# Patient Record
Sex: MALE | Race: WHITE | Employment: OTHER | URBAN - METROPOLITAN AREA
[De-identification: names, ages, dates, MRNs, and addresses within clinical notes are randomized per-mention and may not be internally consistent; named-entity substitution may affect disease eponyms.]

---

## 2017-03-23 ENCOUNTER — HOSPITAL ENCOUNTER (OUTPATIENT)
Dept: GENERAL RADIOLOGY | Age: 67
Discharge: HOME | End: 2017-03-23
Attending: PSYCHIATRY & NEUROLOGY | Admitting: PSYCHIATRY & NEUROLOGY

## 2017-03-23 LAB
ALBUMIN SERPL-MCNC: 4.7 GM/DL (ref 3.4–5)
ALP BLD-CCNC: 97 IU/L (ref 40–128)
ALT SERPL-CCNC: 12 U/L (ref 10–40)
ANION GAP SERPL CALCULATED.3IONS-SCNC: 11 MMOL/L (ref 4–16)
AST SERPL-CCNC: 13 IU/L (ref 15–37)
BASOPHILS ABSOLUTE: 0 K/CU MM
BASOPHILS RELATIVE PERCENT: 0.3 % (ref 0–1)
BILIRUB SERPL-MCNC: 0.7 MG/DL (ref 0–1)
BILIRUB SERPL-MCNC: 0.7 MG/DL (ref 0–1)
BUN BLDV-MCNC: 12 MG/DL (ref 6–23)
CALCIUM SERPL-MCNC: 10.3 MG/DL (ref 8.3–10.6)
CHLORIDE BLD-SCNC: 100 MMOL/L (ref 99–110)
CO2: 28 MMOL/L (ref 21–32)
CREAT SERPL-MCNC: 0.9 MG/DL (ref 0.9–1.3)
DIFFERENTIAL TYPE: ABNORMAL
EOSINOPHILS ABSOLUTE: 0.1 K/CU MM
EOSINOPHILS RELATIVE PERCENT: 0.6 % (ref 0–3)
ERYTHROCYTE SEDIMENTATION RATE: 12 MM/HR (ref 0–20)
GFR AFRICAN AMERICAN: >60 ML/MIN/1.73M2
GFR NON-AFRICAN AMERICAN: >60 ML/MIN/1.73M2
GLUCOSE BLD-MCNC: 88 MG/DL (ref 70–140)
HCT VFR BLD CALC: 43.7 % (ref 42–52)
HEMOGLOBIN: 13.7 GM/DL (ref 13.5–18)
IMMATURE NEUTROPHIL %: 0.4 % (ref 0–0.43)
LYMPHOCYTES ABSOLUTE: 1.6 K/CU MM
LYMPHOCYTES RELATIVE PERCENT: 16.4 % (ref 24–44)
MCH RBC QN AUTO: 28.6 PG (ref 27–31)
MCHC RBC AUTO-ENTMCNC: 31.4 % (ref 32–36)
MCV RBC AUTO: 91.2 FL (ref 78–100)
MONOCYTES ABSOLUTE: 0.6 K/CU MM
MONOCYTES RELATIVE PERCENT: 6.3 % (ref 0–4)
NUCLEATED RBC %: 0 %
PDW BLD-RTO: 14.6 % (ref 11.7–14.9)
PLATELET # BLD: 181 K/CU MM (ref 140–440)
PMV BLD AUTO: 10.5 FL (ref 7.5–11.1)
POTASSIUM SERPL-SCNC: 4.4 MMOL/L (ref 3.5–5.1)
RBC # BLD: 4.79 M/CU MM (ref 4.6–6.2)
SEGMENTED NEUTROPHILS ABSOLUTE COUNT: 7.4 K/CU MM
SEGMENTED NEUTROPHILS RELATIVE PERCENT: 76 % (ref 36–66)
SODIUM BLD-SCNC: 139 MMOL/L (ref 135–145)
TOTAL CK: 49 IU/L (ref 38–174)
TOTAL IMMATURE NEUTOROPHIL: 0.04 K/CU MM
TOTAL NUCLEATED RBC: 0 K/CU MM
TOTAL PROTEIN: 7 GM/DL (ref 6.4–8.2)
TSH HIGH SENSITIVITY: 1.97 UIU/ML (ref 0.27–4.2)
WBC # BLD: 9.8 K/CU MM (ref 4–10.5)

## 2017-03-24 LAB
ALBUMIN ELP: 4 GM/DL (ref 3.2–5.6)
ALPHA-1-GLOBULIN: 0.3 GM/DL (ref 0.1–0.4)
ALPHA-2-GLOBULIN: 0.7 GM/DL (ref 0.4–1.2)
BETA GLOBULIN: 0.9 GM/DL (ref 0.5–1.3)
FOLATE: 3.6 NG/ML (ref 3.1–17.5)
GAMMA GLOBULIN: 1.1 GM/DL (ref 0.5–1.6)
T3 UPTAKE PERCENT: 36
T4 TOTAL: 7.57 UG/DL
TOTAL PROTEIN: 7 GM/DL (ref 6.4–8.2)
VITAMIN B-12: 1868 PG/ML (ref 211–911)

## 2017-03-25 LAB — ANTI-NUCLEAR ANTIBODY (ANA): NORMAL

## 2017-05-30 ENCOUNTER — HOSPITAL ENCOUNTER (OUTPATIENT)
Dept: ULTRASOUND IMAGING | Age: 67
Discharge: HOME | End: 2017-05-30
Attending: FAMILY MEDICINE | Admitting: FAMILY MEDICINE

## 2017-05-30 DIAGNOSIS — R22.42 LOCALIZED SWELLING, MASS AND LUMP, LEFT LOWER LIMB: ICD-10-CM

## 2017-06-05 ENCOUNTER — OFFICE VISIT (OUTPATIENT)
Dept: ORTHOPEDIC SURGERY | Age: 67
End: 2017-06-05

## 2017-06-05 VITALS — RESPIRATION RATE: 16 BRPM | BODY MASS INDEX: 23.98 KG/M2 | HEIGHT: 72 IN | WEIGHT: 177 LBS

## 2017-06-05 DIAGNOSIS — M70.22 OLECRANON BURSITIS OF LEFT ELBOW: Primary | ICD-10-CM

## 2017-06-05 PROCEDURE — 3017F COLORECTAL CA SCREEN DOC REV: CPT | Performed by: PHYSICIAN ASSISTANT

## 2017-06-05 PROCEDURE — 4040F PNEUMOC VAC/ADMIN/RCVD: CPT | Performed by: PHYSICIAN ASSISTANT

## 2017-06-05 PROCEDURE — G8427 DOCREV CUR MEDS BY ELIG CLIN: HCPCS | Performed by: PHYSICIAN ASSISTANT

## 2017-06-05 PROCEDURE — 73080 X-RAY EXAM OF ELBOW: CPT | Performed by: PHYSICIAN ASSISTANT

## 2017-06-05 PROCEDURE — 99204 OFFICE O/P NEW MOD 45 MIN: CPT | Performed by: PHYSICIAN ASSISTANT

## 2017-06-05 PROCEDURE — G8420 CALC BMI NORM PARAMETERS: HCPCS | Performed by: PHYSICIAN ASSISTANT

## 2017-06-05 PROCEDURE — 4004F PT TOBACCO SCREEN RCVD TLK: CPT | Performed by: PHYSICIAN ASSISTANT

## 2017-06-05 ASSESSMENT — ENCOUNTER SYMPTOMS
CONSTIPATION: 1
EYES NEGATIVE: 1
RESPIRATORY NEGATIVE: 1

## 2017-11-30 ENCOUNTER — HOSPITAL ENCOUNTER (OUTPATIENT)
Dept: ULTRASOUND IMAGING | Age: 67
Discharge: HOME | End: 2017-11-30
Attending: UROLOGY | Admitting: UROLOGY

## 2017-11-30 DIAGNOSIS — R39.12 POOR URINARY STREAM: ICD-10-CM

## 2018-10-04 ENCOUNTER — APPOINTMENT (OUTPATIENT)
Dept: GENERAL RADIOLOGY | Age: 68
DRG: 682 | End: 2018-10-04
Payer: MEDICARE

## 2018-10-04 ENCOUNTER — APPOINTMENT (OUTPATIENT)
Dept: CT IMAGING | Age: 68
DRG: 682 | End: 2018-10-04
Payer: MEDICARE

## 2018-10-04 ENCOUNTER — HOSPITAL ENCOUNTER (INPATIENT)
Age: 68
LOS: 1 days | Discharge: ANOTHER HOSPITAL | DRG: 682 | End: 2018-10-05
Attending: EMERGENCY MEDICINE | Admitting: FAMILY MEDICINE
Payer: MEDICARE

## 2018-10-04 DIAGNOSIS — R53.83 FATIGUE, UNSPECIFIED TYPE: ICD-10-CM

## 2018-10-04 DIAGNOSIS — N17.9 AKI (ACUTE KIDNEY INJURY) (HCC): Primary | ICD-10-CM

## 2018-10-04 PROBLEM — G20.A1 PARKINSON DISEASE (HCC): Status: ACTIVE | Noted: 2018-10-04

## 2018-10-04 PROBLEM — G20 PARKINSON DISEASE (HCC): Status: ACTIVE | Noted: 2018-10-04

## 2018-10-04 PROBLEM — I10 HYPERTENSION: Status: ACTIVE | Noted: 2018-10-04

## 2018-10-04 PROBLEM — E78.5 HYPERLIPIDEMIA: Status: ACTIVE | Noted: 2018-10-04

## 2018-10-04 LAB
ALBUMIN SERPL-MCNC: 4.2 GM/DL (ref 3.4–5)
ALP BLD-CCNC: 93 IU/L (ref 40–129)
ALT SERPL-CCNC: 9 U/L (ref 10–40)
AMMONIA: 18 UMOL/L (ref 16–60)
ANION GAP SERPL CALCULATED.3IONS-SCNC: 10 MMOL/L (ref 4–16)
APTT: 27.7 SECONDS (ref 24–40)
AST SERPL-CCNC: 19 IU/L (ref 15–37)
BASOPHILS ABSOLUTE: 0 K/CU MM
BASOPHILS RELATIVE PERCENT: 0.3 % (ref 0–1)
BILIRUB SERPL-MCNC: 0.7 MG/DL (ref 0–1)
BUN BLDV-MCNC: 40 MG/DL (ref 6–23)
CALCIUM SERPL-MCNC: 10.4 MG/DL (ref 8.3–10.6)
CHLORIDE BLD-SCNC: 102 MMOL/L (ref 99–110)
CHP ED QC CHECK: YES
CO2: 35 MMOL/L (ref 21–32)
CREAT SERPL-MCNC: 1.6 MG/DL (ref 0.9–1.3)
D DIMER: 231 NG/ML(DDU)
DIFFERENTIAL TYPE: ABNORMAL
EOSINOPHILS ABSOLUTE: 0 K/CU MM
EOSINOPHILS RELATIVE PERCENT: 0.1 % (ref 0–3)
GFR AFRICAN AMERICAN: 52 ML/MIN/1.73M2
GFR NON-AFRICAN AMERICAN: 43 ML/MIN/1.73M2
GLUCOSE BLD-MCNC: 129 MG/DL
GLUCOSE BLD-MCNC: 129 MG/DL (ref 70–99)
GLUCOSE BLD-MCNC: 142 MG/DL (ref 70–99)
HCT VFR BLD CALC: 39 % (ref 42–52)
HEMOGLOBIN: 12.2 GM/DL (ref 13.5–18)
HIGH SENSITIVE C-REACTIVE PROTEIN: 13.2 MG/L
IMMATURE NEUTROPHIL %: 0.4 % (ref 0–0.43)
INR BLD: 1.07 INDEX
LYMPHOCYTES ABSOLUTE: 1.5 K/CU MM
LYMPHOCYTES RELATIVE PERCENT: 15.6 % (ref 24–44)
MAGNESIUM: 2.3 MG/DL (ref 1.8–2.4)
MCH RBC QN AUTO: 28.6 PG (ref 27–31)
MCHC RBC AUTO-ENTMCNC: 31.3 % (ref 32–36)
MCV RBC AUTO: 91.5 FL (ref 78–100)
MONOCYTES ABSOLUTE: 0.6 K/CU MM
MONOCYTES RELATIVE PERCENT: 5.8 % (ref 0–4)
PDW BLD-RTO: 13.2 % (ref 11.7–14.9)
PLATELET # BLD: 158 K/CU MM (ref 140–440)
PMV BLD AUTO: 10.3 FL (ref 7.5–11.1)
POTASSIUM SERPL-SCNC: 3.5 MMOL/L (ref 3.5–5.1)
PRO-BNP: 2038 PG/ML
PROTHROMBIN TIME: 12.2 SECONDS (ref 9.12–12.5)
RBC # BLD: 4.26 M/CU MM (ref 4.6–6.2)
SEGMENTED NEUTROPHILS ABSOLUTE COUNT: 7.4 K/CU MM
SEGMENTED NEUTROPHILS RELATIVE PERCENT: 77.8 % (ref 36–66)
SODIUM BLD-SCNC: 147 MMOL/L (ref 135–145)
TOTAL IMMATURE NEUTOROPHIL: 0.04 K/CU MM
TOTAL PROTEIN: 7.1 GM/DL (ref 6.4–8.2)
TROPONIN T: 0.02 NG/ML
TROPONIN T: 0.02 NG/ML
WBC # BLD: 9.5 K/CU MM (ref 4–10.5)

## 2018-10-04 PROCEDURE — 2140000000 HC CCU INTERMEDIATE R&B

## 2018-10-04 PROCEDURE — 6370000000 HC RX 637 (ALT 250 FOR IP): Performed by: NURSE PRACTITIONER

## 2018-10-04 PROCEDURE — 36415 COLL VENOUS BLD VENIPUNCTURE: CPT

## 2018-10-04 PROCEDURE — 85025 COMPLETE CBC W/AUTO DIFF WBC: CPT

## 2018-10-04 PROCEDURE — 2580000003 HC RX 258: Performed by: EMERGENCY MEDICINE

## 2018-10-04 PROCEDURE — 82140 ASSAY OF AMMONIA: CPT

## 2018-10-04 PROCEDURE — 82962 GLUCOSE BLOOD TEST: CPT

## 2018-10-04 PROCEDURE — 70450 CT HEAD/BRAIN W/O DYE: CPT

## 2018-10-04 PROCEDURE — 83735 ASSAY OF MAGNESIUM: CPT

## 2018-10-04 PROCEDURE — 85610 PROTHROMBIN TIME: CPT

## 2018-10-04 PROCEDURE — 80053 COMPREHEN METABOLIC PANEL: CPT

## 2018-10-04 PROCEDURE — 1200000000 HC SEMI PRIVATE

## 2018-10-04 PROCEDURE — 85730 THROMBOPLASTIN TIME PARTIAL: CPT

## 2018-10-04 PROCEDURE — 2580000003 HC RX 258: Performed by: FAMILY MEDICINE

## 2018-10-04 PROCEDURE — 86141 C-REACTIVE PROTEIN HS: CPT

## 2018-10-04 PROCEDURE — 84484 ASSAY OF TROPONIN QUANT: CPT

## 2018-10-04 PROCEDURE — 85379 FIBRIN DEGRADATION QUANT: CPT

## 2018-10-04 PROCEDURE — 71045 X-RAY EXAM CHEST 1 VIEW: CPT

## 2018-10-04 PROCEDURE — 99285 EMERGENCY DEPT VISIT HI MDM: CPT

## 2018-10-04 PROCEDURE — 6360000002 HC RX W HCPCS: Performed by: NURSE PRACTITIONER

## 2018-10-04 PROCEDURE — 83880 ASSAY OF NATRIURETIC PEPTIDE: CPT

## 2018-10-04 RX ORDER — DOCUSATE SODIUM 100 MG/1
100 CAPSULE, LIQUID FILLED ORAL 2 TIMES DAILY
Status: DISCONTINUED | OUTPATIENT
Start: 2018-10-04 | End: 2018-10-05

## 2018-10-04 RX ORDER — SODIUM CHLORIDE 0.9 % (FLUSH) 0.9 %
10 SYRINGE (ML) INJECTION EVERY 12 HOURS SCHEDULED
Status: DISCONTINUED | OUTPATIENT
Start: 2018-10-04 | End: 2018-10-05 | Stop reason: HOSPADM

## 2018-10-04 RX ORDER — ATORVASTATIN CALCIUM 40 MG/1
40 TABLET, FILM COATED ORAL NIGHTLY
Status: DISCONTINUED | OUTPATIENT
Start: 2018-10-04 | End: 2018-10-05

## 2018-10-04 RX ORDER — SODIUM CHLORIDE 9 MG/ML
INJECTION, SOLUTION INTRAVENOUS CONTINUOUS
Status: DISCONTINUED | OUTPATIENT
Start: 2018-10-04 | End: 2018-10-04 | Stop reason: SDUPTHER

## 2018-10-04 RX ORDER — ASPIRIN 81 MG/1
81 TABLET ORAL DAILY
Status: DISCONTINUED | OUTPATIENT
Start: 2018-10-05 | End: 2018-10-05 | Stop reason: HOSPADM

## 2018-10-04 RX ORDER — SODIUM CHLORIDE 0.9 % (FLUSH) 0.9 %
10 SYRINGE (ML) INJECTION PRN
Status: DISCONTINUED | OUTPATIENT
Start: 2018-10-04 | End: 2018-10-05 | Stop reason: HOSPADM

## 2018-10-04 RX ORDER — CLONAZEPAM 1 MG/1
2 TABLET ORAL 2 TIMES DAILY
Status: DISCONTINUED | OUTPATIENT
Start: 2018-10-04 | End: 2018-10-05

## 2018-10-04 RX ORDER — TRIHEXYPHENIDYL HYDROCHLORIDE 2 MG/1
2 TABLET ORAL 2 TIMES DAILY
Status: DISCONTINUED | OUTPATIENT
Start: 2018-10-05 | End: 2018-10-05 | Stop reason: HOSPADM

## 2018-10-04 RX ORDER — LISINOPRIL 10 MG/1
10 TABLET ORAL DAILY
Status: DISCONTINUED | OUTPATIENT
Start: 2018-10-05 | End: 2018-10-05

## 2018-10-04 RX ORDER — TIZANIDINE 4 MG/1
2 TABLET ORAL 3 TIMES DAILY
Status: DISCONTINUED | OUTPATIENT
Start: 2018-10-04 | End: 2018-10-05

## 2018-10-04 RX ORDER — ONDANSETRON 2 MG/ML
4 INJECTION INTRAMUSCULAR; INTRAVENOUS EVERY 6 HOURS PRN
Status: DISCONTINUED | OUTPATIENT
Start: 2018-10-04 | End: 2018-10-05 | Stop reason: HOSPADM

## 2018-10-04 RX ORDER — BACLOFEN 10 MG/1
10 TABLET ORAL 2 TIMES DAILY
Status: DISCONTINUED | OUTPATIENT
Start: 2018-10-04 | End: 2018-10-05

## 2018-10-04 RX ORDER — HYDRALAZINE HYDROCHLORIDE 20 MG/ML
5 INJECTION INTRAMUSCULAR; INTRAVENOUS ONCE
Status: COMPLETED | OUTPATIENT
Start: 2018-10-04 | End: 2018-10-04

## 2018-10-04 RX ORDER — SODIUM CHLORIDE 9 MG/ML
INJECTION, SOLUTION INTRAVENOUS CONTINUOUS
Status: DISCONTINUED | OUTPATIENT
Start: 2018-10-04 | End: 2018-10-05 | Stop reason: HOSPADM

## 2018-10-04 RX ORDER — CLOPIDOGREL BISULFATE 75 MG/1
75 TABLET ORAL DAILY
Status: DISCONTINUED | OUTPATIENT
Start: 2018-10-05 | End: 2018-10-05 | Stop reason: HOSPADM

## 2018-10-04 RX ORDER — SENNA AND DOCUSATE SODIUM 50; 8.6 MG/1; MG/1
1 TABLET, FILM COATED ORAL DAILY
Status: DISCONTINUED | OUTPATIENT
Start: 2018-10-04 | End: 2018-10-05

## 2018-10-04 RX ADMIN — CLONAZEPAM 2 MG: 1 TABLET ORAL at 21:01

## 2018-10-04 RX ADMIN — HYDRALAZINE HYDROCHLORIDE 5 MG: 20 INJECTION INTRAMUSCULAR; INTRAVENOUS at 23:25

## 2018-10-04 RX ADMIN — SODIUM CHLORIDE: 9 INJECTION, SOLUTION INTRAVENOUS at 18:08

## 2018-10-04 RX ADMIN — ATORVASTATIN CALCIUM 40 MG: 40 TABLET, FILM COATED ORAL at 21:01

## 2018-10-04 RX ADMIN — TIZANIDINE 2 MG: 4 TABLET ORAL at 21:01

## 2018-10-04 RX ADMIN — SODIUM CHLORIDE: 9 INJECTION, SOLUTION INTRAVENOUS at 20:45

## 2018-10-04 RX ADMIN — DOCUSATE SODIUM 100 MG: 100 CAPSULE, LIQUID FILLED ORAL at 21:01

## 2018-10-04 RX ADMIN — BACLOFEN 10 MG: 10 TABLET ORAL at 21:01

## 2018-10-04 ASSESSMENT — PAIN SCALES - GENERAL
PAINLEVEL_OUTOF10: 0

## 2018-10-04 NOTE — ED PROVIDER NOTES
infusion   Intravenous Continuous Rodriguez Odonnell MD         Current Outpatient Prescriptions   Medication Sig Dispense Refill    sennosides-docusate sodium (SENOKOT-S) 8.6-50 MG tablet Take 1 tablet by mouth daily 30 tablet 0    baclofen (LIORESAL) 10 MG tablet Take 10 mg by mouth 2 times daily      atorvastatin (LIPITOR) 80 MG tablet Take 0.5 tablets by mouth nightly 30 tablet 3    docusate sodium (COLACE) 100 MG capsule Take 1 capsule by mouth 2 times daily. 60 capsule 0    clopidogrel (PLAVIX) 75 MG tablet Take 75 mg by mouth daily.  lisinopril (PRINIVIL;ZESTRIL) 10 MG tablet Take 10 mg by mouth daily.  carisoprodol (SOMA) 350 MG tablet Take 350 mg by mouth 4 times daily as needed for Muscle spasms.  aspirin 81 MG EC tablet Take 81 mg by mouth daily.  clopidogrel (PLAVIX) 75 MG tablet Take 1 tablet by mouth daily. 30 tablet 11    clonazepam (KLONOPIN) 0.5 MG tablet Take 2 mg by mouth 2 times daily. 1.5 mg twice daily      trihexyphenidyl (ARTANE) 2 MG tablet Take 2 mg by mouth 2 times daily. Allergies   Allergen Reactions    Daypro [Oxaprozin]        Nursing Notes Reviewed    Physical Exam:  ED Triage Vitals [10/04/18 1640]   Enc Vitals Group      /80      Pulse 60      Resp 12      Temp 98.1 °F (36.7 °C)      Temp Source Oral      SpO2 99 %      Weight 170 lb (77.1 kg)      Height 6' (1.829 m)      Head Circumference       Peak Flow       Pain Score       Pain Loc       Pain Edu? Excl. in [address]? My pulse ox interpretation is  normal    General appearance:  No acute distress. Skin:  Warm. Dry. Sacral decubitus ulcer to muscle tissue, skin discoloration around it, no crepitus, nontender, no drainage. Eye:  Extraocular movements intact. Ears, nose, mouth and throat:  Oral mucosa moist   Neck:  Trachea midline. Extremity:  No swelling. Normal ROM     Heart:  Regular rate and rhythm, normal S1 & S2, no extra heart sounds.     Perfusion:

## 2018-10-05 ENCOUNTER — APPOINTMENT (OUTPATIENT)
Dept: NUCLEAR MEDICINE | Age: 68
DRG: 094 | End: 2018-10-05
Attending: HOSPITALIST
Payer: MEDICARE

## 2018-10-05 ENCOUNTER — HOSPITAL ENCOUNTER (INPATIENT)
Age: 68
LOS: 4 days | Discharge: NURSING, REHAB OR LONG-TERM CARE | DRG: 094 | End: 2018-10-09
Attending: HOSPITALIST | Admitting: HOSPITALIST
Payer: MEDICARE

## 2018-10-05 VITALS
BODY MASS INDEX: 21.25 KG/M2 | HEIGHT: 72 IN | WEIGHT: 156.9 LBS | SYSTOLIC BLOOD PRESSURE: 144 MMHG | RESPIRATION RATE: 12 BRPM | OXYGEN SATURATION: 99 % | TEMPERATURE: 96.8 F | HEART RATE: 60 BPM | DIASTOLIC BLOOD PRESSURE: 73 MMHG

## 2018-10-05 DIAGNOSIS — G20 PARKINSON DISEASE (HCC): Primary | ICD-10-CM

## 2018-10-05 PROBLEM — G93.40 ACUTE ENCEPHALOPATHY: Status: ACTIVE | Noted: 2018-10-05

## 2018-10-05 LAB
ANION GAP SERPL CALCULATED.3IONS-SCNC: 16 MMOL/L (ref 4–16)
BASOPHILS ABSOLUTE: 0 K/CU MM
BASOPHILS RELATIVE PERCENT: 0.4 % (ref 0–1)
BUN BLDV-MCNC: 36 MG/DL (ref 6–23)
CALCIUM SERPL-MCNC: 9.4 MG/DL (ref 8.3–10.6)
CHLORIDE BLD-SCNC: 104 MMOL/L (ref 99–110)
CO2: 26 MMOL/L (ref 21–32)
CREAT SERPL-MCNC: 1.4 MG/DL (ref 0.9–1.3)
DIFFERENTIAL TYPE: ABNORMAL
EOSINOPHILS ABSOLUTE: 0.1 K/CU MM
EOSINOPHILS RELATIVE PERCENT: 1 % (ref 0–3)
GFR AFRICAN AMERICAN: >60 ML/MIN/1.73M2
GFR NON-AFRICAN AMERICAN: 50 ML/MIN/1.73M2
GLUCOSE BLD-MCNC: 100 MG/DL (ref 70–99)
HCT VFR BLD CALC: 35.5 % (ref 42–52)
HEMOGLOBIN: 11 GM/DL (ref 13.5–18)
IMMATURE NEUTROPHIL %: 0.3 % (ref 0–0.43)
LYMPHOCYTES ABSOLUTE: 2 K/CU MM
LYMPHOCYTES RELATIVE PERCENT: 27.5 % (ref 24–44)
MAGNESIUM: 2.2 MG/DL (ref 1.8–2.4)
MCH RBC QN AUTO: 28.6 PG (ref 27–31)
MCHC RBC AUTO-ENTMCNC: 31 % (ref 32–36)
MCV RBC AUTO: 92.2 FL (ref 78–100)
MONOCYTES ABSOLUTE: 0.6 K/CU MM
MONOCYTES RELATIVE PERCENT: 8.7 % (ref 0–4)
PDW BLD-RTO: 13.1 % (ref 11.7–14.9)
PLATELET # BLD: 129 K/CU MM (ref 140–440)
PMV BLD AUTO: 9.8 FL (ref 7.5–11.1)
POTASSIUM SERPL-SCNC: 3.1 MMOL/L (ref 3.5–5.1)
RBC # BLD: 3.85 M/CU MM (ref 4.6–6.2)
SEGMENTED NEUTROPHILS ABSOLUTE COUNT: 4.4 K/CU MM
SEGMENTED NEUTROPHILS RELATIVE PERCENT: 62.1 % (ref 36–66)
SODIUM BLD-SCNC: 146 MMOL/L (ref 135–145)
TOTAL IMMATURE NEUTOROPHIL: 0.02 K/CU MM
TROPONIN T: 0.01 NG/ML
TROPONIN T: 0.02 NG/ML
WBC # BLD: 7.1 K/CU MM (ref 4–10.5)

## 2018-10-05 PROCEDURE — 1200000000 HC SEMI PRIVATE

## 2018-10-05 PROCEDURE — G8988 SELF CARE GOAL STATUS: HCPCS

## 2018-10-05 PROCEDURE — 80048 BASIC METABOLIC PNL TOTAL CA: CPT

## 2018-10-05 PROCEDURE — 6360000002 HC RX W HCPCS: Performed by: NURSE PRACTITIONER

## 2018-10-05 PROCEDURE — 83735 ASSAY OF MAGNESIUM: CPT

## 2018-10-05 PROCEDURE — 97167 OT EVAL HIGH COMPLEX 60 MIN: CPT

## 2018-10-05 PROCEDURE — 6370000000 HC RX 637 (ALT 250 FOR IP): Performed by: FAMILY MEDICINE

## 2018-10-05 PROCEDURE — G8979 MOBILITY GOAL STATUS: HCPCS

## 2018-10-05 PROCEDURE — A9540 TC99M MAA: HCPCS | Performed by: NURSE PRACTITIONER

## 2018-10-05 PROCEDURE — 3430000000 HC RX DIAGNOSTIC RADIOPHARMACEUTICAL: Performed by: NURSE PRACTITIONER

## 2018-10-05 PROCEDURE — 2580000003 HC RX 258: Performed by: NURSE PRACTITIONER

## 2018-10-05 PROCEDURE — 99211 OFF/OP EST MAY X REQ PHY/QHP: CPT

## 2018-10-05 PROCEDURE — 97530 THERAPEUTIC ACTIVITIES: CPT

## 2018-10-05 PROCEDURE — 97163 PT EVAL HIGH COMPLEX 45 MIN: CPT

## 2018-10-05 PROCEDURE — G8978 MOBILITY CURRENT STATUS: HCPCS

## 2018-10-05 PROCEDURE — 6370000000 HC RX 637 (ALT 250 FOR IP): Performed by: NURSE PRACTITIONER

## 2018-10-05 PROCEDURE — 94761 N-INVAS EAR/PLS OXIMETRY MLT: CPT

## 2018-10-05 PROCEDURE — 84484 ASSAY OF TROPONIN QUANT: CPT

## 2018-10-05 PROCEDURE — 6360000002 HC RX W HCPCS: Performed by: PSYCHIATRY & NEUROLOGY

## 2018-10-05 PROCEDURE — G8987 SELF CARE CURRENT STATUS: HCPCS

## 2018-10-05 PROCEDURE — 78582 LUNG VENTILAT&PERFUS IMAGING: CPT

## 2018-10-05 PROCEDURE — A9539 TC99M PENTETATE: HCPCS | Performed by: NURSE PRACTITIONER

## 2018-10-05 PROCEDURE — 36415 COLL VENOUS BLD VENIPUNCTURE: CPT

## 2018-10-05 PROCEDURE — 85025 COMPLETE CBC W/AUTO DIFF WBC: CPT

## 2018-10-05 PROCEDURE — 6370000000 HC RX 637 (ALT 250 FOR IP): Performed by: PSYCHIATRY & NEUROLOGY

## 2018-10-05 PROCEDURE — 2580000003 HC RX 258: Performed by: FAMILY MEDICINE

## 2018-10-05 RX ORDER — TRIHEXYPHENIDYL HYDROCHLORIDE 2 MG/1
2 TABLET ORAL 2 TIMES DAILY
Status: DISCONTINUED | OUTPATIENT
Start: 2018-10-05 | End: 2018-10-09 | Stop reason: HOSPADM

## 2018-10-05 RX ORDER — LANOLIN ALCOHOL/MO/W.PET/CERES
1000 CREAM (GRAM) TOPICAL DAILY
Status: DISCONTINUED | OUTPATIENT
Start: 2018-10-05 | End: 2018-10-05 | Stop reason: SDUPTHER

## 2018-10-05 RX ORDER — ASPIRIN 81 MG/1
81 TABLET ORAL DAILY
Status: DISCONTINUED | OUTPATIENT
Start: 2018-10-06 | End: 2018-10-05 | Stop reason: SDUPTHER

## 2018-10-05 RX ORDER — ACETAMINOPHEN 80 MG
TABLET,CHEWABLE ORAL
Status: DISCONTINUED
Start: 2018-10-05 | End: 2018-10-05 | Stop reason: HOSPADM

## 2018-10-05 RX ORDER — BACLOFEN 20 MG/1
20 TABLET ORAL 2 TIMES DAILY
COMMUNITY
End: 2018-10-11 | Stop reason: ALTCHOICE

## 2018-10-05 RX ORDER — DOXAZOSIN MESYLATE 4 MG/1
4 TABLET ORAL DAILY
Status: CANCELLED | OUTPATIENT
Start: 2018-10-06

## 2018-10-05 RX ORDER — TRIHEXYPHENIDYL HYDROCHLORIDE 2 MG/1
2 TABLET ORAL 2 TIMES DAILY
Status: CANCELLED | OUTPATIENT
Start: 2018-10-05

## 2018-10-05 RX ORDER — ONDANSETRON 2 MG/ML
4 INJECTION INTRAMUSCULAR; INTRAVENOUS EVERY 6 HOURS PRN
Status: DISCONTINUED | OUTPATIENT
Start: 2018-10-05 | End: 2018-10-05 | Stop reason: SDUPTHER

## 2018-10-05 RX ORDER — LANOLIN ALCOHOL/MO/W.PET/CERES
1000 CREAM (GRAM) TOPICAL DAILY
Status: DISCONTINUED | OUTPATIENT
Start: 2018-10-05 | End: 2018-10-05 | Stop reason: HOSPADM

## 2018-10-05 RX ORDER — CLONAZEPAM 0.5 MG/1
0.25 TABLET ORAL EVERY MORNING
Status: ON HOLD | COMMUNITY
End: 2018-10-14 | Stop reason: HOSPADM

## 2018-10-05 RX ORDER — FUROSEMIDE 20 MG/1
20 TABLET ORAL 2 TIMES DAILY
COMMUNITY
End: 2018-10-11 | Stop reason: ALTCHOICE

## 2018-10-05 RX ORDER — FUROSEMIDE 20 MG/1
20 TABLET ORAL 2 TIMES DAILY
Status: DISCONTINUED | OUTPATIENT
Start: 2018-10-05 | End: 2018-10-09 | Stop reason: HOSPADM

## 2018-10-05 RX ORDER — SODIUM CHLORIDE 9 MG/ML
INJECTION, SOLUTION INTRAVENOUS ONCE
Status: COMPLETED | OUTPATIENT
Start: 2018-10-05 | End: 2018-10-05

## 2018-10-05 RX ORDER — ATORVASTATIN CALCIUM 20 MG/1
20 TABLET, FILM COATED ORAL NIGHTLY
Status: DISCONTINUED | OUTPATIENT
Start: 2018-10-05 | End: 2018-10-05 | Stop reason: SDUPTHER

## 2018-10-05 RX ORDER — CLONAZEPAM 0.5 MG/1
0.25 TABLET ORAL DAILY
Status: DISCONTINUED | OUTPATIENT
Start: 2018-10-06 | End: 2018-10-05

## 2018-10-05 RX ORDER — DOXAZOSIN MESYLATE 4 MG/1
4 TABLET ORAL DAILY
Status: DISCONTINUED | OUTPATIENT
Start: 2018-10-05 | End: 2018-10-05 | Stop reason: HOSPADM

## 2018-10-05 RX ORDER — HEPARIN SODIUM 5000 [USP'U]/ML
5000 INJECTION, SOLUTION INTRAVENOUS; SUBCUTANEOUS EVERY 8 HOURS SCHEDULED
Status: DISCONTINUED | OUTPATIENT
Start: 2018-10-05 | End: 2018-10-09 | Stop reason: HOSPADM

## 2018-10-05 RX ORDER — METOLAZONE 5 MG/1
2.5 TABLET ORAL
Status: DISCONTINUED | OUTPATIENT
Start: 2018-10-05 | End: 2018-10-09 | Stop reason: HOSPADM

## 2018-10-05 RX ORDER — KIT FOR THE PREPARATION OF TECHNETIUM TC 99M PENTETATE 20 MG/1
3 INJECTION, POWDER, LYOPHILIZED, FOR SOLUTION INTRAVENOUS; RESPIRATORY (INHALATION)
Status: COMPLETED | OUTPATIENT
Start: 2018-10-05 | End: 2018-10-05

## 2018-10-05 RX ORDER — MEMANTINE HYDROCHLORIDE 10 MG/1
10 TABLET ORAL 2 TIMES DAILY
Status: DISCONTINUED | OUTPATIENT
Start: 2018-10-05 | End: 2018-10-05 | Stop reason: SDUPTHER

## 2018-10-05 RX ORDER — SODIUM CHLORIDE 0.9 % (FLUSH) 0.9 %
10 SYRINGE (ML) INJECTION EVERY 12 HOURS SCHEDULED
Status: DISCONTINUED | OUTPATIENT
Start: 2018-10-05 | End: 2018-10-05 | Stop reason: SDUPTHER

## 2018-10-05 RX ORDER — ATORVASTATIN CALCIUM 20 MG/1
20 TABLET, FILM COATED ORAL NIGHTLY
Status: DISCONTINUED | OUTPATIENT
Start: 2018-10-05 | End: 2018-10-05 | Stop reason: HOSPADM

## 2018-10-05 RX ORDER — INDOMETHACIN 50 MG/1
50 CAPSULE ORAL 2 TIMES DAILY PRN
COMMUNITY
End: 2019-02-06

## 2018-10-05 RX ORDER — ASPIRIN 81 MG/1
81 TABLET ORAL DAILY
Status: DISCONTINUED | OUTPATIENT
Start: 2018-10-05 | End: 2018-10-09 | Stop reason: HOSPADM

## 2018-10-05 RX ORDER — SODIUM CHLORIDE 0.9 % (FLUSH) 0.9 %
10 SYRINGE (ML) INJECTION PRN
Status: DISCONTINUED | OUTPATIENT
Start: 2018-10-05 | End: 2018-10-09 | Stop reason: HOSPADM

## 2018-10-05 RX ORDER — CLONAZEPAM 0.5 MG/1
0.25 TABLET ORAL DAILY
Status: CANCELLED | OUTPATIENT
Start: 2018-10-06

## 2018-10-05 RX ORDER — ONDANSETRON 2 MG/ML
4 INJECTION INTRAMUSCULAR; INTRAVENOUS EVERY 6 HOURS PRN
Status: CANCELLED | OUTPATIENT
Start: 2018-10-05

## 2018-10-05 RX ORDER — CLONAZEPAM 1 MG/1
1 TABLET ORAL NIGHTLY
Status: DISCONTINUED | OUTPATIENT
Start: 2018-10-05 | End: 2018-10-09

## 2018-10-05 RX ORDER — CLONAZEPAM 1 MG/1
2 TABLET ORAL NIGHTLY
Status: CANCELLED | OUTPATIENT
Start: 2018-10-05

## 2018-10-05 RX ORDER — MEMANTINE HYDROCHLORIDE 10 MG/1
10 TABLET ORAL 2 TIMES DAILY
COMMUNITY

## 2018-10-05 RX ORDER — CLONAZEPAM 2 MG/1
2 TABLET ORAL NIGHTLY
Status: ON HOLD | COMMUNITY
End: 2018-10-14 | Stop reason: HOSPADM

## 2018-10-05 RX ORDER — CLOPIDOGREL BISULFATE 75 MG/1
75 TABLET ORAL DAILY
Status: DISCONTINUED | OUTPATIENT
Start: 2018-10-06 | End: 2018-10-09 | Stop reason: HOSPADM

## 2018-10-05 RX ORDER — LANOLIN ALCOHOL/MO/W.PET/CERES
1000 CREAM (GRAM) TOPICAL DAILY
Status: DISCONTINUED | OUTPATIENT
Start: 2018-10-06 | End: 2018-10-06

## 2018-10-05 RX ORDER — CLONAZEPAM 1 MG/1
2 TABLET ORAL NIGHTLY
Status: DISCONTINUED | OUTPATIENT
Start: 2018-10-05 | End: 2018-10-05 | Stop reason: HOSPADM

## 2018-10-05 RX ORDER — BACLOFEN 10 MG/1
20 TABLET ORAL 2 TIMES DAILY
Status: CANCELLED | OUTPATIENT
Start: 2018-10-05

## 2018-10-05 RX ORDER — CLONAZEPAM 1 MG/1
2 TABLET ORAL NIGHTLY
Status: DISCONTINUED | OUTPATIENT
Start: 2018-10-05 | End: 2018-10-05

## 2018-10-05 RX ORDER — CLOPIDOGREL BISULFATE 75 MG/1
75 TABLET ORAL DAILY
Status: CANCELLED | OUTPATIENT
Start: 2018-10-06

## 2018-10-05 RX ORDER — LANOLIN ALCOHOL/MO/W.PET/CERES
1000 CREAM (GRAM) TOPICAL DAILY
Status: CANCELLED | OUTPATIENT
Start: 2018-10-06

## 2018-10-05 RX ORDER — SODIUM CHLORIDE 9 MG/ML
INJECTION, SOLUTION INTRAVENOUS CONTINUOUS
Status: CANCELLED | OUTPATIENT
Start: 2018-10-05

## 2018-10-05 RX ORDER — DOXAZOSIN MESYLATE 4 MG/1
4 TABLET ORAL DAILY
Status: DISCONTINUED | OUTPATIENT
Start: 2018-10-06 | End: 2018-10-09 | Stop reason: HOSPADM

## 2018-10-05 RX ORDER — CLONAZEPAM 0.5 MG/1
0.25 TABLET ORAL EVERY MORNING
Status: DISCONTINUED | OUTPATIENT
Start: 2018-10-06 | End: 2018-10-05 | Stop reason: SDUPTHER

## 2018-10-05 RX ORDER — CLONAZEPAM 0.5 MG/1
0.25 TABLET ORAL DAILY
Status: DISCONTINUED | OUTPATIENT
Start: 2018-10-05 | End: 2018-10-05 | Stop reason: HOSPADM

## 2018-10-05 RX ORDER — ACETAMINOPHEN 80 MG
TABLET,CHEWABLE ORAL
Status: COMPLETED
Start: 2018-10-05 | End: 2018-10-05

## 2018-10-05 RX ORDER — ATORVASTATIN CALCIUM 20 MG/1
20 TABLET, FILM COATED ORAL NIGHTLY
Status: CANCELLED | OUTPATIENT
Start: 2018-10-05

## 2018-10-05 RX ORDER — SODIUM CHLORIDE 9 MG/ML
INJECTION, SOLUTION INTRAVENOUS CONTINUOUS
Status: DISCONTINUED | OUTPATIENT
Start: 2018-10-05 | End: 2018-10-09 | Stop reason: HOSPADM

## 2018-10-05 RX ORDER — INDOMETHACIN 25 MG/1
50 CAPSULE ORAL 2 TIMES DAILY PRN
Status: DISCONTINUED | OUTPATIENT
Start: 2018-10-05 | End: 2018-10-05 | Stop reason: SDUPTHER

## 2018-10-05 RX ORDER — DONEPEZIL HYDROCHLORIDE 5 MG/1
5 TABLET, FILM COATED ORAL NIGHTLY
Status: ON HOLD | COMMUNITY
End: 2018-10-09 | Stop reason: HOSPADM

## 2018-10-05 RX ORDER — BACLOFEN 10 MG/1
20 TABLET ORAL 2 TIMES DAILY
Status: DISCONTINUED | OUTPATIENT
Start: 2018-10-05 | End: 2018-10-05 | Stop reason: SDUPTHER

## 2018-10-05 RX ORDER — ONDANSETRON 2 MG/ML
4 INJECTION INTRAMUSCULAR; INTRAVENOUS EVERY 6 HOURS PRN
Status: DISCONTINUED | OUTPATIENT
Start: 2018-10-05 | End: 2018-10-09 | Stop reason: HOSPADM

## 2018-10-05 RX ORDER — SODIUM CHLORIDE 0.9 % (FLUSH) 0.9 %
10 SYRINGE (ML) INJECTION PRN
Status: DISCONTINUED | OUTPATIENT
Start: 2018-10-05 | End: 2018-10-05 | Stop reason: SDUPTHER

## 2018-10-05 RX ORDER — SODIUM CHLORIDE 0.9 % (FLUSH) 0.9 %
10 SYRINGE (ML) INJECTION PRN
Status: CANCELLED | OUTPATIENT
Start: 2018-10-05

## 2018-10-05 RX ORDER — MEMANTINE HYDROCHLORIDE 10 MG/1
10 TABLET ORAL 2 TIMES DAILY
Status: DISCONTINUED | OUTPATIENT
Start: 2018-10-05 | End: 2018-10-05 | Stop reason: HOSPADM

## 2018-10-05 RX ORDER — ATORVASTATIN CALCIUM 20 MG/1
20 TABLET, FILM COATED ORAL NIGHTLY
Status: DISCONTINUED | OUTPATIENT
Start: 2018-10-05 | End: 2018-10-09 | Stop reason: HOSPADM

## 2018-10-05 RX ORDER — BACLOFEN 10 MG/1
20 TABLET ORAL 2 TIMES DAILY
Status: DISCONTINUED | OUTPATIENT
Start: 2018-10-05 | End: 2018-10-05 | Stop reason: HOSPADM

## 2018-10-05 RX ORDER — MEMANTINE HYDROCHLORIDE 10 MG/1
10 TABLET ORAL 2 TIMES DAILY
Status: DISCONTINUED | OUTPATIENT
Start: 2018-10-05 | End: 2018-10-09 | Stop reason: HOSPADM

## 2018-10-05 RX ORDER — CLOPIDOGREL BISULFATE 75 MG/1
75 TABLET ORAL DAILY
Status: DISCONTINUED | OUTPATIENT
Start: 2018-10-05 | End: 2018-10-05 | Stop reason: SDUPTHER

## 2018-10-05 RX ORDER — CLONAZEPAM 1 MG/1
2 TABLET ORAL NIGHTLY
Status: DISCONTINUED | OUTPATIENT
Start: 2018-10-05 | End: 2018-10-05 | Stop reason: SDUPTHER

## 2018-10-05 RX ORDER — POTASSIUM CHLORIDE 7.45 MG/ML
10 INJECTION INTRAVENOUS PRN
Status: DISCONTINUED | OUTPATIENT
Start: 2018-10-05 | End: 2018-10-09 | Stop reason: HOSPADM

## 2018-10-05 RX ORDER — METOLAZONE 2.5 MG/1
2.5 TABLET ORAL
Status: ON HOLD | COMMUNITY
End: 2018-10-06 | Stop reason: ALTCHOICE

## 2018-10-05 RX ORDER — POTASSIUM CHLORIDE 20MEQ/15ML
40 LIQUID (ML) ORAL PRN
Status: DISCONTINUED | OUTPATIENT
Start: 2018-10-05 | End: 2018-10-09 | Stop reason: HOSPADM

## 2018-10-05 RX ORDER — SODIUM CHLORIDE 0.9 % (FLUSH) 0.9 %
10 SYRINGE (ML) INJECTION EVERY 12 HOURS SCHEDULED
Status: DISCONTINUED | OUTPATIENT
Start: 2018-10-05 | End: 2018-10-09 | Stop reason: HOSPADM

## 2018-10-05 RX ORDER — TRIHEXYPHENIDYL HYDROCHLORIDE 2 MG/1
2 TABLET ORAL 2 TIMES DAILY
Status: DISCONTINUED | OUTPATIENT
Start: 2018-10-05 | End: 2018-10-05 | Stop reason: SDUPTHER

## 2018-10-05 RX ORDER — SODIUM CHLORIDE 9 MG/ML
INJECTION, SOLUTION INTRAVENOUS CONTINUOUS
Status: DISCONTINUED | OUTPATIENT
Start: 2018-10-05 | End: 2018-10-05 | Stop reason: SDUPTHER

## 2018-10-05 RX ORDER — BACLOFEN 10 MG/1
20 TABLET ORAL 2 TIMES DAILY
Status: DISCONTINUED | OUTPATIENT
Start: 2018-10-05 | End: 2018-10-05

## 2018-10-05 RX ORDER — DONEPEZIL HYDROCHLORIDE 5 MG/1
15 TABLET, FILM COATED ORAL NIGHTLY
Status: ON HOLD | COMMUNITY
End: 2018-10-06 | Stop reason: SDUPTHER

## 2018-10-05 RX ORDER — ASPIRIN 81 MG/1
81 TABLET ORAL DAILY
Status: CANCELLED | OUTPATIENT
Start: 2018-10-06

## 2018-10-05 RX ORDER — BACLOFEN 10 MG/1
10 TABLET ORAL 2 TIMES DAILY
Status: DISCONTINUED | OUTPATIENT
Start: 2018-10-05 | End: 2018-10-09 | Stop reason: HOSPADM

## 2018-10-05 RX ORDER — POTASSIUM CHLORIDE 20 MEQ/1
40 TABLET, EXTENDED RELEASE ORAL PRN
Status: DISCONTINUED | OUTPATIENT
Start: 2018-10-05 | End: 2018-10-09 | Stop reason: HOSPADM

## 2018-10-05 RX ORDER — SODIUM CHLORIDE 0.9 % (FLUSH) 0.9 %
10 SYRINGE (ML) INJECTION EVERY 12 HOURS SCHEDULED
Status: CANCELLED | OUTPATIENT
Start: 2018-10-05

## 2018-10-05 RX ORDER — MEMANTINE HYDROCHLORIDE 10 MG/1
10 TABLET ORAL 2 TIMES DAILY
Status: CANCELLED | OUTPATIENT
Start: 2018-10-05

## 2018-10-05 RX ORDER — ATORVASTATIN CALCIUM 20 MG/1
20 TABLET, FILM COATED ORAL NIGHTLY
COMMUNITY
End: 2019-02-05 | Stop reason: SDUPTHER

## 2018-10-05 RX ADMIN — FUROSEMIDE 20 MG: 20 TABLET ORAL at 22:28

## 2018-10-05 RX ADMIN — HEPARIN SODIUM 5000 UNITS: 5000 INJECTION INTRAVENOUS; SUBCUTANEOUS at 22:27

## 2018-10-05 RX ADMIN — SODIUM CHLORIDE: 9 INJECTION, SOLUTION INTRAVENOUS at 01:56

## 2018-10-05 RX ADMIN — SODIUM CHLORIDE: 9 INJECTION, SOLUTION INTRAVENOUS at 04:41

## 2018-10-05 RX ADMIN — DONEPEZIL HYDROCHLORIDE 15 MG: 10 TABLET, FILM COATED ORAL at 22:28

## 2018-10-05 RX ADMIN — CYANOCOBALAMIN TAB 1000 MCG 1000 MCG: 1000 TAB at 10:29

## 2018-10-05 RX ADMIN — CLOPIDOGREL BISULFATE 75 MG: 75 TABLET ORAL at 10:28

## 2018-10-05 RX ADMIN — KIT FOR THE PREPARATION OF TECHNETIUM TC 99M PENTETATE 3 MILLICURIE: 20 INJECTION, POWDER, LYOPHILIZED, FOR SOLUTION INTRAVENOUS; RESPIRATORY (INHALATION) at 15:35

## 2018-10-05 RX ADMIN — SODIUM CHLORIDE: 9 INJECTION, SOLUTION INTRAVENOUS at 18:40

## 2018-10-05 RX ADMIN — DOXAZOSIN 4 MG: 4 TABLET ORAL at 10:28

## 2018-10-05 RX ADMIN — CLONAZEPAM 0.25 MG: 0.5 TABLET ORAL at 09:56

## 2018-10-05 RX ADMIN — ATORVASTATIN CALCIUM 20 MG: 20 TABLET, FILM COATED ORAL at 22:28

## 2018-10-05 RX ADMIN — BACLOFEN 20 MG: 10 TABLET ORAL at 10:29

## 2018-10-05 RX ADMIN — TRIHEXYPHENIDYL HYDROCHLORIDE 2 MG: 2 TABLET ORAL at 10:29

## 2018-10-05 RX ADMIN — ASPIRIN 81 MG: 81 TABLET, COATED ORAL at 10:28

## 2018-10-05 RX ADMIN — BACLOFEN 10 MG: 10 TABLET ORAL at 22:28

## 2018-10-05 RX ADMIN — CLONAZEPAM 1 MG: 1 TABLET ORAL at 22:27

## 2018-10-05 RX ADMIN — MEMANTINE 10 MG: 10 TABLET ORAL at 10:28

## 2018-10-05 RX ADMIN — Medication: at 09:58

## 2018-10-05 RX ADMIN — METOLAZONE 2.5 MG: 5 TABLET ORAL at 19:42

## 2018-10-05 RX ADMIN — POTASSIUM CHLORIDE 40 MEQ: 40 SOLUTION ORAL at 19:42

## 2018-10-05 RX ADMIN — MEMANTINE 10 MG: 10 TABLET ORAL at 22:28

## 2018-10-05 RX ADMIN — METOPROLOL TARTRATE 12.5 MG: 25 TABLET ORAL at 22:29

## 2018-10-05 RX ADMIN — ASPIRIN 81 MG: 81 TABLET, COATED ORAL at 19:42

## 2018-10-05 RX ADMIN — Medication 3.6 MILLICURIE: at 15:35

## 2018-10-05 RX ADMIN — IMMUNE GLOBULIN INTRAVENOUS (HUMAN) 30 G: 5 INJECTION, SOLUTION INTRAVENOUS at 22:36

## 2018-10-05 ASSESSMENT — PAIN SCALES - GENERAL
PAINLEVEL_OUTOF10: 0

## 2018-10-05 NOTE — PROGRESS NOTES
(Simultaneous filing. User may not have seen previous data.)  Home Access: Stairs to enter with rails (Simultaneous filing. User may not have seen previous data.)  Entrance Stairs - Number of Steps: pt reports he has 4 steps, but they are working on putting on a handicapped deck. (Simultaneous filing. User may not have seen previous data.)  Bathroom Shower/Tub: Tub/Shower unit (pt reports he took a shower until he couldnt move his L leg. Simultaneous filing. User may not have seen previous data.)  Bathroom Toilet: Standard (Simultaneous filing. User may not have seen previous data.)  Bathroom Equipment: Shower chair, Grab bars in shower, Grab bars around toilet (pt reports his shower chair doesnt fit in the tub. Simultaneous filing. User may not have seen previous data.)  Home Equipment: Cane, Rolling walker, Wheelchair-manual (pt reports he was using a cane and holding the wall, but got to where he was unable to ambulate. Pt reports he has been sleeping in the chair lately. Simultaneous filing. User may not have seen previous data.)  Receives Help From: Family  ADL Assistance: Needs assistance (Simultaneous filing. User may not have seen previous data.)  Bath: Maximum assistance  Dressing: Maximum assistance  Grooming: Maximum assistance  Feeding: Maximum assistance  Toileting: Needs assistance (Pt reports he has been urinating in his depends)  Homemaking Assistance: Needs assistance  Homemaking Responsibilities: No (Simultaneous filing. User may not have seen previous data.)  Ambulation Assistance: Needs assistance (pt reports he needs A to ambulate, but has not been able to get up lately. Simultaneous filing. User may not have seen previous data.)  Transfer Assistance: Needs assistance (Simultaneous filing. User may not have seen previous data.)  Active : No  Leisure & Hobbies: Pt reports he used to bowl a lot. He reports he watches the history channel a lot.    IADL Comments: Pt reports his wife A with
something. Pt reports he has been urinating and having a BM in his depends as he has needed help to do everything recently . Pt reports his hasn't been able to grasp and cup or untensils as his hands have been shaking so bad and his wife has had to help feed him and he reports the shaking has happend for about the past month. Pt reports he has had home physical therapy and a nurse coming about 1x a week       Objective   Vision: Impaired  Vision Exceptions: Wears glasses at all times  Hearing: Within functional limits    Orientation  Overall Orientation Status: Within Functional Limits  Observation/Palpation  Observation: Pt presented in bed with HOB elevated. Balance  Sitting Balance:  (Mod-Max A)  Standing Balance: Unable to assess(comment)  ADL  Feeding: Dependent/Total (Pt is unable to demonstrate bringing hand to mouth at this time)  Grooming: Dependent/Total  UE Bathing: Dependent/Total  LE Bathing: Dependent/Total  UE Dressing: Dependent/Total  LE Dressing: Dependent/Total  Toileting: Dependent/Total  Additional Comments: Pt required mod-max A to sit up at EOB and was unable to bring hands toward head and had difficulty reaching out to grasp bed rail  Coordination  Movements Are Fluid And Coordinated: No  Coordination and Movement description: Tremors; Intention tremors;Right UE;Left UE     Bed mobility  Rolling to Left: Dependent/Total  Rolling to Right: Dependent/Total  Supine to Sit: Dependent/Total  Sit to Supine: Dependent/Total  Scooting: Dependent/Total  Comment: Pt required Ax2 for bed mobility to sit up at EOB  Transfers  Transfer Comments: Pt unable to sit up at EOB indep and will require a lift for transfers at this time  Vision - Basic Assessment  Prior Vision: Wears glasses all the time           Sensation  Overall Sensation Status: Impaired  Additional Comments: Pt reports paresthesias in BLE with L>R        LUE AROM (degrees)  LUE AROM : Exceptions  LUE General AROM: Pt had difficulty to

## 2018-10-05 NOTE — H&P
History and Physical      Name:  Diamond Vyas /Age/Sex: 1950  (70 y.o. male)   MRN & CSN:  [id number] & [id number] Admission Date/Time: 10/4/2018  4:36 PM   Location:   PCP: Reny Ortiz DO       Diamond Poster is a 70 y.o.  male  who presents with Fatigue (pt arrives per ems from home with wife who told ems pt had been sleeping alot and altered mental status increasing,  Pt has open wounds to buttocks with redness)      Day 1. Assessment and Plan:     1. Acute Kidney Injury         BUN 40 (H) MG/DL CREATININE 1.6 (H) MG/DL       Elevated Troponin Troponin T 0.017 (H) NG/ML       Mild Hydration in the light of bilateral lower limb edema. Hold nephrotoxic medications. 2 Parkinson's Disease    Slow response to questions, forgetfulness    Reduced ambulation. Continue home Artane      3. Hyperlipidemia      Lipitor    4.  Elevated  pro BNP      Pro-BNP 2,038 (H) PG/ML      Trop trending up, 0.017, 0.023       Dependent edema, shortness of breath with exertion      Last echo 2016 EF > 55%      Echo, cardiology consult              Medications:   Medications:    aspirin  81 mg Oral Daily    atorvastatin  40 mg Oral Nightly    baclofen  10 mg Oral BID    tiZANidine  2 mg Oral TID    clonazePAM  2 mg Oral BID    clopidogrel  75 mg Oral Daily    docusate sodium  100 mg Oral BID    lisinopril  10 mg Oral Daily    sennosides-docusate sodium  1 tablet Oral Daily    [START ON 10/5/2018] trihexyphenidyl  2 mg Oral BID    sodium chloride flush  10 mL Intravenous 2 times per day      Infusions:    sodium chloride       PRN Meds:   sodium chloride flush 10 mL PRN   magnesium hydroxide 30 mL Daily PRN   ondansetron 4 mg Q6H PRN       Current Facility-Administered Medications:     0.9 % sodium chloride infusion, , Intravenous, Continuous, Douglas Espinosa MD    aspirin EC tablet 81 mg, 81 mg, Oral, Daily, JARVIS Zamora - CNP    atorvastatin (LIPITOR) tablet

## 2018-10-05 NOTE — DISCHARGE SUMMARY
other adventious breath sounds  GI: soft & non tender, with +ve bowel sounds, no guarding ,rigidity or rebound tenderness  : no inguinal lymphadenopathy, no CVA tenderness  CNS: no focal weakness or sensory deficits peripherally, DTR's equal bilaterally, CN2-12 normal, obvious tremor noted in bilateral upper extremities  Skin: Could not get to see the wound   MS: normal muscle strength, normal ROM in all major joints      Procedures:  None significant    Significant Diagnostic Studies at discharge:   As above    Patient Instructions:   [facility] Medication Instructions ILB:[id number]    Printed on:10/05/18 5061   Medication Information                      aspirin 81 MG EC tablet  Take 81 mg by mouth daily. atorvastatin (LIPITOR) 20 MG tablet  Take 20 mg by mouth nightly              baclofen (LIORESAL) 20 MG tablet  Take 20 mg by mouth 2 times daily             clonazePAM (KLONOPIN) 0.5 MG tablet  Take 0.25 mg by mouth every morning. .             clonazePAM (KLONOPIN) 2 MG tablet  Take 2 mg by mouth nightly. .             clopidogrel (PLAVIX) 75 MG tablet  Take 75 mg by mouth daily.              cyanocobalamin 1000 MCG tablet  Take 1,000 mcg by mouth daily             donepezil (ARICEPT) 10 MG tablet  Take 15 mg by mouth nightly             donepezil (ARICEPT) 5 MG tablet  Take 15 mg by mouth nightly              furosemide (LASIX) 20 MG tablet  Take 20 mg by mouth 2 times daily             indomethacin (INDOCIN) 50 MG capsule  Take 50 mg by mouth 2 times daily as needed for Pain              linaclotide (LINZESS) 145 MCG capsule  Take 145 mcg by mouth every morning (before breakfast)             memantine (NAMENDA) 10 MG tablet  Take 10 mg by mouth 2 times daily             metolazone (ZAROXOLYN) 2.5 MG tablet  Take 2.5 mg by mouth on Mondays, Wednesdays, and Fridays             metoprolol tartrate (LOPRESSOR) 25 MG tablet  Take 12.5 mg by mouth nightly              trihexyphenidyl

## 2018-10-06 LAB
ANION GAP SERPL CALCULATED.3IONS-SCNC: 8 MMOL/L (ref 4–16)
BASOPHILS ABSOLUTE: 0 K/CU MM
BASOPHILS RELATIVE PERCENT: 0.5 % (ref 0–1)
BUN BLDV-MCNC: 22 MG/DL (ref 6–23)
CALCIUM SERPL-MCNC: 8.9 MG/DL (ref 8.3–10.6)
CHLORIDE BLD-SCNC: 102 MMOL/L (ref 99–110)
CO2: 30 MMOL/L (ref 21–32)
CREAT SERPL-MCNC: 1.3 MG/DL (ref 0.9–1.3)
DIFFERENTIAL TYPE: ABNORMAL
EOSINOPHILS ABSOLUTE: 0.1 K/CU MM
EOSINOPHILS RELATIVE PERCENT: 0.9 % (ref 0–3)
ERYTHROCYTE SEDIMENTATION RATE: 22 MM/HR (ref 0–20)
FOLATE: 3.8 NG/ML (ref 3.1–17.5)
GFR AFRICAN AMERICAN: >60 ML/MIN/1.73M2
GFR NON-AFRICAN AMERICAN: 55 ML/MIN/1.73M2
GLUCOSE BLD-MCNC: 102 MG/DL (ref 70–99)
HCT VFR BLD CALC: 32 % (ref 42–52)
HEMOGLOBIN: 10.2 GM/DL (ref 13.5–18)
IMMATURE NEUTROPHIL %: 0.6 % (ref 0–0.43)
LYMPHOCYTES ABSOLUTE: 1.6 K/CU MM
LYMPHOCYTES RELATIVE PERCENT: 23.6 % (ref 24–44)
MAGNESIUM: 2 MG/DL (ref 1.8–2.4)
MCH RBC QN AUTO: 29.6 PG (ref 27–31)
MCHC RBC AUTO-ENTMCNC: 31.9 % (ref 32–36)
MCV RBC AUTO: 92.8 FL (ref 78–100)
MONOCYTES ABSOLUTE: 0.5 K/CU MM
MONOCYTES RELATIVE PERCENT: 8 % (ref 0–4)
NUCLEATED RBC %: 0 %
PDW BLD-RTO: 13.2 % (ref 11.7–14.9)
PLATELET # BLD: 114 K/CU MM (ref 140–440)
PMV BLD AUTO: 10.2 FL (ref 7.5–11.1)
POTASSIUM SERPL-SCNC: 3.4 MMOL/L (ref 3.5–5.1)
RBC # BLD: 3.45 M/CU MM (ref 4.6–6.2)
SEGMENTED NEUTROPHILS ABSOLUTE COUNT: 4.4 K/CU MM
SEGMENTED NEUTROPHILS RELATIVE PERCENT: 66.4 % (ref 36–66)
SODIUM BLD-SCNC: 140 MMOL/L (ref 135–145)
TOTAL CK: 133 IU/L (ref 38–174)
TOTAL IMMATURE NEUTOROPHIL: 0.04 K/CU MM
TOTAL NUCLEATED RBC: 0 K/CU MM
TSH HIGH SENSITIVITY: 2.91 UIU/ML (ref 0.27–4.2)
VITAMIN B-12: >2000 PG/ML (ref 211–911)
WBC # BLD: 6.6 K/CU MM (ref 4–10.5)

## 2018-10-06 PROCEDURE — 97530 THERAPEUTIC ACTIVITIES: CPT

## 2018-10-06 PROCEDURE — 6370000000 HC RX 637 (ALT 250 FOR IP): Performed by: NURSE PRACTITIONER

## 2018-10-06 PROCEDURE — 6370000000 HC RX 637 (ALT 250 FOR IP): Performed by: PSYCHIATRY & NEUROLOGY

## 2018-10-06 PROCEDURE — 97112 NEUROMUSCULAR REEDUCATION: CPT

## 2018-10-06 PROCEDURE — 85025 COMPLETE CBC W/AUTO DIFF WBC: CPT

## 2018-10-06 PROCEDURE — 84443 ASSAY THYROID STIM HORMONE: CPT

## 2018-10-06 PROCEDURE — 97167 OT EVAL HIGH COMPLEX 60 MIN: CPT

## 2018-10-06 PROCEDURE — G8988 SELF CARE GOAL STATUS: HCPCS

## 2018-10-06 PROCEDURE — 6370000000 HC RX 637 (ALT 250 FOR IP): Performed by: FAMILY MEDICINE

## 2018-10-06 PROCEDURE — 97110 THERAPEUTIC EXERCISES: CPT

## 2018-10-06 PROCEDURE — 6360000002 HC RX W HCPCS: Performed by: NURSE PRACTITIONER

## 2018-10-06 PROCEDURE — 97163 PT EVAL HIGH COMPLEX 45 MIN: CPT

## 2018-10-06 PROCEDURE — 1200000000 HC SEMI PRIVATE

## 2018-10-06 PROCEDURE — 36415 COLL VENOUS BLD VENIPUNCTURE: CPT

## 2018-10-06 PROCEDURE — 2580000003 HC RX 258: Performed by: FAMILY MEDICINE

## 2018-10-06 PROCEDURE — G8979 MOBILITY GOAL STATUS: HCPCS

## 2018-10-06 PROCEDURE — 94761 N-INVAS EAR/PLS OXIMETRY MLT: CPT

## 2018-10-06 PROCEDURE — 82607 VITAMIN B-12: CPT

## 2018-10-06 PROCEDURE — 80048 BASIC METABOLIC PNL TOTAL CA: CPT

## 2018-10-06 PROCEDURE — 82550 ASSAY OF CK (CPK): CPT

## 2018-10-06 PROCEDURE — G8987 SELF CARE CURRENT STATUS: HCPCS

## 2018-10-06 PROCEDURE — G8978 MOBILITY CURRENT STATUS: HCPCS

## 2018-10-06 PROCEDURE — 82746 ASSAY OF FOLIC ACID SERUM: CPT

## 2018-10-06 PROCEDURE — 83735 ASSAY OF MAGNESIUM: CPT

## 2018-10-06 PROCEDURE — 6360000002 HC RX W HCPCS: Performed by: PSYCHIATRY & NEUROLOGY

## 2018-10-06 PROCEDURE — 85652 RBC SED RATE AUTOMATED: CPT

## 2018-10-06 RX ORDER — DONEPEZIL HYDROCHLORIDE 10 MG/1
10 TABLET, FILM COATED ORAL NIGHTLY
Status: ON HOLD | COMMUNITY
End: 2018-10-09 | Stop reason: HOSPADM

## 2018-10-06 RX ADMIN — FUROSEMIDE 20 MG: 20 TABLET ORAL at 09:20

## 2018-10-06 RX ADMIN — BACLOFEN 10 MG: 10 TABLET ORAL at 09:20

## 2018-10-06 RX ADMIN — DONEPEZIL HYDROCHLORIDE 15 MG: 10 TABLET, FILM COATED ORAL at 23:25

## 2018-10-06 RX ADMIN — IMMUNE GLOBULIN INTRAVENOUS (HUMAN) 30 G: 5 INJECTION, SOLUTION INTRAVENOUS at 11:22

## 2018-10-06 RX ADMIN — SODIUM CHLORIDE: 9 INJECTION, SOLUTION INTRAVENOUS at 23:26

## 2018-10-06 RX ADMIN — HEPARIN SODIUM 5000 UNITS: 5000 INJECTION INTRAVENOUS; SUBCUTANEOUS at 14:39

## 2018-10-06 RX ADMIN — FUROSEMIDE 20 MG: 20 TABLET ORAL at 23:25

## 2018-10-06 RX ADMIN — CLOPIDOGREL BISULFATE 75 MG: 75 TABLET ORAL at 09:19

## 2018-10-06 RX ADMIN — IMMUNE GLOBULIN INTRAVENOUS (HUMAN) 30 G: 5 INJECTION, SOLUTION INTRAVENOUS at 13:21

## 2018-10-06 RX ADMIN — MEMANTINE 10 MG: 10 TABLET ORAL at 23:25

## 2018-10-06 RX ADMIN — TRIHEXYPHENIDYL HYDROCHLORIDE 2 MG: 2 TABLET ORAL at 14:32

## 2018-10-06 RX ADMIN — ATORVASTATIN CALCIUM 20 MG: 20 TABLET, FILM COATED ORAL at 23:25

## 2018-10-06 RX ADMIN — ASPIRIN 81 MG: 81 TABLET, COATED ORAL at 09:20

## 2018-10-06 RX ADMIN — HEPARIN SODIUM 5000 UNITS: 5000 INJECTION INTRAVENOUS; SUBCUTANEOUS at 05:55

## 2018-10-06 RX ADMIN — DOXAZOSIN 4 MG: 4 TABLET ORAL at 09:19

## 2018-10-06 RX ADMIN — CLONAZEPAM 1 MG: 1 TABLET ORAL at 23:22

## 2018-10-06 RX ADMIN — TRIHEXYPHENIDYL HYDROCHLORIDE 2 MG: 2 TABLET ORAL at 09:22

## 2018-10-06 RX ADMIN — HEPARIN SODIUM 5000 UNITS: 5000 INJECTION INTRAVENOUS; SUBCUTANEOUS at 23:26

## 2018-10-06 RX ADMIN — CYANOCOBALAMIN TAB 1000 MCG 1000 MCG: 1000 TAB at 09:19

## 2018-10-06 RX ADMIN — BACLOFEN 10 MG: 10 TABLET ORAL at 23:25

## 2018-10-06 RX ADMIN — MEMANTINE 10 MG: 10 TABLET ORAL at 09:20

## 2018-10-06 RX ADMIN — SODIUM CHLORIDE: 9 INJECTION, SOLUTION INTRAVENOUS at 09:40

## 2018-10-06 ASSESSMENT — PAIN SCALES - GENERAL
PAINLEVEL_OUTOF10: 0
PAINLEVEL_OUTOF10: 0

## 2018-10-06 NOTE — CARE COORDINATION
Spoke with patient , his sister Gi Reyes , niece Anjel Garcia and son Fifi Geiger regarding discharge planning . PT/OT evals just completed and CM was informed by therapy that they are recommending SNF. Per Praneeth patient is from home with his wife and patient has been very sedentary and mobility has been difficult especially related to Parkinson's diagnosis. CM let them know that therapy is recommending SNF and they are agreeable. CM provided SNF list and they stated that they will discuss with other family members and choose a facility. Patient 's PCP is Dr Ania Baires and patient has traditional Medicare . Case Management to follow for SNF choice.

## 2018-10-06 NOTE — PROGRESS NOTES
My name is Nirmala Stanley and I am an RN student from [employer] who was assigned to this patient today from 7am to 7pm.  I worked under the supervision of RN Melody Rossi and clinical instructor Elisabet Gee.

## 2018-10-06 NOTE — PROGRESS NOTES
Decreased functional mobility ; Decreased safe awareness;Decreased balance;Decreased coordination;Decreased ADL status; Decreased ROM; Decreased endurance;Decreased high-level IADLs;Decreased strength;Decreased fine motor control  Assessment: pt is 71 yo male who presents to [facility] with decreased functional mobility. PMHx of PD which has progressed significantly. Bed bound for past 6 months and has initiated home care within past 2 weeks. There is concern for GBS and he is undergoing IVIG treatment trial. The pt has the above impairments in addition to rigidity and postural control deficits. able to engate in max A SPT to [facility]. would recommend co-tx moving forward. pt will benefit from skilled PT with dc to SNF in order to achieve optimal functional mobility prior to return home. The medical dx and tx effectiveness with certainly dictate the functional prognosis, but pt demos significant room for improvement regardless of this.    Prognosis: Good  Decision Making: High Complexity  Patient Education: bed mob, offloading, transfers, POC, functional prognosis  Activity Tolerance  Activity Tolerance: Patient Tolerated treatment well  Activity Tolerance: progression of PD and potential GBS limit intensity and duration of activity that can be performed         Plan   Plan  Times per week: 2+  Times per day: Daily  Current Treatment Recommendations: Strengthening, Transfer Training, Endurance Training, Neuromuscular Re-education, Patient/Caregiver Education & Training, ROM, ADL/Self-care Training, Equipment Evaluation, Education, & procurement, Balance Training, IADL Training, Home Exercise Program, Functional Mobility Training, Safety Education & Training, Positioning  Safety Devices  Type of devices: Call light within reach, Left in bed, Bed alarm in place, Nurse notified    G-Code  PT G-Codes  Functional Assessment Tool Used: [facility] mobility   Functional Limitation: Mobility: Walking and moving around  Mobility: Walking and

## 2018-10-06 NOTE — PROGRESS NOTES
PRN       Data/Labs:     Recent Labs      10/04/18   1700  10/05/18   0430  10/06/18   0449   WBC  9.5  7.1  6.6   HGB  12.2*  11.0*  10.2*   HCT  39.0*  35.5*  32.0*   PLT  158  129*  114*      Recent Labs      10/04/18   1700  10/05/18   0430  10/06/18   0449   NA  147*  146*  140   K  3.5  3.1*  3.4*   CL  102  104  102   CO2  35*  26  30   BUN  40*  36*  22   CREATININE  1.6*  1.4*  1.3     Recent Labs      10/04/18   1700   AST  19   ALT  9*   BILITOT  0.7   ALKPHOS  93     Recent Labs      10/04/18   1700   INR  1.07     Recent Labs      10/04/18   2200  10/05/18   0430  10/05/18   1000  10/06/18   0449   CKTOTAL   --    --    --   133   TROPONINT  0.023*  0.019*  0.013*   --        I/O last 3 completed shifts: In: 240 [P.O.:240]  Out: -     Intake/Output Summary (Last 24 hours) at 10/06/18 1251  Last data filed at 10/06/18 1215   Gross per 24 hour   Intake              480 ml   Output                0 ml   Net              480 ml        Assessment/Plan:   Severe Parkinson symptoms concerning for [facility]  MRI spine pending  -IVIG x 5 days  -Continue home Artane  -PT/OT  -Consult neuro  -Consult Oncology r/o paraneoplastic myopathy neuropathy  -Consult Case management for SNF placement     2. Acute metabolic encephalopathy due to chronic dementia  VQ scan with low probability for PE. CXR clarence cute. CT head with no intracranial abnormality  - Continue Namenda and Aricept  -Hydration     3. YEYO 2/2 prerenal   # Elevated troponin due to #2, trending down  # Hypokalemia due to diuretic  EKG with nonspecific ST and T elevation likely 2/2 hypokalemia   -Improving with hydration  -Potassium replacement  -Avoid nephrotoxins  -Monitor BMP     4. HypertensionCardura, metoprolol, monitor blood pressure      DVT Prophylaxis: scd  Diet: DIET CARDIAC;   Dietary Nutrition Supplements: Low Calorie High Protein Supplement  Code Status: Full Code    Dispo:when stable    Electronically signed by Sarah Garcia MD on

## 2018-10-06 NOTE — PROGRESS NOTES
Occupational Therapy  [facility] OCCUPATIONAL THERAPY EVALUATION    History  [place]:  There were no encounter diagnoses. Patient  has a past medical history of Fracture; Hypertension; and Parkinson disease ([address] Patient  has a past surgical history that includes back surgery; Vasectomy; and Cardiac surgery. Restrictions:  Restrictions/Precautions  Restrictions/Precautions: Fall Risk, General Precautions    Position Activity Restriction  Other position/activity restrictions: IV, telemetry                   Communication with other providers: Weston c RN. Roberta ramos PT. Subjective:  Patient states:  \"I want to be able to walk\"  Pain:  No c/o during session. Patient goal:  Be able to stand/walk. Regain strength. Occupational profile (relevant social history and personal factors):    Social/Functional History  Lives With: Spouse  Type of Home: Trailer  Home Layout: One level  Home Access: Stairs to enter with rails (looking to put in ramp soon)  Entrance Stairs - Number of Steps: pt reports he has 4 steps, but they are working on putting on a handicapped deck. Bathroom Shower/Tub: Tub/Shower unit  Bathroom Toilet: Standard  Bathroom Equipment: Shower chair, Grab bars in shower, Grab bars around toilet  Home Equipment: Cane, Rolling walker, Tykli Help From: Family  ADL Assistance: Needs assistance  Bath: Maximum assistance  Dressing: Maximum assistance  Grooming: Maximum assistance  Feeding: Maximum assistance  Toileting: Needs assistance  Homemaking Assistance: Needs assistance  Ambulation Assistance: Needs assistance  Transfer Assistance: Needs assistance  Active : No  IADL Comments: Pt reports his wife A with ADLs including dressing and bathing when he can hold himself up. He reports he frequently cannot get up. Pt reports he urinates and has bowel movments in his depends.    Additional Comments: via eval from [place]--Pt reports the past few weeks having difficulty getting leg over tub to shower and has been sleeping in the chair. Pt reports his wife helps with his ADLs when she is able to. Reports he hasn't been able to hold himself up for ADLs/transfers. Pt reports his L leg gives out when he tries to stand up straight and he needs to hold onto something. Pt reports he has been urinating and having a BM in his depends as he has needed help to do everything recently . Pt reports his hasn't been able to grasp and cup or untensils as his hands have been shaking so bad and his wife has had to help feed him and he reports the shaking has happend for about the past month. Pt reports he has had home physical therapy and a nurse coming about 1x a week    Examination of body systems (includes body structures/functions, activity/participation limitations):  · Orientation: WFL   · Cognition:  WFL   · Observation:  Received pt in bed. Alert and cooperative. Gown and chux saturated c urine. Extremities positive for rigidity and bradykinesia. No tremoring today but pt reports he frequently does have intension tremors which impede function. · Vision:  Good  · Hearing:  good  · ROM:  Impaired LUE>RUE. RUE: grossly 90* shldr flexion, WFL to end ranges elbow flexion, forearm sup/pronation, wrist flex/ext and digit flex/ext. LUE: Less than 90* shldr flexion, WFL to end ranges elbow flexion, forearm sup/pronation, wrist flex/ext and digit flex/ext. · Strength: grossly 2+/5 shldrs and 3+/5 elbows and distally  · Sensation: needs further assessment; appears intact to BUEs    AM-PAC 6 click short form for inpatient daily activity:   How much help from another person does the patient currently need. .. Unable  Dep A Lot  Max A A Lot   Mod A A Little  Min A A Little   CGA  Sup None   Mod I  Indep   1. Putting on and taking off regular lower body clothing? [x] 1    [] 2   [] 2   [] 3   [] 3   [] 4      2. Bathing (including washing, rinsing, drying)? [x] 1   [] 2   [] 2 [] 3 [] 3 [] 4   3.  Toileting,

## 2018-10-06 NOTE — PLAN OF CARE
Problem: Nutrition  Goal: Optimal nutrition therapy  Outcome: Ongoing  Nutrition Problem: Predicted suboptimal energy intake  Intervention: Food and/or Nutrient Delivery: Continue current diet, Start ONS  Nutritional Goals: Patient will tolerate diet to consume at least 70% of meals during stay

## 2018-10-06 NOTE — CONSULTS
Globulin 10% IV INFUSION  0.4 g/kg (Ideal) Intravenous Daily    pneumococcal 13-valent conjugate  0.5 mL Intramuscular Once    influenza virus vaccine  0.5 mL Intramuscular Once     PRN Medications: sodium chloride flush, magnesium hydroxide, ondansetron, potassium chloride **OR** potassium chloride **OR** potassium chloride, potassium chloride      Allergies:  Daypro [oxaprozin]    Social History:   TOBACCO:   reports that he has been smoking Cigarettes. He has a 15.00 pack-year smoking history. He has never used smokeless tobacco.  ETOH:   reports that he drinks about 0.6 oz of alcohol per week .   Quit 4 days ago prior to which he smoked 1 ppd for 35 years  Family History:   Family History   Problem Relation Age of Onset    High Blood Pressure Mother     High Blood Pressure Father     Heart Disease Father          REVIEW OF SYSTEMS:    Please refer to HPI    PHYSICAL EXAM:      Vitals:    /63   Pulse 54   Temp 98.4 °F (36.9 °C) (Oral)   Resp 11   Ht 6' (1.829 m)   Wt 172 lb 3.2 oz (78.1 kg)   SpO2 98%   BMI 23.35 kg/m²     General Appearance:    Alert, cooperative, no distress, tired appearing, masked facies   HEENT:   MM dry       Lungs:     Clear to auscultation bilaterally, respirations unlabored        Heart:    Regular rate and rhythm, S1 and S2 normal, no murmur, rub   or gallop   Abdomen:     Soft, non-tender, bowel sounds active all four quadrants,     no masses, no organomegaly, no ascites        Extremities:   Extremities normal, atraumatic, no cyanosis or edema       Skin:   Diffuse large ecchymosis on the UE/LE   Lymph nodes:   No abnormality               DATA:    ABGs: Lab Results   Component Value Date    PO2ART 106 10/16/2014    QYU0LCR 37.0 10/16/2014     CBC:   Recent Labs      10/04/18   1700  10/05/18   0430  10/06/18   0449   WBC  9.5  7.1  6.6   HGB  12.2*  11.0*  10.2*   PLT  158  129*  114*     BMP:  Recent Labs      10/04/18   1700  10/04/18   1743  10/05/18   0430 10/06/18   0449   NA  147*   --   146*  140   K  3.5   --   3.1*  3.4*   CL  102   --   104  102   CO2  35*   --   26  30   BUN  40*   --   36*  22   CREATININE  1.6*   --   1.4*  1.3   GLUCOSE  142*  129  100*  102*     Magnesium:   Lab Results   Component Value Date    MG 2.0 10/06/2018     Hepatic: Recent Labs      10/04/18   1700   AST  19   ALT  9*   BILITOT  0.7   ALKPHOS  93     No results for input(s): LIPASE, AMYLASE in the last 72 hours. Recent Labs      10/04/18   1700   PROTIME  12.2   INR  1.07     No results for input(s): PTT in the last 72 hours. Lipids: No results for input(s): CHOL, HDL in the last 72 hours. Invalid input(s): LDLCALCU  INR:   Recent Labs      10/04/18   1700   INR  1.07     TSH: No results found for: TSH    Intake/Output Summary (Last 24 hours) at 10/06/18 1251  Last data filed at 10/06/18 1215   Gross per 24 hour   Intake              480 ml   Output                0 ml   Net              480 ml      sodium chloride 75 mL/hr at 10/06/18 0940   LE weakness: Is being followed by neurology. No s/sx of underlying malignancy.  Will await neurology evaluation    Anemia and thrombocytopenia: R/O nutritional defe, hemolysis, MG. W/U in progress    ARF: ?Sec to volume depletion    Continue other medical care    Thank you for letting us be part of the care and will follow along    Jon Shah MD  10/6/2018  12:51 PM

## 2018-10-07 ENCOUNTER — APPOINTMENT (OUTPATIENT)
Dept: CT IMAGING | Age: 68
DRG: 094 | End: 2018-10-07
Attending: HOSPITALIST
Payer: MEDICARE

## 2018-10-07 LAB
ANION GAP SERPL CALCULATED.3IONS-SCNC: 9 MMOL/L (ref 4–16)
BACTERIA: NEGATIVE /HPF
BASOPHILS ABSOLUTE: 0 K/CU MM
BASOPHILS RELATIVE PERCENT: 0.7 % (ref 0–1)
BILIRUBIN URINE: NEGATIVE MG/DL
BLOOD, URINE: NEGATIVE
BUN BLDV-MCNC: 18 MG/DL (ref 6–23)
CALCIUM SERPL-MCNC: 8.9 MG/DL (ref 8.3–10.6)
CEA: 5.1 NG/ML
CHLORIDE BLD-SCNC: 99 MMOL/L (ref 99–110)
CLARITY: CLEAR
CO2: 30 MMOL/L (ref 21–32)
COLOR: YELLOW
CREAT SERPL-MCNC: 1.3 MG/DL (ref 0.9–1.3)
DIFFERENTIAL TYPE: ABNORMAL
EOSINOPHILS ABSOLUTE: 0.1 K/CU MM
EOSINOPHILS RELATIVE PERCENT: 1.8 % (ref 0–3)
FERRITIN: 619 NG/ML (ref 30–400)
FOLATE: 3.1 NG/ML (ref 3.1–17.5)
GFR AFRICAN AMERICAN: >60 ML/MIN/1.73M2
GFR NON-AFRICAN AMERICAN: 55 ML/MIN/1.73M2
GLUCOSE BLD-MCNC: 94 MG/DL (ref 70–99)
GLUCOSE, URINE: NEGATIVE MG/DL
HCT VFR BLD CALC: 30 % (ref 42–52)
HEMOGLOBIN: 9.8 GM/DL (ref 13.5–18)
IMMATURE NEUTROPHIL %: 0.4 % (ref 0–0.43)
IRON: 72 UG/DL (ref 59–158)
KETONES, URINE: NEGATIVE MG/DL
LACTATE DEHYDROGENASE: 154 IU/L (ref 120–246)
LEUKOCYTE ESTERASE, URINE: NEGATIVE
LYMPHOCYTES ABSOLUTE: 1.7 K/CU MM
LYMPHOCYTES RELATIVE PERCENT: 29.2 % (ref 24–44)
MAGNESIUM: 1.8 MG/DL (ref 1.8–2.4)
MCH RBC QN AUTO: 30.2 PG (ref 27–31)
MCHC RBC AUTO-ENTMCNC: 32.7 % (ref 32–36)
MCV RBC AUTO: 92.3 FL (ref 78–100)
MONOCYTES ABSOLUTE: 0.5 K/CU MM
MONOCYTES RELATIVE PERCENT: 8 % (ref 0–4)
MUCUS: ABNORMAL HPF
NITRITE URINE, QUANTITATIVE: NEGATIVE
NUCLEATED RBC %: 0 %
PCT TRANSFERRIN: 40 % (ref 10–44)
PDW BLD-RTO: 13.1 % (ref 11.7–14.9)
PH, URINE: 7 (ref 5–8)
PLATELET # BLD: 119 K/CU MM (ref 140–440)
PMV BLD AUTO: 10.2 FL (ref 7.5–11.1)
POTASSIUM SERPL-SCNC: 3.3 MMOL/L (ref 3.5–5.1)
PROTEIN UA: NEGATIVE MG/DL
RBC # BLD: 3.25 M/CU MM (ref 4.6–6.2)
RBC URINE: 1 /HPF (ref 0–3)
RETICULOCYTE COUNT PCT: 1.2 % (ref 0.2–2.2)
SEGMENTED NEUTROPHILS ABSOLUTE COUNT: 3.4 K/CU MM
SEGMENTED NEUTROPHILS RELATIVE PERCENT: 59.9 % (ref 36–66)
SODIUM BLD-SCNC: 138 MMOL/L (ref 135–145)
SPECIFIC GRAVITY UA: 1.01 (ref 1–1.03)
TOTAL IMMATURE NEUTOROPHIL: 0.02 K/CU MM
TOTAL IRON BINDING CAPACITY: 178 UG/DL (ref 250–450)
TOTAL NUCLEATED RBC: 0 K/CU MM
TRICHOMONAS: ABNORMAL /HPF
UNSATURATED IRON BINDING CAPACITY: 106 UG/DL (ref 110–370)
UROBILINOGEN, URINE: NORMAL MG/DL (ref 0.2–1)
VITAMIN B-12: >2000 PG/ML (ref 211–911)
WBC # BLD: 5.7 K/CU MM (ref 4–10.5)
WBC UA: <1 /HPF (ref 0–2)

## 2018-10-07 PROCEDURE — 6370000000 HC RX 637 (ALT 250 FOR IP): Performed by: PSYCHIATRY & NEUROLOGY

## 2018-10-07 PROCEDURE — 6360000002 HC RX W HCPCS: Performed by: PSYCHIATRY & NEUROLOGY

## 2018-10-07 PROCEDURE — 84154 ASSAY OF PSA FREE: CPT

## 2018-10-07 PROCEDURE — 82105 ALPHA-FETOPROTEIN SERUM: CPT

## 2018-10-07 PROCEDURE — 81001 URINALYSIS AUTO W/SCOPE: CPT

## 2018-10-07 PROCEDURE — 85025 COMPLETE CBC W/AUTO DIFF WBC: CPT

## 2018-10-07 PROCEDURE — 1200000000 HC SEMI PRIVATE

## 2018-10-07 PROCEDURE — 71250 CT THORAX DX C-: CPT

## 2018-10-07 PROCEDURE — 85045 AUTOMATED RETICULOCYTE COUNT: CPT

## 2018-10-07 PROCEDURE — 83550 IRON BINDING TEST: CPT

## 2018-10-07 PROCEDURE — 6370000000 HC RX 637 (ALT 250 FOR IP): Performed by: NURSE PRACTITIONER

## 2018-10-07 PROCEDURE — 86301 IMMUNOASSAY TUMOR CA 19-9: CPT

## 2018-10-07 PROCEDURE — 83010 ASSAY OF HAPTOGLOBIN QUANT: CPT

## 2018-10-07 PROCEDURE — 2580000003 HC RX 258: Performed by: FAMILY MEDICINE

## 2018-10-07 PROCEDURE — 82728 ASSAY OF FERRITIN: CPT

## 2018-10-07 PROCEDURE — 36415 COLL VENOUS BLD VENIPUNCTURE: CPT

## 2018-10-07 PROCEDURE — 94761 N-INVAS EAR/PLS OXIMETRY MLT: CPT

## 2018-10-07 PROCEDURE — 82607 VITAMIN B-12: CPT

## 2018-10-07 PROCEDURE — 74176 CT ABD & PELVIS W/O CONTRAST: CPT

## 2018-10-07 PROCEDURE — 83735 ASSAY OF MAGNESIUM: CPT

## 2018-10-07 PROCEDURE — 80048 BASIC METABOLIC PNL TOTAL CA: CPT

## 2018-10-07 PROCEDURE — 6370000000 HC RX 637 (ALT 250 FOR IP): Performed by: FAMILY MEDICINE

## 2018-10-07 PROCEDURE — 82746 ASSAY OF FOLIC ACID SERUM: CPT

## 2018-10-07 PROCEDURE — 83540 ASSAY OF IRON: CPT

## 2018-10-07 PROCEDURE — 84153 ASSAY OF PSA TOTAL: CPT

## 2018-10-07 PROCEDURE — 82378 CARCINOEMBRYONIC ANTIGEN: CPT

## 2018-10-07 PROCEDURE — 84165 PROTEIN E-PHORESIS SERUM: CPT

## 2018-10-07 PROCEDURE — 86320 SERUM IMMUNOELECTROPHORESIS: CPT

## 2018-10-07 PROCEDURE — 6360000002 HC RX W HCPCS: Performed by: NURSE PRACTITIONER

## 2018-10-07 PROCEDURE — 83615 LACTATE (LD) (LDH) ENZYME: CPT

## 2018-10-07 RX ADMIN — CLOPIDOGREL BISULFATE 75 MG: 75 TABLET ORAL at 09:05

## 2018-10-07 RX ADMIN — IMMUNE GLOBULIN INTRAVENOUS (HUMAN) 30 G: 5 INJECTION, SOLUTION INTRAVENOUS at 13:33

## 2018-10-07 RX ADMIN — METOPROLOL TARTRATE 12.5 MG: 25 TABLET ORAL at 21:49

## 2018-10-07 RX ADMIN — TRIHEXYPHENIDYL HYDROCHLORIDE 2 MG: 2 TABLET ORAL at 09:08

## 2018-10-07 RX ADMIN — SODIUM CHLORIDE: 9 INJECTION, SOLUTION INTRAVENOUS at 13:32

## 2018-10-07 RX ADMIN — DONEPEZIL HYDROCHLORIDE 15 MG: 10 TABLET, FILM COATED ORAL at 21:49

## 2018-10-07 RX ADMIN — ATORVASTATIN CALCIUM 20 MG: 20 TABLET, FILM COATED ORAL at 21:49

## 2018-10-07 RX ADMIN — DOXAZOSIN 4 MG: 4 TABLET ORAL at 09:05

## 2018-10-07 RX ADMIN — CLONAZEPAM 1 MG: 1 TABLET ORAL at 21:49

## 2018-10-07 RX ADMIN — BACLOFEN 10 MG: 10 TABLET ORAL at 21:49

## 2018-10-07 RX ADMIN — HEPARIN SODIUM 5000 UNITS: 5000 INJECTION INTRAVENOUS; SUBCUTANEOUS at 15:34

## 2018-10-07 RX ADMIN — SODIUM CHLORIDE: 9 INJECTION, SOLUTION INTRAVENOUS at 21:50

## 2018-10-07 RX ADMIN — TRIHEXYPHENIDYL HYDROCHLORIDE 2 MG: 2 TABLET ORAL at 15:36

## 2018-10-07 RX ADMIN — BACLOFEN 10 MG: 10 TABLET ORAL at 09:08

## 2018-10-07 RX ADMIN — FUROSEMIDE 20 MG: 20 TABLET ORAL at 09:05

## 2018-10-07 RX ADMIN — ASPIRIN 81 MG: 81 TABLET, COATED ORAL at 09:05

## 2018-10-07 RX ADMIN — FUROSEMIDE 20 MG: 20 TABLET ORAL at 21:49

## 2018-10-07 RX ADMIN — MEMANTINE 10 MG: 10 TABLET ORAL at 09:05

## 2018-10-07 RX ADMIN — MEMANTINE 10 MG: 10 TABLET ORAL at 21:51

## 2018-10-07 NOTE — PROGRESS NOTES
NEUROLOGY NOTE  DR. Tess Hopkins MD.  -------------------------------------------------  Subjective:    pts legs are doing much better today with pt able to lioft his legs off the bed. Denies any new symptoms    Continues to have numbness feet legs    Objective:    BP (!) 104/56   Pulse 67   Temp 98.5 °F (36.9 °C) (Oral)   Resp 12   Ht 6' (1.829 m)   Wt 167 lb 3.2 oz (75.8 kg)   SpO2 95%   BMI 22.68 kg/m²   HEENT nl      Neuro exam    Alert Oriented  X 3  Follow simple commands  EOMI Pupils 3 mm misael  5-/5 misael UE--4-/5 misael LE      RADIOLOGY  -----------------    Ct Head Wo Contrast    Result Date: 10/4/2018  EXAMINATION: CT OF THE HEAD WITHOUT CONTRAST  10/4/2018 5:42 pm TECHNIQUE: CT of the head was performed without the administration of intravenous contrast. Dose modulation, iterative reconstruction, and/or weight based adjustment of the mA/kV was utilized to reduce the radiation dose to as low as reasonably achievable. COMPARISON: 06/12/2015 HISTORY: ORDERING SYSTEM PROVIDED HISTORY: weakness, left side weakness, not getting out of bed TECHNOLOGIST PROVIDED HISTORY: CVA Has a \"code stroke\" or \"stroke alert\" been called? ->No Acuity: Acute Type of Exam: Initial Additional signs and symptoms: weakness, left side weakness, not getting out of bed FINDINGS: BRAIN/VENTRICLES: The ventricles and sulci are diffusely enlarged. Low attenuation is seen in the periventricular and subcortical white matter. No acute intracranial hemorrhage or acute infarct is identified. ORBITS: The visualized portion of the orbits demonstrate no acute abnormality. SINUSES: The visualized paranasal sinuses and mastoid air cells demonstrate no acute abnormality. SOFT TISSUES/SKULL:  No acute abnormality of the visualized skull or soft tissues. No acute intracranial abnormality.  Diffuse atrophic changes with findings suggesting chronic microvascular ischemia     Nm Lung Vent/perfusion (vq)    Result Date:

## 2018-10-07 NOTE — PROGRESS NOTES
HOSPITALIST PROGRESS NOTE  Date: 10/7/2018   Name: Serafin Villarreal   MRN: [id number]   YOB: 1950      Subjective/Interval Hx:   Patient states that he is not having pain at this time appears to be lethargic but alert family at the bedside, denies any pain at this time.     Objective:   Physical Exam:   /73   Pulse 60   Temp 98.2 °F (36.8 °C) (Oral)   Resp 13   Ht 6' (1.829 m)   Wt 167 lb 3.2 oz (75.8 kg)   SpO2 97%   BMI 22.68 kg/m²   General: no acute distress, well nourished and well hydrated  HEENT: NCAT  Heart: S1S2 RRR  Lungs: Clear to ascultation bilaterally,Diminished bibasilar, respiratory effort normal  Abdomen: soft, NT/ND, positive bowel sounds  Extremities: no pitting edema, nontender   Neuro: patient is awake, alert and orientated times 3, no gross deficits  Skin: no rashes or ecchymosis; pale facially        Meds:   Meds:    aspirin  81 mg Oral Daily    metoprolol tartrate  12.5 mg Oral Nightly    metolazone  2.5 mg Oral Q MWF    furosemide  20 mg Oral BID    heparin (porcine)  5,000 Units Subcutaneous 3 times per day    clopidogrel  75 mg Oral Daily    trihexyphenidyl  2 mg Oral BID    sodium chloride flush  10 mL Intravenous 2 times per day    doxazosin  4 mg Oral Daily    atorvastatin  20 mg Oral Nightly    donepezil  15 mg Oral Nightly    linaclotide  145 mcg Oral QAM AC    memantine  10 mg Oral BID    clonazePAM  1 mg Oral Nightly    baclofen  10 mg Oral BID    Immune Globulin 10% IV INFUSION  0.4 g/kg (Ideal) Intravenous Daily    pneumococcal 13-valent conjugate  0.5 mL Intramuscular Once    influenza virus vaccine  0.5 mL Intramuscular Once      Infusions:    sodium chloride 75 mL/hr at 10/06/18 2326     PRN Meds:     sodium chloride flush 10 mL PRN   magnesium hydroxide 30 mL Daily PRN   ondansetron 4 mg Q6H PRN   potassium chloride 40 mEq PRN   Or     potassium chloride 40 mEq PRN   Or     potassium chloride 10 mEq PRN   potassium chloride 10

## 2018-10-08 LAB
ALBUMIN ELP: 2.8 GM/DL (ref 3.2–5.6)
ALPHA-1-GLOBULIN: 0.3 GM/DL (ref 0.1–0.4)
ALPHA-2-GLOBULIN: 0.5 GM/DL (ref 0.4–1.2)
ANION GAP SERPL CALCULATED.3IONS-SCNC: 10 MMOL/L (ref 4–16)
APTT: 26.4 SECONDS (ref 21.2–33)
BASOPHILS ABSOLUTE: 0 K/CU MM
BASOPHILS RELATIVE PERCENT: 0.5 % (ref 0–1)
BETA GLOBULIN: 0.6 GM/DL (ref 0.5–1.3)
BUN BLDV-MCNC: 19 MG/DL (ref 6–23)
CALCIUM SERPL-MCNC: 9.1 MG/DL (ref 8.3–10.6)
CHLORIDE BLD-SCNC: 96 MMOL/L (ref 99–110)
CO2: 30 MMOL/L (ref 21–32)
CREAT SERPL-MCNC: 1.1 MG/DL (ref 0.9–1.3)
DIFFERENTIAL TYPE: ABNORMAL
EKG ATRIAL RATE: 64 BPM
EKG DIAGNOSIS: NORMAL
EKG P AXIS: 88 DEGREES
EKG P-R INTERVAL: 128 MS
EKG Q-T INTERVAL: 454 MS
EKG QRS DURATION: 66 MS
EKG QTC CALCULATION (BAZETT): 468 MS
EKG R AXIS: 23 DEGREES
EKG T AXIS: 56 DEGREES
EKG VENTRICULAR RATE: 64 BPM
EOSINOPHILS ABSOLUTE: 0.1 K/CU MM
EOSINOPHILS RELATIVE PERCENT: 1 % (ref 0–3)
GAMMA GLOBULIN: 1.7 GM/DL (ref 0.5–1.6)
GFR AFRICAN AMERICAN: >60 ML/MIN/1.73M2
GFR NON-AFRICAN AMERICAN: >60 ML/MIN/1.73M2
GLUCOSE BLD-MCNC: 99 MG/DL (ref 70–99)
HCT VFR BLD CALC: 34.9 % (ref 42–52)
HEMOGLOBIN: 11.1 GM/DL (ref 13.5–18)
IMMATURE NEUTROPHIL %: 0.3 % (ref 0–0.43)
INR BLD: 1.05 INDEX
LV EF: 53 %
LVEF MODALITY: NORMAL
LYMPHOCYTES ABSOLUTE: 1.3 K/CU MM
LYMPHOCYTES RELATIVE PERCENT: 20.8 % (ref 24–44)
MAGNESIUM: 1.9 MG/DL (ref 1.8–2.4)
MCH RBC QN AUTO: 29.1 PG (ref 27–31)
MCHC RBC AUTO-ENTMCNC: 31.8 % (ref 32–36)
MCV RBC AUTO: 91.6 FL (ref 78–100)
MONOCYTES ABSOLUTE: 0.5 K/CU MM
MONOCYTES RELATIVE PERCENT: 7.7 % (ref 0–4)
NUCLEATED RBC %: 0 %
PDW BLD-RTO: 13.1 % (ref 11.7–14.9)
PLATELET # BLD: 122 K/CU MM (ref 140–440)
PMV BLD AUTO: 10.1 FL (ref 7.5–11.1)
POTASSIUM SERPL-SCNC: 3.3 MMOL/L (ref 3.5–5.1)
PROTHROMBIN TIME: 12 SECONDS (ref 9.12–12.5)
RBC # BLD: 3.81 M/CU MM (ref 4.6–6.2)
SEGMENTED NEUTROPHILS ABSOLUTE COUNT: 4.4 K/CU MM
SEGMENTED NEUTROPHILS RELATIVE PERCENT: 69.7 % (ref 36–66)
SODIUM BLD-SCNC: 136 MMOL/L (ref 135–145)
TOTAL IMMATURE NEUTOROPHIL: 0.02 K/CU MM
TOTAL NUCLEATED RBC: 0 K/CU MM
TOTAL PROTEIN: 6 GM/DL (ref 6.4–8.2)
WBC # BLD: 6.2 K/CU MM (ref 4–10.5)

## 2018-10-08 PROCEDURE — 6370000000 HC RX 637 (ALT 250 FOR IP): Performed by: FAMILY MEDICINE

## 2018-10-08 PROCEDURE — 6370000000 HC RX 637 (ALT 250 FOR IP): Performed by: PSYCHIATRY & NEUROLOGY

## 2018-10-08 PROCEDURE — 6370000000 HC RX 637 (ALT 250 FOR IP): Performed by: NURSE PRACTITIONER

## 2018-10-08 PROCEDURE — 94761 N-INVAS EAR/PLS OXIMETRY MLT: CPT

## 2018-10-08 PROCEDURE — 2700000000 HC OXYGEN THERAPY PER DAY

## 2018-10-08 PROCEDURE — 92610 EVALUATE SWALLOWING FUNCTION: CPT | Performed by: SPEECH-LANGUAGE PATHOLOGIST

## 2018-10-08 PROCEDURE — G8997 SWALLOW GOAL STATUS: HCPCS | Performed by: SPEECH-LANGUAGE PATHOLOGIST

## 2018-10-08 PROCEDURE — 83735 ASSAY OF MAGNESIUM: CPT

## 2018-10-08 PROCEDURE — 92526 ORAL FUNCTION THERAPY: CPT | Performed by: SPEECH-LANGUAGE PATHOLOGIST

## 2018-10-08 PROCEDURE — 93306 TTE W/DOPPLER COMPLETE: CPT

## 2018-10-08 PROCEDURE — 1200000000 HC SEMI PRIVATE

## 2018-10-08 PROCEDURE — G8996 SWALLOW CURRENT STATUS: HCPCS | Performed by: SPEECH-LANGUAGE PATHOLOGIST

## 2018-10-08 PROCEDURE — 6370000000 HC RX 637 (ALT 250 FOR IP): Performed by: INTERNAL MEDICINE

## 2018-10-08 PROCEDURE — 36415 COLL VENOUS BLD VENIPUNCTURE: CPT

## 2018-10-08 PROCEDURE — 85025 COMPLETE CBC W/AUTO DIFF WBC: CPT

## 2018-10-08 PROCEDURE — 6360000002 HC RX W HCPCS: Performed by: PSYCHIATRY & NEUROLOGY

## 2018-10-08 PROCEDURE — 80048 BASIC METABOLIC PNL TOTAL CA: CPT

## 2018-10-08 PROCEDURE — 85730 THROMBOPLASTIN TIME PARTIAL: CPT

## 2018-10-08 PROCEDURE — 2580000003 HC RX 258: Performed by: FAMILY MEDICINE

## 2018-10-08 PROCEDURE — 6360000002 HC RX W HCPCS: Performed by: NURSE PRACTITIONER

## 2018-10-08 PROCEDURE — 85610 PROTHROMBIN TIME: CPT

## 2018-10-08 RX ORDER — FOLIC ACID 1 MG/1
1 TABLET ORAL DAILY
Status: DISCONTINUED | OUTPATIENT
Start: 2018-10-08 | End: 2018-10-09 | Stop reason: HOSPADM

## 2018-10-08 RX ORDER — ACETAMINOPHEN 80 MG
TABLET,CHEWABLE ORAL
Status: COMPLETED
Start: 2018-10-08 | End: 2018-10-08

## 2018-10-08 RX ADMIN — BACLOFEN 10 MG: 10 TABLET ORAL at 22:28

## 2018-10-08 RX ADMIN — ATORVASTATIN CALCIUM 20 MG: 20 TABLET, FILM COATED ORAL at 22:28

## 2018-10-08 RX ADMIN — ASPIRIN 81 MG: 81 TABLET, COATED ORAL at 08:14

## 2018-10-08 RX ADMIN — FUROSEMIDE 20 MG: 20 TABLET ORAL at 22:29

## 2018-10-08 RX ADMIN — BACLOFEN 10 MG: 10 TABLET ORAL at 08:15

## 2018-10-08 RX ADMIN — DOXAZOSIN 4 MG: 4 TABLET ORAL at 08:14

## 2018-10-08 RX ADMIN — METOPROLOL TARTRATE 12.5 MG: 25 TABLET ORAL at 22:28

## 2018-10-08 RX ADMIN — FUROSEMIDE 20 MG: 20 TABLET ORAL at 08:15

## 2018-10-08 RX ADMIN — IMMUNE GLOBULIN INTRAVENOUS (HUMAN) 30 G: 5 INJECTION, SOLUTION INTRAVENOUS at 09:38

## 2018-10-08 RX ADMIN — MEMANTINE 10 MG: 10 TABLET ORAL at 22:28

## 2018-10-08 RX ADMIN — HEPARIN SODIUM 5000 UNITS: 5000 INJECTION INTRAVENOUS; SUBCUTANEOUS at 14:40

## 2018-10-08 RX ADMIN — CLONAZEPAM 1 MG: 1 TABLET ORAL at 22:28

## 2018-10-08 RX ADMIN — DONEPEZIL HYDROCHLORIDE 15 MG: 10 TABLET, FILM COATED ORAL at 22:28

## 2018-10-08 RX ADMIN — POTASSIUM CHLORIDE 40 MEQ: 20 TABLET, EXTENDED RELEASE ORAL at 06:34

## 2018-10-08 RX ADMIN — TRIHEXYPHENIDYL HYDROCHLORIDE 2 MG: 2 TABLET ORAL at 16:30

## 2018-10-08 RX ADMIN — CLOPIDOGREL BISULFATE 75 MG: 75 TABLET ORAL at 08:13

## 2018-10-08 RX ADMIN — Medication: at 15:31

## 2018-10-08 RX ADMIN — HEPARIN SODIUM 5000 UNITS: 5000 INJECTION INTRAVENOUS; SUBCUTANEOUS at 06:34

## 2018-10-08 RX ADMIN — MEMANTINE 10 MG: 10 TABLET ORAL at 08:14

## 2018-10-08 RX ADMIN — TRIHEXYPHENIDYL HYDROCHLORIDE 2 MG: 2 TABLET ORAL at 08:13

## 2018-10-08 RX ADMIN — FOLIC ACID 1 MG: 1 TABLET ORAL at 14:39

## 2018-10-08 RX ADMIN — METOLAZONE 2.5 MG: 5 TABLET ORAL at 14:39

## 2018-10-08 RX ADMIN — SODIUM CHLORIDE: 9 INJECTION, SOLUTION INTRAVENOUS at 22:27

## 2018-10-08 RX ADMIN — HEPARIN SODIUM 5000 UNITS: 5000 INJECTION INTRAVENOUS; SUBCUTANEOUS at 22:28

## 2018-10-08 ASSESSMENT — PAIN SCALES - GENERAL
PAINLEVEL_OUTOF10: 0

## 2018-10-08 NOTE — PROGRESS NOTES
pm       TECHNIQUE:   CT of the abdomen and pelvis was performed without the administration of   intravenous contrast. Multiplanar reformatted images are provided for review. Dose modulation, iterative reconstruction, and/or weight based adjustment of   the mA/kV was utilized to reduce the radiation dose to as low as reasonably   achievable. CT of the chest was performed without the administration of   intravenous contrast. Multiplanar reformatted images are provided for review.    Dose modulation, iterative reconstruction, and/or weight based adjustment of   the mA/kV was utilized to reduce the radiation dose to as low as reasonably   achievable.       COMPARISON:   June 26, 2016, May 3, 2014 comparison for the abdomen and pelvis.  No   comparison for the chest.       HISTORY:   ORDERING SYSTEM PROVIDED HISTORY: r/o malignancy   TECHNOLOGIST PROVIDED HISTORY:   Additional Contrast?->None   Ordering Physician Provided Reason for Exam: r/o malignancy   Acuity: Acute   Type of Exam: Initial   Additional signs and symptoms: none   Relevant Medical/Surgical History: hx acute kidney injury, cardiac surg, back   surg; ORDERING SYSTEM PROVIDED HISTORY: r/o malignancy/mass/lad   TECHNOLOGIST PROVIDED HISTORY:   Ordering Physician Provided Reason for Exam: r/o malignancy/mass/lad   Acuity: Acute   Type of Exam: Initial   Additional signs and symptoms: none   Relevant Medical/Surgical History: hx acute kidney injury, cardiac surg, back   surg       FINDINGS:       Chest:       Mediastinum: Heart size is normal.  There is no pericardial effusion.  There   are coronary artery calcifications.  Within the limitations of a noncontrast   exam, there is no evidence of hilar or mediastinal adenopathy.  There are   coarsely calcified right hilar and subcarinal nodes.  Visible portion of the   thyroid is grossly unremarkable.       Lungs/pleura: Central tracheobronchial airways are unremarkable.  There is no   focal consolidation, Laryngeal Elevation: All    Prognosis  Prognosis  Prognosis for safe diet advancement: fair  Barriers to reach goals: cognitive deficits  Barriers/Prognosis Comment: questionable family comprehension of recommendations  Individuals consulted  Consulted and agree with results and recommendations: Patient; Family member  Family member consulted: various family members in room - siblings, children, wife not present    Education  Patient Education: results and recommendations  Patient Education Response: No evidence of learning;Needs reinforcement      G-Code  SLP G-Codes  Functional Assessment Tool Used: NOMS  Score: 4  Functional Limitations: Swallowing  Swallow Current Status (): At least 40 percent but less than 60 percent impaired, limited or restricted  Swallow Goal Status ():  At least 20 percent but less than 40 percent impaired, limited or restricted         Therapy Time  SLP Individual Minutes  Time In: 1400  Time Out: [facility]  Minutes: [address]ian [address], [place]  10/8/2018 2:34 PM

## 2018-10-08 NOTE — PROGRESS NOTES
0325  10/08/18   0402   WBC  6.6  5.7  6.2   HGB  10.2*  9.8*  11.1*   HCT  32.0*  30.0*  34.9*   PLT  114*  119*  122*      Recent Labs      10/06/18   0449  10/07/18   0325  10/08/18   0402   NA  140  138  136   K  3.4*  3.3*  3.3*   CL  102  99  96*   CO2  30  30  30   BUN  22  18  19   CREATININE  1.3  1.3  1.1     No results for input(s): AST, ALT, ALB, BILIDIR, BILITOT, ALKPHOS in the last 72 hours. Recent Labs      10/08/18   0402   INR  1.05     Recent Labs      10/05/18   1000  10/06/18   0449   CKTOTAL   --   133   TROPONINT  0.013*   --        I/O last 3 completed shifts: In: 440 [P.O.:440]  Out: 825 [Urine:825]    Intake/Output Summary (Last 24 hours) at 10/08/18 0955  Last data filed at 10/07/18 1205   Gross per 24 hour   Intake                0 ml   Output              825 ml   Net             -825 ml        Assessment/Plan:   Severe Parkinson symptoms concerning for Guillame Parker  MRI spine pending  -IVIG x 5 days  -Continue home Artane  -PT/OT  -Consult neuro  -Consult Oncology r/o paraneoplastic myopathy neuropathy  -Consult Case management for SNF placement  10/08/18-patient has been receiving IVIG, will continue to receive infusion starting 10/05/18 and in the 10/10/18 at 1500 he states some improvement in his extremities but still has paresthesia on bilateral bottom of feet, neurology and hematology or following     2. Acute metabolic encephalopathy due to chronic dementia  VQ scan with low probability for PE. CXR clarence cute. CT head with no intracranial abnormality  - Continue Namenda and Aricept  -Hydration     3. YEYO 2/2 prerenal   # Elevated troponin due to #2, trending down  # Hypokalemia due to diuretic  EKG with nonspecific ST and T elevation likely 2/2 hypokalemia   -Improving with hydration  -Potassium replacement  -Avoid nephrotoxins  -Monitor BMP     4. Hypertension-Cardura, metoprolol, monitor blood pressure      DVT Prophylaxis: scd  Diet: DIET CARDIAC;   Dietary Nutrition

## 2018-10-09 VITALS
HEIGHT: 72 IN | RESPIRATION RATE: 18 BRPM | WEIGHT: 165.1 LBS | BODY MASS INDEX: 22.36 KG/M2 | DIASTOLIC BLOOD PRESSURE: 73 MMHG | TEMPERATURE: 98.3 F | HEART RATE: 66 BPM | SYSTOLIC BLOOD PRESSURE: 127 MMHG | OXYGEN SATURATION: 99 %

## 2018-10-09 LAB
ANION GAP SERPL CALCULATED.3IONS-SCNC: 10 MMOL/L (ref 4–16)
BASOPHILS ABSOLUTE: 0 K/CU MM
BASOPHILS RELATIVE PERCENT: 0.5 % (ref 0–1)
BUN BLDV-MCNC: 17 MG/DL (ref 6–23)
CA 19-9: 10
CALCIUM SERPL-MCNC: 9.7 MG/DL (ref 8.3–10.6)
CHLORIDE BLD-SCNC: 96 MMOL/L (ref 99–110)
CO2: 30 MMOL/L (ref 21–32)
CREAT SERPL-MCNC: 1.1 MG/DL (ref 0.9–1.3)
DIFFERENTIAL TYPE: ABNORMAL
EOSINOPHILS ABSOLUTE: 0.1 K/CU MM
EOSINOPHILS RELATIVE PERCENT: 0.8 % (ref 0–3)
GFR AFRICAN AMERICAN: >60 ML/MIN/1.73M2
GFR NON-AFRICAN AMERICAN: >60 ML/MIN/1.73M2
GLUCOSE BLD-MCNC: 96 MG/DL (ref 70–99)
HAPTOGLOBIN: 87
HCT VFR BLD CALC: 34.4 % (ref 42–52)
HEMOGLOBIN: 10.9 GM/DL (ref 13.5–18)
IMMATURE NEUTROPHIL %: 0.2 % (ref 0–0.43)
LYMPHOCYTES ABSOLUTE: 1.3 K/CU MM
LYMPHOCYTES RELATIVE PERCENT: 21.2 % (ref 24–44)
MCH RBC QN AUTO: 29.3 PG (ref 27–31)
MCHC RBC AUTO-ENTMCNC: 31.7 % (ref 32–36)
MCV RBC AUTO: 92.5 FL (ref 78–100)
MONOCYTES ABSOLUTE: 0.5 K/CU MM
MONOCYTES RELATIVE PERCENT: 7.5 % (ref 0–4)
MS ALPHA-FETOPROTEIN: 1
NUCLEATED RBC %: 0 %
PDW BLD-RTO: 13.2 % (ref 11.7–14.9)
PLATELET # BLD: 117 K/CU MM (ref 140–440)
PMV BLD AUTO: 10.4 FL (ref 7.5–11.1)
POTASSIUM SERPL-SCNC: 3.7 MMOL/L (ref 3.5–5.1)
PROSTATE SPECIFIC ANTIGEN FREE: 0.2
PROSTATE SPECIFIC ANTIGEN PERCENT FREE: 29
PROSTATE SPECIFIC ANTIGEN: 0.7
RBC # BLD: 3.72 M/CU MM (ref 4.6–6.2)
SEGMENTED NEUTROPHILS ABSOLUTE COUNT: 4.4 K/CU MM
SEGMENTED NEUTROPHILS RELATIVE PERCENT: 69.8 % (ref 36–66)
SODIUM BLD-SCNC: 136 MMOL/L (ref 135–145)
TOTAL IMMATURE NEUTOROPHIL: 0.01 K/CU MM
TOTAL NUCLEATED RBC: 0 K/CU MM
WBC # BLD: 6.2 K/CU MM (ref 4–10.5)

## 2018-10-09 PROCEDURE — 6370000000 HC RX 637 (ALT 250 FOR IP): Performed by: FAMILY MEDICINE

## 2018-10-09 PROCEDURE — 6370000000 HC RX 637 (ALT 250 FOR IP): Performed by: PSYCHIATRY & NEUROLOGY

## 2018-10-09 PROCEDURE — 6370000000 HC RX 637 (ALT 250 FOR IP): Performed by: HOSPITALIST

## 2018-10-09 PROCEDURE — 6370000000 HC RX 637 (ALT 250 FOR IP): Performed by: NURSE PRACTITIONER

## 2018-10-09 PROCEDURE — 80048 BASIC METABOLIC PNL TOTAL CA: CPT

## 2018-10-09 PROCEDURE — 6360000002 HC RX W HCPCS: Performed by: PSYCHIATRY & NEUROLOGY

## 2018-10-09 PROCEDURE — 2580000003 HC RX 258: Performed by: FAMILY MEDICINE

## 2018-10-09 PROCEDURE — 36415 COLL VENOUS BLD VENIPUNCTURE: CPT

## 2018-10-09 PROCEDURE — 6370000000 HC RX 637 (ALT 250 FOR IP): Performed by: INTERNAL MEDICINE

## 2018-10-09 PROCEDURE — 85025 COMPLETE CBC W/AUTO DIFF WBC: CPT

## 2018-10-09 PROCEDURE — 97535 SELF CARE MNGMENT TRAINING: CPT

## 2018-10-09 PROCEDURE — 92526 ORAL FUNCTION THERAPY: CPT | Performed by: SPEECH-LANGUAGE PATHOLOGIST

## 2018-10-09 PROCEDURE — 97530 THERAPEUTIC ACTIVITIES: CPT

## 2018-10-09 PROCEDURE — 6360000002 HC RX W HCPCS: Performed by: NURSE PRACTITIONER

## 2018-10-09 PROCEDURE — 97110 THERAPEUTIC EXERCISES: CPT

## 2018-10-09 RX ORDER — DONEPEZIL HYDROCHLORIDE 5 MG/1
15 TABLET, FILM COATED ORAL NIGHTLY
Qty: 30 TABLET | Refills: 3 | Status: SHIPPED | OUTPATIENT
Start: 2018-10-09 | End: 2019-10-09

## 2018-10-09 RX ORDER — NICOTINE 21 MG/24HR
1 PATCH, TRANSDERMAL 24 HOURS TRANSDERMAL DAILY
Status: DISCONTINUED | OUTPATIENT
Start: 2018-10-09 | End: 2018-10-09 | Stop reason: HOSPADM

## 2018-10-09 RX ORDER — ACETAMINOPHEN 160 MG/5ML
650 SOLUTION ORAL EVERY 6 HOURS PRN
Status: DISCONTINUED | OUTPATIENT
Start: 2018-10-09 | End: 2018-10-09

## 2018-10-09 RX ORDER — BACLOFEN 10 MG/1
10 TABLET ORAL 2 TIMES DAILY
Qty: 30 TABLET | Refills: 0 | Status: ON HOLD | OUTPATIENT
Start: 2018-10-09 | End: 2018-10-14 | Stop reason: HOSPADM

## 2018-10-09 RX ORDER — METOLAZONE 2.5 MG/1
2.5 TABLET ORAL EVERY OTHER DAY
Qty: 12 TABLET | Refills: 0 | Status: SHIPPED | OUTPATIENT
Start: 2018-10-09 | End: 2018-10-11 | Stop reason: ALTCHOICE

## 2018-10-09 RX ORDER — ACETAMINOPHEN 325 MG/1
650 TABLET ORAL EVERY 6 HOURS PRN
Status: DISCONTINUED | OUTPATIENT
Start: 2018-10-09 | End: 2018-10-09 | Stop reason: HOSPADM

## 2018-10-09 RX ORDER — CLONAZEPAM 0.5 MG/1
0.5 TABLET ORAL NIGHTLY
Status: DISCONTINUED | OUTPATIENT
Start: 2018-10-09 | End: 2018-10-09 | Stop reason: HOSPADM

## 2018-10-09 RX ORDER — CLONAZEPAM 0.5 MG/1
0.5 TABLET ORAL NIGHTLY
Qty: 6 TABLET | Refills: 0 | Status: SHIPPED | OUTPATIENT
Start: 2018-10-09 | End: 2018-10-11 | Stop reason: DRUGHIGH

## 2018-10-09 RX ADMIN — HUMAN IMMUNOGLOBULIN G 30 G: 10 LIQUID INTRAVENOUS at 15:09

## 2018-10-09 RX ADMIN — FUROSEMIDE 20 MG: 20 TABLET ORAL at 11:17

## 2018-10-09 RX ADMIN — ACETAMINOPHEN 650 MG: 325 TABLET ORAL at 13:27

## 2018-10-09 RX ADMIN — ASPIRIN 81 MG: 81 TABLET, COATED ORAL at 11:18

## 2018-10-09 RX ADMIN — SODIUM CHLORIDE: 9 INJECTION, SOLUTION INTRAVENOUS at 11:30

## 2018-10-09 RX ADMIN — BACLOFEN 10 MG: 10 TABLET ORAL at 11:18

## 2018-10-09 RX ADMIN — FOLIC ACID 1 MG: 1 TABLET ORAL at 11:17

## 2018-10-09 RX ADMIN — MEMANTINE 10 MG: 10 TABLET ORAL at 11:18

## 2018-10-09 RX ADMIN — TRIHEXYPHENIDYL HYDROCHLORIDE 2 MG: 2 TABLET ORAL at 11:18

## 2018-10-09 RX ADMIN — HEPARIN SODIUM 5000 UNITS: 5000 INJECTION INTRAVENOUS; SUBCUTANEOUS at 13:28

## 2018-10-09 RX ADMIN — CLOPIDOGREL BISULFATE 75 MG: 75 TABLET ORAL at 11:18

## 2018-10-09 RX ADMIN — DOXAZOSIN 4 MG: 4 TABLET ORAL at 11:18

## 2018-10-09 ASSESSMENT — PAIN SCALES - GENERAL
PAINLEVEL_OUTOF10: 2
PAINLEVEL_OUTOF10: 8
PAINLEVEL_OUTOF10: 0

## 2018-10-09 ASSESSMENT — PAIN DESCRIPTION - LOCATION: LOCATION: NECK

## 2018-10-09 ASSESSMENT — PAIN DESCRIPTION - PAIN TYPE: TYPE: ACUTE PAIN

## 2018-10-09 NOTE — DISCHARGE SUMMARY
Diamond Poster 1950 [id number]  PCP:  Reny Ortiz DO    Admit date: 10/5/2018  Admitting Physician: Andreas Singh MD    Discharge date: 10/9/2018 Discharge Physician: Sandra Ventura MD         Discharge Diagnoses Include:  [place] syndrome r/o Thoraco Lumbar cord compression cauda aquina lesion   metabolic encephalopathy  Severe parkinsons disease  Moderate dementia    HPI by admitting physician    Diamond Vyas is a 70 y.o.  male  who presents with AMS. Patient's wife reports she is unable to take care of him. She reports increased weakness and fatigue. She states he has been progressively getting more and more confused. He does have a wound on coccyx due to pressure ulcer. Patient was transferred here from [facility] for worsening Parkinson's disease as his neurologist is here. Hx of HTN, Depression, CAD, and Tobacco abuse. Hospital Course best explained per problem list is as follows  Severe Parkinson symptoms concerning for Guillame Butler  MRI spine pending  -IVIG x 5 days  -Continue home Artane  -PT/OT  -Consult neuro  -Consult Oncology r/o paraneoplastic myopathy neuropathy  -Consult Case management for SNF placement  10/08/18-patient has been receiving IVIG, will continue to receive infusion starting 10/05/18 and in the 10/10/18 at 1500 he states some improvement in his extremities but still has paresthesia on bilateral bottom of feet, neurology and hematology or following  10/9/18 pt with some improvement on IVIG,. He got his 5 doses here with some improvement in movement. Ok to d/c per neurology. To snf.      2. Acute metabolic encephalopathy due to chronic dementia  VQ scan with low probability for PE. CXR clarence cute. CT head with no intracranial abnormality  - Continue Namenda and Aricept  -Hydration     3.  YEYO 2/2 prerenal   # Elevated troponin due to #2, trending down  # Hypokalemia due to diuretic  EKG with nonspecific ST and T elevation likely 2/2 hypokalemia   -Improving with without the administration of intravenous contrast. Multiplanar reformatted images are provided for review. Dose modulation, iterative reconstruction, and/or weight based adjustment of the mA/kV was utilized to reduce the radiation dose to as low as reasonably achievable.; CT of the chest was performed without the administration of intravenous contrast. Multiplanar reformatted images are provided for review. Dose modulation, iterative reconstruction, and/or weight based adjustment of the mA/kV was utilized to reduce the radiation dose to as low as reasonably achievable. COMPARISON: June 26, 2016, May 3, 2014 comparison for the abdomen and pelvis. No comparison for the chest. HISTORY: ORDERING SYSTEM PROVIDED HISTORY: r/o malignancy TECHNOLOGIST PROVIDED HISTORY: Additional Contrast?->None Ordering Physician Provided Reason for Exam: r/o malignancy Acuity: Acute Type of Exam: Initial Additional signs and symptoms: none Relevant Medical/Surgical History: hx acute kidney injury, cardiac surg, back surg; ORDERING SYSTEM PROVIDED HISTORY: r/o malignancy/mass/lad TECHNOLOGIST PROVIDED HISTORY: Ordering Physician Provided Reason for Exam: r/o malignancy/mass/lad Acuity: Acute Type of Exam: Initial Additional signs and symptoms: none Relevant Medical/Surgical History: hx acute kidney injury, cardiac surg, back surg FINDINGS: Chest: Mediastinum: Heart size is normal.  There is no pericardial effusion. There are coronary artery calcifications. Within the limitations of a noncontrast exam, there is no evidence of hilar or mediastinal adenopathy. There are coarsely calcified right hilar and subcarinal nodes. Visible portion of the thyroid is grossly unremarkable. Lungs/pleura: Central tracheobronchial airways are unremarkable. There is no focal consolidation, pleural effusion, or pneumothorax. No evidence of pulmonary edema. Calcified granulomas noted in the right lower lobe.  Soft Tissues/Bones: No aggressive lytic or consolidation or significant effusions on recent chest radiograph. Low probability for pulmonary embolus. Xr Chest Portable    Result Date: 10/4/2018  EXAMINATION: SINGLE XRAY VIEW OF THE CHEST 10/4/2018 5:49 pm COMPARISON: 07/20/2016 HISTORY: ORDERING SYSTEM PROVIDED HISTORY: CVA TECHNOLOGIST PROVIDED HISTORY: Reason for exam:->CVA Acuity: Acute Type of Exam: Initial Additional signs and symptoms: sob, weakness, hx HTN FINDINGS: The lungs are without acute focal process. There is no effusion or pneumothorax. The cardiomediastinal silhouette is stable. The osseous structures are stable. No acute process. Significant Diagnostic Studies at discharge:   CBC:   Lab Results   Component Value Date    WBC 6.2 10/09/2018    RBC 3.72 10/09/2018    HGB 10.9 10/09/2018    HCT 34.4 10/09/2018    MCV 92.5 10/09/2018    MCH 29.3 10/09/2018    MCHC 31.7 10/09/2018    RDW 13.2 10/09/2018     10/09/2018    MPV 10.4 10/09/2018     BMP:    Lab Results   Component Value Date     10/09/2018    K 3.7 10/09/2018    CL 96 10/09/2018    CO2 30 10/09/2018    BUN 17 10/09/2018    LABALBU 4.2 10/04/2018    CREATININE 1.1 10/09/2018    CALCIUM 9.7 10/09/2018    GFRAA >60 10/09/2018    LABGLOM >60 10/09/2018    GLUCOSE 96 10/09/2018       Patient Instructions:     Medication List      CHANGE how you take these medications    * baclofen 10 MG tablet  Commonly known as:  LIORESAL  Take 1 tablet by mouth 2 times daily  What changed: You were already taking a medication with the same name, and this prescription was added. Make sure you understand how and when to take each. * baclofen 20 MG tablet  Commonly known as:  LIORESAL  What changed:  Another medication with the same name was added. Make sure you understand how and when to take each.      donepezil 5 MG tablet  Commonly known as:  ARICEPT  Take 3 tablets by mouth nightly  What changed:  · how much to take  · Another medication with the same name was stable    Prognosis: Poor - Guarded    Disposition: SNF      Follow-up with   Physician at SNF   Follow up labs: none        Discharge Physician Signed: Carlton Brown M.D. The patient was seen and examined on day of discharge and this discharge summary is in conjunction with any daily progress note from day of discharge.   Time spent on discharge in the examination, evaluation, counseling and review of medications and discharge plan: 40 minutes

## 2018-10-09 NOTE — PROGRESS NOTES
Plavix     PT-OT     OK to DC pt to [facility] Tuesday or Wednessday.       Electronically signed by Ana Maria Rowe MD on 10/8/2018 at 10:46 PM

## 2018-10-09 NOTE — PROGRESS NOTES
Occupational Therapy Treatment Note  Name: Monet Peraza MRN: [id number] :   1950   Date:  10/9/2018   Admission Date: 10/5/2018 [facility]  [id number]   Restrictions/Precautions:  Restrictions/Precautions  Restrictions/Precautions: Fall Risk, General Precautions   Communication with other providers:  Cleared for OT tx by Giuseppe Wayne  Subjective:  Patient states: \"I get mad sometimes\"  Pain:   Location, Type, Intensity (0/10 to 10/10): Questioned pt about pain, pt answer non sensical  Objective:    Observation:  Pt sitting up in bed with breakfast tray struggling to self-feed  Objective Measures:  Oriented to person only; thought he was in assisted living; knew it was October but not day or   Treatment, including education:  Self Care Training:   Cues were given for safety, sequence, UE/LE placement, visual cues, and balance. Activities performed today included dressing, toileting, hand hygiene, and grooming. Pt required S/U assist for self feeding to open containers, position items on tray and access utensils  Pt washed face/hands after S/U    Therapeutic Activity Training:   Therapeutic activity training was instructed today. Cues were given for safety, sequence, UE/LE placement, awareness, and balance. Activities performed today included bed mobility training, sup-sit, sit-stand, SPT. Pt Min A to reposition in bed for a more upright posture for self-feeding  Mod A to roll onto L side; Max A to roll onto right  Max A to scoot to [facility] in supine  Mod A to scoot hips over in bed via bridging    Therapeutic Exercise:  Cues were given for technique, safety, recruitment, and rationale. Cues were verbal and/or tactile. Pt completed B UE/LE exercise supine in bed  Shoulder flexion  Overhead press  Bicep curl  Wrist flexion/extension  Ankle pumps  Hip abd  Punches  Kicks    Safety  Patient left safely in the bed, with call light/phone in reach with alarm applied.  Gait belt was used for transfers and

## 2018-10-11 ENCOUNTER — HOSPITAL ENCOUNTER (INPATIENT)
Age: 68
LOS: 3 days | Discharge: NURSING, REHAB OR LONG-TERM CARE | DRG: 056 | End: 2018-10-14
Attending: EMERGENCY MEDICINE | Admitting: INTERNAL MEDICINE
Payer: MEDICARE

## 2018-10-11 ENCOUNTER — APPOINTMENT (OUTPATIENT)
Dept: GENERAL RADIOLOGY | Age: 68
DRG: 056 | End: 2018-10-11
Payer: MEDICARE

## 2018-10-11 ENCOUNTER — APPOINTMENT (OUTPATIENT)
Dept: CT IMAGING | Age: 68
DRG: 056 | End: 2018-10-11
Payer: MEDICARE

## 2018-10-11 DIAGNOSIS — S01.01XA LACERATION OF SCALP, INITIAL ENCOUNTER: ICD-10-CM

## 2018-10-11 DIAGNOSIS — E87.6 HYPOKALEMIA: ICD-10-CM

## 2018-10-11 DIAGNOSIS — E87.1 HYPONATREMIA: ICD-10-CM

## 2018-10-11 DIAGNOSIS — S00.83XA CONTUSION OF FACE, INITIAL ENCOUNTER: ICD-10-CM
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
## 2018-10-11 DIAGNOSIS — S01.81XA FACIAL LACERATION, INITIAL ENCOUNTER: ICD-10-CM
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
## 2018-10-11 LAB
ALBUMIN SERPL-MCNC: 3.8 GM/DL (ref 3.4–5)
ALP BLD-CCNC: 78 IU/L (ref 40–129)
ALT SERPL-CCNC: 60 U/L (ref 10–40)
ANION GAP SERPL CALCULATED.3IONS-SCNC: 10 MMOL/L (ref 4–16)
AST SERPL-CCNC: 65 IU/L (ref 15–37)
BASOPHILS ABSOLUTE: 0 K/CU MM
BASOPHILS RELATIVE PERCENT: 0.3 % (ref 0–1)
BILIRUB SERPL-MCNC: 0.6 MG/DL (ref 0–1)
BUN BLDV-MCNC: 23 MG/DL (ref 6–23)
CALCIUM SERPL-MCNC: 9.8 MG/DL (ref 8.3–10.6)
CHLORIDE BLD-SCNC: 87 MMOL/L (ref 99–110)
CO2: 31 MMOL/L (ref 21–32)
CREAT SERPL-MCNC: 1.1 MG/DL (ref 0.9–1.3)
DIFFERENTIAL TYPE: ABNORMAL
EOSINOPHILS ABSOLUTE: 0 K/CU MM
EOSINOPHILS RELATIVE PERCENT: 0.3 % (ref 0–3)
GFR AFRICAN AMERICAN: >60 ML/MIN/1.73M2
GFR NON-AFRICAN AMERICAN: >60 ML/MIN/1.73M2
GLUCOSE BLD-MCNC: 100 MG/DL (ref 70–99)
HAV IGM SER IA-ACNC: NON REACTIVE
HCT VFR BLD CALC: 35.5 % (ref 42–52)
HEMOGLOBIN: 11.6 GM/DL (ref 13.5–18)
HEPATITIS B CORE IGM ANTIBODY: NON REACTIVE
HEPATITIS B SURFACE ANTIGEN: NON REACTIVE
HEPATITIS C ANTIBODY: NON REACTIVE
IMMATURE NEUTROPHIL %: 0.3 % (ref 0–0.43)
INR BLD: 1.06 INDEX
LYMPHOCYTES ABSOLUTE: 1 K/CU MM
LYMPHOCYTES RELATIVE PERCENT: 13.5 % (ref 24–44)
MAGNESIUM: 2.2 MG/DL (ref 1.8–2.4)
MCH RBC QN AUTO: 29.3 PG (ref 27–31)
MCHC RBC AUTO-ENTMCNC: 32.7 % (ref 32–36)
MCV RBC AUTO: 89.6 FL (ref 78–100)
MONOCYTES ABSOLUTE: 0.6 K/CU MM
MONOCYTES RELATIVE PERCENT: 8.2 % (ref 0–4)
NUCLEATED RBC %: 0 %
PDW BLD-RTO: 12.8 % (ref 11.7–14.9)
PLATELET # BLD: 126 K/CU MM (ref 140–440)
PMV BLD AUTO: 10.4 FL (ref 7.5–11.1)
POTASSIUM SERPL-SCNC: 2.9 MMOL/L (ref 3.5–5.1)
POTASSIUM SERPL-SCNC: 3.2 MMOL/L (ref 3.5–5.1)
PROTHROMBIN TIME: 12.3 SECONDS (ref 9.12–12.5)
RBC # BLD: 3.96 M/CU MM (ref 4.6–6.2)
SEGMENTED NEUTROPHILS ABSOLUTE COUNT: 5.5 K/CU MM
SEGMENTED NEUTROPHILS RELATIVE PERCENT: 77.4 % (ref 36–66)
SODIUM BLD-SCNC: 128 MMOL/L (ref 135–145)
SODIUM BLD-SCNC: 129 MMOL/L (ref 135–145)
TOTAL CK: 307 IU/L (ref 38–174)
TOTAL IMMATURE NEUTOROPHIL: 0.02 K/CU MM
TOTAL NUCLEATED RBC: 0 K/CU MM
TOTAL PROTEIN: 8.6 GM/DL (ref 6.4–8.2)
TROPONIN T: <0.01 NG/ML
WBC # BLD: 7 K/CU MM (ref 4–10.5)

## 2018-10-11 PROCEDURE — 85610 PROTHROMBIN TIME: CPT

## 2018-10-11 PROCEDURE — 72170 X-RAY EXAM OF PELVIS: CPT

## 2018-10-11 PROCEDURE — 82550 ASSAY OF CK (CPK): CPT

## 2018-10-11 PROCEDURE — 2580000003 HC RX 258: Performed by: PHYSICIAN ASSISTANT

## 2018-10-11 PROCEDURE — 93010 ELECTROCARDIOGRAM REPORT: CPT | Performed by: INTERNAL MEDICINE

## 2018-10-11 PROCEDURE — 70486 CT MAXILLOFACIAL W/O DYE: CPT

## 2018-10-11 PROCEDURE — 85025 COMPLETE CBC W/AUTO DIFF WBC: CPT

## 2018-10-11 PROCEDURE — 1200000000 HC SEMI PRIVATE

## 2018-10-11 PROCEDURE — 84484 ASSAY OF TROPONIN QUANT: CPT

## 2018-10-11 PROCEDURE — 71045 X-RAY EXAM CHEST 1 VIEW: CPT

## 2018-10-11 PROCEDURE — 73060 X-RAY EXAM OF HUMERUS: CPT

## 2018-10-11 PROCEDURE — 84295 ASSAY OF SERUM SODIUM: CPT

## 2018-10-11 PROCEDURE — 6360000002 HC RX W HCPCS: Performed by: EMERGENCY MEDICINE

## 2018-10-11 PROCEDURE — 6370000000 HC RX 637 (ALT 250 FOR IP): Performed by: PHYSICIAN ASSISTANT

## 2018-10-11 PROCEDURE — 99285 EMERGENCY DEPT VISIT HI MDM: CPT

## 2018-10-11 PROCEDURE — 84132 ASSAY OF SERUM POTASSIUM: CPT

## 2018-10-11 PROCEDURE — 80074 ACUTE HEPATITIS PANEL: CPT

## 2018-10-11 PROCEDURE — 93005 ELECTROCARDIOGRAM TRACING: CPT | Performed by: EMERGENCY MEDICINE

## 2018-10-11 PROCEDURE — 83735 ASSAY OF MAGNESIUM: CPT

## 2018-10-11 PROCEDURE — 73080 X-RAY EXAM OF ELBOW: CPT

## 2018-10-11 PROCEDURE — 70450 CT HEAD/BRAIN W/O DYE: CPT

## 2018-10-11 PROCEDURE — 36415 COLL VENOUS BLD VENIPUNCTURE: CPT

## 2018-10-11 PROCEDURE — 72125 CT NECK SPINE W/O DYE: CPT

## 2018-10-11 PROCEDURE — 80053 COMPREHEN METABOLIC PANEL: CPT

## 2018-10-11 RX ORDER — INDOMETHACIN 25 MG/1
50 CAPSULE ORAL 2 TIMES DAILY PRN
Status: DISCONTINUED | OUTPATIENT
Start: 2018-10-11 | End: 2018-10-15 | Stop reason: HOSPADM

## 2018-10-11 RX ORDER — ONDANSETRON 2 MG/ML
4 INJECTION INTRAMUSCULAR; INTRAVENOUS EVERY 6 HOURS PRN
Status: DISCONTINUED | OUTPATIENT
Start: 2018-10-11 | End: 2018-10-15 | Stop reason: HOSPADM

## 2018-10-11 RX ORDER — TRIHEXYPHENIDYL HYDROCHLORIDE 2 MG/1
2 TABLET ORAL 2 TIMES DAILY
Status: DISCONTINUED | OUTPATIENT
Start: 2018-10-11 | End: 2018-10-15 | Stop reason: HOSPADM

## 2018-10-11 RX ORDER — DIAPER,BRIEF,INFANT-TODD,DISP
EACH MISCELLANEOUS ONCE
Status: COMPLETED | OUTPATIENT
Start: 2018-10-11 | End: 2018-10-11

## 2018-10-11 RX ORDER — CLONAZEPAM 0.5 MG/1
0.25 TABLET ORAL EVERY MORNING
Status: DISCONTINUED | OUTPATIENT
Start: 2018-10-12 | End: 2018-10-13

## 2018-10-11 RX ORDER — CLONAZEPAM 2 MG/1
2 TABLET ORAL NIGHTLY
COMMUNITY
End: 2018-10-11 | Stop reason: SDUPTHER

## 2018-10-11 RX ORDER — POTASSIUM CHLORIDE 7.45 MG/ML
10 INJECTION INTRAVENOUS ONCE
Status: COMPLETED | OUTPATIENT
Start: 2018-10-11 | End: 2018-10-11

## 2018-10-11 RX ORDER — CLONAZEPAM 1 MG/1
2 TABLET ORAL NIGHTLY
Status: DISCONTINUED | OUTPATIENT
Start: 2018-10-11 | End: 2018-10-13

## 2018-10-11 RX ORDER — MEMANTINE HYDROCHLORIDE 10 MG/1
10 TABLET ORAL 2 TIMES DAILY
Status: DISCONTINUED | OUTPATIENT
Start: 2018-10-11 | End: 2018-10-15 | Stop reason: HOSPADM

## 2018-10-11 RX ORDER — POTASSIUM CHLORIDE 7.45 MG/ML
10 INJECTION INTRAVENOUS PRN
Status: DISCONTINUED | OUTPATIENT
Start: 2018-10-11 | End: 2018-10-15 | Stop reason: HOSPADM

## 2018-10-11 RX ORDER — METOPROLOL SUCCINATE 25 MG/1
25 TABLET, EXTENDED RELEASE ORAL DAILY
COMMUNITY
End: 2019-02-05 | Stop reason: SDUPTHER

## 2018-10-11 RX ORDER — SODIUM CHLORIDE 0.9 % (FLUSH) 0.9 %
10 SYRINGE (ML) INJECTION PRN
Status: DISCONTINUED | OUTPATIENT
Start: 2018-10-11 | End: 2018-10-15 | Stop reason: HOSPADM

## 2018-10-11 RX ORDER — NICOTINE 21 MG/24HR
1 PATCH, TRANSDERMAL 24 HOURS TRANSDERMAL DAILY
Status: DISCONTINUED | OUTPATIENT
Start: 2018-10-11 | End: 2018-10-15 | Stop reason: HOSPADM

## 2018-10-11 RX ORDER — SODIUM CHLORIDE 0.9 % (FLUSH) 0.9 %
10 SYRINGE (ML) INJECTION EVERY 12 HOURS SCHEDULED
Status: DISCONTINUED | OUTPATIENT
Start: 2018-10-11 | End: 2018-10-15 | Stop reason: HOSPADM

## 2018-10-11 RX ORDER — 0.9 % SODIUM CHLORIDE 0.9 %
1000 INTRAVENOUS SOLUTION INTRAVENOUS ONCE
Status: COMPLETED | OUTPATIENT
Start: 2018-10-11 | End: 2018-10-11

## 2018-10-11 RX ORDER — 0.9 % SODIUM CHLORIDE 0.9 %
250 INTRAVENOUS SOLUTION INTRAVENOUS PRN
Status: DISCONTINUED | OUTPATIENT
Start: 2018-10-11 | End: 2018-10-15 | Stop reason: HOSPADM

## 2018-10-11 RX ORDER — POTASSIUM CHLORIDE 20 MEQ/1
40 TABLET, EXTENDED RELEASE ORAL PRN
Status: DISCONTINUED | OUTPATIENT
Start: 2018-10-11 | End: 2018-10-15 | Stop reason: HOSPADM

## 2018-10-11 RX ORDER — POTASSIUM CHLORIDE 20MEQ/15ML
40 LIQUID (ML) ORAL PRN
Status: DISCONTINUED | OUTPATIENT
Start: 2018-10-11 | End: 2018-10-15 | Stop reason: HOSPADM

## 2018-10-11 RX ORDER — LANOLIN ALCOHOL/MO/W.PET/CERES
1000 CREAM (GRAM) TOPICAL DAILY
Status: DISCONTINUED | OUTPATIENT
Start: 2018-10-11 | End: 2018-10-15 | Stop reason: HOSPADM

## 2018-10-11 RX ORDER — POTASSIUM CHLORIDE 20 MEQ/1
20 TABLET, EXTENDED RELEASE ORAL 2 TIMES DAILY
COMMUNITY
End: 2019-02-05 | Stop reason: SDUPTHER

## 2018-10-11 RX ORDER — ATORVASTATIN CALCIUM 20 MG/1
20 TABLET, FILM COATED ORAL NIGHTLY
Status: DISCONTINUED | OUTPATIENT
Start: 2018-10-11 | End: 2018-10-15 | Stop reason: HOSPADM

## 2018-10-11 RX ORDER — BACLOFEN 10 MG/1
10 TABLET ORAL 3 TIMES DAILY
Status: DISCONTINUED | OUTPATIENT
Start: 2018-10-11 | End: 2018-10-13

## 2018-10-11 RX ORDER — SODIUM CHLORIDE 9 MG/ML
INJECTION, SOLUTION INTRAVENOUS CONTINUOUS
Status: DISCONTINUED | OUTPATIENT
Start: 2018-10-11 | End: 2018-10-12

## 2018-10-11 RX ADMIN — POTASSIUM CHLORIDE 10 MEQ: 7.46 INJECTION, SOLUTION INTRAVENOUS at 12:58

## 2018-10-11 RX ADMIN — SODIUM CHLORIDE: 9 INJECTION, SOLUTION INTRAVENOUS at 18:21

## 2018-10-11 RX ADMIN — BACITRACIN ZINC 1 G: 500 OINTMENT TOPICAL at 12:59

## 2018-10-11 RX ADMIN — CYANOCOBALAMIN TAB 1000 MCG 1000 MCG: 1000 TAB at 18:24

## 2018-10-11 RX ADMIN — SODIUM CHLORIDE 1000 ML: 9 INJECTION, SOLUTION INTRAVENOUS at 12:58

## 2018-10-11 RX ADMIN — DONEPEZIL HYDROCHLORIDE 15 MG: 10 TABLET, FILM COATED ORAL at 19:57

## 2018-10-11 RX ADMIN — BACLOFEN 10 MG: 10 TABLET ORAL at 19:58

## 2018-10-11 RX ADMIN — CLONAZEPAM 2 MG: 1 TABLET ORAL at 19:57

## 2018-10-11 RX ADMIN — MEMANTINE 10 MG: 10 TABLET ORAL at 19:57

## 2018-10-11 RX ADMIN — TRIHEXYPHENIDYL HYDROCHLORIDE 2 MG: 2 TABLET ORAL at 18:34

## 2018-10-11 RX ADMIN — ATORVASTATIN CALCIUM 20 MG: 20 TABLET, FILM COATED ORAL at 19:57

## 2018-10-11 ASSESSMENT — PAIN SCALES - GENERAL: PAINLEVEL_OUTOF10: 0

## 2018-10-11 ASSESSMENT — PAIN SCALES - WONG BAKER: WONGBAKER_NUMERICALRESPONSE: 8

## 2018-10-11 ASSESSMENT — PAIN DESCRIPTION - LOCATION: LOCATION: ANKLE;KNEE

## 2018-10-11 ASSESSMENT — PAIN DESCRIPTION - PAIN TYPE: TYPE: ACUTE PAIN

## 2018-10-11 ASSESSMENT — PAIN DESCRIPTION - ORIENTATION: ORIENTATION: RIGHT;LEFT

## 2018-10-11 NOTE — ED NOTES
REPORT CALLED TO [facility] 3E.  PT READY FOR TRANSPORT TO [facility] 98.     Karen Ingram RN  10/11/18 1719

## 2018-10-11 NOTE — ED PROVIDER NOTES
Ref Range    Sodium 128 (L) 135 - 145 MMOL/L    Potassium 2.9 (LL) 3.5 - 5.1 MMOL/L    Chloride 87 (L) 99 - 110 mMol/L    CO2 31 21 - 32 MMOL/L    BUN 23 6 - 23 MG/DL    CREATININE 1.1 0.9 - 1.3 MG/DL    Glucose 100 (H) 70 - 99 MG/DL    Calcium 9.8 8.3 - 10.6 MG/DL    Alb 3.8 3.4 - 5.0 GM/DL    Total Protein 8.6 (H) 6.4 - 8.2 GM/DL    Total Bilirubin 0.6 0.0 - 1.0 MG/DL    ALT 60 (H) 10 - 40 U/L    AST 65 (H) 15 - 37 IU/L    Alkaline Phosphatase 78 40 - 129 IU/L    GFR Non-African American >60 >60 mL/min/1.73m2    GFR African American >60 >60 mL/min/1.73m2    Anion Gap 10 4 - 16   PT - INR   Result Value Ref Range    Protime 12.3 9.12 - 12.5 SECONDS    INR 1.06 INDEX   CK   Result Value Ref Range    Total  (H) 38 - 174 IU/L   CBC Auto Differential   Result Value Ref Range    WBC 7.0 4.0 - 10.5 K/CU MM    RBC 3.96 (L) 4.6 - 6.2 M/CU MM    Hemoglobin 11.6 (L) 13.5 - 18.0 GM/DL    Hematocrit 35.5 (L) 42 - 52 %    MCV 89.6 78 - 100 FL    MCH 29.3 27 - 31 PG    MCHC 32.7 32.0 - 36.0 %    RDW 12.8 11.7 - 14.9 %    Platelets 963 (L) 081 - 440 K/CU MM    MPV 10.4 7.5 - 11.1 FL    Differential Type AUTOMATED DIFFERENTIAL     Segs Relative 77.4 (H) 36 - 66 %    Lymphocytes % 13.5 (L) 24 - 44 %    Monocytes % 8.2 (H) 0 - 4 %    Eosinophils % 0.3 0 - 3 %    Basophils % 0.3 0 - 1 %    Segs Absolute 5.5 K/CU MM    Lymphocytes # 1.0 K/CU MM    Monocytes # 0.6 K/CU MM    Eosinophils # 0.0 K/CU MM    Basophils # 0.0 K/CU MM    Nucleated RBC % 0.0 %    Total Nucleated RBC 0.0 K/CU MM    Total Immature Neutrophil 0.02 K/CU MM    Immature Neutrophil % 0.3 0 - 0.43 %   EKG 12 Lead   Result Value Ref Range    Ventricular Rate 63 BPM    Atrial Rate 63 BPM    P-R Interval 146 ms    QRS Duration 98 ms    Q-T Interval 428 ms    QTc Calculation (Bazett) 437 ms    P Axis -20 degrees    R Axis 29 degrees    T Axis 11 degrees    Diagnosis       Normal sinus rhythm  Normal ECG  When compared with ECG of 04-OCT-2018 17:28,  Non-specific elbow.                    XR CHEST PORTABLE (Preliminary result)   Result time 10/11/18 12:36:52   Preliminary result by Priscilla Nieto MD (10/11/18 12:36:52)                Impression:    1. No acute cardiopulmonary disease. 2. No acute fracture of the left humerus and elbow.                    CT CERVICAL SPINE WO CONTRAST (Preliminary result)   Result time 10/11/18 12:39:03   Preliminary result by Burt Kendrick MD (10/11/18 12:39:03)                Impression:    1. No acute fracture or subluxation of the cervical spine. 2. Multilevel cervical degenerative disc disease is as fully detailed above. There is no acute disc herniation or canal stenosis. Narrative:    EXAMINATION:  CT OF THE CERVICAL SPINE WITHOUT CONTRAST 10/11/2018 12:07 pm    TECHNIQUE:  CT of the cervical spine was performed without the administration of  intravenous contrast. Multiplanar reformatted images are provided for review. Dose modulation, iterative reconstruction, and/or weight based adjustment of  the mA/kV was utilized to reduce the radiation dose to as low as reasonably  achievable. COMPARISON:  06/12/2015    HISTORY:  ORDERING SYSTEM PROVIDED HISTORY: RECENT TRAUMA, SPINE  TECHNOLOGIST PROVIDED HISTORY:  Ordering Physician Provided Reason for Exam: FELL OUT OF BED AT NURSING HOME  Acuity: Acute  Type of Exam: Initial    FINDINGS:  BONES/ALIGNMENT: There is no acute fracture or subluxation.  The vertebral  bodies and posterior elements are intact and aligned.  No destructive osseous  lesion is seen.     DEGENERATIVE CHANGES: There is mild multilevel anterior endplate spondylosis  at the C3 through C7 levels.  The disc spaces are maintained.  There are  mild-to-moderate chronic multilevel central disc osteophyte protrusions  throughout the cervical spine, most notable at the C3-C4 level leading to  mild-to-moderate chronic central canal stenoses.  Additionally, there is  moderate severe bilateral uncovertebral  Minimal mucosal thickening the bilateral maxillary  sinuses.  Trace fluid in the inferior left mastoid air cells likely related  to effusion. SOFT TISSUES:  Partially included laceration and subcutaneous contusion in  the right paramedian forehead.  Limited imaging of the intracranial contents  without obvious acute abnormality.                    CT HEAD WO CONTRAST (Final result)   Result time 10/11/18 12:08:13   Final result by Nehemiah Gale MD (10/11/18 12:08:13)                Impression:    1. No acute intracranial abnormality nor acute calvarial fracture. 2. Focal subcutaneous contusion and possible laceration in the right  paramedian forehead. 3. Minimal diffuse atrophy with minimal chronic small vessel ischemic changes. Narrative:    EXAMINATION:  CT OF THE HEAD WITHOUT CONTRAST    10/11/2018 11:49 am    TECHNIQUE:  CT of the head was performed without the administration of intravenous  contrast. Dose modulation, iterative reconstruction, and/or weight based  adjustment of the mA/kV was utilized to reduce the radiation dose to as low  as reasonably achievable. COMPARISON:  Head CT 10/04/2018    HISTORY:  ORDERING SYSTEM PROVIDED HISTORY: HEADACHE, POST TRAUMA  TECHNOLOGIST PROVIDED HISTORY:  Has a \"code stroke\" or \"stroke alert\" been called? ->No  Ordering Physician Provided Reason for Exam: FELL OUT OF BED AT NURSING HOME  Acuity: Acute  Type of Exam: Initial    FINDINGS:  BRAIN/VENTRICLES:  No masses nor acute intracranial hemorrhage.  Intact  gray/white matter differentiation without findings of acute ischemia.  No  mass effect nor midline shift.  Patent basilar cisterns and foramen magnum. No hydrocephalus.  Minimal diffuse atrophy.  Minimal deep periventricular  white matter hypodensities bilaterally. ORBITS:  Normal without acute abnormality. SINUSES:  Minimal mucosal thickening in the bilateral maxillary sinuses.   Trace fluid the inferior left mastoid air cells likely treatment. Given a bolus of fluids and potassium here in ED. Will be admitted for management of electronic abnormality. Patient and family that I comfortable with this workup and plan. Clinical  IMPRESSION    1. Fall, initial encounter    2. Laceration of scalp, initial encounter    3. Contusion of face, initial encounter    4. Facial laceration, initial encounter    5. Hypokalemia    6. Hyponatremia          Admission      Comment: Please note this report has been produced using speech recognition software and may contain errors related to that system including errors in grammar, punctuation, and spelling, as well as words and phrases that may be inappropriate. If there are any questions or concerns please feel free to contact the dictating provider for clarification.         Regulo Lambert PA-C  10/11/18 1667

## 2018-10-11 NOTE — H&P
bed this morning. He states he was trying to get out of bed and landed on his left side on the floor, hitting his head and resulting in small abrasions to the forehead and left arm. He endorses associated pain to bilateral arms, wrists, hands and shoulders as well as forehead over the contusion and abrasion to his nose. He has baseline dementia however is alert and oriented for his normal to person, place, year, president and situation knowing he fell today. He is unsure what time or who found him this morning on the floor. Hospitalist team was consulted for admission despite imaging unremarkable for acute fractures, his labs revealed hypokalemia and hyponatremia. After reviewing nursing home papers he is still taking metolazone which may be causing the HypoK. He has been receiving NS three times daily as well. Family and patient denies any new medication changes. Details per patient history and current CC include: ED/EMS summary, triage data and review of patient medical records and previous admission/ED visits. 10-14 point ROS reviewed negative, unless as noted above. Objective:   No intake or output data in the 24 hours ending 10/11/18 1353     Vitals:   Vitals:    10/11/18 1112 10/11/18 1237   BP: 111/68 110/70   Pulse: 63 67   Resp: 15 12   Temp: 98.6 °F (37 °C)    TempSrc: Oral    SpO2: 98% 100%   Weight: 158 lb (71.7 kg)    Height: 6' 2\" (1.88 m)        Physical Exam:  10/11/18     GEN: Awake elderly and thin male, who appears chronically ill , cooperative, and is sitting upright in bed in no apparent distress. Appears given age. EYES: PERRLA. EOMI. Sclera and conjunctiva are anicteric with no discharge, erythema or conjunctivitis. HENT: Normocephalic, Atraumatic. Mucous membranes are moist, symmetrical rise of palate. No oropharyngeal exudate or erythema. NECK: No apparent thyromegaly or masses. Tracheal midline. RESP: CTA bilaterally, no wheezes, rales or rhonchi. Symmetric chest movement.

## 2018-10-12 PROBLEM — E43 SEVERE MALNUTRITION (HCC): Chronic | Status: ACTIVE | Noted: 2018-10-12

## 2018-10-12 LAB
ALBUMIN SERPL-MCNC: 3.8 GM/DL (ref 3.4–5)
ALP BLD-CCNC: 81 IU/L (ref 40–129)
ALT SERPL-CCNC: 71 U/L (ref 10–40)
ANION GAP SERPL CALCULATED.3IONS-SCNC: 11 MMOL/L (ref 4–16)
AST SERPL-CCNC: 70 IU/L (ref 15–37)
BILIRUB SERPL-MCNC: 0.6 MG/DL (ref 0–1)
BILIRUBIN DIRECT: 0.2 MG/DL (ref 0–0.3)
BILIRUBIN, INDIRECT: 0.4 MG/DL (ref 0–0.7)
BUN BLDV-MCNC: 18 MG/DL (ref 6–23)
CALCIUM SERPL-MCNC: 9.8 MG/DL (ref 8.3–10.6)
CHLORIDE BLD-SCNC: 92 MMOL/L (ref 99–110)
CO2: 31 MMOL/L (ref 21–32)
CREAT SERPL-MCNC: 0.8 MG/DL (ref 0.9–1.3)
GFR AFRICAN AMERICAN: >60 ML/MIN/1.73M2
GFR NON-AFRICAN AMERICAN: >60 ML/MIN/1.73M2
GLUCOSE BLD-MCNC: 86 MG/DL (ref 70–99)
HCT VFR BLD CALC: 37.3 % (ref 42–52)
HEMOGLOBIN: 11.6 GM/DL (ref 13.5–18)
MCH RBC QN AUTO: 28.9 PG (ref 27–31)
MCHC RBC AUTO-ENTMCNC: 31.1 % (ref 32–36)
MCV RBC AUTO: 92.8 FL (ref 78–100)
PDW BLD-RTO: 13.1 % (ref 11.7–14.9)
PLATELET # BLD: 131 K/CU MM (ref 140–440)
PMV BLD AUTO: 10.4 FL (ref 7.5–11.1)
POTASSIUM SERPL-SCNC: 3.6 MMOL/L (ref 3.5–5.1)
POTASSIUM SERPL-SCNC: 3.6 MMOL/L (ref 3.5–5.1)
RBC # BLD: 4.02 M/CU MM (ref 4.6–6.2)
SODIUM BLD-SCNC: 133 MMOL/L (ref 135–145)
SODIUM BLD-SCNC: 134 MMOL/L (ref 135–145)
TOTAL PROTEIN: 7.9 GM/DL (ref 6.4–8.2)
TROPONIN T: <0.01 NG/ML
TROPONIN T: <0.01 NG/ML
WBC # BLD: 7.2 K/CU MM (ref 4–10.5)

## 2018-10-12 PROCEDURE — 6370000000 HC RX 637 (ALT 250 FOR IP): Performed by: PHYSICIAN ASSISTANT

## 2018-10-12 PROCEDURE — 85027 COMPLETE CBC AUTOMATED: CPT

## 2018-10-12 PROCEDURE — 97530 THERAPEUTIC ACTIVITIES: CPT

## 2018-10-12 PROCEDURE — 82248 BILIRUBIN DIRECT: CPT

## 2018-10-12 PROCEDURE — 99223 1ST HOSP IP/OBS HIGH 75: CPT | Performed by: PSYCHIATRY & NEUROLOGY

## 2018-10-12 PROCEDURE — 84295 ASSAY OF SERUM SODIUM: CPT

## 2018-10-12 PROCEDURE — 84484 ASSAY OF TROPONIN QUANT: CPT

## 2018-10-12 PROCEDURE — 97116 GAIT TRAINING THERAPY: CPT

## 2018-10-12 PROCEDURE — G8988 SELF CARE GOAL STATUS: HCPCS

## 2018-10-12 PROCEDURE — 2580000003 HC RX 258: Performed by: PHYSICIAN ASSISTANT

## 2018-10-12 PROCEDURE — G8978 MOBILITY CURRENT STATUS: HCPCS

## 2018-10-12 PROCEDURE — 84132 ASSAY OF SERUM POTASSIUM: CPT

## 2018-10-12 PROCEDURE — 97163 PT EVAL HIGH COMPLEX 45 MIN: CPT

## 2018-10-12 PROCEDURE — 80053 COMPREHEN METABOLIC PANEL: CPT

## 2018-10-12 PROCEDURE — G8996 SWALLOW CURRENT STATUS: HCPCS

## 2018-10-12 PROCEDURE — G8997 SWALLOW GOAL STATUS: HCPCS

## 2018-10-12 PROCEDURE — 36415 COLL VENOUS BLD VENIPUNCTURE: CPT

## 2018-10-12 PROCEDURE — 1200000000 HC SEMI PRIVATE

## 2018-10-12 PROCEDURE — 92610 EVALUATE SWALLOWING FUNCTION: CPT

## 2018-10-12 PROCEDURE — G8979 MOBILITY GOAL STATUS: HCPCS

## 2018-10-12 PROCEDURE — 97167 OT EVAL HIGH COMPLEX 60 MIN: CPT

## 2018-10-12 PROCEDURE — G8987 SELF CARE CURRENT STATUS: HCPCS

## 2018-10-12 RX ORDER — ACETAMINOPHEN 80 MG
TABLET,CHEWABLE ORAL
Status: COMPLETED
Start: 2018-10-12 | End: 2018-10-12

## 2018-10-12 RX ADMIN — BACLOFEN 10 MG: 10 TABLET ORAL at 09:27

## 2018-10-12 RX ADMIN — BACLOFEN 10 MG: 10 TABLET ORAL at 13:04

## 2018-10-12 RX ADMIN — SODIUM CHLORIDE: 9 INJECTION, SOLUTION INTRAVENOUS at 07:45

## 2018-10-12 RX ADMIN — SODIUM CHLORIDE, PRESERVATIVE FREE 10 ML: 5 INJECTION INTRAVENOUS at 21:36

## 2018-10-12 RX ADMIN — DONEPEZIL HYDROCHLORIDE 15 MG: 10 TABLET, FILM COATED ORAL at 21:36

## 2018-10-12 RX ADMIN — CYANOCOBALAMIN TAB 1000 MCG 1000 MCG: 1000 TAB at 09:27

## 2018-10-12 RX ADMIN — BACLOFEN 10 MG: 10 TABLET ORAL at 21:36

## 2018-10-12 RX ADMIN — CLONAZEPAM 2 MG: 1 TABLET ORAL at 21:35

## 2018-10-12 RX ADMIN — CLONAZEPAM 0.25 MG: 0.5 TABLET ORAL at 10:09

## 2018-10-12 RX ADMIN — MEMANTINE 10 MG: 10 TABLET ORAL at 09:27

## 2018-10-12 RX ADMIN — Medication: at 10:15

## 2018-10-12 RX ADMIN — TRIHEXYPHENIDYL HYDROCHLORIDE 2 MG: 2 TABLET ORAL at 09:27

## 2018-10-12 RX ADMIN — ATORVASTATIN CALCIUM 20 MG: 20 TABLET, FILM COATED ORAL at 21:36

## 2018-10-12 RX ADMIN — MEMANTINE 10 MG: 10 TABLET ORAL at 21:36

## 2018-10-12 RX ADMIN — SODIUM CHLORIDE, PRESERVATIVE FREE 10 ML: 5 INJECTION INTRAVENOUS at 10:10

## 2018-10-12 RX ADMIN — TRIHEXYPHENIDYL HYDROCHLORIDE 2 MG: 2 TABLET ORAL at 16:01

## 2018-10-12 ASSESSMENT — PAIN SCALES - GENERAL: PAINLEVEL_OUTOF10: 0

## 2018-10-12 NOTE — CARE COORDINATION
Met with doctor who thinks patient may need hospice. He is going to call the wife and son and talk with them. CM is following    Hospice referral noted and [facility] was called referral. Cm following.

## 2018-10-12 NOTE — CONSULTS
language fluent, repetition and naming intact, follows commands appropriately, patient has a clear short term memory loss, long term intact. Cranial Nerve Exam:   CN II-XII: , PERRL, VFF, no nystagmus, no gaze paresis, sensation V1-V3 intact b/l, muscles of facial expression symmetric; hearing intact to conversational tone, palate elevates symmetrically, shoulder elevation symmetric and tongue protrudes midline with movement side to side. Motor Exam:       Antigravity x4, LLE is weaker than right, this is chronic     Deep Tendon Reflexes: 2/4 biceps, triceps, brachioradialis, 2+/4 patellar, and achilles b/l; flexor plantar responses b/l    Sensation: Intact light touch/pinprick/vibration/pinprick E's/LE's b/l-->BLE stocking distribution     Coordination/Cerebellum:       Tremors--bilateral resting tremor       Rapidly alternating movements: no dysdiadochokinesia b/l                  Gait and stance:      Gait: deferred for safety       LABS:     Results for Delfin Armstrong (MRN [id number]) as of 10/12/2018 15:46   Ref. Range 10/7/2018 03:25   Vitamin B-12 Latest Ref Range: 211 - 911 pg/ml >2000 (H)       Recent Labs      10/11/18   1120  10/11/18   1824  10/12/18   0031  10/12/18   0758   WBC  7.0   --    --   7.2   NA  128*  129*  133*  134*   K  2.9*  3.2*  3.6  3.6   CL  87*   --    --   92*   CO2  31   --    --   31   BUN  23   --    --   18   CREATININE  1.1   --    --   0.8*   GLUCOSE  100*   --    --   86   INR  1.06   --    --    --          IMAGING:    CT Head:  1. No acute intracranial abnormality nor acute calvarial fracture. 2. Focal subcutaneous contusion and possible laceration in the right   paramedian forehead. 3. Minimal diffuse atrophy with minimal chronic small vessel ischemic changes. ASSESSMENT/PLAN:     3 79-year-old male with acute mechanical fall secondary to bilateral lower extremity peripheral neuropathy superimposed on a Parkinson's disease.   Patient previously

## 2018-10-12 NOTE — PLAN OF CARE
Problem: Falls - Risk of:  Goal: Will remain free from falls  Will remain free from falls   Outcome: Ongoing    Goal: Absence of physical injury  Absence of physical injury   Outcome: Ongoing      Problem: Nutrition  Goal: Optimal nutrition therapy  Outcome: Ongoing      Problem: Skin Integrity - Risk of, Impaired  Goal: Be free of skin breakdown  Outcome: Ongoing

## 2018-10-13 PROCEDURE — 6370000000 HC RX 637 (ALT 250 FOR IP): Performed by: PHYSICIAN ASSISTANT

## 2018-10-13 PROCEDURE — 2580000003 HC RX 258: Performed by: PHYSICIAN ASSISTANT

## 2018-10-13 PROCEDURE — 97530 THERAPEUTIC ACTIVITIES: CPT

## 2018-10-13 PROCEDURE — 2580000003 HC RX 258: Performed by: INTERNAL MEDICINE

## 2018-10-13 PROCEDURE — 99233 SBSQ HOSP IP/OBS HIGH 50: CPT | Performed by: NURSE PRACTITIONER

## 2018-10-13 PROCEDURE — 1200000000 HC SEMI PRIVATE

## 2018-10-13 PROCEDURE — 97116 GAIT TRAINING THERAPY: CPT

## 2018-10-13 RX ORDER — SODIUM CHLORIDE 9 MG/ML
INJECTION, SOLUTION INTRAVENOUS CONTINUOUS
Status: DISCONTINUED | OUTPATIENT
Start: 2018-10-13 | End: 2018-10-15 | Stop reason: HOSPADM

## 2018-10-13 RX ADMIN — MEMANTINE 10 MG: 10 TABLET ORAL at 09:54

## 2018-10-13 RX ADMIN — DONEPEZIL HYDROCHLORIDE 15 MG: 10 TABLET, FILM COATED ORAL at 21:37

## 2018-10-13 RX ADMIN — SODIUM CHLORIDE, PRESERVATIVE FREE 10 ML: 5 INJECTION INTRAVENOUS at 09:54

## 2018-10-13 RX ADMIN — SODIUM CHLORIDE: 9 INJECTION, SOLUTION INTRAVENOUS at 09:55

## 2018-10-13 RX ADMIN — CYANOCOBALAMIN TAB 1000 MCG 1000 MCG: 1000 TAB at 09:54

## 2018-10-13 RX ADMIN — ATORVASTATIN CALCIUM 20 MG: 20 TABLET, FILM COATED ORAL at 21:37

## 2018-10-13 RX ADMIN — SODIUM CHLORIDE, PRESERVATIVE FREE 10 ML: 5 INJECTION INTRAVENOUS at 21:37

## 2018-10-13 RX ADMIN — SODIUM CHLORIDE: 9 INJECTION, SOLUTION INTRAVENOUS at 19:25

## 2018-10-13 RX ADMIN — TRIHEXYPHENIDYL HYDROCHLORIDE 2 MG: 2 TABLET ORAL at 16:45

## 2018-10-13 RX ADMIN — MEMANTINE 10 MG: 10 TABLET ORAL at 21:37

## 2018-10-13 RX ADMIN — TRIHEXYPHENIDYL HYDROCHLORIDE 2 MG: 2 TABLET ORAL at 09:54

## 2018-10-13 ASSESSMENT — PAIN DESCRIPTION - LOCATION: LOCATION: BACK;HIP

## 2018-10-13 ASSESSMENT — PAIN DESCRIPTION - ORIENTATION: ORIENTATION: RIGHT

## 2018-10-13 ASSESSMENT — PAIN SCALES - GENERAL
PAINLEVEL_OUTOF10: 0
PAINLEVEL_OUTOF10: 0
PAINLEVEL_OUTOF10: 5

## 2018-10-13 NOTE — PROGRESS NOTES
I am assigned to care for this patient today, 10/13/2018. I am under the direction of my instructor Boogie Witt.
(walked w/ aide before admittance to [facility] only ~10 feet to/from bathroom at a time)  Transfer Assistance: Needs assistance (w/ nursing aide help stand pivot)  Type of occupation: [employer]  Additional Comments: Pt's sister provided social functional history. Pt had one fall leading to current hospitalization. Long term goals  Long term goal 1: In one week, pt will complete all bed mobility with CGAx1  Long term goal 2: In one week, pt will complete sit <> stand transfers with CGAx1  Long term goal 3: In one week, pt will ambulate 100 feet with CGAx1 with LRAD  Long term goal 4:  In one week, pt will independently complete 3 sets of 10 reps of BLE AROM exercises in available and allowed ROM    Electronically signed by:    Bert King, PTA [id number]  10/13/2018, 1:55 PM
minutes  Total Treatment Time: 44 minutes    Tory ADAMS/BRAYDON [id number]  1:22 PM,10/12/2018

## 2018-10-14 VITALS
OXYGEN SATURATION: 99 % | WEIGHT: 150.2 LBS | DIASTOLIC BLOOD PRESSURE: 55 MMHG | SYSTOLIC BLOOD PRESSURE: 132 MMHG | HEART RATE: 65 BPM | BODY MASS INDEX: 19.28 KG/M2 | TEMPERATURE: 98.1 F | RESPIRATION RATE: 16 BRPM | HEIGHT: 74 IN

## 2018-10-14 PROCEDURE — 6370000000 HC RX 637 (ALT 250 FOR IP): Performed by: PHYSICIAN ASSISTANT

## 2018-10-14 PROCEDURE — 2580000003 HC RX 258: Performed by: INTERNAL MEDICINE

## 2018-10-14 RX ADMIN — TRIHEXYPHENIDYL HYDROCHLORIDE 2 MG: 2 TABLET ORAL at 09:07

## 2018-10-14 RX ADMIN — TRIHEXYPHENIDYL HYDROCHLORIDE 2 MG: 2 TABLET ORAL at 17:09

## 2018-10-14 RX ADMIN — MEMANTINE 10 MG: 10 TABLET ORAL at 09:07

## 2018-10-14 RX ADMIN — SODIUM CHLORIDE: 9 INJECTION, SOLUTION INTRAVENOUS at 09:07

## 2018-10-14 RX ADMIN — INDOMETHACIN 50 MG: 25 CAPSULE ORAL at 09:07

## 2018-10-14 RX ADMIN — CYANOCOBALAMIN TAB 1000 MCG 1000 MCG: 1000 TAB at 09:07

## 2018-10-14 ASSESSMENT — PAIN SCALES - GENERAL: PAINLEVEL_OUTOF10: 0

## 2018-10-14 NOTE — PLAN OF CARE
Problem: Falls - Risk of:  Goal: Will remain free from falls  Will remain free from falls   Outcome: Ongoing    Goal: Absence of physical injury  Absence of physical injury   Outcome: Ongoing      Problem: Nutrition  Goal: Optimal nutrition therapy  Outcome: Ongoing      Problem: Skin Integrity - Risk of, Impaired  Goal: Be free of skin breakdown  Outcome: Ongoing      Problem: Pain:  Goal: Pain level will decrease  Pain level will decrease   Outcome: Ongoing    Goal: Control of acute pain  Control of acute pain   Outcome: Ongoing    Goal: Control of chronic pain  Control of chronic pain   Outcome: Ongoing

## 2018-10-14 NOTE — DISCHARGE SUMMARY
Discharge Summary    Name:  Thora Burkitt /Age/Sex: 1950  (70 y.o. male)   MRN & CSN:  [id number] & [id number] Admission Date/Time: 10/11/2018 11:06 AM   Attending:  Jacquelyn Mckeon MD Discharging Physician: Jacquelyn Mckeon MD     Hospital Course:   Thora Burkitt is a 70 y.o.  male  who presents with Fall at nursing home     He is admitted from NH due to fall. Imaging did nor show any fracture. He was hypokalemic on admission. He was lethargic hence some of his medications were discontinued. He was evaluated by PT/OT who recommended PT at [facility]. He was discharged stable. 1. Falls: Likely due to severe Parkinson's disease: PTOT. Continue medications. 2. Acute Encephalopathy: more awake. could be medication induced. Hold Baclofen and Lorazepam for now  3. Dementia, chronic: Continue medications. Nephrology on consult. 4. Hypokalemia: Potassium 2.9. Replace accordingly. 5. Hyponatremia: Sodium 128. Resolving. 6. Chronic decubitus ulcer: Daily wound care. 7. Coronary artery disease: Status post L HC. Hold antiplatelets because of fall risk. 8. Depression    The patient expressed appropriate understanding of and agreement with the discharge recommendations, medications, and plan. Consults this admission:  IP CONSULT TO HOSPITALIST  IP CONSULT TO NEUROLOGY  IP CONSULT TO CASE MANAGEMENT  IP CONSULT TO HOSPICE  IP CONSULT TO CASE MANAGEMENT    Discharge Instruction:   Follow up appointments: Neurology  Primary care physician:  within 1 week    Diet:  regular diet   Activity: activity as tolerated  Disposition: Discharged to:   []Home, [][facility], [x]SNF, []Acute Rehab, []Hospice     Condition on discharge: Stable    Discharge Medications:      Diana Banana   Home Medication Instructions [facility]:[id number]    Printed on:10/14/18 3357   Medication Information                      aspirin 81 MG EC tablet  Take 81 mg by mouth daily.              atorvastatin (LIPITOR) 20 MG tablet  Take 20 mg by mouth nightly              clopidogrel (PLAVIX) 75 MG tablet  Take 75 mg by mouth daily. cyanocobalamin 1000 MCG tablet  Take 1,000 mcg by mouth daily             donepezil (ARICEPT) 5 MG tablet  Take 3 tablets by mouth nightly             indomethacin (INDOCIN) 50 MG capsule  Take 50 mg by mouth 2 times daily as needed for Pain              linaclotide (LINZESS) 145 MCG capsule  Take 145 mcg by mouth every morning (before breakfast)             memantine (NAMENDA) 10 MG tablet  Take 10 mg by mouth 2 times daily             metoprolol succinate (TOPROL XL) 25 MG extended release tablet  Take 25 mg by mouth daily             potassium chloride (KLOR-CON M) 20 MEQ extended release tablet  Take 20 mEq by mouth 2 times daily             trihexyphenidyl (ARTANE) 2 MG tablet  Take 2 mg by mouth 2 times daily. Objective Findings at Discharge:   BP (!) 132/55   Pulse 65   Temp 98.1 °F (36.7 °C) (Oral)   Resp 16   Ht 6' 2\" (1.88 m)   Wt 150 lb 3.2 oz (68.1 kg)   SpO2 99%   BMI 19.28 kg/m²            PHYSICAL EXAM   GEN Awake male, sitting upright in bed in no apparent distress. Appears given age. EYES Pupils are equally round. No scleral erythema, discharge, or conjunctivitis. HENT Mucous membranes are moist. Oral pharynx without exudates, no evidence of thrush. NECK Supple, no apparent thyromegaly or masses. RESP Clear to auscultation, no wheezes, rales or rhonchi. Symmetric chest movement while on room air. CARDIO/VASC S1/S2 auscultated. Regular rate without appreciable murmurs, rubs, or gallops. No JVD or carotid bruits. Peripheral pulses equal bilaterally and palpable. No peripheral edema. GI Abdomen is soft without significant tenderness, masses, or guarding. Bowel sounds are normoactive. Rectal exam deferred. HEME/LYMPH No palpable cervical lymphadenopathy and no hepatosplenomegaly. No petechiae or ecchymoses. MSK No gross joint deformities.   SKIN Normal coloration, the abdomen and pelvis. No comparison for the chest. HISTORY: ORDERING SYSTEM PROVIDED HISTORY: r/o malignancy TECHNOLOGIST PROVIDED HISTORY: Additional Contrast?->None Ordering Physician Provided Reason for Exam: r/o malignancy Acuity: Acute Type of Exam: Initial Additional signs and symptoms: none Relevant Medical/Surgical History: hx acute kidney injury, cardiac surg, back surg; ORDERING SYSTEM PROVIDED HISTORY: r/o malignancy/mass/lad TECHNOLOGIST PROVIDED HISTORY: Ordering Physician Provided Reason for Exam: r/o malignancy/mass/lad Acuity: Acute Type of Exam: Initial Additional signs and symptoms: none Relevant Medical/Surgical History: hx acute kidney injury, cardiac surg, back surg FINDINGS: Chest: Mediastinum: Heart size is normal.  There is no pericardial effusion. There are coronary artery calcifications. Within the limitations of a noncontrast exam, there is no evidence of hilar or mediastinal adenopathy. There are coarsely calcified right hilar and subcarinal nodes. Visible portion of the thyroid is grossly unremarkable. Lungs/pleura: Central tracheobronchial airways are unremarkable. There is no focal consolidation, pleural effusion, or pneumothorax. No evidence of pulmonary edema. Calcified granulomas noted in the right lower lobe. Soft Tissues/Bones: No aggressive lytic or blastic bony lesion. Bones are osteopenic. Abdomen/Pelvis: Organs: Gallbladder is contracted. There are calcified granulomas in the liver and spleen. No hydronephrosis. Multiple hypodensities in the left renal cortex, measuring up to 2.4 cm are not fully assessed on this noncontrast exam but are statistically favored to be cysts. Given stability since 2016, this is unlikely to be of significance. Adrenal glands are morphologically normal. GI/Bowel: Moderate amount stool in the rectum. No evidence of bowel obstruction or free intraperitoneal air. Pelvis: Bladder is unremarkable. No free fluid in the pelvis.   There are

## 2018-10-15 LAB
EKG ATRIAL RATE: 63 BPM
EKG DIAGNOSIS: NORMAL
EKG P AXIS: -20 DEGREES
EKG P-R INTERVAL: 146 MS
EKG Q-T INTERVAL: 428 MS
EKG QRS DURATION: 98 MS
EKG QTC CALCULATION (BAZETT): 437 MS
EKG R AXIS: 29 DEGREES
EKG T AXIS: 11 DEGREES
EKG VENTRICULAR RATE: 63 BPM

## 2018-10-16 ENCOUNTER — HOSPITAL ENCOUNTER (OUTPATIENT)
Age: 68
Setting detail: SPECIMEN
Discharge: HOME | End: 2018-10-16
Payer: MEDICARE

## 2018-10-16 LAB
ALBUMIN SERPL-MCNC: 3.6 GM/DL (ref 3.4–5)
ALP BLD-CCNC: 74 IU/L (ref 40–128)
ALT SERPL-CCNC: 32 U/L (ref 10–40)
ANION GAP SERPL CALCULATED.3IONS-SCNC: 9 MMOL/L (ref 4–16)
AST SERPL-CCNC: 29 IU/L (ref 15–37)
BILIRUB SERPL-MCNC: 0.5 MG/DL (ref 0–1)
BUN BLDV-MCNC: 13 MG/DL (ref 6–23)
CALCIUM SERPL-MCNC: 9.6 MG/DL (ref 8.3–10.6)
CHLORIDE BLD-SCNC: 99 MMOL/L (ref 99–110)
CO2: 27 MMOL/L (ref 21–32)
CREAT SERPL-MCNC: 0.7 MG/DL (ref 0.9–1.3)
GFR AFRICAN AMERICAN: >60 ML/MIN/1.73M2
GFR NON-AFRICAN AMERICAN: >60 ML/MIN/1.73M2
GLUCOSE BLD-MCNC: 84 MG/DL (ref 70–99)
HCT VFR BLD CALC: 33.7 % (ref 42–52)
HEMOGLOBIN: 10.3 GM/DL (ref 13.5–18)
MCH RBC QN AUTO: 28.7 PG (ref 27–31)
MCHC RBC AUTO-ENTMCNC: 30.6 % (ref 32–36)
MCV RBC AUTO: 93.9 FL (ref 78–100)
PDW BLD-RTO: 13.3 % (ref 11.7–14.9)
PLATELET # BLD: 199 K/CU MM (ref 140–440)
PMV BLD AUTO: 10.2 FL (ref 7.5–11.1)
POTASSIUM SERPL-SCNC: 4.3 MMOL/L (ref 3.5–5.1)
RBC # BLD: 3.59 M/CU MM (ref 4.6–6.2)
SODIUM BLD-SCNC: 135 MMOL/L (ref 135–145)
TOTAL PROTEIN: 6.9 GM/DL (ref 6.4–8.2)
WBC # BLD: 9.2 K/CU MM (ref 4–10.5)

## 2018-10-16 PROCEDURE — 36415 COLL VENOUS BLD VENIPUNCTURE: CPT

## 2018-10-16 PROCEDURE — 85027 COMPLETE CBC AUTOMATED: CPT

## 2018-10-16 PROCEDURE — 80053 COMPREHEN METABOLIC PANEL: CPT

## 2018-10-22 ENCOUNTER — HOSPITAL ENCOUNTER (OUTPATIENT)
Age: 68
Setting detail: SPECIMEN
Discharge: HOME | End: 2018-10-22
Payer: MEDICARE

## 2018-10-22 LAB
CHOLESTEROL: 96 MG/DL
HCT VFR BLD CALC: 31.2 % (ref 42–52)
HDLC SERPL-MCNC: 44 MG/DL
HEMOGLOBIN: 9.2 GM/DL (ref 13.5–18)
LDL CHOLESTEROL DIRECT: 42 MG/DL
MCH RBC QN AUTO: 28.8 PG (ref 27–31)
MCHC RBC AUTO-ENTMCNC: 29.5 % (ref 32–36)
MCV RBC AUTO: 97.5 FL (ref 78–100)
PDW BLD-RTO: 13.9 % (ref 11.7–14.9)
PLATELET # BLD: 196 K/CU MM (ref 140–440)
PMV BLD AUTO: 10.1 FL (ref 7.5–11.1)
RBC # BLD: 3.2 M/CU MM (ref 4.6–6.2)
TRIGL SERPL-MCNC: 46 MG/DL
URIC ACID: 3.8 MG/DL (ref 3.5–7.2)
VITAMIN B-12: 1179 PG/ML (ref 211–911)
WBC # BLD: 7.4 K/CU MM (ref 4–10.5)

## 2018-10-22 PROCEDURE — 36415 COLL VENOUS BLD VENIPUNCTURE: CPT

## 2018-10-22 PROCEDURE — 82607 VITAMIN B-12: CPT

## 2018-10-22 PROCEDURE — 80061 LIPID PANEL: CPT

## 2018-10-22 PROCEDURE — 85027 COMPLETE CBC AUTOMATED: CPT

## 2018-10-22 PROCEDURE — 83721 ASSAY OF BLOOD LIPOPROTEIN: CPT

## 2018-10-22 PROCEDURE — 84550 ASSAY OF BLOOD/URIC ACID: CPT

## 2018-10-30 ENCOUNTER — HOSPITAL ENCOUNTER (OUTPATIENT)
Age: 68
Setting detail: SPECIMEN
Discharge: HOME | End: 2018-10-30
Payer: MEDICARE

## 2018-10-30 LAB
HCT VFR BLD CALC: 35.9 % (ref 42–52)
HEMOGLOBIN: 11.3 GM/DL (ref 13.5–18)
MCH RBC QN AUTO: 30 PG (ref 27–31)
MCHC RBC AUTO-ENTMCNC: 31.5 % (ref 32–36)
MCV RBC AUTO: 95.2 FL (ref 78–100)
PDW BLD-RTO: 14.1 % (ref 11.7–14.9)
PLATELET # BLD: 192 K/CU MM (ref 140–440)
PMV BLD AUTO: 9.9 FL (ref 7.5–11.1)
RBC # BLD: 3.77 M/CU MM (ref 4.6–6.2)
WBC # BLD: 7.6 K/CU MM (ref 4–10.5)

## 2018-10-30 PROCEDURE — 36415 COLL VENOUS BLD VENIPUNCTURE: CPT

## 2018-10-30 PROCEDURE — 85027 COMPLETE CBC AUTOMATED: CPT

## 2018-11-12 ENCOUNTER — HOSPITAL ENCOUNTER (EMERGENCY)
Age: 68
Discharge: HOME | End: 2018-11-12
Payer: MEDICARE

## 2018-11-12 ENCOUNTER — APPOINTMENT (OUTPATIENT)
Dept: GENERAL RADIOLOGY | Age: 68
End: 2018-11-12
Payer: MEDICARE

## 2018-11-12 ENCOUNTER — APPOINTMENT (OUTPATIENT)
Dept: CT IMAGING | Age: 68
End: 2018-11-12
Payer: MEDICARE

## 2018-11-12 VITALS
SYSTOLIC BLOOD PRESSURE: 160 MMHG | RESPIRATION RATE: 17 BRPM | DIASTOLIC BLOOD PRESSURE: 77 MMHG | HEART RATE: 62 BPM | TEMPERATURE: 98.7 F | OXYGEN SATURATION: 100 %

## 2018-11-12 DIAGNOSIS — S09.90XA INJURY OF HEAD, INITIAL ENCOUNTER: ICD-10-CM
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
## 2018-11-12 DIAGNOSIS — S01.81XA FACIAL LACERATION, INITIAL ENCOUNTER: ICD-10-CM

## 2018-11-12 LAB
ALBUMIN SERPL-MCNC: 3.9 GM/DL (ref 3.4–5)
ALP BLD-CCNC: 65 IU/L (ref 40–129)
ALT SERPL-CCNC: 10 U/L (ref 10–40)
ANION GAP SERPL CALCULATED.3IONS-SCNC: 9 MMOL/L (ref 4–16)
AST SERPL-CCNC: 12 IU/L (ref 15–37)
BASOPHILS ABSOLUTE: 0 K/CU MM
BASOPHILS RELATIVE PERCENT: 0.3 % (ref 0–1)
BILIRUB SERPL-MCNC: 0.4 MG/DL (ref 0–1)
BUN BLDV-MCNC: 18 MG/DL (ref 6–23)
CALCIUM SERPL-MCNC: 9.3 MG/DL (ref 8.3–10.6)
CHLORIDE BLD-SCNC: 101 MMOL/L (ref 99–110)
CO2: 25 MMOL/L (ref 21–32)
CREAT SERPL-MCNC: 1 MG/DL (ref 0.9–1.3)
DIFFERENTIAL TYPE: ABNORMAL
EKG ATRIAL RATE: 63 BPM
EKG DIAGNOSIS: NORMAL
EKG P AXIS: 71 DEGREES
EKG P-R INTERVAL: 136 MS
EKG Q-T INTERVAL: 416 MS
EKG QRS DURATION: 84 MS
EKG QTC CALCULATION (BAZETT): 425 MS
EKG R AXIS: 16 DEGREES
EKG T AXIS: 49 DEGREES
EKG VENTRICULAR RATE: 63 BPM
EOSINOPHILS ABSOLUTE: 0 K/CU MM
EOSINOPHILS RELATIVE PERCENT: 0.5 % (ref 0–3)
GFR AFRICAN AMERICAN: >60 ML/MIN/1.73M2
GFR NON-AFRICAN AMERICAN: >60 ML/MIN/1.73M2
GLUCOSE BLD-MCNC: 102 MG/DL (ref 70–99)
HCT VFR BLD CALC: 34.7 % (ref 42–52)
HEMOGLOBIN: 10.8 GM/DL (ref 13.5–18)
IMMATURE NEUTROPHIL %: 0.1 % (ref 0–0.43)
LYMPHOCYTES ABSOLUTE: 1.3 K/CU MM
LYMPHOCYTES RELATIVE PERCENT: 17.7 % (ref 24–44)
MCH RBC QN AUTO: 29.6 PG (ref 27–31)
MCHC RBC AUTO-ENTMCNC: 31.1 % (ref 32–36)
MCV RBC AUTO: 95.1 FL (ref 78–100)
MONOCYTES ABSOLUTE: 0.5 K/CU MM
MONOCYTES RELATIVE PERCENT: 6.7 % (ref 0–4)
NUCLEATED RBC %: 0 %
PDW BLD-RTO: 14.2 % (ref 11.7–14.9)
PLATELET # BLD: 162 K/CU MM (ref 140–440)
PMV BLD AUTO: 9.4 FL (ref 7.5–11.1)
POTASSIUM SERPL-SCNC: 4.3 MMOL/L (ref 3.5–5.1)
RBC # BLD: 3.65 M/CU MM (ref 4.6–6.2)
SEGMENTED NEUTROPHILS ABSOLUTE COUNT: 5.6 K/CU MM
SEGMENTED NEUTROPHILS RELATIVE PERCENT: 74.7 % (ref 36–66)
SODIUM BLD-SCNC: 135 MMOL/L (ref 135–145)
TOTAL IMMATURE NEUTOROPHIL: 0.01 K/CU MM
TOTAL NUCLEATED RBC: 0 K/CU MM
TOTAL PROTEIN: 6.7 GM/DL (ref 6.4–8.2)
TOTAL RETICULOCYTE COUNT: 0.06 K/CU MM
TROPONIN T: <0.01 NG/ML
WBC # BLD: 7.6 K/CU MM (ref 4–10.5)

## 2018-11-12 PROCEDURE — 93010 ELECTROCARDIOGRAM REPORT: CPT | Performed by: INTERNAL MEDICINE

## 2018-11-12 PROCEDURE — 99284 EMERGENCY DEPT VISIT MOD MDM: CPT

## 2018-11-12 PROCEDURE — 71045 X-RAY EXAM CHEST 1 VIEW: CPT

## 2018-11-12 PROCEDURE — 4500000027

## 2018-11-12 PROCEDURE — 93005 ELECTROCARDIOGRAM TRACING: CPT | Performed by: PHYSICIAN ASSISTANT

## 2018-11-12 PROCEDURE — 80053 COMPREHEN METABOLIC PANEL: CPT

## 2018-11-12 PROCEDURE — 84484 ASSAY OF TROPONIN QUANT: CPT

## 2018-11-12 PROCEDURE — 36415 COLL VENOUS BLD VENIPUNCTURE: CPT

## 2018-11-12 PROCEDURE — 85025 COMPLETE CBC W/AUTO DIFF WBC: CPT

## 2018-11-12 PROCEDURE — 94761 N-INVAS EAR/PLS OXIMETRY MLT: CPT

## 2018-11-12 PROCEDURE — 70450 CT HEAD/BRAIN W/O DYE: CPT

## 2018-11-12 PROCEDURE — 72125 CT NECK SPINE W/O DYE: CPT

## 2018-11-12 RX ORDER — DONEPEZIL HYDROCHLORIDE 10 MG/1
10 TABLET, FILM COATED ORAL NIGHTLY
COMMUNITY

## 2018-11-12 RX ORDER — ACETAMINOPHEN 325 MG/1
650 TABLET ORAL EVERY 4 HOURS PRN
COMMUNITY

## 2018-11-12 ASSESSMENT — PAIN SCALES - GENERAL: PAINLEVEL_OUTOF10: 4

## 2018-11-12 ASSESSMENT — PAIN DESCRIPTION - LOCATION: LOCATION: HEAD

## 2018-11-12 ASSESSMENT — PAIN DESCRIPTION - ONSET: ONSET: SUDDEN

## 2018-11-12 ASSESSMENT — PAIN DESCRIPTION - DESCRIPTORS: DESCRIPTORS: ACHING;HEADACHE

## 2018-11-12 ASSESSMENT — PAIN DESCRIPTION - FREQUENCY: FREQUENCY: CONTINUOUS

## 2018-11-12 ASSESSMENT — PAIN DESCRIPTION - PROGRESSION: CLINICAL_PROGRESSION: NOT CHANGED

## 2018-11-12 NOTE — ED PROVIDER NOTES
radiographic finding to account for patient's chest pain. 2. Stable mild enlargement of the cardiac silhouette. CT Cervical Spine WO Contrast   Final Result   No acute abnormality of the cervical spine. Multilevel degenerative changes with facet arthropathy and disc disease. CT Head WO Contrast   Preliminary Result   No acute intracranial abnormality.            Results for orders placed or performed during the hospital encounter of 11/12/18   CBC auto diff   Result Value Ref Range    WBC 7.6 4.0 - 10.5 K/CU MM    RBC 3.65 (L) 4.6 - 6.2 M/CU MM    Hemoglobin 10.8 (L) 13.5 - 18.0 GM/DL    Hematocrit 34.7 (L) 42 - 52 %    MCV 95.1 78 - 100 FL    MCH 29.6 27 - 31 PG    MCHC 31.1 (L) 32.0 - 36.0 %    RDW 14.2 11.7 - 14.9 %    Platelets 209 862 - 134 K/CU MM    MPV 9.4 7.5 - 11.1 FL    Differential Type AUTOMATED DIFFERENTIAL     Segs Relative 74.7 (H) 36 - 66 %    Lymphocytes % 17.7 (L) 24 - 44 %    Monocytes % 6.7 (H) 0 - 4 %    Eosinophils % 0.5 0 - 3 %    Basophils % 0.3 0 - 1 %    Segs Absolute 5.6 K/CU MM    Lymphocytes # 1.3 K/CU MM    Monocytes # 0.5 K/CU MM    Eosinophils # 0.0 K/CU MM    Basophils # 0.0 K/CU MM    Nucleated RBC % 0.0 %    Total Nucleated RBC 0.0 K/CU MM    TRC 0.0558 K/CU MM    Total Immature Neutrophil 0.01 K/CU MM    Immature Neutrophil % 0.1 0 - 0.43 %   CMP   Result Value Ref Range    Sodium 135 135 - 145 MMOL/L    Potassium 4.3 3.5 - 5.1 MMOL/L    Chloride 101 99 - 110 mMol/L    CO2 25 21 - 32 MMOL/L    BUN 18 6 - 23 MG/DL    CREATININE 1.0 0.9 - 1.3 MG/DL    Glucose 102 (H) 70 - 99 MG/DL    Calcium 9.3 8.3 - 10.6 MG/DL    Alb 3.9 3.4 - 5.0 GM/DL    Total Protein 6.7 6.4 - 8.2 GM/DL    Total Bilirubin 0.4 0.0 - 1.0 MG/DL    ALT 10 10 - 40 U/L    AST 12 (L) 15 - 37 IU/L    Alkaline Phosphatase 65 40 - 129 IU/L    GFR Non-African American >60 >60 mL/min/1.73m2    GFR African American >60 >60 mL/min/1.73m2    Anion Gap 9 4 - 16   Troponin   Result Value Ref Range in nature or severity of symptoms, development of numbness/tingling, or weakness, or any new symptoms, were discussed in detail with patient who understands and agrees. Comment: Please note this report has been produced using speech recognition software and may contain errors related to that system including errors in grammar, punctuation, and spelling, as well as words and phrases that may be inappropriate. If there are any questions or concerns please feel free to contact the dictating provider for clarification.             LOLY Page  11/12/18 9556

## 2018-11-13 LAB
EKG ATRIAL RATE: 64 BPM
EKG DIAGNOSIS: NORMAL
EKG P AXIS: 63 DEGREES
EKG P-R INTERVAL: 142 MS
EKG Q-T INTERVAL: 418 MS
EKG QRS DURATION: 80 MS
EKG QTC CALCULATION (BAZETT): 431 MS
EKG R AXIS: 13 DEGREES
EKG T AXIS: 42 DEGREES
EKG VENTRICULAR RATE: 64 BPM

## 2018-11-13 PROCEDURE — 93010 ELECTROCARDIOGRAM REPORT: CPT | Performed by: INTERNAL MEDICINE

## 2018-11-16 ENCOUNTER — HOSPITAL ENCOUNTER (OUTPATIENT)
Age: 68
Setting detail: SPECIMEN
Discharge: HOME | End: 2018-11-16
Payer: MEDICARE

## 2018-11-16 LAB
ANION GAP SERPL CALCULATED.3IONS-SCNC: 12 MMOL/L (ref 4–16)
BASOPHILS ABSOLUTE: 0 K/CU MM
BASOPHILS RELATIVE PERCENT: 0.5 % (ref 0–1)
BUN BLDV-MCNC: 16 MG/DL (ref 6–23)
CALCIUM SERPL-MCNC: 9.6 MG/DL (ref 8.3–10.6)
CHLORIDE BLD-SCNC: 104 MMOL/L (ref 99–110)
CO2: 24 MMOL/L (ref 21–32)
CREAT SERPL-MCNC: 0.8 MG/DL (ref 0.9–1.3)
DIFFERENTIAL TYPE: ABNORMAL
EOSINOPHILS ABSOLUTE: 0.1 K/CU MM
EOSINOPHILS RELATIVE PERCENT: 1.2 % (ref 0–3)
GFR AFRICAN AMERICAN: >60 ML/MIN/1.73M2
GFR NON-AFRICAN AMERICAN: >60 ML/MIN/1.73M2
GLUCOSE BLD-MCNC: 89 MG/DL (ref 70–99)
HCT VFR BLD CALC: 36.3 % (ref 42–52)
HEMOGLOBIN: 10.5 GM/DL (ref 13.5–18)
IMMATURE NEUTROPHIL %: 0.3 % (ref 0–0.43)
LYMPHOCYTES ABSOLUTE: 1.7 K/CU MM
LYMPHOCYTES RELATIVE PERCENT: 22.2 % (ref 24–44)
MCH RBC QN AUTO: 30.2 PG (ref 27–31)
MCHC RBC AUTO-ENTMCNC: 28.9 % (ref 32–36)
MCV RBC AUTO: 104.3 FL (ref 78–100)
MONOCYTES ABSOLUTE: 0.6 K/CU MM
MONOCYTES RELATIVE PERCENT: 7.3 % (ref 0–4)
NUCLEATED RBC %: 0.4 %
PDW BLD-RTO: 14.5 % (ref 11.7–14.9)
PLATELET # BLD: 174 K/CU MM (ref 140–440)
PMV BLD AUTO: 10.1 FL (ref 7.5–11.1)
POTASSIUM SERPL-SCNC: 4.6 MMOL/L (ref 3.5–5.1)
RBC # BLD: 3.48 M/CU MM (ref 4.6–6.2)
SEGMENTED NEUTROPHILS ABSOLUTE COUNT: 5.3 K/CU MM
SEGMENTED NEUTROPHILS RELATIVE PERCENT: 68.5 % (ref 36–66)
SODIUM BLD-SCNC: 140 MMOL/L (ref 135–145)
TOTAL IMMATURE NEUTOROPHIL: 0.02 K/CU MM
TOTAL NUCLEATED RBC: 0 K/CU MM
WBC # BLD: 7.7 K/CU MM (ref 4–10.5)

## 2018-11-16 PROCEDURE — 85025 COMPLETE CBC W/AUTO DIFF WBC: CPT

## 2018-11-16 PROCEDURE — 80048 BASIC METABOLIC PNL TOTAL CA: CPT

## 2018-11-16 PROCEDURE — 36415 COLL VENOUS BLD VENIPUNCTURE: CPT

## 2019-01-29 ENCOUNTER — TELEPHONE (OUTPATIENT)
Dept: INTERNAL MEDICINE CLINIC | Age: 69
End: 2019-01-29

## 2019-02-05 ENCOUNTER — OFFICE VISIT (OUTPATIENT)
Dept: INTERNAL MEDICINE CLINIC | Age: 69
End: 2019-02-05
Payer: MEDICARE

## 2019-02-05 VITALS
HEART RATE: 62 BPM | BODY MASS INDEX: 25.15 KG/M2 | HEIGHT: 74 IN | SYSTOLIC BLOOD PRESSURE: 132 MMHG | OXYGEN SATURATION: 98 % | RESPIRATION RATE: 12 BRPM | DIASTOLIC BLOOD PRESSURE: 84 MMHG | WEIGHT: 196 LBS

## 2019-02-05 DIAGNOSIS — E78.5 HYPERLIPIDEMIA, UNSPECIFIED HYPERLIPIDEMIA TYPE: ICD-10-CM

## 2019-02-05 DIAGNOSIS — Z91.81 AT HIGH RISK FOR FALLS: ICD-10-CM

## 2019-02-05 DIAGNOSIS — G20 PARKINSON'S DISEASE (HCC): ICD-10-CM

## 2019-02-05 DIAGNOSIS — E87.6 HYPOKALEMIA: ICD-10-CM

## 2019-02-05 DIAGNOSIS — I10 ESSENTIAL HYPERTENSION: Primary | ICD-10-CM

## 2019-02-05 DIAGNOSIS — E43 SEVERE MALNUTRITION (HCC): ICD-10-CM

## 2019-02-05 PROCEDURE — 99214 OFFICE O/P EST MOD 30 MIN: CPT | Performed by: FAMILY MEDICINE

## 2019-02-05 RX ORDER — ATORVASTATIN CALCIUM 20 MG/1
20 TABLET, FILM COATED ORAL NIGHTLY
Qty: 30 TABLET | Refills: 2 | Status: SHIPPED | OUTPATIENT
Start: 2019-02-05 | End: 2019-05-08 | Stop reason: SDUPTHER

## 2019-02-05 RX ORDER — METOPROLOL SUCCINATE 25 MG/1
25 TABLET, EXTENDED RELEASE ORAL DAILY
Qty: 30 TABLET | Refills: 2 | Status: SHIPPED | OUTPATIENT
Start: 2019-02-05 | End: 2019-05-03 | Stop reason: SDUPTHER

## 2019-02-05 RX ORDER — POTASSIUM CHLORIDE 20 MEQ/1
20 TABLET, EXTENDED RELEASE ORAL DAILY
Qty: 30 TABLET | Refills: 2 | Status: SHIPPED | OUTPATIENT
Start: 2019-02-05 | End: 2019-05-08

## 2019-02-05 ASSESSMENT — PATIENT HEALTH QUESTIONNAIRE - PHQ9
SUM OF ALL RESPONSES TO PHQ9 QUESTIONS 1 & 2: 0
SUM OF ALL RESPONSES TO PHQ QUESTIONS 1-9: 0
2. FEELING DOWN, DEPRESSED OR HOPELESS: 0
SUM OF ALL RESPONSES TO PHQ QUESTIONS 1-9: 0
1. LITTLE INTEREST OR PLEASURE IN DOING THINGS: 0

## 2019-02-06 ASSESSMENT — ENCOUNTER SYMPTOMS
ABDOMINAL PAIN: 0
CONSTIPATION: 1
EYES NEGATIVE: 1
NAUSEA: 0
BLOOD IN STOOL: 0
BACK PAIN: 0
COUGH: 0
SHORTNESS OF BREATH: 0

## 2019-02-11 ENCOUNTER — TELEPHONE (OUTPATIENT)
Dept: INTERNAL MEDICINE CLINIC | Age: 69
End: 2019-02-11

## 2019-02-20 ENCOUNTER — TELEPHONE (OUTPATIENT)
Dept: INTERNAL MEDICINE CLINIC | Age: 69
End: 2019-02-20

## 2019-02-28 ENCOUNTER — HOSPITAL ENCOUNTER (OUTPATIENT)
Age: 69
Discharge: HOME | End: 2019-02-28
Payer: MEDICARE

## 2019-02-28 LAB
ANION GAP SERPL CALCULATED.3IONS-SCNC: 14 MMOL/L (ref 4–16)
BUN BLDV-MCNC: 16 MG/DL (ref 6–23)
CALCIUM SERPL-MCNC: 9.7 MG/DL (ref 8.3–10.6)
CHLORIDE BLD-SCNC: 104 MMOL/L (ref 99–110)
CO2: 23 MMOL/L (ref 21–32)
CREAT SERPL-MCNC: 1.2 MG/DL (ref 0.9–1.3)
GFR AFRICAN AMERICAN: >60 ML/MIN/1.73M2
GFR NON-AFRICAN AMERICAN: >60 ML/MIN/1.73M2
GLUCOSE BLD-MCNC: 80 MG/DL (ref 70–99)
POTASSIUM SERPL-SCNC: 4.2 MMOL/L (ref 3.5–5.1)
SODIUM BLD-SCNC: 141 MMOL/L (ref 135–145)

## 2019-02-28 PROCEDURE — 80048 BASIC METABOLIC PNL TOTAL CA: CPT

## 2019-02-28 PROCEDURE — 36415 COLL VENOUS BLD VENIPUNCTURE: CPT

## 2019-03-01 ENCOUNTER — TELEPHONE (OUTPATIENT)
Dept: INTERNAL MEDICINE CLINIC | Age: 69
End: 2019-03-01

## 2019-03-01 DIAGNOSIS — E87.6 HYPOKALEMIA: Primary | ICD-10-CM

## 2019-05-03 DIAGNOSIS — I10 ESSENTIAL HYPERTENSION: ICD-10-CM

## 2019-05-03 RX ORDER — METOPROLOL SUCCINATE 25 MG/1
TABLET, EXTENDED RELEASE ORAL
Qty: 30 TABLET | Refills: 0 | Status: SHIPPED | OUTPATIENT
Start: 2019-05-03 | End: 2019-05-08 | Stop reason: SDUPTHER

## 2019-05-07 ENCOUNTER — HOSPITAL ENCOUNTER (OUTPATIENT)
Age: 69
Discharge: HOME | End: 2019-05-07
Payer: MEDICARE

## 2019-05-07 LAB
ANION GAP SERPL CALCULATED.3IONS-SCNC: 11 MMOL/L (ref 4–16)
BUN BLDV-MCNC: 19 MG/DL (ref 6–23)
CALCIUM SERPL-MCNC: 9.6 MG/DL (ref 8.3–10.6)
CHLORIDE BLD-SCNC: 103 MMOL/L (ref 99–110)
CO2: 26 MMOL/L (ref 21–32)
CREAT SERPL-MCNC: 1 MG/DL (ref 0.9–1.3)
GFR AFRICAN AMERICAN: >60 ML/MIN/1.73M2
GFR NON-AFRICAN AMERICAN: >60 ML/MIN/1.73M2
GLUCOSE BLD-MCNC: 101 MG/DL (ref 70–99)
POTASSIUM SERPL-SCNC: 4.7 MMOL/L (ref 3.5–5.1)
SODIUM BLD-SCNC: 140 MMOL/L (ref 135–145)

## 2019-05-07 PROCEDURE — 36415 COLL VENOUS BLD VENIPUNCTURE: CPT

## 2019-05-07 PROCEDURE — 80048 BASIC METABOLIC PNL TOTAL CA: CPT

## 2019-05-08 ENCOUNTER — OFFICE VISIT (OUTPATIENT)
Dept: INTERNAL MEDICINE CLINIC | Age: 69
End: 2019-05-08
Payer: MEDICARE

## 2019-05-08 VITALS
WEIGHT: 189.8 LBS | HEART RATE: 54 BPM | RESPIRATION RATE: 14 BRPM | HEIGHT: 74 IN | SYSTOLIC BLOOD PRESSURE: 132 MMHG | DIASTOLIC BLOOD PRESSURE: 86 MMHG | BODY MASS INDEX: 24.36 KG/M2 | OXYGEN SATURATION: 98 %

## 2019-05-08 DIAGNOSIS — Z23 NEED FOR VACCINATION AGAINST STREPTOCOCCUS PNEUMONIAE USING PNEUMOCOCCAL CONJUGATE VACCINE 13: ICD-10-CM

## 2019-05-08 DIAGNOSIS — I10 ESSENTIAL HYPERTENSION: Primary | ICD-10-CM

## 2019-05-08 DIAGNOSIS — I25.10 CORONARY ARTERY DISEASE INVOLVING NATIVE CORONARY ARTERY OF NATIVE HEART WITHOUT ANGINA PECTORIS: ICD-10-CM

## 2019-05-08 DIAGNOSIS — Z79.899 LONG TERM USE OF DRUG: ICD-10-CM

## 2019-05-08 DIAGNOSIS — E78.5 HYPERLIPIDEMIA, UNSPECIFIED HYPERLIPIDEMIA TYPE: ICD-10-CM

## 2019-05-08 PROCEDURE — 99214 OFFICE O/P EST MOD 30 MIN: CPT | Performed by: FAMILY MEDICINE

## 2019-05-08 RX ORDER — ATORVASTATIN CALCIUM 20 MG/1
20 TABLET, FILM COATED ORAL NIGHTLY
Qty: 90 TABLET | Refills: 1 | Status: SHIPPED | OUTPATIENT
Start: 2019-05-08 | End: 2019-10-09 | Stop reason: SDUPTHER

## 2019-05-08 RX ORDER — METOPROLOL SUCCINATE 25 MG/1
25 TABLET, EXTENDED RELEASE ORAL DAILY
Qty: 90 TABLET | Refills: 1 | Status: SHIPPED | OUTPATIENT
Start: 2019-05-08 | End: 2019-10-09 | Stop reason: SDUPTHER

## 2019-05-12 ASSESSMENT — ENCOUNTER SYMPTOMS
EYES NEGATIVE: 1
ABDOMINAL PAIN: 0
NAUSEA: 0
SHORTNESS OF BREATH: 0
DIARRHEA: 0
WHEEZING: 0
CONSTIPATION: 0
BLOOD IN STOOL: 0
BACK PAIN: 0
CHEST TIGHTNESS: 0

## 2019-05-12 NOTE — PROGRESS NOTES
Lamont Toscano  1950  78 y.o.  male    SUBJECTIVE:    History of Present Illness  Lamont Toscano is a 78 y.o. male who presents today for HTN, HL, and CAD. He has parkinson's and had seen neurology. Last labs reviewed. Potassium stable. -HTN-Stable on medication  -HL-Stable on Lipitor 20 mg  -CAD with stent- Last cardiologist was Dr. Aimee Singh. He wants to go to [employer]. No Cp or Sob  -Parkinson's disease and dementia- He is going to [facility]    Review of Systems   Constitutional: Negative for chills, diaphoresis and fever. HENT: Negative. Eyes: Negative. Respiratory: Negative for chest tightness, shortness of breath and wheezing. Cardiovascular: Positive for leg swelling (trace). Negative for chest pain and palpitations. Gastrointestinal: Negative for abdominal pain, blood in stool, constipation, diarrhea and nausea. Endocrine: Negative for polyphagia and polyuria. Genitourinary: Negative for difficulty urinating and dysuria. Musculoskeletal: Negative for back pain and neck pain. Skin: Negative. Neurological: Negative for dizziness, light-headedness and headaches. Psychiatric/Behavioral: Negative for sleep disturbance.         No changes in mood       Allergies   Allergen Reactions    Chantix [Varenicline]     Daypro [Oxaprozin]        Past Medical History:   Diagnosis Date    CAD (coronary artery disease)     with stent    Chronic low back pain     HX of laminectomy    Coronary arteriosclerosis     stent    Dementia     Fracture     R ankle    Hypertension     Hypertensive disorder     Osteoarthritis     Parkinson disease ([address]     Small vessel disease, cerebrovascular     Tobacco abuse        Past Surgical History:   Procedure Laterality Date    ANKLE SURGERY      BACK SURGERY      CARDIAC SURGERY      heart cath    CORONARY ANGIOPLASTY WITH STENT PLACEMENT      HERNIA REPAIR      LUMBAR LAMINECTOMY      TONSILLECTOMY      VASECTOMY         Social History Tobacco Use    Smoking status: Current Every Day Smoker     Packs/day: 0.50     Years: 15.00     Pack years: 7.50     Types: Cigarettes     Start date: 2/5/1964    Smokeless tobacco: Never Used   Substance Use Topics    Alcohol use: Yes     Alcohol/week: 0.6 oz     Types: 1 Cans of beer per week     Comment: seldom    Drug use: No       Current Outpatient Medications   Medication Sig Dispense Refill    cyanocobalamin 1000 MCG tablet Take 1 tablet by mouth daily 90 tablet 2    metoprolol succinate (TOPROL XL) 25 MG extended release tablet Take 1 tablet by mouth daily 90 tablet 1    atorvastatin (LIPITOR) 20 MG tablet Take 1 tablet by mouth nightly 90 tablet 1    acetaminophen (TYLENOL) 325 MG tablet Take 650 mg by mouth every 4 hours as needed for Pain      donepezil (ARICEPT) 10 MG tablet Take 10 mg by mouth nightly      donepezil (ARICEPT) 5 MG tablet Take 3 tablets by mouth nightly (Patient taking differently: Take 10 mg by mouth nightly ) 30 tablet 3    linaclotide (LINZESS) 145 MCG capsule Take 145 mcg by mouth every morning (before breakfast)      memantine (NAMENDA) 10 MG tablet Take 10 mg by mouth 2 times daily      aspirin 81 MG EC tablet Take 81 mg by mouth daily.  trihexyphenidyl (ARTANE) 2 MG tablet Take 2 mg by mouth 2 times daily. No current facility-administered medications for this visit. OBJECTIVE:    /86 (Site: Right Upper Arm, Position: Sitting, Cuff Size: Medium Adult)   Pulse 54   Resp 14   Ht 6' 2\" (1.88 m)   Wt 189 lb 12.8 oz (86.1 kg)   SpO2 98%   BMI 24.37 kg/m²     Physical Exam   Constitutional: He is oriented to person, place, and time. He appears well-developed. No distress. HENT:   Right Ear: External ear normal.   Left Ear: External ear normal.   Nose: Nose normal.   Mouth/Throat: Oropharynx is clear and moist.   Eyes: Pupils are equal, round, and reactive to light. EOM are normal.   Neck: Neck supple.    Cardiovascular: Normal rate,

## 2019-10-09 ENCOUNTER — OFFICE VISIT (OUTPATIENT)
Dept: INTERNAL MEDICINE CLINIC | Age: 69
End: 2019-10-09
Payer: MEDICARE

## 2019-10-09 VITALS
WEIGHT: 186.2 LBS | HEIGHT: 74 IN | OXYGEN SATURATION: 94 % | DIASTOLIC BLOOD PRESSURE: 82 MMHG | BODY MASS INDEX: 23.9 KG/M2 | SYSTOLIC BLOOD PRESSURE: 140 MMHG | HEART RATE: 55 BPM

## 2019-10-09 DIAGNOSIS — E78.5 HYPERLIPIDEMIA, UNSPECIFIED HYPERLIPIDEMIA TYPE: ICD-10-CM

## 2019-10-09 DIAGNOSIS — G20 PARKINSON'S DISEASE (HCC): ICD-10-CM

## 2019-10-09 DIAGNOSIS — I10 ESSENTIAL HYPERTENSION: Primary | ICD-10-CM

## 2019-10-09 DIAGNOSIS — Z23 NEEDS FLU SHOT: ICD-10-CM

## 2019-10-09 PROCEDURE — G8420 CALC BMI NORM PARAMETERS: HCPCS | Performed by: FAMILY MEDICINE

## 2019-10-09 PROCEDURE — G8598 ASA/ANTIPLAT THER USED: HCPCS | Performed by: FAMILY MEDICINE

## 2019-10-09 PROCEDURE — G8427 DOCREV CUR MEDS BY ELIG CLIN: HCPCS | Performed by: FAMILY MEDICINE

## 2019-10-09 PROCEDURE — 4004F PT TOBACCO SCREEN RCVD TLK: CPT | Performed by: FAMILY MEDICINE

## 2019-10-09 PROCEDURE — 90653 IIV ADJUVANT VACCINE IM: CPT | Performed by: FAMILY MEDICINE

## 2019-10-09 PROCEDURE — 99213 OFFICE O/P EST LOW 20 MIN: CPT | Performed by: FAMILY MEDICINE

## 2019-10-09 PROCEDURE — G0008 ADMIN INFLUENZA VIRUS VAC: HCPCS | Performed by: FAMILY MEDICINE

## 2019-10-09 PROCEDURE — 4040F PNEUMOC VAC/ADMIN/RCVD: CPT | Performed by: FAMILY MEDICINE

## 2019-10-09 PROCEDURE — G8482 FLU IMMUNIZE ORDER/ADMIN: HCPCS | Performed by: FAMILY MEDICINE

## 2019-10-09 PROCEDURE — 1123F ACP DISCUSS/DSCN MKR DOCD: CPT | Performed by: FAMILY MEDICINE

## 2019-10-09 PROCEDURE — 3017F COLORECTAL CA SCREEN DOC REV: CPT | Performed by: FAMILY MEDICINE

## 2019-10-09 RX ORDER — ATORVASTATIN CALCIUM 20 MG/1
20 TABLET, FILM COATED ORAL NIGHTLY
Qty: 90 TABLET | Refills: 1 | Status: SHIPPED | OUTPATIENT
Start: 2019-10-09 | End: 2020-05-18 | Stop reason: SDUPTHER

## 2019-10-09 RX ORDER — FUROSEMIDE 20 MG/1
20 TABLET ORAL 2 TIMES DAILY
COMMUNITY

## 2019-10-09 RX ORDER — METOPROLOL SUCCINATE 25 MG/1
25 TABLET, EXTENDED RELEASE ORAL DAILY
Qty: 90 TABLET | Refills: 1 | Status: SHIPPED | OUTPATIENT
Start: 2019-10-09 | End: 2020-05-18 | Stop reason: SDUPTHER

## 2019-10-10 ASSESSMENT — ENCOUNTER SYMPTOMS
SHORTNESS OF BREATH: 0
CHEST TIGHTNESS: 0
BACK PAIN: 0
DIARRHEA: 0
COUGH: 0
BLOOD IN STOOL: 0
EYES NEGATIVE: 1
ABDOMINAL PAIN: 0
CONSTIPATION: 1
WHEEZING: 0
NAUSEA: 0

## 2020-03-25 PROBLEM — N17.9 AKI (ACUTE KIDNEY INJURY) (HCC): Status: RESOLVED | Noted: 2018-10-04 | Resolved: 2020-03-24

## 2020-05-18 ENCOUNTER — VIRTUAL VISIT (OUTPATIENT)
Dept: INTERNAL MEDICINE CLINIC | Age: 70
End: 2020-05-18
Payer: MEDICARE

## 2020-05-18 PROCEDURE — 99442 PR PHYS/QHP TELEPHONE EVALUATION 11-20 MIN: CPT | Performed by: FAMILY MEDICINE

## 2020-05-18 RX ORDER — ATORVASTATIN CALCIUM 20 MG/1
20 TABLET, FILM COATED ORAL NIGHTLY
Qty: 90 TABLET | Refills: 1 | Status: SHIPPED | OUTPATIENT
Start: 2020-05-18 | End: 2020-06-29

## 2020-05-18 RX ORDER — METOPROLOL SUCCINATE 25 MG/1
25 TABLET, EXTENDED RELEASE ORAL DAILY
Qty: 90 TABLET | Refills: 1 | Status: SHIPPED | OUTPATIENT
Start: 2020-05-18 | End: 2020-11-11

## 2020-05-25 ASSESSMENT — ENCOUNTER SYMPTOMS
NAUSEA: 0
CONSTIPATION: 0
COUGH: 0
DIARRHEA: 0
ABDOMINAL PAIN: 0
SHORTNESS OF BREATH: 0

## 2020-06-29 RX ORDER — ATORVASTATIN CALCIUM 20 MG/1
TABLET, FILM COATED ORAL
Qty: 90 TABLET | Refills: 0 | Status: SHIPPED | OUTPATIENT
Start: 2020-06-29 | End: 2021-03-30

## 2020-11-11 RX ORDER — METOPROLOL SUCCINATE 25 MG/1
TABLET, EXTENDED RELEASE ORAL
Qty: 90 TABLET | Refills: 0 | Status: SHIPPED | OUTPATIENT
Start: 2020-11-11 | End: 2021-02-08

## 2020-12-15 ENCOUNTER — OFFICE VISIT (OUTPATIENT)
Dept: INTERNAL MEDICINE CLINIC | Age: 70
End: 2020-12-15
Payer: MEDICARE

## 2020-12-15 VITALS
BODY MASS INDEX: 24.38 KG/M2 | OXYGEN SATURATION: 98 % | HEIGHT: 74 IN | SYSTOLIC BLOOD PRESSURE: 126 MMHG | HEART RATE: 54 BPM | WEIGHT: 190 LBS | DIASTOLIC BLOOD PRESSURE: 84 MMHG | TEMPERATURE: 96.7 F

## 2020-12-15 PROCEDURE — G8484 FLU IMMUNIZE NO ADMIN: HCPCS | Performed by: FAMILY MEDICINE

## 2020-12-15 PROCEDURE — G8420 CALC BMI NORM PARAMETERS: HCPCS | Performed by: FAMILY MEDICINE

## 2020-12-15 PROCEDURE — G8427 DOCREV CUR MEDS BY ELIG CLIN: HCPCS | Performed by: FAMILY MEDICINE

## 2020-12-15 PROCEDURE — 1123F ACP DISCUSS/DSCN MKR DOCD: CPT | Performed by: FAMILY MEDICINE

## 2020-12-15 PROCEDURE — 4004F PT TOBACCO SCREEN RCVD TLK: CPT | Performed by: FAMILY MEDICINE

## 2020-12-15 PROCEDURE — 3017F COLORECTAL CA SCREEN DOC REV: CPT | Performed by: FAMILY MEDICINE

## 2020-12-15 PROCEDURE — 4040F PNEUMOC VAC/ADMIN/RCVD: CPT | Performed by: FAMILY MEDICINE

## 2020-12-15 PROCEDURE — 99214 OFFICE O/P EST MOD 30 MIN: CPT | Performed by: FAMILY MEDICINE

## 2020-12-15 RX ORDER — DOXYCYCLINE HYCLATE 100 MG
100 TABLET ORAL 2 TIMES DAILY
Qty: 20 TABLET | Refills: 0 | Status: SHIPPED | OUTPATIENT
Start: 2020-12-15 | End: 2020-12-25

## 2020-12-15 ASSESSMENT — ENCOUNTER SYMPTOMS
BACK PAIN: 0
ABDOMINAL PAIN: 0
NAUSEA: 0
CHEST TIGHTNESS: 0
SHORTNESS OF BREATH: 0
COUGH: 0

## 2020-12-15 NOTE — PROGRESS NOTES
Lamont Lentz  1950  69 y.o.  male    Chief Complaint   Patient presents with    Follow-up         History of Present Illness  Lamont Lentz is a 69 y.o. male who presents today for a check up with his wife. Patient Active Problem List    Diagnosis Date Noted    Dementia [facility])     Osteoarthritis     Small vessel disease, cerebrovascular     CAD (coronary artery disease)     Tobacco abuse     Hyperlipidemia 10/04/2018    Hypertension 10/04/2018    Parkinson's disease ([address] 10/04/2018    Right inguinal hernia 10/05/2015   -HTN- Stable on medications  -HLD- Stable on Lipitor  -Constipation- Stable on Linzess  -Parkinson's/Dementia- Follows with Dr. Renny Eisenmenger. Stable  -CAD. Hx of stents- Follows with [facility] cardiology  - He c/o of recurrent cyst/abscess to R axilla that has started to drain. Slightly tender. Symptoms for a few days      Review of Systems   Constitutional: Negative for diaphoresis and fever. HENT: Negative. Respiratory: Negative for cough, chest tightness and shortness of breath. Cardiovascular: Negative for chest pain and palpitations. Gastrointestinal: Positive for constipation (stable). Negative for abdominal pain, diarrhea and nausea. Genitourinary: Negative for difficulty urinating. Musculoskeletal: Negative for back pain. Neurological: Negative for dizziness and headaches. Psychiatric/Behavioral: Negative for dysphoric mood.        Allergies   Allergen Reactions    Chantix [Varenicline]     Daypro [Oxaprozin]        Past Medical History:   Diagnosis Date    CAD (coronary artery disease)     with stent    Chronic low back pain     HX of laminectomy    Coronary arteriosclerosis     stent    D[address]     Fracture     R ankle    Hypertension     Hypertensive disorder     Osteoarthritis     Parkinson disease ([address]     Small vessel disease, cerebrovascular     Tobacco abuse        Past Surgical History:   Procedure Laterality Date    ANKLE SURGERY  BACK SURGERY      CARDIAC SURGERY      heart cath    CORONARY ANGIOPLASTY WITH STENT PLACEMENT      HERNIA REPAIR      LUMBAR LAMINECTOMY      TONSILLECTOMY      VASECTOMY         Family History   Problem Relation Age of Onset    High Blood Pressure Mother     High Blood Pressure Father     Heart Disease Father        Social History     Tobacco Use    Smoking status: Current Every Day Smoker     Packs/day: 0.50     Years: 15.00     Pack years: 7.50     Types: Cigarettes     Start date: 2/5/1964    Smokeless tobacco: Never Used   Substance Use Topics    Alcohol use: Yes     Alcohol/week: 1.0 standard drinks     Types: 1 Cans of beer per week     Comment: seldom    Drug use: No       Current Outpatient Medications   Medication Sig Dispense Refill    doxycycline hyclate (VIBRA-TABS) 100 MG tablet Take 1 tablet by mouth 2 times daily for 10 days 20 tablet 0    linaclotide (LINZESS) 145 MCG capsule Take 1 capsule by mouth every morning (before breakfast) 30 capsule 2    metoprolol succinate (TOPROL XL) 25 MG extended release tablet TAKE ONE TABLET BY MOUTH DAILY 90 tablet 0    atorvastatin (LIPITOR) 20 MG tablet TAKE ONE TABLET BY MOUTH ONCE NIGHTLY 90 tablet 0    cyanocobalamin 1000 MCG tablet Take 1 tablet by mouth daily 90 tablet 3    furosemide (LASIX) 20 MG tablet Take 20 mg by mouth 2 times daily      acetaminophen (TYLENOL) 325 MG tablet Take 650 mg by mouth every 4 hours as needed for Pain      donepezil (ARICEPT) 10 MG tablet Take 10 mg by mouth nightly      memantine (NAMENDA) 10 MG tablet Take 10 mg by mouth 2 times daily      aspirin 81 MG EC tablet Take 81 mg by mouth daily.  trihexyphenidyl (ARTANE) 2 MG tablet Take 2 mg by mouth 2 times daily. No current facility-administered medications for this visit.         OBJECTIVE:    /84 (Site: Left Upper Arm, Position: Sitting, Cuff Size: Large Adult)   Pulse 54   Temp 96.7 °F (35.9 °C) (Temporal)   Ht 6' 2\" (1.88 m) Wt 190 lb (86.2 kg)   SpO2 98%   BMI 24.39 kg/m²     Physical Exam  Vitals signs reviewed. Constitutional:       General: He is not in acute distress. Appearance: He is well-developed. Eyes:      Conjunctiva/sclera: Conjunctivae normal.   Neck:      Musculoskeletal: Neck supple. Cardiovascular:      Rate and Rhythm: Normal rate and regular rhythm. Pulmonary:      Effort: Pulmonary effort is normal. No respiratory distress. Breath sounds: Normal breath sounds. Abdominal:      General: Bowel sounds are normal.      Palpations: Abdomen is soft. Tenderness: There is no abdominal tenderness. Musculoskeletal:      Right lower leg: No edema. Left lower leg: No edema. Skin:     Comments: Abscess under R axilla- sl drainage   Neurological:      Mental Status: He is alert and oriented to person, place, and time. Cranial Nerves: No cranial nerve deficit. Gait: Gait abnormal (ambulates with walker). Psychiatric:         Mood and Affect: Mood normal.         ASSESSMENT:  1. Essential hypertension    2. Abscess of axilla    3. Hyperlipidemia, unspecified hyperlipidemia type    4. Chronic constipation    5. Parkinson's disease ([facility]    6. Long term use of drug        PLAN:    Orders Placed This Encounter   Procedures    CBC Auto Differential    Comprehensive Metabolic Panel       Orders Placed This Encounter   Medications    doxycycline hyclate (VIBRA-TABS) 100 MG tablet     Sig: Take 1 tablet by mouth 2 times daily for 10 days     Dispense:  20 tablet     Refill:  0    linaclotide (LINZESS) 145 MCG capsule     Sig: Take 1 capsule by mouth every morning (before breakfast)     Dispense:  30 capsule     Refill:  2   Obtain lab   Continue medications. Start Doxycycline  Warm compress to area  ADR's explained  Keep f/u neurology and other specialists  Fall precautions  Persist RTO or call         Return in about 3 months (around 3/15/2021) for Check up.     Electronically Signed by Shasha Gupta, DO

## 2020-12-20 PROBLEM — N17.9 ACUTE KIDNEY INJURY (HCC): Status: RESOLVED | Noted: 2018-10-04 | Resolved: 2020-12-20

## 2020-12-20 PROBLEM — E43 SEVERE MALNUTRITION (HCC): Chronic | Status: RESOLVED | Noted: 2018-10-12 | Resolved: 2020-12-20
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
## 2020-12-20 PROBLEM — G93.40 ACUTE ENCEPHALOPATHY: Status: RESOLVED | Noted: 2018-10-05 | Resolved: 2020-12-20
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
## 2020-12-20 PROBLEM — E87.6 HYPOKALEMIA: Status: RESOLVED | Noted: 2018-10-11 | Resolved: 2020-12-20

## 2020-12-20 ASSESSMENT — ENCOUNTER SYMPTOMS
CONSTIPATION: 1
DIARRHEA: 0

## 2021-02-08 DIAGNOSIS — I10 ESSENTIAL HYPERTENSION: ICD-10-CM

## 2021-02-08 RX ORDER — METOPROLOL SUCCINATE 25 MG/1
TABLET, EXTENDED RELEASE ORAL
Qty: 90 TABLET | Refills: 1 | Status: SHIPPED | OUTPATIENT
Start: 2021-02-08 | End: 2021-08-16

## 2021-03-16 ENCOUNTER — TELEPHONE (OUTPATIENT)
Dept: INTERNAL MEDICINE CLINIC | Age: 71
End: 2021-03-16

## 2021-03-16 NOTE — TELEPHONE ENCOUNTER
Insurance will not cover Linzess. I have removed it from the list but he needs this changed so he can begin medication. Please review.

## 2021-03-16 NOTE — TELEPHONE ENCOUNTER
Patient wife on hippa called inquiring up date on linzess. She is aware prior authorization was denied. Physician to review and will call back.     Per wife patient has tried senokot previously

## 2021-03-17 NOTE — TELEPHONE ENCOUNTER
Spoke to pt's wife. She will try generic Colace OTC. He received a card from Dr. Yohana OSBORN to repeat C-scope.  She will call to set up also

## 2021-03-30 DIAGNOSIS — E78.5 HYPERLIPIDEMIA, UNSPECIFIED HYPERLIPIDEMIA TYPE: ICD-10-CM

## 2021-03-30 RX ORDER — ATORVASTATIN CALCIUM 20 MG/1
TABLET, FILM COATED ORAL
Qty: 90 TABLET | Refills: 0 | Status: SHIPPED | OUTPATIENT
Start: 2021-03-30 | End: 2021-06-28

## 2021-04-06 LAB
ALBUMIN SERPL-MCNC: 4.3 G/DL
ALP BLD-CCNC: 123 U/L
ALT SERPL-CCNC: 18 U/L
ANION GAP SERPL CALCULATED.3IONS-SCNC: 1.8 MMOL/L
AST SERPL-CCNC: 19 U/L
BASOPHILS ABSOLUTE: 0.1 /ΜL
BASOPHILS RELATIVE PERCENT: 1 %
BILIRUB SERPL-MCNC: 0.4 MG/DL (ref 0.1–1.4)
BUN BLDV-MCNC: 16 MG/DL
CALCIUM SERPL-MCNC: 10.1 MG/DL
CHLORIDE BLD-SCNC: 106 MMOL/L
CO2: 31 MMOL/L
CREAT SERPL-MCNC: 0.9 MG/DL
EOSINOPHILS ABSOLUTE: 0.1 /ΜL
EOSINOPHILS RELATIVE PERCENT: 1 %
GFR CALCULATED: 86
GLUCOSE BLD-MCNC: 99 MG/DL
HCT VFR BLD CALC: 38.9 % (ref 41–53)
HEMOGLOBIN: 12.8 G/DL (ref 13.5–17.5)
LYMPHOCYTES ABSOLUTE: 2.2 /ΜL
LYMPHOCYTES RELATIVE PERCENT: 22.5 %
MCH RBC QN AUTO: 28.8 PG
MCHC RBC AUTO-ENTMCNC: 32.9 G/DL
MCV RBC AUTO: 87.6 FL
MONOCYTES ABSOLUTE: 0.6 /ΜL
MONOCYTES RELATIVE PERCENT: 6.6 %
NEUTROPHILS ABSOLUTE: 6.6 /ΜL
NEUTROPHILS RELATIVE PERCENT: 68.9 %
PDW BLD-RTO: 14.6 %
PLATELET # BLD: 165 K/ΜL
PMV BLD AUTO: ABNORMAL FL
POTASSIUM SERPL-SCNC: 4.3 MMOL/L
RBC # BLD: 4.44 10^6/ΜL
SODIUM BLD-SCNC: 146 MMOL/L
TOTAL PROTEIN: 6.7
WBC # BLD: 9.6 10^3/ML

## 2021-04-12 DIAGNOSIS — Z79.899 LONG TERM USE OF DRUG: ICD-10-CM

## 2021-04-12 DIAGNOSIS — I10 ESSENTIAL HYPERTENSION: ICD-10-CM

## 2021-04-21 ENCOUNTER — OFFICE VISIT (OUTPATIENT)
Dept: INTERNAL MEDICINE CLINIC | Age: 71
End: 2021-04-21
Payer: MEDICARE

## 2021-04-21 VITALS
TEMPERATURE: 96.7 F | DIASTOLIC BLOOD PRESSURE: 81 MMHG | WEIGHT: 207 LBS | BODY MASS INDEX: 26.58 KG/M2 | SYSTOLIC BLOOD PRESSURE: 132 MMHG | OXYGEN SATURATION: 92 % | HEART RATE: 52 BPM

## 2021-04-21 DIAGNOSIS — E78.5 HYPERLIPIDEMIA, UNSPECIFIED HYPERLIPIDEMIA TYPE: ICD-10-CM

## 2021-04-21 DIAGNOSIS — F03.90 DEMENTIA WITHOUT BEHAVIORAL DISTURBANCE, UNSPECIFIED DEMENTIA TYPE: ICD-10-CM

## 2021-04-21 DIAGNOSIS — G20 PARKINSON'S DISEASE (HCC): ICD-10-CM

## 2021-04-21 DIAGNOSIS — K59.09 CHRONIC CONSTIPATION: ICD-10-CM

## 2021-04-21 DIAGNOSIS — I10 ESSENTIAL HYPERTENSION: Primary | ICD-10-CM

## 2021-04-21 DIAGNOSIS — D64.9 ANEMIA, UNSPECIFIED TYPE: ICD-10-CM

## 2021-04-21 PROCEDURE — 3017F COLORECTAL CA SCREEN DOC REV: CPT | Performed by: FAMILY MEDICINE

## 2021-04-21 PROCEDURE — 1123F ACP DISCUSS/DSCN MKR DOCD: CPT | Performed by: FAMILY MEDICINE

## 2021-04-21 PROCEDURE — G8427 DOCREV CUR MEDS BY ELIG CLIN: HCPCS | Performed by: FAMILY MEDICINE

## 2021-04-21 PROCEDURE — 99214 OFFICE O/P EST MOD 30 MIN: CPT | Performed by: FAMILY MEDICINE

## 2021-04-21 PROCEDURE — G8417 CALC BMI ABV UP PARAM F/U: HCPCS | Performed by: FAMILY MEDICINE

## 2021-04-21 PROCEDURE — 4004F PT TOBACCO SCREEN RCVD TLK: CPT | Performed by: FAMILY MEDICINE

## 2021-04-21 PROCEDURE — 4040F PNEUMOC VAC/ADMIN/RCVD: CPT | Performed by: FAMILY MEDICINE

## 2021-04-21 RX ORDER — DOCUSATE SODIUM 100 MG/1
100 CAPSULE, LIQUID FILLED ORAL 2 TIMES DAILY
Status: ON HOLD | COMMUNITY
End: 2022-08-02 | Stop reason: HOSPADM

## 2021-04-21 SDOH — ECONOMIC STABILITY: FOOD INSECURITY: WITHIN THE PAST 12 MONTHS, YOU WORRIED THAT YOUR FOOD WOULD RUN OUT BEFORE YOU GOT MONEY TO BUY MORE.: NEVER TRUE

## 2021-04-21 SDOH — ECONOMIC STABILITY: INCOME INSECURITY: HOW HARD IS IT FOR YOU TO PAY FOR THE VERY BASICS LIKE FOOD, HOUSING, MEDICAL CARE, AND HEATING?: NOT HARD AT ALL

## 2021-04-21 ASSESSMENT — ENCOUNTER SYMPTOMS
COUGH: 0
CONSTIPATION: 0
SHORTNESS OF BREATH: 0
ABDOMINAL PAIN: 0
DIARRHEA: 0
NAUSEA: 0
BACK PAIN: 0

## 2021-04-21 ASSESSMENT — PATIENT HEALTH QUESTIONNAIRE - PHQ9
SUM OF ALL RESPONSES TO PHQ QUESTIONS 1-9: 0
SUM OF ALL RESPONSES TO PHQ QUESTIONS 1-9: 0
2. FEELING DOWN, DEPRESSED OR HOPELESS: 0
SUM OF ALL RESPONSES TO PHQ QUESTIONS 1-9: 0
1. LITTLE INTEREST OR PLEASURE IN DOING THINGS: 0

## 2021-04-21 NOTE — PROGRESS NOTES
[facility]  1950  69 y.o.  male    Chief Complaint   Patient presents with    Hypertension         History of Present Illness  Lamont Terrazas is a 69 y.o. male who presents today for a check up. Patient Active Problem List    Diagnosis Date Noted    Dementia [facility])     Osteoarthritis     Small vessel disease, cerebrovascular     CAD (coronary artery disease)     Tobacco abuse     Hyperlipidemia 10/04/2018    Hypertension 10/04/2018    Parkinson's disease ([facility] 10/04/2018    Right inguinal hernia 10/05/2015     -HTN and HLD -Stable  -Mild NC anemia  -Constipation- Stable on Colace. Insurance would not cover the Llinzess  -Parkinson's disease and dementia- stable. Follows with neurology    Review of Systems   Constitutional: Negative for diaphoresis and fever. Respiratory: Negative for cough and shortness of breath. Cardiovascular: Negative for chest pain and palpitations. Gastrointestinal: Negative for abdominal pain, constipation, diarrhea and nausea. Genitourinary: Negative for difficulty urinating. Musculoskeletal: Negative for back pain. Neurological: Negative for dizziness and headaches. Hematological: Does not bruise/bleed easily. Psychiatric/Behavioral: Negative for dysphoric mood.        Allergies   Allergen Reactions    Chantix [Varenicline]     Daypro [Oxaprozin]        Past Medical History:   Diagnosis Date    CAD (coronary artery disease)     with stent    Chronic low back pain     HX of laminectomy    Coronary arteriosclerosis     stent    Dementia ([address]     Fracture     R ankle    Hypertension     Hypertensive disorder     Osteoarthritis     Parkinson disease ([address]     Small vessel disease, cerebrovascular     Tobacco abuse        Past Surgical History:   Procedure Laterality Date    ANKLE SURGERY      BACK SURGERY      CARDIAC SURGERY      heart cath    CORONARY ANGIOPLASTY WITH STENT PLACEMENT      HERNIA REPAIR      LUMBAR LAMINECTOMY      TONSILLECTOMY      VASECTOMY         Family History   Problem Relation Age of Onset    High Blood Pressure Mother     High Blood Pressure Father     Heart Disease Father        Social History     Tobacco Use    Smoking status: Current Every Day Smoker     Packs/day: 0.50     Years: 15.00     Pack years: 7.50     Types: Cigarettes     Start date: 2/5/1964    Smokeless tobacco: Never Used   Substance Use Topics    Alcohol use: Yes     Alcohol/week: 1.0 standard drinks     Types: 1 Cans of beer per week     Comment: seldom    Drug use: No       Current Outpatient Medications   Medication Sig Dispense Refill    docusate sodium (COLACE) 100 MG capsule Take 100 mg by mouth 2 times daily      atorvastatin (LIPITOR) 20 MG tablet TAKE ONE TABLET BY MOUTH ONCE NIGHTLY 90 tablet 0    metoprolol succinate (TOPROL XL) 25 MG extended release tablet TAKE ONE TABLET BY MOUTH DAILY 90 tablet 1    cyanocobalamin 1000 MCG tablet Take 1 tablet by mouth daily 90 tablet 3    furosemide (LASIX) 20 MG tablet Take 20 mg by mouth 2 times daily      acetaminophen (TYLENOL) 325 MG tablet Take 650 mg by mouth every 4 hours as needed for Pain      donepezil (ARICEPT) 10 MG tablet Take 10 mg by mouth nightly      memantine (NAMENDA) 10 MG tablet Take 10 mg by mouth 2 times daily      aspirin 81 MG EC tablet Take 81 mg by mouth daily.  trihexyphenidyl (ARTANE) 2 MG tablet Take by mouth Take 1 1/2 tablet bid       No current facility-administered medications for this visit. OBJECTIVE:    /81   Pulse 52   Temp 96.7 °F (35.9 °C)   Wt 207 lb (93.9 kg)   SpO2 92%   BMI 26.58 kg/m²     Physical Exam  Vitals signs reviewed. Constitutional:       General: He is not in acute distress. Eyes:      Extraocular Movements: Extraocular movements intact. Conjunctiva/sclera: Conjunctivae normal.      Pupils: Pupils are equal, round, and reactive to light. Neck:      Musculoskeletal: Neck supple.    Cardiovascular: Rate and Rhythm: Normal rate and regular rhythm. Pulmonary:      Effort: Pulmonary effort is normal. No respiratory distress. Breath sounds: Normal breath sounds. Abdominal:      General: Bowel sounds are normal. There is no distension. Palpations: Abdomen is soft. Tenderness: There is no abdominal tenderness. Musculoskeletal:      Right lower leg: No edema. Left lower leg: No edema. Skin:     Comments: R axilla with recurrent cyst   Neurological:      Mental Status: He is alert and oriented to person, place, and time. Cranial Nerves: No cranial nerve deficit. Psychiatric:         Mood and Affect: Mood normal.         ASSESSMENT:  1. Essential hypertension    2. Hyperlipidemia, unspecified hyperlipidemia type    3. Chronic constipation    4. Anemia, unspecified type    5. Parkinson's disease ([facility]    6. Dementia without behavioral disturbance, unspecified dementia type ([facility]        PLAN:  Last labs reviewed  Continue medications  Increase fiber fluids  Pt does not want cyst removed at this time  Pt to follow up with Dr. Terri Bronson for repeat C-scope       Return in about 5 months (around 9/21/2021).     Electronically Signed by Harmony Gonzalez DO

## 2021-06-27 DIAGNOSIS — E78.5 HYPERLIPIDEMIA, UNSPECIFIED HYPERLIPIDEMIA TYPE: ICD-10-CM

## 2021-06-28 RX ORDER — ATORVASTATIN CALCIUM 20 MG/1
TABLET, FILM COATED ORAL
Qty: 90 TABLET | Refills: 1 | Status: SHIPPED | OUTPATIENT
Start: 2021-06-28 | End: 2021-12-27

## 2021-09-20 ENCOUNTER — OFFICE VISIT (OUTPATIENT)
Dept: INTERNAL MEDICINE CLINIC | Age: 71
End: 2021-09-20
Payer: MEDICARE

## 2021-09-20 VITALS
HEIGHT: 74 IN | DIASTOLIC BLOOD PRESSURE: 76 MMHG | WEIGHT: 194 LBS | BODY MASS INDEX: 24.9 KG/M2 | HEART RATE: 62 BPM | SYSTOLIC BLOOD PRESSURE: 120 MMHG | OXYGEN SATURATION: 98 % | TEMPERATURE: 98.1 F

## 2021-09-20 VITALS
BODY MASS INDEX: 24.95 KG/M2 | HEART RATE: 62 BPM | OXYGEN SATURATION: 98 % | TEMPERATURE: 98.1 F | SYSTOLIC BLOOD PRESSURE: 120 MMHG | HEIGHT: 74 IN | WEIGHT: 194.4 LBS | DIASTOLIC BLOOD PRESSURE: 76 MMHG

## 2021-09-20 DIAGNOSIS — G20 PARKINSON'S DISEASE (HCC): ICD-10-CM

## 2021-09-20 DIAGNOSIS — E78.5 HYPERLIPIDEMIA, UNSPECIFIED HYPERLIPIDEMIA TYPE: ICD-10-CM

## 2021-09-20 DIAGNOSIS — D64.9 ANEMIA, UNSPECIFIED TYPE: ICD-10-CM

## 2021-09-20 DIAGNOSIS — Z00.00 ROUTINE GENERAL MEDICAL EXAMINATION AT A HEALTH CARE FACILITY: Primary | ICD-10-CM

## 2021-09-20 DIAGNOSIS — I10 ESSENTIAL HYPERTENSION: Primary | ICD-10-CM

## 2021-09-20 PROCEDURE — G8420 CALC BMI NORM PARAMETERS: HCPCS | Performed by: FAMILY MEDICINE

## 2021-09-20 PROCEDURE — 4004F PT TOBACCO SCREEN RCVD TLK: CPT | Performed by: FAMILY MEDICINE

## 2021-09-20 PROCEDURE — 3017F COLORECTAL CA SCREEN DOC REV: CPT | Performed by: FAMILY MEDICINE

## 2021-09-20 PROCEDURE — 1123F ACP DISCUSS/DSCN MKR DOCD: CPT | Performed by: FAMILY MEDICINE

## 2021-09-20 PROCEDURE — 4040F PNEUMOC VAC/ADMIN/RCVD: CPT | Performed by: FAMILY MEDICINE

## 2021-09-20 PROCEDURE — G0438 PPPS, INITIAL VISIT: HCPCS | Performed by: FAMILY MEDICINE

## 2021-09-20 PROCEDURE — 99214 OFFICE O/P EST MOD 30 MIN: CPT | Performed by: FAMILY MEDICINE

## 2021-09-20 PROCEDURE — G8427 DOCREV CUR MEDS BY ELIG CLIN: HCPCS | Performed by: FAMILY MEDICINE

## 2021-09-20 ASSESSMENT — PATIENT HEALTH QUESTIONNAIRE - PHQ9
2. FEELING DOWN, DEPRESSED OR HOPELESS: 0
SUM OF ALL RESPONSES TO PHQ QUESTIONS 1-9: 0
1. LITTLE INTEREST OR PLEASURE IN DOING THINGS: 0
SUM OF ALL RESPONSES TO PHQ9 QUESTIONS 1 & 2: 0
SUM OF ALL RESPONSES TO PHQ QUESTIONS 1-9: 0
SUM OF ALL RESPONSES TO PHQ QUESTIONS 1-9: 0

## 2021-09-20 ASSESSMENT — ENCOUNTER SYMPTOMS
ABDOMINAL PAIN: 0
NAUSEA: 0
COUGH: 0
BACK PAIN: 0
SHORTNESS OF BREATH: 0

## 2021-09-20 ASSESSMENT — LIFESTYLE VARIABLES: HOW OFTEN DO YOU HAVE A DRINK CONTAINING ALCOHOL: 0

## 2021-09-20 NOTE — PROGRESS NOTES
Medicare Annual Wellness Visit  Name: Efrain Granados Date: 2021   MRN: [id number] Sex: Male   Age: 66 y.o. Ethnicity: Non- / Non    : 1950 Race: White (non-)      Leslie Baker is here for Medicare AWV    Screenings for behavioral, psychosocial and functional/safety risks, and cognitive dysfunction are all negative except as indicated below. These results, as well as other patient data from the [address] form, are documented in Flowsheets linked to this Encounter. Allergies   Allergen Reactions    Chantix [Varenicline]     Daypro [Oxaprozin]          Prior to Visit Medications    Medication Sig Taking? Authorizing Provider   metoprolol succinate (TOPROL XL) 25 MG extended release tablet TAKE ONE TABLET BY MOUTH DAILY Yes Khadar Aguila DO   atorvastatin (LIPITOR) 20 MG tablet TAKE ONE TABLET BY MOUTH ONCE NIGHTLY Yes Khadar Aguila DO   docusate sodium (COLACE) 100 MG capsule Take 100 mg by mouth 2 times daily Yes Historical Provider, MD   cyanocobalamin 1000 MCG tablet Take 1 tablet by mouth daily Yes Khadar Aguila DO   furosemide (LASIX) 20 MG tablet Take 20 mg by mouth 2 times daily Yes Tanvir Darden MD   acetaminophen (TYLENOL) 325 MG tablet Take 650 mg by mouth every 4 hours as needed for Pain Yes Historical Provider, MD   donepezil (ARICEPT) 10 MG tablet Take 10 mg by mouth nightly Yes Historical Provider, MD   memantine (NAMENDA) 10 MG tablet Take 10 mg by mouth 2 times daily Yes Historical Provider, MD   aspirin 81 MG EC tablet Take 81 mg by mouth daily.  Yes Historical Provider, MD   trihexyphenidyl (ARTANE) 2 MG tablet Take by mouth Take 1 1/2 tablet bid Yes Historical Provider, MD         Past Medical History:   Diagnosis Date    CAD (coronary artery disease)     with stent    Chronic low back pain     HX of laminectomy    Coronary arteriosclerosis     stent    Dementia ([facility]     Fracture     R ankle    Hypertension     Hypertensive disorder     Osteoarthritis     Parkinson disease ([facility]     Small vessel disease, cerebrovascular     Tobacco abuse        Past Surgical History:   Procedure Laterality Date    ANKLE SURGERY      BACK SURGERY      CARDIAC SURGERY      heart cath    CORONARY ANGIOPLASTY WITH STENT PLACEMENT      HERNIA REPAIR      LUMBAR LAMINECTOMY      TONSILLECTOMY      VASECTOMY           Family History   Problem Relation Age of Onset    High Blood Pressure Mother     High Blood Pressure Father     Heart Disease Father     Osteoporosis Sister          from allergy from an injection    No Known Problems Sister     No Known Problems Sister     No Known Problems Sister     No Known Problems Sister        CareTeam (Including outside providers/suppliers regularly involved in providing care):   Patient Care Team:  Dylan Gallardo DO as PCP - General  Dylan Gallardo DO as PCP - [facility]  Gela Butcher MD as Consulting Physician (Gastroenterology)  Jeannie Sue DO as Consulting Physician (Neurology)  Dwight Maza MD as Consulting Physician (Cardiology)    Wt Readings from Last 3 Encounters:   21 194 lb (88 kg)   21 194 lb 6.4 oz (88.2 kg)   21 207 lb (93.9 kg)     Vitals:    21 1547   BP: 120/76   Pulse: 62   Temp: 98.1 °F (36.7 °C)   SpO2: 98%   Weight: 194 lb (88 kg)   Height: 6' 2\" (1.88 m)     Body mass index is 24.91 kg/m². Based upon direct observation of the patient, evaluation of cognition reveals remote memory intact, recent memory impaired. Patient's complete Health Risk Assessment and screening values have been reviewed and are found in Flowsheets. The following problems were reviewed today and where indicated follow up appointments were made and/or referrals ordered. Positive Risk Factor Screenings with Interventions:     Fall Risk:  Timed Up and Go Test > 12 seconds?  (Complete if either Fall Risk answers are Yes): no  2 or more falls in past year?: (!) yes  Fall with injury in past year?: no  Fall Risk Interventions:    · Home safety tips provided    Cognitive: Words recalled: 2 Words Recalled  Clock Drawing Test (CDT) Score:  (pt unable to draw the clock due to hand tremor)  Total Score Interpretation: Positive Mini-Cog  Did the patient refuse to take the cognition test?: No  Cognitive Impairment Interventions:  · Patient declines any further evaluation/treatment for cognitive impairment   · Patient saw PCP today, unable to draw the clock due to his hand shaking. Substance History:  Social History     Tobacco History     Smoking Status  Current Every Day Smoker Smoking Start Date  2/5/1964 Smoking Frequency  0.5 packs/day for 62 years (28.5 pk yrs) Smoking Tobacco Type  Cigarettes    Smokeless Tobacco Use  Never Used          Alcohol History     Alcohol Use Status  Not Currently          Drug Use     Drug Use Status  No          Sexual Activity     Sexually Active  Not Asked               Alcohol Screening:       A score of 8 or more is associated with harmful or hazardous drinking. A score of 13 or more in women, and 15 or more in men, is likely to indicate alcohol dependence.   Substance Abuse Interventions:  · Tobacco abuse:  tobacco cessation tips and resources provided      Hearing/Vision:  No exam data present  Hearing/Vision  Do you or your family notice any trouble with your hearing that hasn't been managed with hearing aids?: (!) Yes  Do you have difficulty driving, watching TV, or doing any of your daily activities because of your eyesight?: (!) Yes  Have you had an eye exam within the past year?: (!) No  Hearing/Vision Interventions:  · Hearing concerns:  patient declines any further evaluation/treatment for hearing issues  · Vision concerns:  patient encouraged to make appointment with his/her eye specialist    Safety:  Safety  Do you have working smoke detectors?: (!) No  Have all throw rugs been removed or fastened?: Yes  Do you have non-slip mats or surfaces in all bathtubs/showers?: Yes  Do all of your stairways have a railing or banister?: Yes  Are your doorways, halls and stairs free of clutter?: Yes  Do you always fasten your seatbelt when you are in a car?: Yes  Safety Interventions:  · Home safety tips provided    ADL:  ADLs  In the past 7 days, did you need help from others to perform any of the following everyday activities? Eating, dressing, grooming, bathing, toileting, or walking/balance?: None  In the past 7 days, did you need help from others to take care of any of the following?  Laundry, housekeeping, banking/finances, shopping, telephone use, food preparation, transportation, or taking medications?: (!) Transportation, Taking Medications, Banking/Finances, Shopping  ADL Interventions:  · Patient declines any further evaluation/treatment for this issue, states his wife helps him with his ADLs    Personalized Preventive Plan   Current Health Maintenance Status  Immunization History   Administered Date(s) Administered    COVID-19, Moderna, PF, 100mcg/0.5mL 03/31/2021, 04/28/2021    Influenza, Triv, inactivated, subunit, adjuvanted, IM (Fluad 65 yrs and older) 10/09/2019        Health Maintenance   Topic Date Due    DTaP/Tdap/Td vaccine (1 - Tdap) Never done    Shingles Vaccine (1 of 2) Never done    Pneumococcal 65+ years Vaccine (1 of 1 - PPSV23) Never done   [employer] Visit (AWV)  Never done    Lipid screen  03/09/2021    Flu vaccine (1) 09/01/2021    Potassium monitoring  04/06/2022    Creatinine monitoring  04/06/2022    Colon cancer screen colonoscopy  10/21/2025    COVID-19 Vaccine  Completed    AAA screen  Completed    Hepatitis C screen  Completed    Hepatitis A vaccine  Aged Out    Hepatitis B vaccine  Aged Out    Hib vaccine  Aged Out    Meningococcal (ACWY) vaccine  Aged Out     Recommendations for [employer] Due: see orders and patient instructions/AVS. Patient saw PCP today, LPN discussed vaccinations due. Recommended screening schedule for the next 5-10 years is provided to the patient in written form: see Patient Instructions/AVS.    Star TORRES LPN, 2/92/1834, performed the documented evaluation under the direct supervision of the attending physician.

## 2021-09-20 NOTE — PROGRESS NOTES
Ana Perry (:  1950) is a 66 y.o. male,Established patient, here for evaluation of the following chief complaint(s):  Follow-up (5 months), Hypertension, and Hyperlipidemia         ASSESSMENT/PLAN:  1. Essential hypertension chronic  Continue medications  2. Hyperlipidemia, unspecified hyperlipidemia type, chronic  -     Lipid Panel; Future  Continue medications  3. Parkinson's disease ([facility] [address]  Continue medications and P.T  4. Anemia, unspecified type  -     CBC Auto Differential; Future  -     IRON AND TIBC; Future  -     FERRITIN; Future  Continue medications  Keep f/u with specialists  On this date 2021 I have spent 30 minutes reviewing previous notes, test results and face to face with the patient discussing the diagnosis and importance of compliance with the treatment plan as well as documenting on the day of the visit. Return in about 5 months (around 2022) for HLD.      Lab Results   Component Value Date    WBC 9.6 2021    HGB 12.8 (L) 2021    HCT 38.9 (L) 2021    MCV 87.6 2021     2021     Lab Results   Component Value Date     2021    K 4.3 2021     2021    CO2 31 2021    BUN 16 2021    CREATININE 0.9 2021    GLUCOSE 99 2021    CALCIUM 10.1 2021    PROT 6.6 2020    LABALBU 4.3 2021    BILITOT 0.4 2021    ALKPHOS 123 2021    AST 19 2021    ALT 18 2021    LABGLOM 86 2021    GFRAA >60 2019    AGRATIO 1.9 2020    GLOB 2.3 2020           Subjective   SUBJECTIVE/OBJECTIVE:  HPI: This 67 yo m here for the following:  Patient Active Problem List    Diagnosis Date Noted    Dementia ([facility] [address]     Osteoarthritis     Small vessel disease, cerebrovascular     CAD (coronary artery disease)     Tobacco abuse     Hyperlipidemia 10/04/2018    Hypertension 10/04/2018    Parkinson's disease ([facility] [address] 10/04/2018    Right inguinal hernia 10/05/2015 -HTN and HLD- stable on medications  Parkinson's- pt having more balance issues. Follows with neurology  PT in home once a week for balance and strength  CAD- Follows with cardiology  Appt with GI this friday    Review of Systems   Constitutional: Negative for diaphoresis and fever. Respiratory: Negative for cough and shortness of breath. Cardiovascular: Negative for chest pain and palpitations. Gastrointestinal: Negative for abdominal pain and nausea. Genitourinary: Negative for difficulty urinating. Musculoskeletal: Positive for gait problem (balance off/ ambulates with walker). Negative for back pain. Neurological: Negative for dizziness and headaches. Allergies   Allergen Reactions    Chantix [Varenicline]     Daypro [Oxaprozin]      Current Outpatient Medications   Medication Sig Dispense Refill    metoprolol succinate (TOPROL XL) 25 MG extended release tablet TAKE ONE TABLET BY MOUTH DAILY 90 tablet 0    atorvastatin (LIPITOR) 20 MG tablet TAKE ONE TABLET BY MOUTH ONCE NIGHTLY 90 tablet 1    docusate sodium (COLACE) 100 MG capsule Take 100 mg by mouth 2 times daily      cyanocobalamin 1000 MCG tablet Take 1 tablet by mouth daily 90 tablet 3    furosemide (LASIX) 20 MG tablet Take 20 mg by mouth 2 times daily      acetaminophen (TYLENOL) 325 MG tablet Take 650 mg by mouth every 4 hours as needed for Pain      donepezil (ARICEPT) 10 MG tablet Take 10 mg by mouth nightly      memantine (NAMENDA) 10 MG tablet Take 10 mg by mouth 2 times daily      aspirin 81 MG EC tablet Take 81 mg by mouth daily.  trihexyphenidyl (ARTANE) 2 MG tablet Take by mouth Take 1 1/2 tablet bid       No current facility-administered medications for this visit.      Lab Results   Component Value Date    WBC 9.6 04/06/2021    HGB 12.8 (L) 04/06/2021    HCT 38.9 (L) 04/06/2021    MCV 87.6 04/06/2021     04/06/2021     Lab Results   Component Value Date     04/06/2021    K 4.3 04/06/2021  04/06/2021    CO2 31 04/06/2021    BUN 16 04/06/2021    CREATININE 0.9 04/06/2021    GLUCOSE 99 04/06/2021    CALCIUM 10.1 04/06/2021    PROT 6.6 03/09/2020    LABALBU 4.3 04/06/2021    BILITOT 0.4 04/06/2021    ALKPHOS 123 04/06/2021    AST 19 04/06/2021    ALT 18 04/06/2021    LABGLOM 86 04/06/2021    GFRAA >60 05/07/2019    AGRATIO 1.9 03/09/2020    GLOB 2.3 03/09/2020              Vitals:    09/20/21 1516   BP: 120/76   Pulse: 62   Temp: 98.1 °F (36.7 °C)   SpO2: 98%   Weight: 194 lb 6.4 oz (88.2 kg)   Height: 6' 2\" (1.88 m)     Objective   Physical Exam  Vitals reviewed. Constitutional:       General: He is not in acute distress. Eyes:      Extraocular Movements: Extraocular movements intact. Conjunctiva/sclera: Conjunctivae normal.   Cardiovascular:      Rate and Rhythm: Normal rate and regular rhythm. Pulmonary:      Effort: Pulmonary effort is normal. No respiratory distress. Breath sounds: Normal breath sounds. Abdominal:      General: Bowel sounds are normal.      Palpations: Abdomen is soft. Tenderness: There is no abdominal tenderness. Musculoskeletal:      Cervical back: Neck supple. Right lower leg: No edema. Left lower leg: No edema. Skin:     General: Skin is dry. Comments: Thick curved toenails   Neurological:      Mental Status: He is alert and oriented to person, place, and time. Gait: Gait abnormal (ambulates with walker). Psychiatric:         Mood and Affect: Mood normal.                An electronic signature was used to authenticate this note.     --Will High, DO

## 2021-09-20 NOTE — PATIENT INSTRUCTIONS
Personalized Preventive Plan for [facility] - 9/20/2021  Medicare offers a range of preventive health benefits. Some of the tests and screenings are paid in full while other may be subject to a deductible, co-insurance, and/or copay. Some of these benefits include a comprehensive review of your medical history including lifestyle, illnesses that may run in your family, and various assessments and screenings as appropriate. After reviewing your medical record and screening and assessments performed today your provider may have ordered immunizations, labs, imaging, and/or referrals for you. A list of these orders (if applicable) as well as your Preventive Care list are included within your After Visit Summary for your review. Other Preventive Recommendations:    · A preventive eye exam performed by an eye specialist is recommended every 1-2 years to screen for glaucoma; cataracts, macular degeneration, and other eye disorders. · A preventive dental visit is recommended every 6 months. · Try to get at least 150 minutes of exercise per week or 10,000 steps per day on a pedometer . · Order or download the FREE \"Exercise & Physical Activity: Your Everyday Guide\" from [employer]. Call 3-366.160.6186 or search [employer] online. · You need 1423-5704 mg of calcium and 1740-5700 IU of vitamin D per day. It is possible to meet your calcium requirement with diet alone, but a vitamin D supplement is usually necessary to meet this goal.  · When exposed to the sun, use a sunscreen that protects against both UVA and UVB radiation with an SPF of 30 or greater. Reapply every 2 to 3 hours or after sweating, drying off with a towel, or swimming. · Always wear a seat belt when traveling in a car. Always wear a helmet when riding a bicycle or motorcycle. Heart-Healthy Diet   Sodium, Fat, and Cholesterol Controlled Diet       What Is a Heart Healthy Diet?    A heart-healthy diet is one that limits sodium , certain types of fat , and cholesterol . This type of diet is recommended for:   People with any form of cardiovascular disease (eg, coronary heart disease , peripheral vascular disease , previous heart attack , previous stroke )   People with risk factors for cardiovascular disease, such as high blood pressure , high cholesterol , or diabetes   Anyone who wants to lower their risk of developing cardiovascular disease   Sodium    Sodium is a mineral found in many foods. In general, most people consume much more sodium than they need. Diets high in sodium can increase blood pressure and lead to edema (water retention). On a heart-healthy diet, you should consume no more than 2,300 mg (milligrams) of sodium per dayabout the amount in one teaspoon of table salt. The foods highest in sodium include table salt (about 50% sodium), processed foods, convenience foods, and preserved foods. Cholesterol    Cholesterol is a fat-like, waxy substance in your blood. Our bodies make some cholesterol. It is also found in animal products, with the highest amounts in fatty meat, egg yolks, whole milk, cheese, shellfish, and organ meats. On a heart-healthy diet, you should limit your cholesterol intake to less than 200 mg per day. It is normal and important to have some cholesterol in your bloodstream. But too much cholesterol can cause plaque to build up within your arteries, which can eventually lead to a heart attack or stroke. The two types of cholesterol that are most commonly referred to are:   Low-density lipoprotein (LDL) cholesterol  Also known as bad cholesterol, this is the cholesterol that tends to build up along your arteries. Bad cholesterol levels are increased by eating fats that are saturated or hydrogenated. Optimal level of this cholesterol is less than 100. Over 130 starts to get risky for heart disease.    High-density lipoprotein (HDL) cholesterol  Also known as good cholesterol, this type of cholesterol actually carries cholesterol away from your arteries and may, therefore, help lower your risk of having a heart attack. You want this level to be high (ideally greater than 60). It is a risk to have a level less than 40. You can raise this good cholesterol by eating olive oil, canola oil, avocados, or nuts. Exercise raises this level, too. Fat    Fat is calorie dense and packs a lot of calories into a small amount of food. Even though fats should be limited due to their high calorie content, not all fats are bad. In fact, some fats are quite healthful. Fat can be broken down into four main types. The good-for-you fats are:   Monounsaturated fat  found in oils such as olive and canola, avocados, and nuts and natural nut butters; can decrease cholesterol levels, while keeping levels of HDL cholesterol high   Polyunsaturated fat  found in oils such as safflower, sunflower, soybean, corn, and sesame; can decrease total cholesterol and LDL cholesterol   Omega-3 fatty acids  particularly those found in fatty fish (such as salmon, trout, tuna, mackerel, herring, and sardines); can decrease risk of arrhythmias, decrease triglyceride levels, and slightly lower blood pressure   The fats that you want to limit are:   Saturated fat  found in animal products, many fast foods, and a few vegetables; increases total blood cholesterol, including LDL levels   Animal fats that are saturated include: butter, lard, whole-milk dairy products, meat fat, and poultry skin   Vegetable fats that are saturated include: hydrogenated shortening, palm oil, coconut oil, cocoa butter   Hydrogenated or trans fat  found in margarine and vegetable shortening, most shelf stable snack foods, and fried foods; increases LDL and decreases HDL     It is generally recommended that you limit your total fat for the day to less than 30% of your total calories.  If you follow an 1800-calorie heart healthy diet, for example, this would mean 60 grams of fat or less per day. Saturated fat and trans fat in your diet raises your blood cholesterol the most, much more than dietary cholesterol does. For this reason, on a heart-healthy diet, less than 7% of your calories should come from saturated fat and ideally 0% from trans fat. On an 1800-calorie diet, this translates into less than 14 grams of saturated fat per day, leaving 46 grams of fat to come from mono- and polyunsaturated fats.    Food Choices on a Heart Healthy Diet   Food Category   Foods Recommended   Foods to Avoid   Grains   Breads and rolls without salted tops Most dry and cooked cereals Unsalted crackers and breadsticks Low-sodium or homemade breadcrumbs or stuffing All rice and pastas   Breads, rolls, and crackers with salted tops High-fat baked goods (eg, muffins, donuts, pastries) Quick breads, self-rising flour, and biscuit mixes Regular bread crumbs Instant hot cereals Commercially prepared rice, pasta, or stuffing mixes   Vegetables   Most fresh, frozen, and low-sodium canned vegetables Low-sodium and salt-free vegetable juices Canned vegetables if unsalted or rinsed   Regular canned vegetables and juices, including sauerkraut and pickled vegetables Frozen vegetables with sauces Commercially prepared potato and vegetable mixes   Fruits   Most fresh, frozen, and canned fruits All fruit juices   Fruits processed with salt or sodium   Milk   Nonfat or low-fat (1%) milk Nonfat or low-fat yogurt Cottage cheese, low-fat ricotta, cheeses labeled as low-fat and low-sodium   Whole milk Reduced-fat (2%) milk Malted and chocolate milk Full fat yogurt Most cheeses (unless low-fat and low salt) Buttermilk (no more than 1 cup per week)   Meats and Beans   Lean cuts of fresh or frozen beef, veal, lamb, or pork (look for the word loin) Fresh or frozen poultry without the skin Fresh or frozen fish and some shellfish Egg whites and egg substitutes (Limit whole eggs to three per week) Tofu Nuts or seeds (unsalted, dry-roasted), low-sodium peanut butter Dried peas, beans, and lentils   Any smoked, cured, salted, or canned meat, fish, or poultry (including yusuf, chipped beef, cold cuts, hot dogs, sausages, sardines, and anchovies) Poultry skins Breaded and/or fried fish or meats Canned peas, beans, and lentils Salted nuts   Fats and Oils   Olive oil and canola oil Low-sodium, low-fat salad dressings and mayonnaise   Butter, margarine, coconut and palm oils, yusuf fat   Snacks, Sweets, and Condiments   Low-sodium or unsalted versions of broths, soups, soy sauce, and condiments Pepper, herbs, and spices; vinegar, lemon, or lime juice Low-fat frozen desserts (yogurt, sherbet, fruit bars) Sugar, cocoa powder, honey, syrup, jam, and preserves Low-fat, trans-fat free cookies, cakes, and pies Pavan and animal crackers, fig bars, lucila snaps   High-fat desserts Broth, soups, gravies, and sauces, made from instant mixes or other high-sodium ingredients Salted snack foods Canned olives Meat tenderizers, seasoning salt, and most flavored vinegars   Beverages   Low-sodium carbonated beverages Tea and coffee in moderation Soy milk   Commercially softened water   Suggestions   Make whole grains, fruits, and vegetables the base of your diet. Choose heart-healthy fats such as canola, olive, and flaxseed oil, and foods high in heart-healthy fats, such as nuts, seeds, soybeans, tofu, and fish. Eat fish at least twice per week; the fish highest in omega-3 fatty acids and lowest in mercury include salmon, herring, mackerel, sardines, and canned chunk light tuna. If you eat fish less than twice per week or have high triglycerides, talk to your doctor about taking fish oil supplements. Read food labels.    For products low in fat and cholesterol, look for fat free, low-fat, cholesterol free, saturated fat free, and trans fat freeAlso scan the Nutrition Facts Label, which lists saturated fat, trans fat, and cholesterol amounts. For products low in sodium, look for sodium free, very low sodium, low sodium, no added salt, and unsalted   Skip the salt when cooking or at the table; if food needs more flavor, get creative and try out different herbs and spices. Garlic and onion also add substantial flavor to foods. Trim any visible fat off meat and poultry before cooking, and drain the fat off after wilson. Use cooking methods that require little or no added fat, such as grilling, boiling, baking, poaching, broiling, roasting, steaming, stir-frying, and sauting. Avoid fast food and convenience food. They tend to be high in saturated and trans fat and have a lot of added salt. Talk to a registered dietitian for individualized diet advice. Last Reviewed: March 2011 Kelvin Lombardi MS, MPH, RD   Updated: 3/29/2011   ·     Heart-Healthy Diet   Sodium, Fat, and Cholesterol Controlled Diet       What Is a Heart Healthy Diet? A heart-healthy diet is one that limits sodium , certain types of fat , and cholesterol . This type of diet is recommended for:   People with any form of cardiovascular disease (eg, coronary heart disease , peripheral vascular disease , previous heart attack , previous stroke )   People with risk factors for cardiovascular disease, such as high blood pressure , high cholesterol , or diabetes   Anyone who wants to lower their risk of developing cardiovascular disease   Sodium    Sodium is a mineral found in many foods. In general, most people consume much more sodium than they need. Diets high in sodium can increase blood pressure and lead to edema (water retention). On a heart-healthy diet, you should consume no more than 2,300 mg (milligrams) of sodium per dayabout the amount in one teaspoon of table salt. The foods highest in sodium include table salt (about 50% sodium), processed foods, convenience foods, and preserved foods.    Cholesterol    Cholesterol is a fat-like, waxy substance in your blood. Our bodies make some cholesterol. It is also found in animal products, with the highest amounts in fatty meat, egg yolks, whole milk, cheese, shellfish, and organ meats. On a heart-healthy diet, you should limit your cholesterol intake to less than 200 mg per day. It is normal and important to have some cholesterol in your bloodstream. But too much cholesterol can cause plaque to build up within your arteries, which can eventually lead to a heart attack or stroke. The two types of cholesterol that are most commonly referred to are:   Low-density lipoprotein (LDL) cholesterol  Also known as bad cholesterol, this is the cholesterol that tends to build up along your arteries. Bad cholesterol levels are increased by eating fats that are saturated or hydrogenated. Optimal level of this cholesterol is less than 100. Over 130 starts to get risky for heart disease. High-density lipoprotein (HDL) cholesterol  Also known as good cholesterol, this type of cholesterol actually carries cholesterol away from your arteries and may, therefore, help lower your risk of having a heart attack. You want this level to be high (ideally greater than 60). It is a risk to have a level less than 40. You can raise this good cholesterol by eating olive oil, canola oil, avocados, or nuts. Exercise raises this level, too. Fat    Fat is calorie dense and packs a lot of calories into a small amount of food. Even though fats should be limited due to their high calorie content, not all fats are bad. In fact, some fats are quite healthful. Fat can be broken down into four main types.    The good-for-you fats are:   Monounsaturated fat  found in oils such as olive and canola, avocados, and nuts and natural nut butters; can decrease cholesterol levels, while keeping levels of HDL cholesterol high   Polyunsaturated fat  found in oils such as safflower, sunflower, soybean, corn, and sesame; can decrease total cholesterol and LDL cholesterol   Omega-3 fatty acids  particularly those found in fatty fish (such as salmon, trout, tuna, mackerel, herring, and sardines); can decrease risk of arrhythmias, decrease triglyceride levels, and slightly lower blood pressure   The fats that you want to limit are:   Saturated fat  found in animal products, many fast foods, and a few vegetables; increases total blood cholesterol, including LDL levels   Animal fats that are saturated include: butter, lard, whole-milk dairy products, meat fat, and poultry skin   Vegetable fats that are saturated include: hydrogenated shortening, palm oil, coconut oil, cocoa butter   Hydrogenated or trans fat  found in margarine and vegetable shortening, most shelf stable snack foods, and fried foods; increases LDL and decreases HDL     It is generally recommended that you limit your total fat for the day to less than 30% of your total calories. If you follow an 1800-calorie heart healthy diet, for example, this would mean 60 grams of fat or less per day. Saturated fat and trans fat in your diet raises your blood cholesterol the most, much more than dietary cholesterol does. For this reason, on a heart-healthy diet, less than 7% of your calories should come from saturated fat and ideally 0% from trans fat. On an 1800-calorie diet, this translates into less than 14 grams of saturated fat per day, leaving 46 grams of fat to come from mono- and polyunsaturated fats.    Food Choices on a Heart Healthy Diet   Food Category   Foods Recommended   Foods to Avoid   Grains   Breads and rolls without salted tops Most dry and cooked cereals Unsalted crackers and breadsticks Low-sodium or homemade breadcrumbs or stuffing All rice and pastas   Breads, rolls, and crackers with salted tops High-fat baked goods (eg, muffins, donuts, pastries) Quick breads, self-rising flour, and biscuit mixes Regular bread crumbs Instant hot cereals Commercially prepared rice, pasta, or stuffing mixes Vegetables   Most fresh, frozen, and low-sodium canned vegetables Low-sodium and salt-free vegetable juices Canned vegetables if unsalted or rinsed   Regular canned vegetables and juices, including sauerkraut and pickled vegetables Frozen vegetables with sauces Commercially prepared potato and vegetable mixes   Fruits   Most fresh, frozen, and canned fruits All fruit juices   Fruits processed with salt or sodium   Milk   Nonfat or low-fat (1%) milk Nonfat or low-fat yogurt Cottage cheese, low-fat ricotta, cheeses labeled as low-fat and low-sodium   Whole milk Reduced-fat (2%) milk Malted and chocolate milk Full fat yogurt Most cheeses (unless low-fat and low salt) Buttermilk (no more than 1 cup per week)   Meats and Beans   Lean cuts of fresh or frozen beef, veal, lamb, or pork (look for the word loin) Fresh or frozen poultry without the skin Fresh or frozen fish and some shellfish Egg whites and egg substitutes (Limit whole eggs to three per week) Tofu Nuts or seeds (unsalted, dry-roasted), low-sodium peanut butter Dried peas, beans, and lentils   Any smoked, cured, salted, or canned meat, fish, or poultry (including yusuf, chipped beef, cold cuts, hot dogs, sausages, sardines, and anchovies) Poultry skins Breaded and/or fried fish or meats Canned peas, beans, and lentils Salted nuts   Fats and Oils   Olive oil and canola oil Low-sodium, low-fat salad dressings and mayonnaise   Butter, margarine, coconut and palm oils, yusuf fat   Snacks, Sweets, and Condiments   Low-sodium or unsalted versions of broths, soups, soy sauce, and condiments Pepper, herbs, and spices; vinegar, lemon, or lime juice Low-fat frozen desserts (yogurt, sherbet, fruit bars) Sugar, cocoa powder, honey, syrup, jam, and preserves Low-fat, trans-fat free cookies, cakes, and pies Pavan and animal crackers, fig bars, lucila snaps   High-fat desserts Broth, soups, gravies, and sauces, made from instant mixes or other high-sodium ingredients Salted snack foods Canned olives Meat tenderizers, seasoning salt, and most flavored vinegars   Beverages   Low-sodium carbonated beverages Tea and coffee in moderation Soy milk   Commercially softened water   Suggestions   Make whole grains, fruits, and vegetables the base of your diet. Choose heart-healthy fats such as canola, olive, and flaxseed oil, and foods high in heart-healthy fats, such as nuts, seeds, soybeans, tofu, and fish. Eat fish at least twice per week; the fish highest in omega-3 fatty acids and lowest in mercury include salmon, herring, mackerel, sardines, and canned chunk light tuna. If you eat fish less than twice per week or have high triglycerides, talk to your doctor about taking fish oil supplements. Read food labels. For products low in fat and cholesterol, look for fat free, low-fat, cholesterol free, saturated fat free, and trans fat freeAlso scan the Nutrition Facts Label, which lists saturated fat, trans fat, and cholesterol amounts. For products low in sodium, look for sodium free, very low sodium, low sodium, no added salt, and unsalted   Skip the salt when cooking or at the table; if food needs more flavor, get creative and try out different herbs and spices. Garlic and onion also add substantial flavor to foods. Trim any visible fat off meat and poultry before cooking, and drain the fat off after wilson. Use cooking methods that require little or no added fat, such as grilling, boiling, baking, poaching, broiling, roasting, steaming, stir-frying, and sauting. Avoid fast food and convenience food. They tend to be high in saturated and trans fat and have a lot of added salt. Talk to a registered dietitian for individualized diet advice.       Last Reviewed: March 2011 Jen Bashir MS, MPH, RD   Updated: 3/29/2011   ·   Patient information: Weight loss treatments    INTRODUCTION -- Obesity is a major international problem, and Americans are among the heaviest people in the world. The percentage of obese people in the [place] has risen steadily. Many people find that although they initially lose weight by dieting, they quickly regain the weight after the diet ends. Because it so hard to keep weight off over time, it is important to have as much information and support as possible before starting a diet. You are most likely to be successful in losing weight and keeping it off when you believe that your body weight can be controlled. STARTING A WEIGHT LOSS PROGRAM -- Some people like to talk to their doctor or nurse to get help choosing the best plan, monitoring progress, and getting advice and support along the way. To know what treatment (or combination of treatments) will work best, determine your body mass index (BMI) and waist circumference (measurement). The BMI is calculated from your height and weight. A person with a BMI between 25 and 29.9 is considered overweight   A person with a BMI of 30 or greater is considered to be obese  A waist circumference greater than 35 inches (88 cm) in women and 40 inches (102 cm) in men increases the risk of obesity-related complications, such as heart disease and diabetes. People who are obese and who have a larger waist size may need more aggressive weight loss treatment than others. Talk to your doctor or nurse for advice. Types of treatment -- Based on your measurements and your medical history, your doctor or nurse can determine what combination of weight loss treatments would work best for you. Treatments may include changes in lifestyle, exercise, dieting, and, in some cases, weight loss medicines or weight loss surgery. Weight loss surgery, also called bariatric surgery, is reserved for people with severe obesity who have not responded to other weight loss treatments.    SETTING A WEIGHT LOSS GOAL -- It is important to set a realistic weight loss goal. Your first goal should be to avoid gaining more weight and staying at your current weight (or within 5 percent). Many people have a \"dream\" weight that is difficult or impossible to achieve. People at high risk of developing diabetes who are able to lose 5 percent of their body weight and maintain this weight will reduce their risk of developing diabetes by about 50 percent and reduce their blood pressure. This is a success. Losing more than 15 percent of your body weight and staying at this weight is an extremely good result, even if you never reach your \"dream\" or \"ideal\" weight. LIFESTYLE CHANGES -- Programs that help you to change your lifestyle are usually run by psychologists or other professionals. The goals of lifestyle changes are to help you change your eating habits, become more active, and be more aware of how much you eat and exercise, helping you to make healthier choices. This type of treatment can be broken down into three steps: The triggers that make you want to eat   Eating   What happens after you eat  Triggers to eat -- Determining what triggers you to eat involves figuring out what foods you eat and where and when you eat. To figure out what triggers you to eat, keep a record for a few days of everything you eat, the places where you eat, how often you eat, and the emotions you were feeling when you ate. For some people, the trigger is related to a certain time of day or night. For others, the trigger is related to a certain place, like sitting at a desk working. Eating -- You can change your eating habits by breaking the chain of events between the trigger for eating and eating itself. There are many ways to do this.  For instance, you can:  Limit where you eat to a few places (eg, dining room)   Restrict the number of utensils (eg, only a fork) used for eating   Drink a sip of water between each bite   Chew your food a certain number of times   Get up and stop eating every few minutes  What happens after you eat -- Rewarding yourself for good eating behaviors can help you to develop better habits. This is not a reward for weight loss; instead, it is a reward for changing unhealthy behaviors. Do not use food as a reward. Some people find money, clothing, or personal care (eg, a hair cut, manicure, or massage) to be effective rewards. Treat yourself immediately after making better eating choices to reinforce the value of the good behavior. You need to have clear behavior goals, and you must have a time frame for reaching your goals. Reward small changes along the way to your final goal.  Other factors that contribute to successful weight loss -- Changing your behavior involves more than just changing unhealthy eating habits; it also involves finding people around you to support your weight loss, reducing stress, and learning to be strong when tempted by food. Establish a \"leandro\" system -- Having a friend or family member available to provide support and reinforce good behavior is very helpful. The support person needs to understand your goals. Learn to be strong -- Learning to be strong when tempted by food is an important part of losing weight. As an example, you will need to learn how to say \"no\" and continue to say no when urged to eat at parties and social gatherings. Develop strategies for events before you go, such as eating before you go or taking low-calorie snacks and drinks with you. Develop a support system -- Having a support system is helpful when losing weight. This is why many [employer] groups are successful. Family support is also essential; if your family does not support your efforts to lose weight, this can slow your progress or even keep you from losing weight. Positive thinking -- People often have conversations with themselves in their head; these conversations can be positive or negative. If you eat a piece of cake that was not planned, you may respond by thinking, \"Oh, you stupid idiot, you've blown your diet! \" and as a meal-replacement drink or a frozen low-calorie (250 to 350 calories) meal for lunch   A frozen low-calorie meal or other prepackaged, calorie-controlled meal, along with extra vegetables for dinner  This would give you 1000 to 1500 calories per day. Low-fat diet -- To reduce the amount of fat in your diet, you can:  Eat low-fat foods. Low-fat foods are those that contain less than 30 percent of calories from fat. Fat is listed on the food facts label (figure 1). Count fat grams. For a 1500 calorie diet, this would mean about 45 g or fewer of fat per day. Low-carbohydrate diet -- Low- and very-low-carbohydrate diets (eg, Atkins diet, [employer]) have become popular ways to lose weight quickly. With a very-low-carbohydrate diet, you eat between 0 and 60 grams of carbohydrates per day (a standard diet contains 200 to 300 grams of carbohydrates)   With a low-carbohydrate diet, you eat between 60 and 130 grams of carbohydrates per day  Carbohydrates are found in fruits, vegetables, and grains (including breads, rice, pasta, and cereal), alcoholic beverages, and in dairy products. Meat and fish do not contain carbohydrates. Side effects of very-low-carbohydrate diets can include constipation, headache, bad breath, muscle cramps, diarrhea, and weakness. Mediterranean diet -- The term \"Mediterranean diet\" refers to a way of eating that is common in [place] regions around the [facility]. Although there is some variation in Mediterranean diets, there are some similarities. Most Mediterranean diets include:  A high level of monounsaturated fats (from olive or canola oil, walnuts, pecans, almonds) and a low level of saturated fats (from butter)   A high amount of vegetables, fruits, legumes, and grains (7 to 10 servings of fruits and vegetables per day)   A moderate amount of milk and dairy products, mostly in the form of cheese. Use low-fat dairy products (skim milk, fat-free yogurt, low-fat cheese). A relatively low amount of red meat and meat products. Substitute fish or poultry for red meat. For those who drink alcohol, a modest amount (mainly as red wine) may help to protect against cardiovascular disease. A modest amount is up to one (4 ounce) glass per day for women and up to two glasses per day for men. Which diet is best? -- Studies have compared different diets, including:  Very-low-carbohydrate (Atkins)   Macronutrient balance controlling glycemic load (Zone®)   Reduced-calorie (Weight Watchers®)   Very-low-fat (Ornish)  No one diet is \"best\" for weight loss. Any diet will help you to lose weight if you stick with the diet. Therefore, it is important to choose a diet that includes foods you like. Fad diets -- Fad diets often promise quick weight loss (more than 1 to 2 pounds per week) and may claim that you do not need to exercise or give up favorite foods. Some fad diets cost a lot of money, because you have to pay for seminars or pills. Fad diets generally lack any scientific evidence that they are safe and effective, but instead rely on \"before\" and \"after\" photos or testimonials. Diets that sound too good to be true usually are. These plans are a waste of time and money and are not recommended. A doctor, nurse, or nutritionist can help you find a safe and effective way to lose weight and keep it off. WEIGHT LOSS MEDICINES -- Taking a weight loss medicine may be helpful when used in combination with diet, exercise, and lifestyle changes. However, it is important to understand the risks and benefits of these medicines. It is also important to be realistic about your goal weight using a weight loss medicine; you may not reach your \"dream\" weight, but you may be able to reduce your risk of diabetes or heart disease.    Weight loss medicines may be recommended for people who have not been able to lose weight with diet and exercise who have a:  BMI of 30 or more    BMI between 27 and 29.9 and have other medical problems, such as diabetes, high cholesterol, or high blood pressure  Two weight loss medicines are approved in the [place] for long-term use. These are sibutramine and orlistat. Other weight loss medicines (phentermine, diethylpropion) are available but are only approved for short-term use (up to 12 weeks). Sibutramine -- Sibutramine (Meridia®, Reductil®) is a medicine that reduces your appetite. In people who take the medicine for one year, the average weight loss is 10 percent of the initial body weight (5 percent more than those who took a placebo treatment). Side effects of sibutramine include insomnia, dry mouth, and constipation. Increases in blood pressure can occur. Therefore, blood pressure is usually monitored during treatment. There is no evidence that sibutramine causes heart or lung problems (like dexfenfluramine and fenfluramine (Phen/Fen)). However, experts agree that sibutramine should not used by people with coronary heart disease, heart failure, uncontrolled hypertension, stroke, irregular heart rhythms, or peripheral vascular disease (poor circulation in the legs). Orlistat -- Orlistat (Xenical® 120 mg capsules) is a medicine that reduces the amount of fat your body absorbs from the foods you eat. A lower-dose version is now available without a prescription (Jeff® 60 mg capsules) in many countries, including the [place]. The medicine is recommended three times per day, taken with a meal; you can skip a dose if you skip a meal or if the meal contains no fat. After one year of treatment with orlistat, the average weight loss is approximately 8 to 10 percent of initial body weight (4 percent more than in those who took a placebo). Cholesterol levels often improve, and blood pressure sometimes falls. In people with diabetes, orlistat may help control blood sugar levels.   Side effects occur in 15 to 10 percent of people and may include stomach cramps, gas, diarrhea, leakage of stool, or oily stools. These problems are more likely when you take orlistat with a high-fat meal (if more than 30 percent of calories in the meal are from fat). Side effects usually improve as you learn to avoid high-fat foods. Severe liver injury has been reported rarely in patients taking orlistat, but it is not known if orlistat caused the liver problems. Diet supplements -- Diet supplements are widely used by people who are trying to lose weight, although the safety and efficacy of these supplements are often unproven. A few of the more common diet supplements are discussed below; none of these are recommended because they have not been studied carefully, and there is no proof they are safe or effective. Chitosan and wheat dextrin are ineffective for weight loss, and their use is not recommended. Ephedra, a compound related to ephedrine, is no longer available in the [place] due to safety concerns. Many nonprescription diet pills previously contained ephedra. Although some studies have shown that ephedra helps with weight loss, there can be serious side effects (psychiatric symptoms, palpitations, and stomach upset), including death. There are not enough data about the safety and efficacy of chromium, ginseng, glucomannan, green tea, hydroxycitric acid, L carnitine, psyllium, pyruvate supplements, [place] wort, and conjugated linoleic acid. Two supplements from [facility], [address] [place] (also known as the [place] 15 pill) and Herbathin dietary supplement, have been shown to contain prescription drugs. Hoodia gordonii is a dietary supplement derived from a plant in [place]. It is not recommended because there is no proof that it is safe or effective. Bitter orange (Citrus aurantium) can increase your heart rate and blood pressure and is not recommended.   SHOULD I HAVE SURGERY TO LOSE WEIGHT? -- Weight loss surgery is recommended ONLY for people with one of the following:  Severe obesity (body mass index above 40) (calculator 1 and calculator 2) who have not responded to diet, exercise, or weight loss medicines   Body mass index between 35 and 40, along with a serious medical problem (including diabetes, severe joint pain, or sleep apnea) that would improve with weight loss  You should be sure that you understand the potential risks and benefits of weight loss surgery. You must be motivated and willing to make lifelong changes in how you eat to reach and maintain a healthier weight after surgery. You must also be realistic about weight loss after surgery (see 'Effectiveness of weight loss surgery' below). PREPARING FOR WEIGHT LOSS SURGERY -- Most people who have weight loss surgery will meet with several specialists before surgery is scheduled. This often includes a dietitian, mental health counselor, a doctor who specializes in care of obese people, and a surgeon who performs weight loss surgery (bariatric surgeon). You may need to work with these providers for several weeks or months before surgery. The nutritionist will explain what and how much you will be able to eat after surgery. You may also need to lose a small amount of weight before surgery. The mental health specialist will help you to cope with stress and other factors that can make it harder to lose weight or trigger you to eat   The medical doctor will determine whether you need other tests, counseling, or treatment before surgery. He or she might also help you begin a medical weight loss program so that you can lose some weight before surgery. The bariatric surgeon will meet with you to discuss the surgeries available to treat obesity. He or she will also make sure you are a good candidate for surgery. TYPES OF WEIGHT LOSS SURGERY -- There are several types of weight loss surgeries, the most common being lap banding, gastric bypass, and gastric sleeve.   Lap banding -- Laparoscopic adjustable gastric banding (LAGB), or lap banding, is a surgery that uses an adjustable band around the opening to the stomach (figure 1). This reduces the amount of food that you can eat at one time. Lap banding is done through small incisions, with a laparoscope. The band can be adjusted after surgery, allowing you to eat more or less food. Adjustments to the size and tightness of the band are made by using a needle to add or remove fluid from a port (a small container under the skin that is connected to the band). Adding fluid to the band makes it tighter which restricts the amount of food you can eat and may help you to lose more weight. Lap banding is a popular choice because it is relatively simple to perform, can be adjusted or removed, and has a low risk of serious complications immediately after surgery. However, weight loss with the lap band depends on your ability to follow the program closely. You will need to prepare nutritious meals that [facility] with\" the band, not against it. For example, the lap band will not work well if you eat or drink a large amount of liquid calories (like ice cream). The band will not help you to feel full when you eat/drink liquid calories. Weight loss ranges from 45 to 75 percent after two years. As an example, a person who is 120 pounds overweight could expect to lose approximately 54 to 90 pounds in the two years after lap banding. Gastric bypass -- Mona-en-Y gastric bypass, also called gastric bypass, helps you to lose weight by reducing the amount of food you can eat and reducing the number of calories and nutrients you absorb from the food you eat. To perform gastric bypass, a surgeon creates a small stomach pouch by dividing the stomach and attaching it to the small intestine. This helps you to lose weight in two ways: The smaller stomach can hold less food than before surgery. This causes you to feel full after eating a very small amount of food or liquid.  Over time, the pouch might stretch, allowing you to eat more food. The body absorbs fewer calories, since food bypasses most of the stomach as well as the upper small intestine. This new arrangement seems to decrease your appetite and change how you break down foods by changing the release of various hormones. Gastric bypass can be performed as open surgery (through an incision on the abdomen) or laparoscopically, which uses smaller incisions and smaller instruments. Both the laparoscopic and open techniques have risks and benefits. You and your surgeon should work together to decide which surgery, if any, is right for you. Gastric bypass has a high success rate, and people lose an average of 62 to 68 percent of their excess body weight in the first year. Weight loss typically levels off after one to two years, with an overall excess weight loss between 50 and 75 percent. For a person who is 120 pounds overweight, an average of 60 to 90 pounds of weight loss would be expected. Gastric sleeve -- Gastric sleeve, also known as sleeve gastrectomy, is a surgery that reduces the size of the stomach and makes it into a narrow tube (figure 3). The new stomach is much smaller and produces less of the hormone (ghrelin) that causes hunger, helping you feel satisfied with less food. Sleeve gastrectomy is safer than gastric bypass because the intestines are not rearranged, and there is less chance of malnutrition. It also appears to control hunger better than lap banding. It might be safer than the lap banding because no foreign materials are used. The gastric sleeve has a good success rate, and people lose an average of 33 percent of their excess body weight in the first year. For a person who is 120 pounds overweight, this would mean losing about 40 pounds in the first year. WEIGHT LOSS SURGERY COMPLICATIONS -- A variety of complications can occur with weight loss surgery.  The risks of surgery depend upon which surgery you have and any medical problems you had before surgery. Some of the more common early surgical complications (one to six weeks after surgery) include:  Bleeding   Infection   Blockage or tear in the bowels   Need for further surgery  Important medical complications after surgery can include blood clots in the legs or lungs, heart attack, pneumonia, and urinary tract infection. Complications are less likely when surgery is performed in centers that are experienced in weight loss surgery. In general, centers with experience in weight loss surgery have:  Board-certified doctors and surgeons   A team of support staff (dietitians, counselors, nurses)   Long-term follow-up after surgery   Hospital staff experienced with the care of weight loss patients. This includes nurses who are trained in the care of patients immediately after surgery and anesthesiologists who are experienced in caring for the morbidly obese. EFFECTIVENESS OF WEIGHT LOSS SURGERY -- The goal of weight loss surgery is to reduce the risk of illness or death associated with obesity. Weight loss surgery can also help you to feel and look better, reduce the amount of money you spend on medicines, and cut down on sick days. As an example, weight loss surgery can improve health problems related to obesity (diabetes, high blood pressure, high cholesterol, sleep apnea) to the point that you need less or no medicine. Finally, weight loss surgery might reduce your risk of developing heart disease, cancer, and certain infections. AFTER WEIGHT LOSS SURGERY -- You will need to stay in the hospital until your team feels that it is safe for you to leave (on average, one to three days). Do not drive if you are taking prescription pain medicine. Begin exercising as soon as possible once you have healed; most weight loss centers will design an exercise program for you. Once you are home, it is important to eat and drink exactly what your doctor and dietitian recommend.  You will see your doctor, nurse, and dietitian on a regular basis after surgery to monitor your health, diet, and weight loss. You will be able to slowly increase how much you eat over time, although it will always be important to:  Eat small, frequent meals and not skip meals   Chew your food slowly and completely   Avoid eating while \"distracted\" (such as eating while watching TV)   Stop eating when you feel full   Drink liquids at least 30 minutes before or after eating   Avoid foods high in fat or sugar   Take vitamin supplements, as recommended  It can take several months to learn to listen to your body so that you know when you are hungry and when you are full. You may dislike foods you previously loved, and you may begin to prefer new foods. This can be a frustrating process for some people, so talk to your dietitian if you are having trouble. It usually takes between one and two years to lose weight after surgery. After reaching their goal weight, some people have plastic surgery (called \"body contouring\") to remove excess skin from the body, particularly in the abdominal area. Before you decide to have weight loss surgery, you must commit to staying healthy for life. This includes following up with your healthcare team, exercising most days of the week, and eating a sensible diet every day. It can be difficult to develop new eating and exercise habits after weight loss surgery, and you will have to work hard to stick to your goals. Recovering from surgery and losing weight can be stressful and emotional, and it is important to have the support of family and friends. Working with a , therapist, or support group can help you through the ups and downs. WHERE TO GET MORE INFORMATION -- Your healthcare provider is the best source of information for questions and concerns related to your medical problem.   This article will be updated as needed every four months on our Web site ([email or web address])  ·     High-Fiber Diet     What Is Fiber? Dietary fiber is a form of carbohydrate found in plants that cannot be digested by humans. All plants contain fiber, including fruits, vegetables, grains, and legumes. Fiber is often classified into two categories: soluble and insoluble. Soluble fiber draws water into the bowel and can help slow digestion. Examples of foods that are high in soluble fiber include oatmeal, oat bran, barley, legumes (eg, beans and peas), apples, and strawberries. Insoluble fiber speeds digestion and can add bulk to the stool. Examples of foods that are high in insoluble fiber include whole-wheat products, wheat bran, cauliflower, green beans, and potatoes. Why Follow a High-Fiber Diet? A high-fiber diet is often recommended to prevent and treat constipation , hemorrhoids , diverticulitis , and irritable bowel syndrome . Eating a high-fiber diet can also help improve your cholesterol levels, lower your risk of coronary heart disease , reduce your risk of type 2 diabetes , and lower your weight. For people with type 1 or 2 diabetes, a high-fiber diet can also help stabilize blood sugar levels. How Much Fiber Should I Eat? A high-fiber diet should contain  20-35 grams  of fiber a day. This is actually the amount recommended for the general adult population; however, most Americans eat only 15 grams of fiber per day. Digestion of Fiber   Eating a higher fiber diet than usual can take some getting used to by your body's digestive system. To avoid the side effects of sudden increases in dietary fiber (eg, gas, cramping, bloating, and diarrhea), increase fiber gradually and be sure to drink plenty of fluids every day. Tips for Increasing Fiber Intake   Whenever possible, choose whole grains over refined grains (eg, brown rice instead of white rice, whole-wheat bread instead of white bread).     Include a variety of grains in your diet, such as wheat, rye, barley, oats, quinoa, and bulgur. Eat more vegetarian-based meals. Here are some ideas: black bean burgers, eggplant lasagna, and veggie tofu stir-alanis. Choose high-fiber snacks, such as fruits, popcorn, whole-grain crackers, and nuts. Make whole-grain cereal or whole-grain toast part of your daily breakfast regime. When eating out, whether ordering a sandwich or dinner, ask for extra vegetables. When baking, replace part of the white flour with whole-wheat flour. Whole-wheat flour is particularly easy to incorporate into a recipe. High-Fiber Diet Eating Guide   Food Category   Foods Recommended   Notes   Grains   Whole-grain breads, muffins, bagels, or steffi bread Rye bread Whole-wheat crackers or crisp breads Whole-grain or bran cereals Oatmeal, oat bran, or grits Wheat germ Whole-wheat pasta and brown rice   Read the ingredients list on food labels. Look for products that list \"whole\" as the first ingredient (eg, whole-wheat, whole oats). Choose cereals with at least 2 grams of fiber per serving. Vegetables   All vegetables, especially asparagus, bean sprouts, broccoli, [place] sprouts, cabbage, carrots, cauliflower, celery, corn, greens, green beans, green pepper, onions, peas, potatoes (with skin), snow peas, spinach, squash, sweet potatoes, tomatoes, zucchini   For maximum fiber intake, eat the peels of fruits and vegetablesjust be sure to wash them well first.   Fruits   All fruits, especially apples, berries, grapefruits, mangoes, nectarines, oranges, peaches, pears, dried fruits (figs, dates, prunes, raisins)   Choose raw fruits and vegetables over juice, cooked, or cannedraw fruit has more fiber. Dried fruit is also a good source of fiber. Milk   With the exception of yogurt containing inulin (a type of fiber), dairy foods provide little fiber. Add more fiber by topping your yogurt or cottage cheese with fresh fruit, whole grain or bran cereals, nuts, or seeds.    Meats and Beans There is no need to remember every detail on your own. These memory aids can help:   Calendars and day planners   Electronic organizers to store all sorts of helpful informationThese devices can \"beep\" to remind you of appointments. A book of days to record birthdays, anniversaries, and other occasions that occur on the same date every year   Detailed \"to-do\" lists and strategically placed sticky notes   Quick \"study\" sessionsBefore a gathering, review who will be there so their names will be fresh in your mind. Establish routinesFor example, keep your keys, wallet, and umbrella in the same place all the time or take medicine with your 8:00 AM glass of juice   Live a Healthy Life   Many actions that will keep your body strong will do the same for your mind. For example:   Talk to Your Doctor About Herbs and Supplements    Malnutrition and vitamin deficiencies can impair your mental function. For example, vitamin B12 deficiency can cause a range of symptoms, including confusion. But, what if your nutritional needs are being met? Can herbs and supplements still offer a benefit? Researchers have investigated a range of natural remedies, such as ginkgo , ginseng , and the supplement phosphatidylserine (PS). So far, though, the evidence is inconsistent as to whether these products can improve memory or thinking. If you are interested in taking herbs and supplements, talk to your doctor first because they may interact with other medicines that you are taking. Exercise Regularly    Among the many benefits of regular exercise are increased blood flow to the brain and decreased risk of certain diseases that can interfere with memory function. One study found that even moderate exercise has a beneficial effect.  Examples of \"moderate\" exercise include:   Playing 18 holes of golf once a week, without a cart   Playing tennis twice a week   Walking one mile per day   Manage Stress    It can be tough to remember what is 48 hours, damaged nerve endings begin to recover. Sense of taste and smell improve. After 72 hours, the body is virtually free of nicotine. Bronchial tubes relax and breathing becomes easier. After 2 to 12 weeks, lungs can hold more air. Exercise becomes easier and circulation improves. Quitting will reduce your risk of having a heart attack, stroke, cancer, or lung disease:  After 1 year, the risk of coronary heart disease is cut in half. After 5 years, the risk of stroke falls to the same as a nonsmoker. After 10 years, the risk of lung cancer is cut in half and the risk of other cancers decreases significantly. After 15 years, the risk of coronary heart disease drops, usually to the level of a nonsmoker. If you are pregnant, quitting smoking will improve your chances of having a healthy baby. The people you live with, especially your children, will be healthier. You will have extra money to spend on things other than cigarettes. FIVE KEYS TO QUITTING  Studies have shown that these 5 steps will help you quit smoking and quit for good. You have the best chances of quitting if you use them together:  Get ready. Get support and encouragement. Learn new skills and behaviors. Get medicine to reduce your nicotine addiction and use it correctly. Be prepared for relapse or difficult situations. Be determined to continue trying to quit, even if you do not succeed at first.  1. GET READY  Set a quit date. Change your environment. Get rid of ALL cigarettes, ashtrays, matches, and lighters in your home, car, and place of work. Do not let people smoke in your home. Review your past attempts to quit. Think about what worked and what did not. Once you quit, do not smoke. NOT EVEN A PUFF! 2. GET SUPPORT AND ENCOURAGEMENT  Studies have shown that you have a better chance of being successful if you have help. You can get support in many ways.   Tell your family, friends, and coworkers that you are going to quit spray: Available by prescription. Nicotine skin patches (transdermal): Available by prescription and over-the-counter. Antidepressant medicine (helps people abstain from smoking, but how this works is unknown): Bupropion sustained-release (SR) tablets: Available by prescription. Nicotinic receptor partial agonist (simulates the effect of nicotine in your brain):  Varenicline tartrate tablets: Available by prescription. Ask your caregiver for advice about which medicines to use and how to use them. Carefully read the information on the package. Everyone who is trying to quit may benefit from using a medicine. If you are pregnant or trying to become pregnant, nursing an infant, you are under age 25, or you smoke fewer than 10 cigarettes per day, talk to your caregiver before taking any nicotine replacement medicines. You should stop using a nicotine replacement product and call your caregiver if you experience nausea, dizziness, weakness, vomiting, fast or irregular heartbeat, mouth problems with the lozenge or gum, or redness or swelling of the skin around the patch that does not go away. Do not use any other product containing nicotine while using a nicotine replacement product. Talk to your caregiver before using these products if you have diabetes, heart disease, asthma, stomach ulcers, you had a recent heart attack, you have high blood pressure that is not controlled with medicine, a history of irregular heartbeat, or you have been prescribed medicine to help you quit smoking. 5. BE PREPARED FOR RELAPSE OR DIFFICULT SITUATIONS  Most relapses occur within the first 3 months after quitting. Do not be discouraged if you start smoking again. Remember, most people try several times before they finally quit. You may have symptoms of withdrawal because your body is used to nicotine. You may crave cigarettes, be irritable, feel very hungry, cough often, get headaches, or have difficulty concentrating.   The withdrawal symptoms are only temporary. They are strongest when you first quit, but they will go away within 10 to 14 days. Here are some difficult situations to watch for:  Alcohol. Avoid drinking alcohol. Drinking lowers your chances of successfully quitting. Caffeine. Try to reduce the amount of caffeine you consume. It also lowers your chances of successfully quitting. Other smokers. Being around smoking can make you want to smoke. Avoid smokers. Weight gain. Many smokers will gain weight when they quit, usually less than 10 pounds. Eat a healthy diet and stay active. Do not let weight gain distract you from your main goal, quitting smoking. Some medicines that help you quit smoking may also help delay weight gain. You can always lose the weight gained after you quit. Bad mood or depression. There are a lot of ways to improve your mood other than smoking. If you are having problems with any of these situations, talk to your caregiver. SPECIAL SITUATIONS AND CONDITIONS  Studies suggest that everyone can quit smoking. Your situation or condition can give you a special reason to quit. Pregnant women/new mothers: By quitting, you protect your baby's health and your own. Hospitalized patients: By quitting, you reduce health problems and help healing. Heart attack patients: By quitting, you reduce your risk of a second heart attack. Lung, head, and neck cancer patients: By quitting, you reduce your chance of a second cancer. Parents of children and adolescents: By quitting, you protect your children from illnesses caused by secondhand smoke. QUESTIONS TO THINK ABOUT  Think about the following questions before you try to stop smoking. You may want to talk about your answers with your caregiver. Why do you want to quit? If you tried to quit in the past, what helped and what did not? What will be the most difficult situations for you after you quit? How will you plan to handle them?   Who can help you through the tough times? Your family? Friends? Caregiver? What pleasures do you get from smoking? What ways can you still get pleasure if you quit? Here are some questions to ask your caregiver: How can you help me to be successful at quitting? What medicine do you think would be best for me and how should I take it? What should I do if I need more help? What is smoking withdrawal like? How can I get information on withdrawal?  Quitting takes hard work and a lot of effort, but you can quit smoking. FOR MORE INFORMATION   Smokefree. gov ([employer]) provides free, accurate, evidence-based information and professional assistance to help support the immediate and long-term needs of people trying to quit smoking. Document Released: 12/12/2002 Document Revised: 12/06/2012 Document Reviewed: 10/04/2010  CATHERINE BREWER [facility] Patient Information ©2012 Corey Zacarias. ·     Keeping Home a [facility]       As we get older, changes in balance, gait, strength, vision, hearing, and cognition make even the most youthful senior more prone to accidents. Falls are one of the leading health risks for older people. This increased risk of falling is related to:   Aging process (eg, decreased muscle strength, slowed reflexes)   Higher incidence of chronic health problems (eg, arthritis, diabetes) that may limit mobility, agility or sensory awareness   Side effects of medicine (eg, dizziness, blurred vision)especially medicines like prescription pain medicines and drugs used to treat mental health conditions   Depending on the brittleness of your bones, the consequences of a fall can be serious and long lasting. Home Life   Research by the Association of Aging Merged with [facility]) shows that some home accidents among older adults can be prevented by making simple lifestyle changes and basic modifications and repairs to the home environment.  Here are some lifestyle changes that experts recommend:   Have your hearing and vision checked regularly. Be sure to wear prescription glasses that are right for you. Speak to your doctor or pharmacist about the possible side effects of your medicines. A number of medicines can cause dizziness. If you have problems with sleep, talk to your doctor. Limit your intake of alcohol. If necessary, use a cane or walker to help maintain your balance. Wear supportive, rubber-soled shoes, even at home. If you live in a region that gets wintry weather, you may want to put special cleats on your shoes to prevent you from slipping on the snow and ice. Exercise regularly to help maintain muscle tone, agility, and balance. Always hold the banister when going up or down stairs. Also, use  bars when getting in or out of the bath or shower, or using the toilet. To avoid dizziness, get up slowly from a lying down position. Sit up first, dangling your legs for a minute or two before rising to a standing position. Overall Home Safety Check   According to the Consumer Product Safety Commision's \"Older Consumer Home Safety Checklist,\" it is important to check for potential hazards in each room. And remember, proper lighting is an essential factor in home safety. If you cannot see clearly, you are more likely to fall. Important questions to ask yourself include:   Are lamp, electric, extension, and telephone cords placed out of the flow of traffic and maintained in good condition? Have frayed cords been replaced? Are all small rugs and runners slip resistant? If not, you can secure them to the floor with a special double-sided carpet tape. Are smoke detectors properly locatedone on every floor of your home and one outside of every sleeping area? Are they in good working order? Are batteries replaced at least once a year? Do you have a well-maintained carbon monoxide detector outside every sleeping are in your home?    Does your furniture layout leave plenty of space to maneuver between and around chairs, tables, beds, and sofas? Are hallways, stairs and passages between rooms well lit? Can you reach a lamp without getting out of bed? Are floor surfaces well maintained? Shag rugs, high-pile carpeting, tile floors, and polished wood floors can be particularly slippery. Stairs should always have handrails and be carpeted or fitted with a non-skid tread. Is your telephone easily reachable. Is the cord safely tucked away? Room by Room   According to the Association of Aging, bathrooms and kraen are the two most potentially hazardous rooms in your home. In the Kitchen    Be sure your stove is in proper working order and always make sure burners and the oven are off before you go out or go to sleep. Keep pots on the back burners, turn handles away from the front of the stove, and keep stove clean and free of grease build-up. Kitchen ventilation systems and range exhausts should be working properly. Keep flammable objects such as towels and pot holders away from the cooking area except when in use. Make sure kitchen curtains are tied back. Move cords and appliances away from the sink and hot surfaces. If extension cords are needed, install wiring guides so they do not hang over the sink, range, or working areas. Look for coffee pots, kettles and toaster ovens with automatic shut-offs. Keep a mop handy in the kitchen so you can wipe up spills instantly. You should also have a small fire extinguisher. Arrange your kitchen with frequently used items on lower shelves to avoid the need to stand on a stepstool to reach them. Make sure countertops are well-lit to avoid injuries while cutting and preparing food. In the Bathroom    Use a non-slip mat or decals in the tub and shower, since wet, soapy tile or porcelain surfaces are extremely slippery. Make sure bathroom rugs are non-skid or tape them firmly to the floor.  Bathtubs should have at least one, preferably two, grab bars, firmly attached to structural supports in the wall. (Do not use built-in soap holders or glass shower doors as grab bars.)    Tub seats fitted with non-slip material on the legs allow you to wash sitting down. For people with limited mobility, bathtub transfer benches allow you to slide safely into the tub. Raised toilet seats and toilet safety rails are helpful for those with knee or hip problems. In the [facility]    Make sure you use a nightlight and that the area around your bed is clear of potential obstacles. Be careful with electric blankets and never go to sleep with a heating pad, which can cause serious burns even if on a low setting. Use fire-resistant mattress covers and pillows, and NEVER smoke in bed. Keep a phone next to the bed that is programmed to dial 911 at the push of a button. If you have a chronic condition, you may want to sign on with an automatic call-in service. Typically the system includes a small pendant that connects directly to an emergency medical voice-response system. You should also make arrangements to stay in contact with someonefriend, neighbor, family memberon a regular schedule. Fire Prevention   According to the [employer]. (Smoke Alarms for Every) [address] [place] [address], senior citizens are one of the two highest risk groups for death and serious injuries due to residential fires. When cooking, wear short-sleeved items, never a bulky long-sleeved robe. The [facility]'s Safety Checklist for Older Consumers emphasizes the importance of checking basements, garages, workshops and storage areas for fire hazards, such as volatile liquids, piles of old rags or clothing and overloaded circuits. Never smoke in bed or when lying down on a couch or recliner chair. Small portable electric or kerosene heaters are responsible for many home fires and should be used cautiously if at all. If you do use one, be sure to keep them away from flammable materials.     In case of fire, make sure you have a pre-established emergency exit plan. Have a professional check your fireplace and other fuel-burning appliances yearly. Helping [employer]   Baby boomers entering the brady years will continue to see the development of new products to help older adults live safely and independently in spite of age-related changes. Making Life More Livable  , by Karel Kelly, lists over 1,000 products for \"living well in the mature years,\" such as bathing and mobility aids, household security devices, ergonomically designed knives and peelers, and faucet valves and knobs for temperature control. Medical supply stores and organizations are good sources of information about products that improve your quality of life and insure your safety.      Last Reviewed: November 2009 Ani Weems MD   Updated: 3/7/2011     ·

## 2021-11-28 DIAGNOSIS — I10 ESSENTIAL HYPERTENSION: ICD-10-CM

## 2021-11-29 RX ORDER — METOPROLOL SUCCINATE 25 MG/1
TABLET, EXTENDED RELEASE ORAL
Qty: 90 TABLET | Refills: 0 | Status: ON HOLD
Start: 2021-11-29 | End: 2022-03-02 | Stop reason: HOSPADM

## 2021-12-27 DIAGNOSIS — E78.5 HYPERLIPIDEMIA, UNSPECIFIED HYPERLIPIDEMIA TYPE: ICD-10-CM

## 2021-12-27 RX ORDER — ATORVASTATIN CALCIUM 20 MG/1
TABLET, FILM COATED ORAL
Qty: 90 TABLET | Refills: 1 | Status: ON HOLD
Start: 2021-12-27 | End: 2022-08-02 | Stop reason: HOSPADM

## 2022-02-20 ENCOUNTER — APPOINTMENT (OUTPATIENT)
Dept: GENERAL RADIOLOGY | Age: 72
End: 2022-02-20
Payer: MEDICARE

## 2022-02-20 ENCOUNTER — APPOINTMENT (OUTPATIENT)
Dept: CT IMAGING | Age: 72
End: 2022-02-20
Payer: MEDICARE

## 2022-02-20 ENCOUNTER — HOSPITAL ENCOUNTER (EMERGENCY)
Age: 72
Discharge: HOME | End: 2022-02-20
Attending: EMERGENCY MEDICINE
Payer: MEDICARE

## 2022-02-20 VITALS
OXYGEN SATURATION: 98 % | DIASTOLIC BLOOD PRESSURE: 71 MMHG | SYSTOLIC BLOOD PRESSURE: 181 MMHG | TEMPERATURE: 98.1 F | RESPIRATION RATE: 20 BRPM | HEART RATE: 64 BPM
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
## 2022-02-20 PROCEDURE — 99282 EMERGENCY DEPT VISIT SF MDM: CPT

## 2022-02-20 PROCEDURE — 73030 X-RAY EXAM OF SHOULDER: CPT

## 2022-02-20 PROCEDURE — 71045 X-RAY EXAM CHEST 1 VIEW: CPT

## 2022-02-20 PROCEDURE — 72131 CT LUMBAR SPINE W/O DYE: CPT

## 2022-02-20 NOTE — ED PROVIDER NOTES
Triage Chief Complaint:   Fall (mechanical fall at home; c/o R rib, R flank, and R shoulder pain )    [place]:  Lamont Bright is a 66 y.o. male that presents via EMS after a fall at home. Patient states that he tripped over his feet and fell onto his left side and his back. Denies hitting his head or losing consciousness. States that at this time it is his right shoulder that is worse with movement in his middle to lower back. Denies any motor or sensory deficits, headache, neck pain, chest or abdominal pain. He has not had any nausea or vomiting. He is not anticoagulated. ROS:  At least 10 systems reviewed and otherwise acutely negative except as in the [address]     Past Medical History:   Diagnosis Date    CAD (coronary artery disease)     with stent    Chronic low back pain     HX of laminectomy    Coronary arteriosclerosis     stent    Dementia ([address]     Fracture     R ankle    Hypertension     Hypertensive disorder     Osteoarthritis     Parkinson disease ([address]     Small vessel disease, cerebrovascular     Tobacco abuse      Past Surgical History:   Procedure Laterality Date    ANKLE SURGERY      BACK SURGERY      CARDIAC SURGERY      heart cath    CORONARY ANGIOPLASTY WITH STENT PLACEMENT      HERNIA REPAIR      LUMBAR LAMINECTOMY      TONSILLECTOMY      VASECTOMY       Family History   Problem Relation Age of Onset    High Blood Pressure Mother     High Blood Pressure Father     Heart Disease Father     Osteoporosis Sister          from allergy from an injection    No Known Problems Sister     No Known Problems Sister     No Known Problems Sister     No Known Problems Sister      Social History     Socioeconomic History    Marital status:      Spouse name: Not on file    Number of children: Not on file    Years of education: Not on file    Highest education level: Not on file   Occupational History    Not on file   Tobacco Use    Smoking status: Current Every Day Smoker     Packs/day: 0.50     Years: 57.00     Pack years: 28.50     Types: Cigarettes     Start date: 2/5/1964    Smokeless tobacco: Never Used   Vaping Use    Vaping Use: Never used   Substance and Sexual Activity    Alcohol use: Not Currently    Drug use: No    Sexual activity: Not on file   Other Topics Concern    Not on file   Social History Narrative    Not on file     Social Determinants of Health     Financial Resource Strain: Low Risk     Difficulty of Paying Living Expenses: Not hard at all   Food Insecurity: No Food Insecurity    Worried About Running Out of Food in the Last Year: Never true    Sergey of Food in the Last Year: Never true   Transportation Needs:     Lack of Transportation (Medical): Not on file    Lack of Transportation (Non-Medical): Not on file   Physical Activity:     Days of Exercise per Week: Not on file    Minutes of Exercise per Session: Not on file   Stress:     Feeling of Stress : Not on file   Social Connections:     Frequency of Communication with Friends and Family: Not on file    Frequency of Social Gatherings with Friends and Family: Not on file    Attends Protestant Services: Not on file    Active Member of [address] [place] [address] [employer] or Organizations: Not on file    Attends Club or Organization Meetings: Not on file    Marital Status: Not on file   Intimate Partner Violence:     Fear of Current or Ex-Partner: Not on file    Emotionally Abused: Not on file    Physically Abused: Not on file    Sexually Abused: Not on file   Housing Stability:     Unable to Pay for Housing in the Last Year: Not on file    Number of [place] in the Last Year: Not on file    Unstable Housing in the Last Year: Not on file     No current facility-administered medications for this encounter.      Current Outpatient Medications   Medication Sig Dispense Refill    atorvastatin (LIPITOR) 20 MG tablet TAKE ONE TABLET BY MOUTH ONCE NIGHTLY 90 tablet 1    metoprolol succinate (TOPROL XL) 25 MG extended release tablet TAKE ONE TABLET BY MOUTH DAILY 90 tablet 0    docusate sodium (COLACE) 100 MG capsule Take 100 mg by mouth 2 times daily      cyanocobalamin 1000 MCG tablet Take 1 tablet by mouth daily 90 tablet 3    furosemide (LASIX) 20 MG tablet Take 20 mg by mouth 2 times daily      acetaminophen (TYLENOL) 325 MG tablet Take 650 mg by mouth every 4 hours as needed for Pain      donepezil (ARICEPT) 10 MG tablet Take 10 mg by mouth nightly      memantine (NAMENDA) 10 MG tablet Take 10 mg by mouth 2 times daily      aspirin 81 MG EC tablet Take 81 mg by mouth daily.  trihexyphenidyl (ARTANE) 2 MG tablet Take by mouth Take 1 1/2 tablet bid       Allergies   Allergen Reactions    Chantix [Varenicline]     Daypro [Oxaprozin]        Nursing Notes Reviewed    Physical Exam:  ED Triage Vitals   Enc Vitals Group      BP       Pulse       Resp       Temp       Temp src       SpO2       Weight       Height       Head Circumference       Peak Flow       Pain Score       Pain Loc       Pain Edu? Excl. in [address]? General appearance:  No acute distress. Head: Normocephalic, atraumatic  Face: No bony deformity, midface stable  Skin:  Warm. Dry. No acute wounds  Eye:  Extraocular movements intact. Ears, nose, mouth and throat:  Oral mucosa moist  Neck:  Trachea midline. Extremity:  No swelling. Normal ROM. Tenderness right shoulder without deformity  Back: Lumbar midline tenderness without step-off or deformity. No cervical or thoracic spine tenderness  Heart:  Regular rate and rhythm  Respiratory:  Lungs clear to auscultation bilaterally. Respirations nonlabored. Chest: No tenderness to palpation. No ecchymosis or deformity. Abdominal:  Soft. Nontender. Non distended.      Neurological:  Alert and oriented times 4.  5 out of 5 motor strength and sensation intact to light touch in all extremities    I have reviewed and interpreted all of the currently available lab results from this visit (if applicable):  No results found for this visit on 02/20/22. Radiographs (if obtained):  [] The following radiograph was interpreted by myself in the absence of a radiologist:  [x] Radiologist's Report Reviewed:    EKG (if obtained): (All EKG's are interpreted by myself in the absence of a cardiologist)    MDM:  Plan of care is discussed thoroughly with the patient and family if present. If performed, all imaging and lab work also discussed with patient. All relevant prior results and chart reviewed if available. Presents as above after a ground-level fall complaining of lower back pain and right shoulder pain. He is neurovascularly intact. He has no cervical or thoracic spine tenderness. Denies any headache. Denies any neurologic deficits. No obvious injury on exam at this time other than shoulder and lower back tenderness. Imaging ordered for further evaluation. Imaging does not show any evidence of acute traumatic injury. On reevaluation, patient states that he is feeling better with no new complaints of areas of pain. He is here with family at this time and discharged in good condition      Clinical Impression:  1.  Fall, initial encounter      (Please note that portions of this note may have been completed with a voice recognition program. Efforts were made to edit the dictations but occasionally words are mis-transcribed.)    MD Jhoana Butler MD  02/20/22 [id number]

## 2022-02-24 ENCOUNTER — OFFICE VISIT (OUTPATIENT)
Dept: INTERNAL MEDICINE CLINIC | Age: 72
End: 2022-02-24
Payer: MEDICARE

## 2022-02-24 VITALS
SYSTOLIC BLOOD PRESSURE: 120 MMHG | HEIGHT: 74 IN | WEIGHT: 201 LBS | BODY MASS INDEX: 25.8 KG/M2 | HEART RATE: 40 BPM | DIASTOLIC BLOOD PRESSURE: 74 MMHG | OXYGEN SATURATION: 97 % | TEMPERATURE: 97 F

## 2022-02-24 DIAGNOSIS — Z91.81 AT HIGH RISK FOR FALLS: ICD-10-CM

## 2022-02-24 DIAGNOSIS — I87.8 STASIS, VENOUS: ICD-10-CM

## 2022-02-24 DIAGNOSIS — G20 PARKINSON'S DISEASE (HCC): ICD-10-CM

## 2022-02-24 DIAGNOSIS — I10 ESSENTIAL HYPERTENSION: ICD-10-CM

## 2022-02-24 DIAGNOSIS — S81.802A WOUND OF LEFT LEG, INITIAL ENCOUNTER: ICD-10-CM
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
## 2022-02-24 DIAGNOSIS — F03.90 DEMENTIA WITHOUT BEHAVIORAL DISTURBANCE, UNSPECIFIED DEMENTIA TYPE: ICD-10-CM

## 2022-02-24 DIAGNOSIS — E78.5 HYPERLIPIDEMIA, UNSPECIFIED HYPERLIPIDEMIA TYPE: ICD-10-CM

## 2022-02-24 DIAGNOSIS — K63.9 COLON WALL THICKENING: Primary | ICD-10-CM

## 2022-02-24 LAB
A/G RATIO: 2 (ref 1.1–2.2)
ALBUMIN SERPL-MCNC: 4.5 G/DL (ref 3.4–5)
ALP BLD-CCNC: 115 U/L (ref 40–129)
ALT SERPL-CCNC: 8 U/L (ref 10–40)
ANION GAP SERPL CALCULATED.3IONS-SCNC: 11 MMOL/L (ref 3–16)
AST SERPL-CCNC: 13 U/L (ref 15–37)
BASOPHILS ABSOLUTE: 0 K/UL (ref 0–0.2)
BASOPHILS RELATIVE PERCENT: 0.6 %
BILIRUB SERPL-MCNC: 0.4 MG/DL (ref 0–1)
BUN BLDV-MCNC: 18 MG/DL (ref 7–20)
CALCIUM SERPL-MCNC: 10.7 MG/DL (ref 8.3–10.6)
CHLORIDE BLD-SCNC: 104 MMOL/L (ref 99–110)
CHOLESTEROL, TOTAL: 118 MG/DL (ref 0–199)
CO2: 25 MMOL/L (ref 21–32)
CREAT SERPL-MCNC: 1 MG/DL (ref 0.8–1.3)
EOSINOPHILS ABSOLUTE: 0.1 K/UL (ref 0–0.6)
EOSINOPHILS RELATIVE PERCENT: 0.8 %
GFR AFRICAN AMERICAN: >60
GFR NON-AFRICAN AMERICAN: >60
GLUCOSE BLD-MCNC: 93 MG/DL (ref 70–99)
HCT VFR BLD CALC: 39.1 % (ref 40.5–52.5)
HDLC SERPL-MCNC: 56 MG/DL (ref 40–60)
HEMOGLOBIN: 13 G/DL (ref 13.5–17.5)
LDL CHOLESTEROL CALCULATED: 48 MG/DL
LYMPHOCYTES ABSOLUTE: 1.6 K/UL (ref 1–5.1)
LYMPHOCYTES RELATIVE PERCENT: 19.8 %
MCH RBC QN AUTO: 28.2 PG (ref 26–34)
MCHC RBC AUTO-ENTMCNC: 33.1 G/DL (ref 31–36)
MCV RBC AUTO: 85 FL (ref 80–100)
MONOCYTES ABSOLUTE: 0.6 K/UL (ref 0–1.3)
MONOCYTES RELATIVE PERCENT: 6.9 %
NEUTROPHILS ABSOLUTE: 6 K/UL (ref 1.7–7.7)
NEUTROPHILS RELATIVE PERCENT: 71.9 %
PDW BLD-RTO: 14.8 % (ref 12.4–15.4)
PLATELET # BLD: 148 K/UL (ref 135–450)
PMV BLD AUTO: 8.4 FL (ref 5–10.5)
POTASSIUM SERPL-SCNC: 4.6 MMOL/L (ref 3.5–5.1)
RBC # BLD: 4.6 M/UL (ref 4.2–5.9)
SODIUM BLD-SCNC: 140 MMOL/L (ref 136–145)
TOTAL PROTEIN: 6.7 G/DL (ref 6.4–8.2)
TRIGL SERPL-MCNC: 72 MG/DL (ref 0–150)
VLDLC SERPL CALC-MCNC: 14 MG/DL
WBC # BLD: 8.3 K/UL (ref 4–11)

## 2022-02-24 PROCEDURE — G8417 CALC BMI ABV UP PARAM F/U: HCPCS | Performed by: FAMILY MEDICINE

## 2022-02-24 PROCEDURE — G8427 DOCREV CUR MEDS BY ELIG CLIN: HCPCS | Performed by: FAMILY MEDICINE

## 2022-02-24 PROCEDURE — 4004F PT TOBACCO SCREEN RCVD TLK: CPT | Performed by: FAMILY MEDICINE

## 2022-02-24 PROCEDURE — 36415 COLL VENOUS BLD VENIPUNCTURE: CPT | Performed by: FAMILY MEDICINE

## 2022-02-24 PROCEDURE — G8484 FLU IMMUNIZE NO ADMIN: HCPCS | Performed by: FAMILY MEDICINE

## 2022-02-24 PROCEDURE — 1123F ACP DISCUSS/DSCN MKR DOCD: CPT | Performed by: FAMILY MEDICINE

## 2022-02-24 PROCEDURE — 99214 OFFICE O/P EST MOD 30 MIN: CPT | Performed by: FAMILY MEDICINE

## 2022-02-24 PROCEDURE — 4040F PNEUMOC VAC/ADMIN/RCVD: CPT | Performed by: FAMILY MEDICINE

## 2022-02-24 PROCEDURE — 3017F COLORECTAL CA SCREEN DOC REV: CPT | Performed by: FAMILY MEDICINE

## 2022-02-24 ASSESSMENT — ENCOUNTER SYMPTOMS
SHORTNESS OF BREATH: 0
COUGH: 0
NAUSEA: 0
BACK PAIN: 0
ABDOMINAL PAIN: 0

## 2022-02-24 NOTE — PROGRESS NOTES
Horace Parikh (:  1950) is a 66 y.o. male,Established patient, here for evaluation of the following chief complaint(s):  Hyperlipidemia and Hypertension         ASSESSMENT/PLAN:  1. Colon wall thickening  -     CT PELVIS W IV CONTRAST; Future  2. Fall, subsequent encounter  3. Wound of left leg, initial encounter  -     [facility]  4. Stasis, venous  -     [facility]  5. Parkinson's disease ([facility] chronic  Follows with Dr. Herminia Venegas  6. Dementia without behavioral disturbance, unspecified dementia type ([address] chronic  Continue medications  7. Hyperlipidemia, unspecified hyperlipidemia type  -     Lipid Panel  Continue Lipitor  8. Essential hypertension  -     CBC with Auto Differential  -     Comprehensive Metabolic Panel  9. At high risk for falls    Obtain labs today  On this date 2022 I have spent 30 minutes reviewing previous notes, test results and face to face with the patient discussing the diagnosis and importance of compliance with the treatment plan as well as documenting on the day of the visit. Return in about 4 months (around 2022). 22 CT Lumbar Spine WO Contrast  Impression No acute fracture identified. Incidentally noted circumferential wall thickening of the rectum. Correlate with clinical exam and consider dedicated CT of the pelvis for further evaluation. 22 XR Chest Portable  Impression Clear stable chest without acute cardiopulmonary process. No acute fracture is identified. The right shoulder appears intact, with no fracture or dislocation. Degenerative change in the shoulder and spine.        Subjective   SUBJECTIVE/OBJECTIVE:  HPI: This  67 yo M here with wife for the following  Patient Active Problem List    Diagnosis Date Noted    Dementia ([facility]     Osteoarthritis     Small vessel disease, cerebrovascular     CAD (coronary artery disease)     Tobacco abuse     Hyperlipidemia 10/04/2018    Hypertension 10/04/2018    Parkinson's disease ([facility] 10/04/2018    Right inguinal hernia 10/05/2015     He was seen in [place] [address] ER on 2/20 for a fall. Colon wall thickening noted on imaging. Last C-scope 2021. Denies rectal pain  Parkinson's - he ambulates with a walker. He is managed by Dr. Aleksey Stein- Neuro. On Artane  Dementia- On Namenda and Aricept  HTN- stable on medications  HLD- On Lipitor 20  He states he had blisters on his LLE for 3 weeks. This blister opened up and now he has a wound. He has been using Neosporin on the area. He has venous stasis of the LE    Review of Systems   Constitutional: Negative for diaphoresis and fever. Respiratory: Negative for cough and shortness of breath. Cardiovascular: Negative for chest pain and palpitations. Gastrointestinal: Negative for abdominal pain and nausea. Genitourinary: Negative for dysuria. Musculoskeletal: Positive for gait problem. Negative for back pain. Neurological: Negative for dizziness and headaches. Psychiatric/Behavioral: Negative for dysphoric mood. Allergies   Allergen Reactions    Chantix [Varenicline]     Daypro [Oxaprozin]      No current facility-administered medications for this visit.      Current Outpatient Medications   Medication Sig Dispense Refill    atorvastatin (LIPITOR) 20 MG tablet TAKE ONE TABLET BY MOUTH ONCE NIGHTLY 90 tablet 1    metoprolol succinate (TOPROL XL) 25 MG extended release tablet TAKE ONE TABLET BY MOUTH DAILY 90 tablet 0    docusate sodium (COLACE) 100 MG capsule Take 100 mg by mouth 2 times daily      cyanocobalamin 1000 MCG tablet Take 1 tablet by mouth daily 90 tablet 3    furosemide (LASIX) 20 MG tablet Take 20 mg by mouth 2 times daily      acetaminophen (TYLENOL) 325 MG tablet Take 650 mg by mouth every 4 hours as needed for Pain      donepezil (ARICEPT) 10 MG tablet Take 10 mg by mouth nightly      memantine (NAMENDA) 10 MG tablet Take 10 mg by mouth 2 times daily      aspirin 81 MG EC tablet Take 81 mg by mouth daily.  trihexyphenidyl (ARTANE) 2 MG tablet Take by mouth Take 1 1/2 tablet bid       Facility-Administered Medications Ordered in Other Visits   Medication Dose Route Frequency Provider Last Rate Last Admin    iopamidol (ISOVUE-370) 76 % injection 100 mL  100 mL IntraVENous ONCE PRN Mana Lopez DO              Vitals:    02/24/22 0802   BP: 120/74   Site: Right Upper Arm   Position: Sitting   Cuff Size: Medium Adult   Pulse: (!) 40   Temp: 97 °F (36.1 °C)   SpO2: 97%   Weight: 201 lb (91.2 kg)   Height: 6' 2\" (1.88 m)   Pulse recheck 70    Objective   Physical Exam  Vitals reviewed. Constitutional:       General: He is not in acute distress. Eyes:      Extraocular Movements: Extraocular movements intact. Conjunctiva/sclera: Conjunctivae normal.   Cardiovascular:      Rate and Rhythm: Normal rate and regular rhythm. Pulmonary:      Effort: Pulmonary effort is normal. No respiratory distress. Breath sounds: Normal breath sounds. Abdominal:      General: Bowel sounds are normal.      Palpations: Abdomen is soft. Tenderness: There is no abdominal tenderness. Musculoskeletal:      Cervical back: Neck supple. Right lower leg: Edema present. Left lower leg: Edema present. Skin:     Findings: Rash (stasis dermatitis) present. Comments: Wound to LLE x2. No warmth. .   Neurological:      Mental Status: He is alert and oriented to person, place, and time. Gait: Gait abnormal (walker). Psychiatric:         Mood and Affect: Mood normal.                An electronic signature was used to authenticate this note. --Emmanuelle Jarrett DO On the basis of positive falls risk screening, assessment and plan is as follows: patient to follow up with neurology.

## 2022-02-26 ENCOUNTER — APPOINTMENT (OUTPATIENT)
Dept: GENERAL RADIOLOGY | Age: 72
End: 2022-02-26
Payer: MEDICARE

## 2022-02-26 ENCOUNTER — APPOINTMENT (OUTPATIENT)
Dept: CT IMAGING | Age: 72
End: 2022-02-26
Payer: MEDICARE

## 2022-02-26 ENCOUNTER — HOSPITAL ENCOUNTER (EMERGENCY)
Age: 72
Discharge: HOME | End: 2022-02-26
Attending: EMERGENCY MEDICINE
Payer: MEDICARE

## 2022-02-26 VITALS
DIASTOLIC BLOOD PRESSURE: 80 MMHG | SYSTOLIC BLOOD PRESSURE: 152 MMHG | RESPIRATION RATE: 16 BRPM | TEMPERATURE: 97.7 F | BODY MASS INDEX: 25.03 KG/M2 | HEART RATE: 70 BPM | HEIGHT: 74 IN | WEIGHT: 195 LBS | OXYGEN SATURATION: 95 %

## 2022-02-26 DIAGNOSIS — S80.02XA CONTUSION OF LEFT KNEE, INITIAL ENCOUNTER: ICD-10-CM

## 2022-02-26 DIAGNOSIS — I87.2 VENOUS STASIS DERMATITIS OF BOTH LOWER EXTREMITIES: ICD-10-CM

## 2022-02-26 DIAGNOSIS — G20 PARKINSON'S DISEASE (HCC): ICD-10-CM

## 2022-02-26 DIAGNOSIS — S50.02XA CONTUSION OF LEFT ELBOW, INITIAL ENCOUNTER: ICD-10-CM

## 2022-02-26 DIAGNOSIS — S80.01XA CONTUSION OF RIGHT KNEE, INITIAL ENCOUNTER: ICD-10-CM
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
## 2022-02-26 LAB
ALBUMIN SERPL-MCNC: 4 GM/DL (ref 3.4–5)
ALP BLD-CCNC: 113 IU/L (ref 40–129)
ALT SERPL-CCNC: 12 U/L (ref 10–40)
ANION GAP SERPL CALCULATED.3IONS-SCNC: 7 MMOL/L (ref 4–16)
AST SERPL-CCNC: 14 IU/L (ref 15–37)
BASOPHILS ABSOLUTE: 0 K/CU MM
BASOPHILS RELATIVE PERCENT: 0.2 % (ref 0–1)
BILIRUB SERPL-MCNC: 0.7 MG/DL (ref 0–1)
BUN BLDV-MCNC: 15 MG/DL (ref 6–23)
CALCIUM SERPL-MCNC: 10.3 MG/DL (ref 8.3–10.6)
CHLORIDE BLD-SCNC: 104 MMOL/L (ref 99–110)
CO2: 27 MMOL/L (ref 21–32)
CREAT SERPL-MCNC: 1 MG/DL (ref 0.9–1.3)
DIFFERENTIAL TYPE: ABNORMAL
EOSINOPHILS ABSOLUTE: 0 K/CU MM
EOSINOPHILS RELATIVE PERCENT: 0.2 % (ref 0–3)
GFR AFRICAN AMERICAN: >60 ML/MIN/1.73M2
GFR NON-AFRICAN AMERICAN: >60 ML/MIN/1.73M2
GLUCOSE BLD-MCNC: 95 MG/DL (ref 70–99)
HCT VFR BLD CALC: 42.3 % (ref 42–52)
HEMOGLOBIN: 13.2 GM/DL (ref 13.5–18)
IMMATURE NEUTROPHIL %: 0.3 % (ref 0–0.43)
LYMPHOCYTES ABSOLUTE: 1.4 K/CU MM
LYMPHOCYTES RELATIVE PERCENT: 10.7 % (ref 24–44)
MAGNESIUM: 1.9 MG/DL (ref 1.8–2.4)
MCH RBC QN AUTO: 27.7 PG (ref 27–31)
MCHC RBC AUTO-ENTMCNC: 31.2 % (ref 32–36)
MCV RBC AUTO: 88.7 FL (ref 78–100)
MONOCYTES ABSOLUTE: 0.9 K/CU MM
MONOCYTES RELATIVE PERCENT: 6.9 % (ref 0–4)
PDW BLD-RTO: 14 % (ref 11.7–14.9)
PLATELET # BLD: 159 K/CU MM (ref 140–440)
PMV BLD AUTO: 10.2 FL (ref 7.5–11.1)
POTASSIUM SERPL-SCNC: 4.1 MMOL/L (ref 3.5–5.1)
PRO-BNP: 617.9 PG/ML
RBC # BLD: 4.77 M/CU MM (ref 4.6–6.2)
SEGMENTED NEUTROPHILS ABSOLUTE COUNT: 10.5 K/CU MM
SEGMENTED NEUTROPHILS RELATIVE PERCENT: 81.7 % (ref 36–66)
SODIUM BLD-SCNC: 138 MMOL/L (ref 135–145)
TOTAL IMMATURE NEUTOROPHIL: 0.04 K/CU MM
TOTAL PROTEIN: 6.6 GM/DL (ref 6.4–8.2)
WBC # BLD: 12.8 K/CU MM (ref 4–10.5)

## 2022-02-26 PROCEDURE — 74177 CT ABD & PELVIS W/CONTRAST: CPT

## 2022-02-26 PROCEDURE — 73562 X-RAY EXAM OF KNEE 3: CPT

## 2022-02-26 PROCEDURE — 83735 ASSAY OF MAGNESIUM: CPT

## 2022-02-26 PROCEDURE — 80053 COMPREHEN METABOLIC PANEL: CPT

## 2022-02-26 PROCEDURE — 6360000004 HC RX CONTRAST MEDICATION: Performed by: EMERGENCY MEDICINE

## 2022-02-26 PROCEDURE — 83880 ASSAY OF NATRIURETIC PEPTIDE: CPT

## 2022-02-26 PROCEDURE — 99284 EMERGENCY DEPT VISIT MOD MDM: CPT

## 2022-02-26 PROCEDURE — 85025 COMPLETE CBC W/AUTO DIFF WBC: CPT

## 2022-02-26 PROCEDURE — 73080 X-RAY EXAM OF ELBOW: CPT

## 2022-02-26 RX ORDER — METHYLPREDNISOLONE 4 MG/1
TABLET ORAL
Qty: 1 KIT | Refills: 0 | Status: ON HOLD | OUTPATIENT
Start: 2022-02-26 | End: 2022-03-02 | Stop reason: HOSPADM

## 2022-02-26 RX ORDER — MORPHINE SULFATE 4 MG/ML
4 INJECTION, SOLUTION INTRAMUSCULAR; INTRAVENOUS EVERY 30 MIN PRN
Status: DISCONTINUED | OUTPATIENT
Start: 2022-02-26 | End: 2022-02-26

## 2022-02-26 RX ORDER — SULFAMETHOXAZOLE AND TRIMETHOPRIM 800; 160 MG/1; MG/1
1 TABLET ORAL 2 TIMES DAILY
Qty: 20 TABLET | Refills: 0 | Status: ON HOLD | OUTPATIENT
Start: 2022-02-26 | End: 2022-03-02 | Stop reason: HOSPADM

## 2022-02-26 RX ADMIN — IOPAMIDOL 100 ML: 755 INJECTION, SOLUTION INTRAVENOUS at 12:02

## 2022-02-26 NOTE — ED PROVIDER NOTES
Emergency Department Encounter  Location: [facility] [address] [place] [address]    Patient: Polina Sorto  MRN: [id number]  : 1950  Date of evaluation: 2022  ED Provider: Rosa Pickett DO, FACEP    Chief Complaint:    Fall, Knee Pain (bilateral knee), and Arm Pain (left elbow from fall)    [place]:  Polina Sorto is a 66 y.o. male that presents to the emergency department by squad with complaints of falling. He fell twice today and is having increased weakness. He complains of bilateral knee pain left elbow pain. He did not hit his head and denies loss of consciousness. Denies neck or back pain. He does not take anticoagulants. The patient states his weakness has been increasing over the past couple weeks. He had an appointment with his neurologist yesterday but rescheduled that because of bad weather it is currently scheduled for March 10. Patient denies chest pain or shortness of breath. He states he simply feels weak and states that his right side goes out and then his left side goes out and he goes down. He is complaining of pain to his bilateral knees and his left elbow. Is also having some swelling in his lower extremities bilaterally. Which has increased. The patient sees the wound clinic for chronic venous stasis injuries and sores to his legs. ROS - see HPI, below listed is current ROS at time of my eval:  At least 10 systems reviewed and otherwise acutely negative except as in the [address]   General:  No fevers, no chills, positive for weakness  Eyes:  No recent vison changes, no discharge  ENT:  No sore throat, no nasal congestion, no hearing changes  Cardiovascular:  No chest pain, no palpitations  Respiratory:  No shortness of breath, no cough, no wheezing  Gastrointestinal:  No pain, no nausea, no vomiting, no diarrhea  Musculoskeletal:  No muscle pain, no joint pain  Skin:  No rash, no pruritis, no easy bruising  Neurologic:  No speech problems, no headache, no extremity numbness, no extremity tingling, positive for increasing bilateral extremity weakness  Psychiatric:  No anxiety  Genitourinary:  No dysuria, no hematuria  Endocrine:  No unexpected weight gain, no unexpected weight loss  Extremities:  no edema, no pain    Past Medical History:   Diagnosis Date    CAD (coronary artery disease)     with stent    Chronic low back pain     HX of laminectomy    Coronary arteriosclerosis     stent    Dementia ([facility]     Fracture     R ankle    Hypertension     Hypertensive disorder     Osteoarthritis     Parkinson disease ([facility]     Small vessel disease, cerebrovascular     Tobacco abuse      Past Surgical History:   Procedure Laterality Date    ANKLE SURGERY      BACK SURGERY      CARDIAC SURGERY      heart cath    CORONARY ANGIOPLASTY WITH STENT PLACEMENT      HERNIA REPAIR      LUMBAR LAMINECTOMY      TONSILLECTOMY      VASECTOMY       Family History   Problem Relation Age of Onset    High Blood Pressure Mother     High Blood Pressure Father     Heart Disease Father     Osteoporosis Sister          from allergy from an injection    No Known Problems Sister     No Known Problems Sister     No Known Problems Sister     No Known Problems Sister      Social History     Socioeconomic History    Marital status:      Spouse name: Not on file    Number of children: Not on file    Years of education: Not on file    Highest education level: Not on file   Occupational History    Not on file   Tobacco Use    Smoking status: Current Every Day Smoker     Packs/day: 0.50     Years: 57.00     Pack years: 28.50     Types: Cigarettes     Start date: 1964    Smokeless tobacco: Never Used   Vaping Use    Vaping Use: Never used   Substance and Sexual Activity    Alcohol use: Not Currently    Drug use: No    Sexual activity: Not on file   Other Topics Concern    Not on file   Social History Narrative    Not on file     Social Determinants of Health Financial Resource Strain: Low Risk     Difficulty of Paying Living Expenses: Not hard at all   Food Insecurity: No Food Insecurity    Worried About Running Out of Food in the Last Year: Never true    Sergey of Food in the Last Year: Never true   Transportation Needs:     Lack of Transportation (Medical): Not on file    Lack of Transportation (Non-Medical): Not on file   Physical Activity:     Days of Exercise per Week: Not on file    Minutes of Exercise per Session: Not on file   Stress:     Feeling of Stress : Not on file   Social Connections:     Frequency of Communication with Friends and Family: Not on file    Frequency of Social Gatherings with Friends and Family: Not on file    Attends Jain Services: Not on file    Active Member of [address] [place] [address] [employer] or Organizations: Not on file    Attends Club or Organization Meetings: Not on file    Marital Status: Not on file   Intimate Partner Violence:     Fear of Current or Ex-Partner: Not on file    Emotionally Abused: Not on file    Physically Abused: Not on file    Sexually Abused: Not on file   Housing Stability:     Unable to Pay for Housing in the Last Year: Not on file    Number of [place] in the Last Year: Not on file    Unstable Housing in the Last Year: Not on file     No current facility-administered medications for this encounter. Current Outpatient Medications   Medication Sig Dispense Refill    sulfamethoxazole-trimethoprim (BACTRIM DS) 800-160 MG per tablet Take 1 tablet by mouth 2 times daily for 10 days 20 tablet 0    methylPREDNISolone (MEDROL, CIERRA,) 4 MG tablet Take by mouth.  1 kit 0    atorvastatin (LIPITOR) 20 MG tablet TAKE ONE TABLET BY MOUTH ONCE NIGHTLY 90 tablet 1    metoprolol succinate (TOPROL XL) 25 MG extended release tablet TAKE ONE TABLET BY MOUTH DAILY 90 tablet 0    docusate sodium (COLACE) 100 MG capsule Take 100 mg by mouth 2 times daily      cyanocobalamin 1000 MCG tablet Take 1 tablet by mouth daily 90 tablet 3    furosemide (LASIX) 20 MG tablet Take 20 mg by mouth 2 times daily      acetaminophen (TYLENOL) 325 MG tablet Take 650 mg by mouth every 4 hours as needed for Pain      donepezil (ARICEPT) 10 MG tablet Take 10 mg by mouth nightly      memantine (NAMENDA) 10 MG tablet Take 10 mg by mouth 2 times daily      aspirin 81 MG EC tablet Take 81 mg by mouth daily.  trihexyphenidyl (ARTANE) 2 MG tablet Take by mouth Take 1 1/2 tablet bid       Allergies   Allergen Reactions    Chantix [Varenicline]     Daypro [Oxaprozin]        Nursing Notes Reviewed    Physical Exam:  ED Triage Vitals [02/26/22 0941]   Enc Vitals Group      BP (!) 161/82      Pulse 70      Resp 19      Temp 97.7 °F (36.5 °C)      Temp Source Oral      SpO2 98 %      Weight 195 lb (88.5 kg)      Height 6' 2\" (1.88 m)      Head Circumference       Peak Flow       Pain Score       Pain Loc       Pain Edu? Excl. in [address]? GENERAL APPEARANCE: Awake and alert. Cooperative. No acute distress. Nontoxic in appearance  HEAD: Normocephalic. Atraumatic. EYES: EOM's grossly intact. Sclera anicteric. ENT: Tolerates saliva. No trismus. NECK: Supple. Trachea midline. CARDIO: RRR. Radial pulse 2+. LUNGS: Respirations unlabored. CTAB. ABDOMEN: Soft. Non-distended. Non-tender. EXTREMITIES: No acute deformities. Patient has 2+ pitting edema in his feet bilaterally. His lower extremities show thickening of the skin consistent with venous stasis with some sores on his legs. His left leg is seeping a serous fluid. He has contusions to his knees bilaterally and his left elbow. SKIN: Warm and dry. NEUROLOGICAL: No gross facial drooping. Moves all 4 extremities spontaneously. He is showing extremity weakness bilaterally but it is equally weak. PSYCHIATRIC: Normal mood.      Labs:  Results for orders placed or performed during the hospital encounter of 02/26/22   Comprehensive Metabolic Panel   Result Value Ref Range    Sodium 138 135 - 145 MMOL/L    Potassium 4.1 3.5 - 5.1 MMOL/L    Chloride 104 99 - 110 mMol/L    CO2 27 21 - 32 MMOL/L    BUN 15 6 - 23 MG/DL    CREATININE 1.0 0.9 - 1.3 MG/DL    Glucose 95 70 - 99 MG/DL    Calcium 10.3 8.3 - 10.6 MG/DL    Albumin 4.0 3.4 - 5.0 GM/DL    Total Protein 6.6 6.4 - 8.2 GM/DL    Total Bilirubin 0.7 0.0 - 1.0 MG/DL    ALT 12 10 - 40 U/L    AST 14 (L) 15 - 37 IU/L    Alkaline Phosphatase 113 40 - 129 IU/L    GFR Non-African American >60 >60 mL/min/1.73m2    GFR African American >60 >60 mL/min/1.73m2    Anion Gap 7 4 - 16   CBC with Auto Differential   Result Value Ref Range    WBC 12.8 (H) 4.0 - 10.5 K/CU MM    RBC 4.77 4.6 - 6.2 M/CU MM    Hemoglobin 13.2 (L) 13.5 - 18.0 GM/DL    Hematocrit 42.3 42 - 52 %    MCV 88.7 78 - 100 FL    MCH 27.7 27 - 31 PG    MCHC 31.2 (L) 32.0 - 36.0 %    RDW 14.0 11.7 - 14.9 %    Platelets 274 936 - 349 K/CU MM    MPV 10.2 7.5 - 11.1 FL    Differential Type AUTOMATED DIFFERENTIAL     Segs Relative 81.7 (H) 36 - 66 %    Lymphocytes % 10.7 (L) 24 - 44 %    Monocytes % 6.9 (H) 0 - 4 %    Eosinophils % 0.2 0 - 3 %    Basophils % 0.2 0 - 1 %    Segs Absolute 10.5 K/CU MM    Lymphocytes Absolute 1.4 K/CU MM    Monocytes Absolute 0.9 K/CU MM    Eosinophils Absolute 0.0 K/CU MM    Basophils Absolute 0.0 K/CU MM    Immature Neutrophil % 0.3 0 - 0.43 %    Total Immature Neutrophil 0.04 K/CU MM   Magnesium   Result Value Ref Range    Magnesium 1.9 1.8 - 2.4 mg/dl   Brain Natriuretic Peptide   Result Value Ref Range    Pro-.9 (H) <300 PG/ML           Radiographs (if obtained):  [] The following radiograph was interpreted by myself in the absence of a radiologist:  [x] Radiologist's Report reviewed at time of ED visit:  CT ABDOMEN PELVIS W IV CONTRAST   Final Result   No acute abnormality. XR ELBOW LEFT (MIN 3 VIEWS)   Final Result   No acute fracture seen but there is a small joint effusion. In the setting   of trauma, an occult fracture cannot be excluded. Consider follow-up   radiographs in 7-10 days if symptoms persist.         XR KNEE RIGHT (3 VIEWS)   Final Result   No acute osseous abnormality of either knee. XR KNEE LEFT (3 VIEWS)   Final Result   No acute osseous abnormality of either knee. ED Course and MDM:  Patient presents to the emergency department after falling. He reportedly by his wife is not using his walker for ambulation. He has become more weak however secondary to his Parkinson's disease. Here in the ER the patient has a contusion to his left elbow was a slight effusion noted. He also has contusion to his knees bilaterally. Laboratory data is unremarkable. He does appear to have venous stasis in his lower extremities and some sores are present on his left lower extremity. Dressings have been applied he will be referred to the wound clinic. The patient will be started on Bactrim. He is discharged in stable condition is instructed return if his condition worsens. He is instructed to follow-up with his primary caregiver for recheck as well. He is discharged stable condition at this time with prescriptions for methylprednisolone for the contusions and Bactrim for the venous stasis. Final Impression:  1. Fall, initial encounter    2. Contusion of left elbow, initial encounter    3. Contusion of left knee, initial encounter    4. Contusion of right knee, initial encounter    5. Parkinson's disease ([address]    6. Venous stasis dermatitis of both lower extremities      DISPOSITION Decision To Discharge    Patient referred to:  [address]  [address] [facility] [phone]    Schedule an appointment as soon as possible for a visit in 1 week  For follow up    [address] [address]  Schedule an appointment as soon as possible for a visit in 3 days  For wound re-check, For follow up    Discharge medications:  New Prescriptions METHYLPREDNISOLONE (MEDROL, CIERRA,) 4 MG TABLET    Take by mouth.     SULFAMETHOXAZOLE-TRIMETHOPRIM (BACTRIM DS) 800-160 MG PER TABLET    Take 1 tablet by mouth 2 times daily for 10 days     (Please note that portions of this note may have been completed with a voice recognition program. Efforts were made to edit the dictations but occasionally words are mis-transcribed.)    Tricia Bear DO, [facility]  Board certified in [address], [place]  02/26/22 1320

## 2022-02-26 NOTE — ED TRIAGE NOTES
Patient came in via EMS from home for multiple falls and increased weakness. Patient complains of bilateral knee pain and left elbow pain after falls. Patient denies loss of consciousness. Patient did not hit head. No neck or back pain. Patient takes daily ASA 81 mg. Patient's weakness has gone on for two weeks.

## 2022-02-27 ENCOUNTER — APPOINTMENT (OUTPATIENT)
Dept: GENERAL RADIOLOGY | Age: 72
DRG: 057 | End: 2022-02-27
Payer: MEDICARE

## 2022-02-27 ENCOUNTER — HOSPITAL ENCOUNTER (INPATIENT)
Age: 72
LOS: 3 days | Discharge: NURSING, REHAB OR LONG-TERM CARE | DRG: 057 | End: 2022-03-02
Attending: EMERGENCY MEDICINE | Admitting: FAMILY MEDICINE
Payer: MEDICARE

## 2022-02-27 ENCOUNTER — APPOINTMENT (OUTPATIENT)
Dept: CT IMAGING | Age: 72
DRG: 057 | End: 2022-02-27
Payer: MEDICARE

## 2022-02-27 DIAGNOSIS — G20 PARKINSON'S DISEASE (HCC): Primary | ICD-10-CM

## 2022-02-27 DIAGNOSIS — R29.6 MULTIPLE FALLS: ICD-10-CM

## 2022-02-27 DIAGNOSIS — R53.1 GENERALIZED WEAKNESS: ICD-10-CM

## 2022-02-27 PROBLEM — I87.8 VENOUS STASIS OF BOTH LOWER EXTREMITIES: Status: ACTIVE | Noted: 2022-02-27

## 2022-02-27 PROBLEM — L03.90 CELLULITIS: Status: ACTIVE | Noted: 2022-02-27

## 2022-02-27 PROBLEM — L03.116 CELLULITIS OF LEFT LEG: Status: ACTIVE | Noted: 2022-02-27

## 2022-02-27 LAB
ALBUMIN SERPL-MCNC: 3.9 GM/DL (ref 3.4–5)
ALP BLD-CCNC: 110 IU/L (ref 40–129)
ALT SERPL-CCNC: 16 U/L (ref 10–40)
ANION GAP SERPL CALCULATED.3IONS-SCNC: 9 MMOL/L (ref 4–16)
AST SERPL-CCNC: 25 IU/L (ref 15–37)
BACTERIA: ABNORMAL /HPF
BASOPHILS ABSOLUTE: 0 K/CU MM
BASOPHILS RELATIVE PERCENT: 0.2 % (ref 0–1)
BILIRUB SERPL-MCNC: 0.6 MG/DL (ref 0–1)
BILIRUBIN URINE: NEGATIVE MG/DL
BLOOD, URINE: ABNORMAL
BUN BLDV-MCNC: 15 MG/DL (ref 6–23)
CALCIUM SERPL-MCNC: 9.8 MG/DL (ref 8.3–10.6)
CAST TYPE: NEGATIVE /HPF
CHLORIDE BLD-SCNC: 101 MMOL/L (ref 99–110)
CLARITY: ABNORMAL
CO2: 27 MMOL/L (ref 21–32)
COLOR: YELLOW
CREAT SERPL-MCNC: 0.9 MG/DL (ref 0.9–1.3)
CRYSTAL TYPE: NEGATIVE /HPF
DIFFERENTIAL TYPE: ABNORMAL
EOSINOPHILS ABSOLUTE: 0 K/CU MM
EOSINOPHILS RELATIVE PERCENT: 0.3 % (ref 0–3)
EPITHELIAL CELLS, UA: ABNORMAL /HPF
GFR AFRICAN AMERICAN: >60 ML/MIN/1.73M2
GFR NON-AFRICAN AMERICAN: >60 ML/MIN/1.73M2
GLUCOSE BLD-MCNC: 96 MG/DL (ref 70–99)
GLUCOSE, URINE: NEGATIVE MG/DL
HCT VFR BLD CALC: 43.5 % (ref 42–52)
HEMOGLOBIN: 13.5 GM/DL (ref 13.5–18)
IMMATURE NEUTROPHIL %: 0.4 % (ref 0–0.43)
KETONES, URINE: NEGATIVE MG/DL
LEUKOCYTE ESTERASE, URINE: NEGATIVE
LYMPHOCYTES ABSOLUTE: 1.8 K/CU MM
LYMPHOCYTES RELATIVE PERCENT: 13 % (ref 24–44)
MCH RBC QN AUTO: 27.6 PG (ref 27–31)
MCHC RBC AUTO-ENTMCNC: 31 % (ref 32–36)
MCV RBC AUTO: 88.8 FL (ref 78–100)
MONOCYTES ABSOLUTE: 1 K/CU MM
MONOCYTES RELATIVE PERCENT: 7.2 % (ref 0–4)
NITRITE URINE, QUANTITATIVE: NEGATIVE
PDW BLD-RTO: 14 % (ref 11.7–14.9)
PH, URINE: 6.5 (ref 5–8)
PLATELET # BLD: 159 K/CU MM (ref 140–440)
PMV BLD AUTO: 10.4 FL (ref 7.5–11.1)
POTASSIUM SERPL-SCNC: 4 MMOL/L (ref 3.5–5.1)
PROTEIN UA: NEGATIVE MG/DL
RBC # BLD: 4.9 M/CU MM (ref 4.6–6.2)
RBC URINE: NEGATIVE /HPF (ref 0–3)
SARS-COV-2, NAAT: NOT DETECTED
SEGMENTED NEUTROPHILS ABSOLUTE COUNT: 10.8 K/CU MM
SEGMENTED NEUTROPHILS RELATIVE PERCENT: 78.9 % (ref 36–66)
SODIUM BLD-SCNC: 137 MMOL/L (ref 135–145)
SOURCE: NORMAL
SPECIFIC GRAVITY UA: 1.01 (ref 1–1.03)
TOTAL IMMATURE NEUTOROPHIL: 0.05 K/CU MM
TOTAL PROTEIN: 6.2 GM/DL (ref 6.4–8.2)
TROPONIN T: <0.01 NG/ML
UROBILINOGEN, URINE: 0.2 MG/DL (ref 0.2–1)
WBC # BLD: 13.6 K/CU MM (ref 4–10.5)
WBC UA: NEGATIVE /HPF (ref 0–2)

## 2022-02-27 PROCEDURE — 72125 CT NECK SPINE W/O DYE: CPT

## 2022-02-27 PROCEDURE — 85025 COMPLETE CBC W/AUTO DIFF WBC: CPT

## 2022-02-27 PROCEDURE — 6360000002 HC RX W HCPCS: Performed by: GENERAL ACUTE CARE HOSPITAL

## 2022-02-27 PROCEDURE — 87075 CULTR BACTERIA EXCEPT BLOOD: CPT

## 2022-02-27 PROCEDURE — 84484 ASSAY OF TROPONIN QUANT: CPT

## 2022-02-27 PROCEDURE — 73521 X-RAY EXAM HIPS BI 2 VIEWS: CPT

## 2022-02-27 PROCEDURE — 87070 CULTURE OTHR SPECIMN AEROBIC: CPT

## 2022-02-27 PROCEDURE — 71045 X-RAY EXAM CHEST 1 VIEW: CPT

## 2022-02-27 PROCEDURE — 87076 CULTURE ANAEROBE IDENT EACH: CPT

## 2022-02-27 PROCEDURE — 81001 URINALYSIS AUTO W/SCOPE: CPT

## 2022-02-27 PROCEDURE — 72131 CT LUMBAR SPINE W/O DYE: CPT

## 2022-02-27 PROCEDURE — 80053 COMPREHEN METABOLIC PANEL: CPT

## 2022-02-27 PROCEDURE — 87040 BLOOD CULTURE FOR BACTERIA: CPT

## 2022-02-27 PROCEDURE — 2580000003 HC RX 258: Performed by: GENERAL ACUTE CARE HOSPITAL

## 2022-02-27 PROCEDURE — 1200000000 HC SEMI PRIVATE

## 2022-02-27 PROCEDURE — 70450 CT HEAD/BRAIN W/O DYE: CPT

## 2022-02-27 PROCEDURE — 87077 CULTURE AEROBIC IDENTIFY: CPT

## 2022-02-27 PROCEDURE — 93005 ELECTROCARDIOGRAM TRACING: CPT | Performed by: EMERGENCY MEDICINE

## 2022-02-27 PROCEDURE — 6370000000 HC RX 637 (ALT 250 FOR IP): Performed by: GENERAL ACUTE CARE HOSPITAL

## 2022-02-27 PROCEDURE — 87635 SARS-COV-2 COVID-19 AMP PRB: CPT

## 2022-02-27 PROCEDURE — 87186 SC STD MICRODIL/AGAR DIL: CPT

## 2022-02-27 PROCEDURE — 99285 EMERGENCY DEPT VISIT HI MDM: CPT

## 2022-02-27 RX ORDER — ACETAMINOPHEN 325 MG/1
650 TABLET ORAL EVERY 4 HOURS PRN
Status: DISCONTINUED | OUTPATIENT
Start: 2022-02-27 | End: 2022-03-02 | Stop reason: HOSPADM

## 2022-02-27 RX ORDER — ONDANSETRON 4 MG/1
4 TABLET, ORALLY DISINTEGRATING ORAL EVERY 8 HOURS PRN
Status: DISCONTINUED | OUTPATIENT
Start: 2022-02-27 | End: 2022-03-02 | Stop reason: HOSPADM

## 2022-02-27 RX ORDER — PANTOPRAZOLE SODIUM 40 MG/1
40 TABLET, DELAYED RELEASE ORAL
Status: DISCONTINUED | OUTPATIENT
Start: 2022-02-28 | End: 2022-03-02 | Stop reason: HOSPADM

## 2022-02-27 RX ORDER — LANOLIN ALCOHOL/MO/W.PET/CERES
1000 CREAM (GRAM) TOPICAL DAILY
Status: DISCONTINUED | OUTPATIENT
Start: 2022-02-28 | End: 2022-03-02 | Stop reason: HOSPADM

## 2022-02-27 RX ORDER — GINSENG 100 MG
CAPSULE ORAL ONCE
Status: COMPLETED | OUTPATIENT
Start: 2022-02-27 | End: 2022-02-27

## 2022-02-27 RX ORDER — ONDANSETRON 2 MG/ML
4 INJECTION INTRAMUSCULAR; INTRAVENOUS EVERY 6 HOURS PRN
Status: DISCONTINUED | OUTPATIENT
Start: 2022-02-27 | End: 2022-03-02 | Stop reason: HOSPADM

## 2022-02-27 RX ORDER — FUROSEMIDE 20 MG/1
20 TABLET ORAL 2 TIMES DAILY
Status: DISCONTINUED | OUTPATIENT
Start: 2022-02-27 | End: 2022-03-02 | Stop reason: HOSPADM

## 2022-02-27 RX ORDER — DIPHENHYDRAMINE HYDROCHLORIDE 50 MG/ML
12.5 INJECTION INTRAMUSCULAR; INTRAVENOUS PRN
Status: DISCONTINUED | OUTPATIENT
Start: 2022-02-27 | End: 2022-03-02 | Stop reason: HOSPADM

## 2022-02-27 RX ORDER — SODIUM CHLORIDE 0.9 % (FLUSH) 0.9 %
5-40 SYRINGE (ML) INJECTION PRN
Status: DISCONTINUED | OUTPATIENT
Start: 2022-02-27 | End: 2022-03-02 | Stop reason: HOSPADM

## 2022-02-27 RX ORDER — MEMANTINE HYDROCHLORIDE 10 MG/1
10 TABLET ORAL 2 TIMES DAILY
Status: DISCONTINUED | OUTPATIENT
Start: 2022-02-27 | End: 2022-03-02 | Stop reason: HOSPADM

## 2022-02-27 RX ORDER — ATORVASTATIN CALCIUM 20 MG/1
20 TABLET, FILM COATED ORAL DAILY
Status: DISCONTINUED | OUTPATIENT
Start: 2022-02-28 | End: 2022-02-28

## 2022-02-27 RX ORDER — NICOTINE 21 MG/24HR
1 PATCH, TRANSDERMAL 24 HOURS TRANSDERMAL DAILY
Status: DISCONTINUED | OUTPATIENT
Start: 2022-02-28 | End: 2022-03-02 | Stop reason: HOSPADM

## 2022-02-27 RX ORDER — POLYETHYLENE GLYCOL 3350 17 G/17G
17 POWDER, FOR SOLUTION ORAL DAILY PRN
Status: DISCONTINUED | OUTPATIENT
Start: 2022-02-27 | End: 2022-03-02 | Stop reason: HOSPADM

## 2022-02-27 RX ORDER — SODIUM CHLORIDE 0.9 % (FLUSH) 0.9 %
5-40 SYRINGE (ML) INJECTION EVERY 12 HOURS SCHEDULED
Status: DISCONTINUED | OUTPATIENT
Start: 2022-02-27 | End: 2022-03-02 | Stop reason: HOSPADM

## 2022-02-27 RX ORDER — ASPIRIN 81 MG/1
81 TABLET ORAL DAILY
Status: DISCONTINUED | OUTPATIENT
Start: 2022-02-28 | End: 2022-03-02 | Stop reason: HOSPADM

## 2022-02-27 RX ORDER — DONEPEZIL HYDROCHLORIDE 10 MG/1
10 TABLET, FILM COATED ORAL NIGHTLY
Status: DISCONTINUED | OUTPATIENT
Start: 2022-02-27 | End: 2022-03-02 | Stop reason: HOSPADM

## 2022-02-27 RX ORDER — SODIUM CHLORIDE, SODIUM LACTATE, POTASSIUM CHLORIDE, CALCIUM CHLORIDE 600; 310; 30; 20 MG/100ML; MG/100ML; MG/100ML; MG/100ML
INJECTION, SOLUTION INTRAVENOUS CONTINUOUS
Status: DISCONTINUED | OUTPATIENT
Start: 2022-02-27 | End: 2022-02-28

## 2022-02-27 RX ORDER — SODIUM CHLORIDE 9 MG/ML
25 INJECTION, SOLUTION INTRAVENOUS PRN
Status: DISCONTINUED | OUTPATIENT
Start: 2022-02-27 | End: 2022-03-02 | Stop reason: HOSPADM

## 2022-02-27 RX ORDER — DOCUSATE SODIUM 100 MG/1
100 CAPSULE, LIQUID FILLED ORAL 2 TIMES DAILY
Status: DISCONTINUED | OUTPATIENT
Start: 2022-02-27 | End: 2022-03-02 | Stop reason: HOSPADM

## 2022-02-27 RX ORDER — METOPROLOL SUCCINATE 25 MG/1
25 TABLET, EXTENDED RELEASE ORAL DAILY
Status: DISCONTINUED | OUTPATIENT
Start: 2022-02-28 | End: 2022-03-01

## 2022-02-27 RX ORDER — TRIHEXYPHENIDYL HYDROCHLORIDE 2 MG/1
2 TABLET ORAL 2 TIMES DAILY
Status: DISCONTINUED | OUTPATIENT
Start: 2022-02-27 | End: 2022-02-28

## 2022-02-27 RX ADMIN — ACETAMINOPHEN 650 MG: 325 TABLET ORAL at 22:25

## 2022-02-27 RX ADMIN — DONEPEZIL HYDROCHLORIDE 10 MG: 10 TABLET, FILM COATED ORAL at 22:25

## 2022-02-27 RX ADMIN — MEMANTINE 10 MG: 10 TABLET ORAL at 22:24

## 2022-02-27 RX ADMIN — SODIUM CHLORIDE, POTASSIUM CHLORIDE, SODIUM LACTATE AND CALCIUM CHLORIDE: 600; 310; 30; 20 INJECTION, SOLUTION INTRAVENOUS at 22:16

## 2022-02-27 RX ADMIN — DOCUSATE SODIUM 100 MG: 100 CAPSULE, LIQUID FILLED ORAL at 22:24

## 2022-02-27 RX ADMIN — BACITRACIN: 500 OINTMENT TOPICAL at 22:23

## 2022-02-27 RX ADMIN — SODIUM CHLORIDE, PRESERVATIVE FREE 10 ML: 5 INJECTION INTRAVENOUS at 22:25

## 2022-02-27 RX ADMIN — CEFEPIME 2000 MG: 2 INJECTION, POWDER, FOR SOLUTION INTRAVENOUS at 22:17

## 2022-02-27 RX ADMIN — FUROSEMIDE 20 MG: 20 TABLET ORAL at 22:24

## 2022-02-27 RX ADMIN — VANCOMYCIN HYDROCHLORIDE 1750 MG: 1 INJECTION, POWDER, LYOPHILIZED, FOR SOLUTION INTRAVENOUS at 22:55

## 2022-02-27 RX ADMIN — TRIHEXYPHENIDYL HYDROCHLORIDE 2 MG: 2 TABLET ORAL at 22:25

## 2022-02-27 RX ADMIN — ENOXAPARIN SODIUM 40 MG: 100 INJECTION SUBCUTANEOUS at 22:24

## 2022-02-27 ASSESSMENT — PAIN DESCRIPTION - PAIN TYPE: TYPE: CHRONIC PAIN

## 2022-02-27 ASSESSMENT — PAIN SCALES - PAIN ASSESSMENT IN ADVANCED DEMENTIA (PAINAD)
BREATHING: 0
BREATHING: 0
FACIALEXPRESSION: 1
BREATHING: 0
CONSOLABILITY: 1
CONSOLABILITY: 0
FACIALEXPRESSION: 1
TOTALSCORE: 4
TOTALSCORE: 4
BODYLANGUAGE: 1
BODYLANGUAGE: 1
CONSOLABILITY: 1
TOTALSCORE: 0
BODYLANGUAGE: 0
FACIALEXPRESSION: 0
NEGVOCALIZATION: 1
NEGVOCALIZATION: 1
NEGVOCALIZATION: 0

## 2022-02-27 ASSESSMENT — PAIN DESCRIPTION - ORIENTATION: ORIENTATION: LOWER

## 2022-02-27 ASSESSMENT — ENCOUNTER SYMPTOMS
DIARRHEA: 0
BACK PAIN: 1
ABDOMINAL DISTENTION: 0
ABDOMINAL PAIN: 0
VOMITING: 0
COUGH: 1
EYE REDNESS: 0
SHORTNESS OF BREATH: 0
COLOR CHANGE: 1
TROUBLE SWALLOWING: 0
SORE THROAT: 0
EYE DISCHARGE: 0
EYE PAIN: 0

## 2022-02-27 ASSESSMENT — PAIN DESCRIPTION - ONSET: ONSET: ON-GOING

## 2022-02-27 ASSESSMENT — PAIN DESCRIPTION - LOCATION: LOCATION: BACK

## 2022-02-27 ASSESSMENT — PAIN DESCRIPTION - FREQUENCY: FREQUENCY: CONTINUOUS

## 2022-02-27 ASSESSMENT — PAIN - FUNCTIONAL ASSESSMENT: PAIN_FUNCTIONAL_ASSESSMENT: PREVENTS OR INTERFERES SOME ACTIVE ACTIVITIES AND ADLS

## 2022-02-27 ASSESSMENT — PAIN DESCRIPTION - DIRECTION: RADIATING_TOWARDS: HIPS

## 2022-02-27 ASSESSMENT — PAIN DESCRIPTION - PROGRESSION: CLINICAL_PROGRESSION: RAPIDLY WORSENING

## 2022-02-27 ASSESSMENT — PAIN SCALES - GENERAL: PAINLEVEL_OUTOF10: 3

## 2022-02-27 ASSESSMENT — PAIN DESCRIPTION - DESCRIPTORS: DESCRIPTORS: ACHING;CONSTANT

## 2022-02-27 NOTE — ED TRIAGE NOTES
Patient arrived to room 3 by [facility] from home with complaints of multiple falls, family unable to provide appropriate care. Patient was seen yesterday in the ED and discharged home with referral to [facility]. Patients clothing soiled in urine, multiple abrasions/bruising noted to the arms and legs. Patient currently c/o back pain. On arrival to the ED, patients clothing removed, papo care provided and changed into gown.

## 2022-02-27 NOTE — ED PROVIDER NOTES
Emergency Department Encounter  Location: [facility] [address] [place] [address]    Patient: Dani Arce  MRN: [id number]  : 1950  Date of evaluation: 2022  ED Provider: Valerie Velásquez DO, FACEP    Chief Complaint:    Fall (C/o multiple falls in the past few days, patient forgetting to use walker. Patients family unable to provide care. Patient soiled in urine, multiple abrasions/bruising noted. )    [place]:  Dani Arce is a 66 y.o. male that presents to the emergency department by squad. Patient had multiple falls yesterday. He was seen in the emergency department yesterday morning. The patient while he was doing okay at home and was instructed use his walker. And evidently he is unable to get his walker through the doorways and he has not been using it. The squad was called 3 more times to the home yesterday for multiple falls. Today he was transported as his wife is unable to get him back up. He is complaining of pain to his chest pain to his neck and his back. He is somewhat unkempt upon arrival with a strong smell of urine and the patient did have wet undergarments from urination. Patient has a history of Parkinson's disease and it seems to be worsening. ROS - see HPI, below listed is current ROS at time of my eval:  At least 10 systems reviewed and otherwise acutely negative except as in the [address]   General:  No fevers, no chills, positive for generalized weakness  Eyes:  No recent vison changes, no discharge  ENT:  No sore throat, no nasal congestion, no hearing changes  Cardiovascular: Positive for chest wall pain, no palpitations  Respiratory:  No shortness of breath, no cough, no wheezing  Gastrointestinal:  No pain, no nausea, no vomiting, no diarrhea  Musculoskeletal:  No muscle pain, no joint pain  Skin:  No rash, no pruritis, no easy bruising  Neurologic:  No speech problems, no headache, no extremity numbness, no extremity tingling, positive for extremity weakness  Psychiatric:  No anxiety  Genitourinary:  No dysuria, no hematuria  Endocrine:  No unexpected weight gain, no unexpected weight loss  Extremities:  no edema, no pain    Past Medical History:   Diagnosis Date    CAD (coronary artery disease)     with stent    Chronic low back pain     HX of laminectomy    Coronary arteriosclerosis     stent    [facility]     Fracture     R ankle    Hypertension     Hypertensive disorder     Osteoarthritis     Parkinson disease ([facility]     Small vessel disease, cerebrovascular     Tobacco abuse      Past Surgical History:   Procedure Laterality Date    ANKLE SURGERY      BACK SURGERY      CARDIAC SURGERY      heart cath    CORONARY ANGIOPLASTY WITH STENT PLACEMENT      HERNIA REPAIR      LUMBAR LAMINECTOMY      TONSILLECTOMY      VASECTOMY       Family History   Problem Relation Age of Onset    High Blood Pressure Mother     High Blood Pressure Father     Heart Disease Father     Osteoporosis Sister          from allergy from an injection    No Known Problems Sister     No Known Problems Sister     No Known Problems Sister     No Known Problems Sister      Social History     Socioeconomic History    Marital status:      Spouse name: Not on file    Number of children: Not on file    Years of education: Not on file    Highest education level: Not on file   Occupational History    Not on file   Tobacco Use    Smoking status: Current Every Day Smoker     Packs/day: 0.50     Years: 57.00     Pack years: 28.50     Types: Cigarettes     Start date: 1964    Smokeless tobacco: Never Used   Vaping Use    Vaping Use: Never used   Substance and Sexual Activity    Alcohol use: Not Currently    Drug use: No    Sexual activity: Not on file   Other Topics Concern    Not on file   Social History Narrative    Not on file     Social Determinants of Health     Financial Resource Strain: Low Risk     Difficulty of Paying Living Expenses: Not hard at all   Food Insecurity: No Food Insecurity    Worried About [address] [place] [address] in the Last Year: Never true    Ran Out of Food in the Last Year: Never true   Transportation Needs:     Lack of Transportation (Medical): Not on file    Lack of Transportation (Non-Medical): Not on file   Physical Activity:     Days of Exercise per Week: Not on file    Minutes of Exercise per Session: Not on file   Stress:     Feeling of Stress : Not on file   Social Connections:     Frequency of Communication with Friends and Family: Not on file    Frequency of Social Gatherings with Friends and Family: Not on file    Attends Confucianist Services: Not on file    Active Member of [address] [place] [address] or Organizations: Not on file    Attends Club or Organization Meetings: Not on file    Marital Status: Not on file   Intimate Partner Violence:     Fear of Current or Ex-Partner: Not on file    Emotionally Abused: Not on file    Physically Abused: Not on file    Sexually Abused: Not on file   Housing Stability:     Unable to Pay for Housing in the Last Year: Not on file    Number of [place] in the Last Year: Not on file    Unstable Housing in the Last Year: Not on file     No current facility-administered medications for this encounter. Current Outpatient Medications   Medication Sig Dispense Refill    sulfamethoxazole-trimethoprim (BACTRIM DS) 800-160 MG per tablet Take 1 tablet by mouth 2 times daily for 10 days 20 tablet 0    methylPREDNISolone (MEDROL, CIERRA,) 4 MG tablet Take by mouth.  1 kit 0    atorvastatin (LIPITOR) 20 MG tablet TAKE ONE TABLET BY MOUTH ONCE NIGHTLY 90 tablet 1    metoprolol succinate (TOPROL XL) 25 MG extended release tablet TAKE ONE TABLET BY MOUTH DAILY 90 tablet 0    docusate sodium (COLACE) 100 MG capsule Take 100 mg by mouth 2 times daily      cyanocobalamin 1000 MCG tablet Take 1 tablet by mouth daily 90 tablet 3    furosemide (LASIX) 20 MG tablet Take 20 mg by mouth 2 times daily  acetaminophen (TYLENOL) 325 MG tablet Take 650 mg by mouth every 4 hours as needed for Pain      donepezil (ARICEPT) 10 MG tablet Take 10 mg by mouth nightly      memantine (NAMENDA) 10 MG tablet Take 10 mg by mouth 2 times daily      aspirin 81 MG EC tablet Take 81 mg by mouth daily.  trihexyphenidyl (ARTANE) 2 MG tablet Take by mouth Take 1 1/2 tablet bid       Allergies   Allergen Reactions    Chantix [Varenicline]     Daypro [Oxaprozin]        Nursing Notes Reviewed    Physical Exam:  ED Triage Vitals   Enc Vitals Group      BP       Pulse       Resp       Temp       Temp src       SpO2       Weight       Height       Head Circumference       Peak Flow       Pain Score       Pain Loc       Pain Edu? Excl. in [address]? GENERAL APPEARANCE: Awake and alert. Cooperative. No acute distress. Nontoxic in appearance. He is extremely disheveled and smells of urine  HEAD: Normocephalic. Atraumatic. EYES: EOM's grossly intact. Sclera anicteric. ENT: Tolerates saliva. No trismus. NECK: Supple. Trachea midline. No steps palpable. Mild tenderness in the paraspinal musculature  CARDIO: RRR. Radial pulse 2+. Chest wall is tender to palpation in the left parasternal region  LUNGS: Respirations unlabored. CTAB. ABDOMEN: Soft. Non-distended. Non-tender. EXTREMITIES: No acute deformities. Patient has venous stasis in his lower extremities with sores on his left lower extremity. Ace wrap to his knees and left elbow are intact from yesterday's visit. He has an abrasion on his right elbow. SKIN: Warm and dry. Multiple abrasions and contusions are seen across his upper and lower extremities. NEUROLOGICAL: No gross facial drooping. Moves all 4 extremities spontaneously. PSYCHIATRIC: Normal mood.      Labs:  Results for orders placed or performed during the hospital encounter of 02/27/22   COVID-19, Rapid    Specimen: Nasopharyngeal   Result Value Ref Range    Source THROAT     SARS-CoV-2, NAAT NOT DETECTED NOT DETECTED   CBC with Auto Differential   Result Value Ref Range    WBC 13.6 (H) 4.0 - 10.5 K/CU MM    RBC 4.90 4.6 - 6.2 M/CU MM    Hemoglobin 13.5 13.5 - 18.0 GM/DL    Hematocrit 43.5 42 - 52 %    MCV 88.8 78 - 100 FL    MCH 27.6 27 - 31 PG    MCHC 31.0 (L) 32.0 - 36.0 %    RDW 14.0 11.7 - 14.9 %    Platelets 005 132 - 246 K/CU MM    MPV 10.4 7.5 - 11.1 FL    Differential Type AUTOMATED DIFFERENTIAL     Segs Relative 78.9 (H) 36 - 66 %    Lymphocytes % 13.0 (L) 24 - 44 %    Monocytes % 7.2 (H) 0 - 4 %    Eosinophils % 0.3 0 - 3 %    Basophils % 0.2 0 - 1 %    Segs Absolute 10.8 K/CU MM    Lymphocytes Absolute 1.8 K/CU MM    Monocytes Absolute 1.0 K/CU MM    Eosinophils Absolute 0.0 K/CU MM    Basophils Absolute 0.0 K/CU MM    Immature Neutrophil % 0.4 0 - 0.43 %    Total Immature Neutrophil 0.05 K/CU MM   Comprehensive Metabolic Panel   Result Value Ref Range    Sodium 137 135 - 145 MMOL/L    Potassium 4.0 3.5 - 5.1 MMOL/L    Chloride 101 99 - 110 mMol/L    CO2 27 21 - 32 MMOL/L    BUN 15 6 - 23 MG/DL    CREATININE 0.9 0.9 - 1.3 MG/DL    Glucose 96 70 - 99 MG/DL    Calcium 9.8 8.3 - 10.6 MG/DL    Albumin 3.9 3.4 - 5.0 GM/DL    Total Protein 6.2 (L) 6.4 - 8.2 GM/DL    Total Bilirubin 0.6 0.0 - 1.0 MG/DL    ALT 16 10 - 40 U/L    AST 25 15 - 37 IU/L    Alkaline Phosphatase 110 40 - 129 IU/L    GFR Non-African American >60 >60 mL/min/1.73m2    GFR African American >60 >60 mL/min/1.73m2    Anion Gap 9 4 - 16   EKG 12 Lead   Result Value Ref Range    Ventricular Rate 62 BPM    Atrial Rate 62 BPM    P-R Interval 140 ms    QRS Duration 78 ms    Q-T Interval 432 ms    QTc Calculation (Bazett) 438 ms    P Axis 80 degrees    R Axis -35 degrees    T Axis -1 degrees    Diagnosis       Sinus rhythm with premature supraventricular complexes  Left axis deviation  Inferior infarct , age undetermined  Abnormal ECG  When compared with ECG of 27-FEB-2022 16:39,  fusion complexes are no longer present  premature ventricular complexes are no longer present  Inferior infarct is now present  ST no longer depressed in Lateral leads  Inverted T waves have replaced nonspecific T wave abnormality in Inferior leads         EKG (if obtained): (All EKG's are interpreted by myself in the absence of a cardiologist)  The Ekg interpreted by me in the absence of a cardiologist shows. normal sinus rhythm with a rate of 62 with P VCs noted  Axis is   Normal  QTc is  438  Intervals and Durations are unremarkable. No specific ST-T wave changes appreciated. No evidence of acute ischemia. No significant change from prior EKG dated 13 November 2018    Radiographs (if obtained):  [] The following radiograph was interpreted by myself in the absence of a radiologist:  [x] Radiologist's Report reviewed at time of ED visit:  XR CHEST PORTABLE   Final Result   Chronic findings in the chest without acute airspace disease identified. CT Lumbar Spine WO Contrast   Final Result   1. No acute osseous abnormality identified. 2.  Rectal distension with at least moderate stool burden, partially   visualized. 3.  Infrarenal aortic enlargement measuring up to 3.1 cm. RECOMMENDATIONS:   For management of fusiform AAA:      3.0-3.4 cm AAA, recommend follow-up every 3 years. d      Note: Recommend Vascular consultation if a fusiform AAA enlarges by >0.5 cm   in 6 months or >1 cm in 1 year or for a saccular AAA of any size. References: Ancil Slider Radiol 2013; 30(95):662-726; J Vasc Surg. 2018; 67:2-77         CT Cervical Spine WO Contrast    (Results Pending)       ED Course and MDM:  Patient presents to the emergency department with complaints of multiple falls. He has Parkinson's disease and has had multiple falls since he was seen yesterday here in the emergency department. Patient's family is no longer able to take care of him at home. He was soaked in urine when he got here.   Urinalysis is pending at the time of this dictation as are the final readings of the cervical spine CT scan. [facility] results of these [facility]. He will most likely need to be admitted for placement and physical therapy evaluation. Final Impression:  1. Parkinson's disease ([address]    2. Multiple falls    3. Generalized weakness      DISPOSITION     Patient referred to: No follow-up provider specified.   Discharge medications:  New Prescriptions    No medications on file     (Please note that portions of this note may have been completed with a voice recognition program. Efforts were made to edit the dictations but occasionally words are mis-transcribed.)    Luis Goldstein DO, [facility]  Board certified in [address] [facility], [address] [facility]  02/27/22 [place]

## 2022-02-28 LAB
ANION GAP SERPL CALCULATED.3IONS-SCNC: 8 MMOL/L (ref 4–16)
BASOPHILS ABSOLUTE: 0 K/CU MM
BASOPHILS RELATIVE PERCENT: 0.4 % (ref 0–1)
BUN BLDV-MCNC: 12 MG/DL (ref 6–23)
CALCIUM SERPL-MCNC: 9 MG/DL (ref 8.3–10.6)
CHLORIDE BLD-SCNC: 103 MMOL/L (ref 99–110)
CO2: 26 MMOL/L (ref 21–32)
CREAT SERPL-MCNC: 0.9 MG/DL (ref 0.9–1.3)
DIFFERENTIAL TYPE: ABNORMAL
EKG ATRIAL RATE: 62 BPM
EKG DIAGNOSIS: NORMAL
EKG P AXIS: 80 DEGREES
EKG P-R INTERVAL: 140 MS
EKG Q-T INTERVAL: 432 MS
EKG QRS DURATION: 78 MS
EKG QTC CALCULATION (BAZETT): 438 MS
EKG R AXIS: -35 DEGREES
EKG T AXIS: -1 DEGREES
EKG VENTRICULAR RATE: 62 BPM
EOSINOPHILS ABSOLUTE: 0.1 K/CU MM
EOSINOPHILS RELATIVE PERCENT: 1.2 % (ref 0–3)
GFR AFRICAN AMERICAN: >60 ML/MIN/1.73M2
GFR NON-AFRICAN AMERICAN: >60 ML/MIN/1.73M2
GLUCOSE BLD-MCNC: 86 MG/DL (ref 70–99)
HCT VFR BLD CALC: 40.8 % (ref 42–52)
HEMOGLOBIN: 12.5 GM/DL (ref 13.5–18)
IMMATURE NEUTROPHIL %: 0.4 % (ref 0–0.43)
LYMPHOCYTES ABSOLUTE: 1.6 K/CU MM
LYMPHOCYTES RELATIVE PERCENT: 15.5 % (ref 24–44)
MCH RBC QN AUTO: 27.3 PG (ref 27–31)
MCHC RBC AUTO-ENTMCNC: 30.6 % (ref 32–36)
MCV RBC AUTO: 89.1 FL (ref 78–100)
MONOCYTES ABSOLUTE: 0.9 K/CU MM
MONOCYTES RELATIVE PERCENT: 8.7 % (ref 0–4)
PDW BLD-RTO: 14 % (ref 11.7–14.9)
PLATELET # BLD: 137 K/CU MM (ref 140–440)
PMV BLD AUTO: 10.3 FL (ref 7.5–11.1)
POTASSIUM SERPL-SCNC: 3.9 MMOL/L (ref 3.5–5.1)
PROCALCITONIN: 0.18
RBC # BLD: 4.58 M/CU MM (ref 4.6–6.2)
SEGMENTED NEUTROPHILS ABSOLUTE COUNT: 7.5 K/CU MM
SEGMENTED NEUTROPHILS RELATIVE PERCENT: 73.8 % (ref 36–66)
SODIUM BLD-SCNC: 137 MMOL/L (ref 135–145)
TOTAL IMMATURE NEUTOROPHIL: 0.04 K/CU MM
WBC # BLD: 10.2 K/CU MM (ref 4–10.5)

## 2022-02-28 PROCEDURE — 36415 COLL VENOUS BLD VENIPUNCTURE: CPT

## 2022-02-28 PROCEDURE — 2580000003 HC RX 258: Performed by: GENERAL ACUTE CARE HOSPITAL

## 2022-02-28 PROCEDURE — 6360000002 HC RX W HCPCS: Performed by: GENERAL ACUTE CARE HOSPITAL

## 2022-02-28 PROCEDURE — 1200000000 HC SEMI PRIVATE

## 2022-02-28 PROCEDURE — 85025 COMPLETE CBC W/AUTO DIFF WBC: CPT

## 2022-02-28 PROCEDURE — 93010 ELECTROCARDIOGRAM REPORT: CPT | Performed by: INTERNAL MEDICINE

## 2022-02-28 PROCEDURE — 97162 PT EVAL MOD COMPLEX 30 MIN: CPT

## 2022-02-28 PROCEDURE — 97166 OT EVAL MOD COMPLEX 45 MIN: CPT

## 2022-02-28 PROCEDURE — 97116 GAIT TRAINING THERAPY: CPT

## 2022-02-28 PROCEDURE — 97530 THERAPEUTIC ACTIVITIES: CPT

## 2022-02-28 PROCEDURE — 84145 PROCALCITONIN (PCT): CPT

## 2022-02-28 PROCEDURE — 6370000000 HC RX 637 (ALT 250 FOR IP): Performed by: GENERAL ACUTE CARE HOSPITAL

## 2022-02-28 PROCEDURE — 80048 BASIC METABOLIC PNL TOTAL CA: CPT

## 2022-02-28 PROCEDURE — 99211 OFF/OP EST MAY X REQ PHY/QHP: CPT

## 2022-02-28 PROCEDURE — 6370000000 HC RX 637 (ALT 250 FOR IP): Performed by: PHYSICIAN ASSISTANT

## 2022-02-28 RX ORDER — TRIHEXYPHENIDYL HYDROCHLORIDE 2 MG/1
3 TABLET ORAL 2 TIMES DAILY
Status: DISCONTINUED | OUTPATIENT
Start: 2022-02-28 | End: 2022-03-02 | Stop reason: HOSPADM

## 2022-02-28 RX ORDER — ATORVASTATIN CALCIUM 20 MG/1
20 TABLET, FILM COATED ORAL NIGHTLY
Status: DISCONTINUED | OUTPATIENT
Start: 2022-03-01 | End: 2022-03-02 | Stop reason: HOSPADM

## 2022-02-28 RX ADMIN — DOCUSATE SODIUM 100 MG: 100 CAPSULE, LIQUID FILLED ORAL at 08:34

## 2022-02-28 RX ADMIN — CYANOCOBALAMIN TAB 1000 MCG 1000 MCG: 1000 TAB at 08:34

## 2022-02-28 RX ADMIN — CEFEPIME 2000 MG: 2 INJECTION, POWDER, FOR SOLUTION INTRAVENOUS at 13:30

## 2022-02-28 RX ADMIN — SODIUM CHLORIDE, PRESERVATIVE FREE 10 ML: 5 INJECTION INTRAVENOUS at 19:41

## 2022-02-28 RX ADMIN — VANCOMYCIN HYDROCHLORIDE 750 MG: 750 INJECTION, POWDER, LYOPHILIZED, FOR SOLUTION INTRAVENOUS at 12:00

## 2022-02-28 RX ADMIN — ATORVASTATIN CALCIUM 20 MG: 20 TABLET, FILM COATED ORAL at 08:35

## 2022-02-28 RX ADMIN — MEMANTINE 10 MG: 10 TABLET ORAL at 08:35

## 2022-02-28 RX ADMIN — ENOXAPARIN SODIUM 40 MG: 100 INJECTION SUBCUTANEOUS at 19:41

## 2022-02-28 RX ADMIN — SODIUM CHLORIDE 25 ML: 9 INJECTION, SOLUTION INTRAVENOUS at 22:06

## 2022-02-28 RX ADMIN — FUROSEMIDE 20 MG: 20 TABLET ORAL at 18:33

## 2022-02-28 RX ADMIN — MEMANTINE 10 MG: 10 TABLET ORAL at 19:41

## 2022-02-28 RX ADMIN — DONEPEZIL HYDROCHLORIDE 10 MG: 10 TABLET, FILM COATED ORAL at 19:41

## 2022-02-28 RX ADMIN — TRIHEXYPHENIDYL HYDROCHLORIDE 2 MG: 2 TABLET ORAL at 08:35

## 2022-02-28 RX ADMIN — PANTOPRAZOLE SODIUM 40 MG: 40 TABLET, DELAYED RELEASE ORAL at 05:38

## 2022-02-28 RX ADMIN — CEFEPIME 2000 MG: 2 INJECTION, POWDER, FOR SOLUTION INTRAVENOUS at 05:39

## 2022-02-28 RX ADMIN — ASPIRIN 81 MG: 81 TABLET, COATED ORAL at 08:35

## 2022-02-28 RX ADMIN — SODIUM CHLORIDE 25 ML: 9 INJECTION, SOLUTION INTRAVENOUS at 22:54

## 2022-02-28 RX ADMIN — METOPROLOL SUCCINATE 25 MG: 25 TABLET, EXTENDED RELEASE ORAL at 08:35

## 2022-02-28 RX ADMIN — FUROSEMIDE 20 MG: 20 TABLET ORAL at 08:35

## 2022-02-28 RX ADMIN — VANCOMYCIN HYDROCHLORIDE 750 MG: 750 INJECTION, POWDER, LYOPHILIZED, FOR SOLUTION INTRAVENOUS at 22:55

## 2022-02-28 RX ADMIN — DOCUSATE SODIUM 100 MG: 100 CAPSULE, LIQUID FILLED ORAL at 19:41

## 2022-02-28 RX ADMIN — CEFEPIME 2000 MG: 2 INJECTION, POWDER, FOR SOLUTION INTRAVENOUS at 22:06

## 2022-02-28 RX ADMIN — TRIHEXYPHENIDYL HYDROCHLORIDE 3 MG: 2 TABLET ORAL at 19:41

## 2022-02-28 ASSESSMENT — PAIN SCALES - GENERAL
PAINLEVEL_OUTOF10: 0

## 2022-02-28 ASSESSMENT — PAIN SCALES - PAIN ASSESSMENT IN ADVANCED DEMENTIA (PAINAD)
FACIALEXPRESSION: 0
NEGVOCALIZATION: 0
TOTALSCORE: 0
FACIALEXPRESSION: 0
NEGVOCALIZATION: 0
BODYLANGUAGE: 0
CONSOLABILITY: 0
BREATHING: 0
CONSOLABILITY: 0
BREATHING: 0
TOTALSCORE: 0
BODYLANGUAGE: 0

## 2022-02-28 NOTE — PROGRESS NOTES
Bilateral lower legs cleansed with Hibiclens. Bacitracin was applied to the ulcer on the left lower leg then a telfa and wrapped with gauze. Both legs were then wrapped with ACE bandages from the toes up. Patient tolerated the procedure well with no c/o pain.

## 2022-02-28 NOTE — PROGRESS NOTES
Physical Therapy    Facility/Department: [facility] UNIT  Initial Assessment    NAME: Elver Denton  : 1950  MRN: [id number]    Date of Service: 2022    Discharge Recommendations:  Subacute/Skilled Nursing Facility        Assessment   Body structures, Functions, Activity limitations: Decreased ROM; Decreased ADL status; Decreased functional mobility ; Decreased strength;Decreased safe awareness  Assessment: Patient is a 65 yo male admitted to [facility] for falls; patient demonstrates impaired mobility and medical Hx includes Parkinsons-; patient will benefir from skilled PT intervention to maximize his function and address deficits related to falling  Treatment Diagnosis: impaired mobility  Prognosis: Fair  Decision Making: Medium Complexity  History: PF- Hx of falls  Exam: ROM/ strength/ bed mobility/transfers/ gait  Clinical Presentation: evolving  Barriers to Learning: none  REQUIRES PT FOLLOW UP: Yes       Patient Diagnosis(es): The primary encounter diagnosis was Parkinson's disease ([address] Diagnoses of Multiple falls and Generalized weakness were also pertinent to this visit. has a past medical history of CAD (coronary artery disease), Chronic low back pain, Coronary arteriosclerosis, Dementia ([address] Fracture, Hypertension, Hypertensive disorder, Osteoarthritis, Parkinson disease ([address] Small vessel disease, cerebrovascular, and Tobacco abuse.   has a past surgical history that includes back surgery; Vasectomy; Cardiac surgery; lumbar laminectomy; Coronary angioplasty with stent; hernia repair; Ankle surgery; and Tonsillectomy.     Restrictions  Restrictions/Precautions  Restrictions/Precautions: Fall Risk  Position Activity Restriction  Other position/activity restrictions: purewick for men  Vision/Hearing  Vision Exceptions: Wears glasses at all times  Hearing Exceptions: Hard of hearing/hearing concerns     Subjective  General  Chart Reviewed: Yes  Patient assessed for rehabilitation services?: Yes  Follows Commands: Within Functional Limits  Pain Screening  Patient Currently in Pain: No     Pre Treatment Pain Screening  Pain at present: 0    Orientation  Orientation  Overall Orientation Status: Impaired  Orientation Level: Disoriented to time  Social/Functional History  Social/Functional History  Lives With: Spouse  Type of Home: Mobile home  Home Layout: One level  Home Access: Ramped entrance  Bathroom Shower/Tub: Tub/Shower unit  Bathroom Toilet: Standard  Bathroom Equipment: Grab bars in shower  Home Equipment: Rolling walker  Receives Help From: Family  ADL Assistance: Needs assistance  Homemaking Assistance: Needs assistance  Homemaking Responsibilities: No  Ambulation Assistance: Needs assistance (uses RW)  Transfer Assistance: Needs assistance  Active : No  Occupation: Retired  Type of occupation:   Leisure & [address]: used to do [employer]  Cognition        Objective     Observation/Palpation  Observation: in bed    AROM RLE (degrees)  RLE General AROM: knee FLEX AROM limited to~100*  AROM LLE (degrees)  LLE General AROM: knee FLEX AROM limited to~100*  Strength RLE  Comment: I SLR -- strength grossly 3- to 3+/5  Strength LLE  Comment: I SLR - weaker than R; strength grossly 2+/5 to 3=/5        Bed mobility  Supine to Sit: Maximum assistance  Transfers  Sit to Stand: Moderate Assistance  Stand to sit: Moderate Assistance  Ambulation  Ambulation?: Yes  Ambulation 1  Surface: level tile  Device: Rolling Walker  Assistance:  Moderate assistance  Quality of Gait: patient leans backward  Gait Deviations: Slow [place];Decreased step length  Distance: 5 ft     Balance  Standing - Static: Poor  Standing - Dynamic: Poor        Plan   Plan  Times per week: Mon- Sat 4x/wk  Current Treatment Recommendations: Strengthening,Transfer Training,Endurance Training,Gait Training,Balance Training,Functional Mobility Training    G-Code       OutComes Score AM-PAC Score             Goals  Short term goals  Time Frame for Short term goals: 1 week  Short term goal 1: patient will safely perform bed mobility activity with min A  Short term goal 2: patient will safely perform transfer activity with min A  Short term goal 3: patient will safely ambulate 25 ft using RW with min A       Therapy Time   Individual Concurrent Group Co-treatment   Time In 0930 0940   Time Out 0940     1005   Minutes 10     25   Timed Code Treatment Minutes: 10 Minutes       Denisse Newsome PT

## 2022-02-28 NOTE — PROGRESS NOTES
Progress Note             Name:  Talita Payan /Age/Sex: 1950  (66 y.o. male)   MRN & CSN:  [id number] & [id number] Admission Date/Time: 2022 8:09 PM EST   Location:   PCP: Clarita Gentile DO         Chief Complaint     Chief Complaint   [facility]   [facility]     C/o multiple falls in the past few days, patient forgetting to use walker. Patients family unable to provide care. Patient soiled in urine, multiple abrasions/bruising noted. History Obtained From   Patient, electronic medical record    Overnight events   No acute events. Endorses right hip pain from fall yesterday. Past Medical History     Past Medical History:   Diagnosis Date    CAD (coronary artery disease)     with stent    Chronic low back pain     HX of laminectomy    Coronary arteriosclerosis     stent    Dementia [address] [address]     Fracture     R ankle    Hypertension     Hypertensive disorder     Osteoarthritis     Parkinson disease ([facility] [address]     Small vessel disease, cerebrovascular     Tobacco abuse         Past Surgical History     Past Surgical History:   Procedure Laterality Date    ANKLE SURGERY      BACK SURGERY      CARDIAC SURGERY      heart cath    CORONARY ANGIOPLASTY WITH STENT PLACEMENT      HERNIA REPAIR      LUMBAR LAMINECTOMY      TONSILLECTOMY      VASECTOMY          Medications Prior to Admission     Prior to Admission medications    Medication Sig Start Date End Date Taking?  Authorizing Provider   atorvastatin (LIPITOR) 20 MG tablet TAKE ONE TABLET BY MOUTH ONCE NIGHTLY 21  Yes Clarita Gentile, DO   metoprolol succinate (TOPROL XL) 25 MG extended release tablet TAKE ONE TABLET BY MOUTH DAILY 21  Yes Clarita Gentile, DO   docusate sodium (COLACE) 100 MG capsule Take 100 mg by mouth 2 times daily   Yes Historical Provider, MD   cyanocobalamin 1000 MCG tablet Take 1 tablet by mouth daily 10/9/19  Yes Clarita Gentile DO   furosemide (LASIX) 20 MG tablet Take 20 mg by mouth 2 times daily   Yes Joelle Mancuso MD   donepezil (ARICEPT) 10 MG tablet Take 10 mg by mouth nightly   Yes Historical Provider, MD   memantine (NAMENDA) 10 MG tablet Take 10 mg by mouth 2 times daily   Yes Historical Provider, MD   aspirin 81 MG EC tablet Take 81 mg by mouth daily. Yes Historical Provider, MD   trihexyphenidyl (ARTANE) 2 MG tablet Take by mouth Take 1 1/2 tablet bid   Yes Historical Provider, MD   sulfamethoxazole-trimethoprim (BACTRIM DS) 800-160 MG per tablet Take 1 tablet by mouth 2 times daily for 10 days 2/26/22 3/8/22  Bing Knowles DO   methylPREDNISolone (MEDROL, CIERRA,) 4 MG tablet Take by mouth. 22   Bing Knowles DO   acetaminophen (TYLENOL) 325 MG tablet Take 650 mg by mouth every 4 hours as needed for Pain    Historical Provider, MD        Allergies   Chantix [varenicline] and Daypro [oxaprozin]    Social History     Social History     Tobacco History     Smoking Status  Current Every Day Smoker Smoking Start Date  1964 Smoking Frequency  0.5 packs/day for 62 years (28.5 pk yrs) Smoking Tobacco Type  Cigarettes    Smokeless Tobacco Use  Never Used          Alcohol History     Alcohol Use Status  Not Currently          Drug Use     Drug Use Status  No          Sexual Activity     Sexually Active  Not Asked                Family History     Family History   Problem Relation Age of Onset    High Blood Pressure Mother     High Blood Pressure Father     Heart Disease Father     Osteoporosis Sister          from allergy from an injection    No Known Problems Sister     No Known Problems Sister     No Known Problems Sister     No Known Problems Sister        Physical Exam    BP (!) 181/82   Pulse 72   Temp 97 °F (36.1 °C) (Infrared)   Resp 18   Ht 6' 2\" (1.88 m)   Wt 193 lb 14.4 oz (88 kg)   SpO2 92%   BMI 24.90 kg/m²   Physical Exam  Vitals and nursing note reviewed. Constitutional:       Appearance: Normal appearance. He is ill-appearing. He is not toxic-appearing. Comments: Appears older than stated age, unkempt, disheveled, smells of urine, visible stool noted on legs and feet   HENT:      Head: Normocephalic and atraumatic. Right Ear: External ear normal.      Left Ear: External ear normal.      Nose: Nose normal.      Mouth/Throat:      Mouth: Mucous membranes are dry. Pharynx: Oropharynx is clear. Eyes:      General:         Right eye: No discharge. Left eye: No discharge. Extraocular Movements: Extraocular movements intact. Conjunctiva/sclera: Conjunctivae normal.   Cardiovascular:      Rate and Rhythm: Normal rate and regular rhythm. Pulses: Normal pulses. Heart sounds: Normal heart sounds. Pulmonary:      Effort: Pulmonary effort is normal. No respiratory distress. Breath sounds: Decreased breath sounds present. No wheezing, rhonchi or rales. Abdominal:      General: Bowel sounds are normal.      Palpations: Abdomen is soft. Tenderness: There is no abdominal tenderness. There is no right CVA tenderness, left CVA tenderness or guarding. Musculoskeletal:         General: Tenderness present. Cervical back: Normal range of motion and neck supple. No rigidity or tenderness. Right lower leg: Edema present. Left lower leg: Edema present. Skin:     General: Skin is warm and dry. Capillary Refill: Capillary refill takes less than 2 seconds. Coloration: Skin is not jaundiced. Findings: Erythema and wound present. Comments: Left lower extremity with foul-smelling venous stasis ulcers noted. Purulent drainage noted. No fluctuance. No lymphangitic streaking. Neurological:      Mental Status: He is alert. GCS: GCS eye subscore is 4. GCS verbal subscore is 4. GCS motor subscore is 6. Psychiatric:         Attention and Perception: He is inattentive.          Mood and Affect: Mood and affect normal.         Speech: Speech is tangential.         Behavior: Behavior normal. Behavior is cooperative. Thought Content: Thought content normal.         Cognition and Memory: Cognition is impaired. Memory is impaired. He exhibits impaired recent memory and impaired remote memory.      Labs      Recent Results (from the past 24 hour(s))   CBC with Auto Differential    Collection Time: 02/27/22  4:30 PM   Result Value Ref Range    WBC 13.6 (H) 4.0 - 10.5 K/CU MM    RBC 4.90 4.6 - 6.2 M/CU MM    Hemoglobin 13.5 13.5 - 18.0 GM/DL    Hematocrit 43.5 42 - 52 %    MCV 88.8 78 - 100 FL    MCH 27.6 27 - 31 PG    MCHC 31.0 (L) 32.0 - 36.0 %    RDW 14.0 11.7 - 14.9 %    Platelets 355 157 - 994 K/CU MM    MPV 10.4 7.5 - 11.1 FL    Differential Type AUTOMATED DIFFERENTIAL     Segs Relative 78.9 (H) 36 - 66 %    Lymphocytes % 13.0 (L) 24 - 44 %    Monocytes % 7.2 (H) 0 - 4 %    Eosinophils % 0.3 0 - 3 %    Basophils % 0.2 0 - 1 %    Segs Absolute 10.8 K/CU MM    Lymphocytes Absolute 1.8 K/CU MM    Monocytes Absolute 1.0 K/CU MM    Eosinophils Absolute 0.0 K/CU MM    Basophils Absolute 0.0 K/CU MM    Immature Neutrophil % 0.4 0 - 0.43 %    Total Immature Neutrophil 0.05 K/CU MM   Comprehensive Metabolic Panel    Collection Time: 02/27/22  4:30 PM   Result Value Ref Range    Sodium 137 135 - 145 MMOL/L    Potassium 4.0 3.5 - 5.1 MMOL/L    Chloride 101 99 - 110 mMol/L    CO2 27 21 - 32 MMOL/L    BUN 15 6 - 23 MG/DL    CREATININE 0.9 0.9 - 1.3 MG/DL    Glucose 96 70 - 99 MG/DL    Calcium 9.8 8.3 - 10.6 MG/DL    Albumin 3.9 3.4 - 5.0 GM/DL    Total Protein 6.2 (L) 6.4 - 8.2 GM/DL    Total Bilirubin 0.6 0.0 - 1.0 MG/DL    ALT 16 10 - 40 U/L    AST 25 15 - 37 IU/L    Alkaline Phosphatase 110 40 - 129 IU/L    GFR Non-African American >60 >60 mL/min/1.73m2    GFR African American >60 >60 mL/min/1.73m2    Anion Gap 9 4 - 16   Urinalysis with Microscopic    Collection Time: 02/27/22  4:30 PM   Result Value Ref Range    Color, UA YELLOW YELLOW    Clarity, UA SL HAZY CLEAR    Glucose, Urine NEGATIVE NEGATIVE MG/DL    Bilirubin Urine NEGATIVE NEGATIVE MG/DL    Ketones, Urine NEGATIVE NEGATIVE MG/DL    Specific Gravity, UA 1.015 1.001 - 1.035    Blood, Urine TRACE NEGATIVE    pH, Urine 6.5 5.0 - 8.0    Protein, UA NEGATIVE NEGATIVE MG/DL    Urobilinogen, Urine 0.2 0.2 - 1.0 MG/DL    Nitrite Urine, Quantitative NEGATIVE NEGATIVE    Leukocyte Esterase, Urine NEGATIVE NEGATIVE    RBC, UA NEGATIVE 0 - 3 /HPF    WBC, UA NEGATIVE 0 - 2 /HPF    Epithelial Cells, UA 0 TO 1 /HPF    Cast Type NEGATIVE (A) NO CAST FORMS SEEN /HPF    Bacteria, UA FEW NEGATIVE /HPF    Crystal Type NEGATIVE NEGATIVE /HPF   EKG 12 Lead    Collection Time: 02/27/22  4:40 PM   Result Value Ref Range    Ventricular Rate 62 BPM    Atrial Rate 62 BPM    P-R Interval 140 ms    QRS Duration 78 ms    Q-T Interval 432 ms    QTc Calculation (Bazett) 438 ms    P Axis 80 degrees    R Axis -35 degrees    T Axis -1 degrees    Diagnosis       Sinus rhythm with premature supraventricular complexes  Left axis deviation  Inferior infarct , age undetermined  Abnormal ECG  When compared with ECG of 27-FEB-2022 16:39,  fusion complexes are no longer present  premature ventricular complexes are no longer present  Inferior infarct is now present  ST no longer depressed in Lateral leads  Inverted T waves have replaced nonspecific T wave abnormality in Inferior leads     COVID-19, Rapid    Collection Time: 02/27/22  5:30 PM    Specimen: Nasopharyngeal   Result Value Ref Range    Source THROAT     SARS-CoV-2, NAAT NOT DETECTED NOT DETECTED   Troponin    Collection Time: 02/27/22  9:00 PM   Result Value Ref Range    Troponin T <0.010 <0.01 NG/ML   Basic Metabolic Panel w/ Reflex to MG    Collection Time: 02/28/22  5:55 AM   Result Value Ref Range    Sodium 137 135 - 145 MMOL/L    Potassium 3.9 3.5 - 5.1 MMOL/L    Chloride 103 99 - 110 mMol/L    CO2 26 21 - 32 MMOL/L    Anion Gap 8 4 - 16    BUN 12 6 - 23 MG/DL    CREATININE 0.9 0.9 - 1.3 MG/DL    Glucose 86 70 - 99 MG/DL Calcium 9.0 8.3 - 10.6 MG/DL    GFR Non-African American >60 >60 mL/min/1.73m2    GFR African American >60 >60 mL/min/1.73m2   CBC with Auto Differential    Collection Time: 02/28/22  5:55 AM   Result Value Ref Range    WBC 10.2 4.0 - 10.5 K/CU MM    RBC 4.58 (L) 4.6 - 6.2 M/CU MM    Hemoglobin 12.5 (L) 13.5 - 18.0 GM/DL    Hematocrit 40.8 (L) 42 - 52 %    MCV 89.1 78 - 100 FL    MCH 27.3 27 - 31 PG    MCHC 30.6 (L) 32.0 - 36.0 %    RDW 14.0 11.7 - 14.9 %    Platelets 191 (L) 998 - 440 K/CU MM    MPV 10.3 7.5 - 11.1 FL    Differential Type AUTOMATED DIFFERENTIAL     Segs Relative 73.8 (H) 36 - 66 %    Lymphocytes % 15.5 (L) 24 - 44 %    Monocytes % 8.7 (H) 0 - 4 %    Eosinophils % 1.2 0 - 3 %    Basophils % 0.4 0 - 1 %    Segs Absolute 7.5 K/CU MM    Lymphocytes Absolute 1.6 K/CU MM    Monocytes Absolute 0.9 K/CU MM    Eosinophils Absolute 0.1 K/CU MM    Basophils Absolute 0.0 K/CU MM    Immature Neutrophil % 0.4 0 - 0.43 %    Total Immature Neutrophil 0.04 K/CU MM        Imaging/Diagnostics Last 24 Hours   XR ELBOW LEFT (MIN 3 VIEWS)    Result Date: 2/26/2022  EXAMINATION: THREE XRAY VIEWS OF THE LEFT ELBOW 2/26/2022 10:47 am COMPARISON: 10/11/2018 HISTORY: Acute pain status post fall. FINDINGS: No acute fracture seen. No malalignment. However there is a small joint effusion. Soft tissue swelling noted posteriorly. No acute fracture seen but there is a small joint effusion. In the setting of trauma, an occult fracture cannot be excluded. Consider follow-up radiographs in 7-10 days if symptoms persist.     XR KNEE LEFT (3 VIEWS)    Result Date: 2/26/2022  EXAMINATION: THREE XRAY VIEWS OF THE LEFT KNEE; THREE XRAY VIEWS OF THE RIGHT KNEE 2/26/2022 10:48 am COMPARISON: Left knee radiographs on 11/04/2008 HISTORY: Bilateral knee pain status post fall. FINDINGS: No acute fracture or dislocation of either knee. Mild osteoarthrosis. Chondrocalcinosis. Small joint effusions. Mild soft tissue swelling.      No acute osseous abnormality of either knee. XR KNEE RIGHT (3 VIEWS)    Result Date: 2/26/2022  EXAMINATION: THREE XRAY VIEWS OF THE LEFT KNEE; THREE XRAY VIEWS OF THE RIGHT KNEE 2/26/2022 10:48 am COMPARISON: Left knee radiographs on 11/04/2008 HISTORY: Bilateral knee pain status post fall. FINDINGS: No acute fracture or dislocation of either knee. Mild osteoarthrosis. Chondrocalcinosis. Small joint effusions. Mild soft tissue swelling. No acute osseous abnormality of either knee. CT Cervical Spine WO Contrast    Result Date: 2/27/2022  EXAMINATION: CT OF THE CERVICAL SPINE WITHOUT CONTRAST 2/27/2022 4:33 pm TECHNIQUE: CT of the cervical spine was performed without the administration of intravenous contrast. Multiplanar reformatted images are provided for review. Dose modulation, iterative reconstruction, and/or weight based adjustment of the mA/kV was utilized to reduce the radiation dose to as low as reasonably achievable. COMPARISON: 11/12/2018 HISTORY: ORDERING SYSTEM PROVIDED HISTORY: fall, neck pain TECHNOLOGIST PROVIDED HISTORY: Reason for exam:->fall, neck pain Decision Support Exception - unselect if not a suspected or confirmed emergency medical condition->Emergency Medical Condition (MA) FINDINGS: BONES/ALIGNMENT: There is no acute fracture or traumatic malalignment. DEGENERATIVE CHANGES: Advanced multilevel degenerative disc disease and advanced multilevel facet arthropathy with osseous overgrowth. SOFT TISSUES: There is no prevertebral soft tissue swelling. No acute abnormality of the cervical spine. CT Lumbar Spine WO Contrast    Result Date: 2/27/2022  EXAMINATION: CT OF THE LUMBAR SPINE WITHOUT CONTRAST  2/27/2022 TECHNIQUE: CT of the lumbar spine was performed without the administration of intravenous contrast. Multiplanar reformatted images are provided for review. Adjustment of mA and/or kV according to patient size was utilized.   Dose modulation, iterative reconstruction, and/or weight based adjustment of the mA/kV was utilized to reduce the radiation dose to as low as reasonably achievable. COMPARISON: CT abdomen and pelvis yesterday. CT lumbar spine 02/20/2022. HISTORY: ORDERING SYSTEM PROVIDED HISTORY: fall back pain TECHNOLOGIST PROVIDED HISTORY: Reason for exam:->fall back pain Decision Support Exception - unselect if not a suspected or confirmed emergency medical condition->Emergency Medical Condition (MA) FINDINGS: BONES/ALIGNMENT: Status post discectomy and transpedicular fusion at L4-5. No evidence for hardware failure. The vertebral body heights are maintained. No listhesis. DEGENERATIVE CHANGES: Multilevel degenerative disc disease again noted with disc bulge at L2-3 and L3-4. Advanced facet arthropathy in the lower lumbar spine. SOFT TISSUES/RETROPERITONEUM: No soft tissue hematoma identified. Infrarenal enlargement measuring up to 3.1 x 2.6 cm. The rectum is distended with at least moderate stool burden, partially visualized. 1.  No acute osseous abnormality identified. 2.  Rectal distension with at least moderate stool burden, partially visualized. 3.  Infrarenal aortic enlargement measuring up to 3.1 cm. RECOMMENDATIONS: For management of fusiform AAA: 3.0-3.4 cm AAA, recommend follow-up every 3 years. d Note: Recommend Vascular consultation if a fusiform AAA enlarges by >0.5 cm in 6 months or >1 cm in 1 year or for a saccular AAA of any size. References: Alina Sago Radiol 2013; 21(03):097-712; J Vasc Surg. 2018; 67:2-77     CT ABDOMEN PELVIS W IV CONTRAST    Result Date: 2/26/2022  EXAMINATION: CT OF THE ABDOMEN AND PELVIS WITH CONTRAST 2/26/2022 11:44 am TECHNIQUE: CT of the abdomen and pelvis was performed with the administration of 100 mL Isovue 370 intravenous contrast. Multiplanar reformatted images are provided for review.  Dose modulation, iterative reconstruction, and/or weight based adjustment of the mA/kV was utilized to reduce the radiation dose to as low as reasonably achievable. COMPARISON: 10/07/2018 HISTORY: Acute abdominal pain. FINDINGS: Lower Chest: Dependent atelectasis at the lung bases. Mild lingular scarring. Organs: Renal cysts, left greater than right. Calcified granulomas in the spleen and liver. Remaining solid abdominal organs and the gallbladder are unremarkable. GI/Bowel: No acute abnormality. Normal appendix. Mild colonic diverticulosis. Pelvis: Mild urinary bladder wall thickening could be due to incomplete distension or chronic outlet obstruction from mildly enlarged prostate. No lymphadenopathy or free fluid. Peritoneum/Retroperitoneum: No lymphadenopathy or ascites. Extensive atherosclerotic disease with ectasia of the infrarenal abdominal aorta. Bones/Soft Tissues: Posterior fusion changes at L4-L5. Multilevel degenerative changes of the visualized spine. No acute abnormality. XR CHEST PORTABLE    Result Date: 2/27/2022  EXAMINATION: ONE XRAY VIEW OF THE CHEST 2/27/2022 4:33 pm COMPARISON: 02/19/2022 HISTORY: ORDERING SYSTEM PROVIDED HISTORY: chest pain TECHNOLOGIST PROVIDED HISTORY: Reason for exam:->chest pain FINDINGS: The cardiomediastinal silhouette is unchanged in appearance. Generalized interstitial prominence again noted. Linear opacities in the left lung base likely represent subsegmental atelectasis or scarring. There is no consolidation, pneumothorax, or evidence of edema. No effusion is appreciated. The osseous structures are unchanged in appearance. Chronic findings in the chest without acute airspace disease identified.        Assessment      Hospital Problems           Last Modified POA    * (Principal) Parkinson's disease ([address] 2/27/2022 Yes    Hyperlipidemia 2/27/2022 Yes    Hypertension 2/27/2022 Yes    Dementia ([address] 2/27/2022 Yes    Tobacco abuse 2/27/2022 Yes    Cellulitis 2/27/2022 Yes    Frequent falls 2/27/2022 Yes    General weakness 2/27/2022 Yes    Venous stasis of both lower extremities 2/27/2022 Yes        Plan    1. Parkinson's disease-continue home Artane dose, fall precautions  2. Cellulitis of left lower extremity- wound culture obtained, initiate antibiotic therapy (Vancomycin- MRSA coverage, Cefipime-pseudomonas coverage), wound care consult  3. Dementia- continue home dose of Aricept and Namenda  4. Frequent falls- fall risk precautions, up with with assistance   5. Generalized weakness- consult to dietician-nutrition supplements as needed, PT/OT consult. CM consult for placement  6. Hyperlipidemia- continue home Lipitor dose, low cholesterol diet  7. Uncontrolled hypertension-May be 2/2 hip pain from falls, monitor, continue home Toprol XL dose, low sodium diet, if continues may need antihypertensives added  8. Tobacco abuse- nicotine patch, encourage smoking cessation  9. Venous stasis of bilateral lower extremities- compression stockings, bilateral lower extremity elevation, continue home lasix  10. CAD - continue home ASA    Diet ADULT DIET; Regular; 5 carb choices (75 gm/meal);  Low Fat/Low Chol/High Fiber/2 gm Na   DVT Prophylaxis [x] Lovenox, []  Heparin, [] SCDs, [] Ambulation   GI Prophylaxis [x] PPI,  [] H2 Blocker,  [] Carafate,  [] Diet/Tube Feeds   Code Status Full Code   Surrogate Decision Maker (Spouse) Renea Sewell [phone]   Disposition Patient requires continued admission due to frequent falls, left lower extremity cellulitis, weakness, and placement     Consultations Ordered:  PHARMACY TO DOSE VANCOMYCIN  IP CONSULT TO CASE MANAGEMENT  IP CONSULT TO DIETITIAN    Medications   Medications:    vancomycin  750 mg IntraVENous Q12H    aspirin  81 mg Oral Daily    atorvastatin  20 mg Oral Daily    cyanocobalamin  1,000 mcg Oral Daily    docusate sodium  100 mg Oral BID    donepezil  10 mg Oral Nightly    furosemide  20 mg Oral BID    memantine  10 mg Oral BID    metoprolol succinate  25 mg Oral Daily    trihexyphenidyl  2 mg Oral BID    sodium chloride flush 5-40 mL IntraVENous 2 times per day    enoxaparin  40 mg SubCUTAneous Daily    ceFEPIme (MAXIPIME) IVPB  2,000 mg IntraVENous Q8H    pantoprazole  40 mg Oral QAM AC    nicotine  1 patch TransDERmal Daily      Infusions:    sodium chloride      lactated ringers 125 mL/hr at 02/28/22 0613     PRN Meds: acetaminophen, 650 mg, Q4H PRN  sodium chloride flush, 5-40 mL, PRN  sodium chloride, 25 mL, PRN  ondansetron, 4 mg, Q8H PRN   Or  ondansetron, 4 mg, Q6H PRN  polyethylene glycol, 17 g, Daily PRN  diphenhydrAMINE, 12.5 mg, PRN        Eugenio Roger PA-C  Hospitalist  2/28/2022, 12:41 PM

## 2022-02-28 NOTE — PLAN OF CARE
Problem: Falls - Risk of:  Goal: Will remain free from falls  Description: Will remain free from falls  Outcome: Ongoing  Goal: Absence of physical injury  Description: Absence of physical injury  Outcome: Ongoing     Problem: Pain:  Goal: Pain level will decrease  Description: Pain level will decrease  Outcome: Ongoing  Goal: Control of acute pain  Description: Control of acute pain  Outcome: Ongoing  Goal: Control of chronic pain  Description: Control of chronic pain  Outcome: Ongoing     Problem: Discharge Planning:  Goal: Discharged to appropriate level of care  Description: Discharged to appropriate level of care  Outcome: Ongoing     Problem: Skin Integrity:  Goal: Will show no infection signs and symptoms  Description: Will show no infection signs and symptoms  Outcome: Ongoing  Goal: Absence of new skin breakdown  Description: Absence of new skin breakdown  Outcome: Ongoing

## 2022-02-28 NOTE — ED PROVIDER NOTES
ADDENDUM:    Care of the patient was assumed  from Dr. Chavez Winkler. I have reviewed the notes, assessments, and/or procedures performed, I concur with her/his documentation on Lamont Jose. I reviewed the medical record and evaluated the patient. ED COURSE/MDM:  Laboratory and imaging data were reviewed and care plan was arranged with the patient(see separate lab/imaging reports). RADIOLOGY:  Already resulted studies have been reviewed. XR CHEST PORTABLE   Final Result   Chronic findings in the chest without acute airspace disease identified. CT Cervical Spine WO Contrast   Final Result   No acute abnormality of the cervical spine. CT Lumbar Spine WO Contrast   Final Result   1. No acute osseous abnormality identified. 2.  Rectal distension with at least moderate stool burden, partially   visualized. 3.  Infrarenal aortic enlargement measuring up to 3.1 cm. RECOMMENDATIONS:   For management of fusiform AAA:      3.0-3.4 cm AAA, recommend follow-up every 3 years. d      Note: Recommend Vascular consultation if a fusiform AAA enlarges by >0.5 cm   in 6 months or >1 cm in 1 year or for a saccular AAA of any size. References: Mehran Buoy Radiol 2013; 28(68):463-074; J Vasc Surg. 2018; 67:2-77             Labs Reviewed   CBC WITH AUTO DIFFERENTIAL - Abnormal; Notable for the following components:       Result Value    WBC 13.6 (*)     MCHC 31.0 (*)     Segs Relative 78.9 (*)     Lymphocytes % 13.0 (*)     Monocytes % 7.2 (*)     All other components within normal limits   COMPREHENSIVE METABOLIC PANEL - Abnormal; Notable for the following components:     Total Protein 6.2 (*)     All other components within normal limits   URINALYSIS WITH MICROSCOPIC - Abnormal; Notable for the following components:    Cast Type NEGATIVE (*)     All other components within normal limits   COVID-19, RAPID       Medications - No data to display    Vitals:    02/27/22 1624 02/27/22 1830   BP: (!) 177/115 126/80   Pulse: 68 70   Resp: 16    Temp: 98 °F (36.7 °C)    TempSrc: Oral    SpO2: 96% 94%   Weight: 195 lb (88.5 kg)    Height: 6' 2\" (1.88 m)        Hospitalist contacted for admission. Patient will need placement due to decreasing ADL and progression of falls causing safety concern  FINAL IMPRESSION:  1. Parkinson's disease ([facility] [address]    2. Multiple falls    3.  Generalized weakness        New Prescriptions    No medications on file                 [address] Serene Kwong, DO  02/27/22 0443

## 2022-02-28 NOTE — PROGRESS NOTES
Skin assessment performed by myself and Juan Manuel Mckeon RN from the ED. A scabbed skin tear is noted to the left elbow and a venous stasis ulcer is on the left lower leg. There are multiple areas of bruising noted to both upper extremities. Patient appears very unkempt, there is dried stool noted on his feet and he smells of urine. The patient's medications were verified with his wife by the NP when he was still in the ED.

## 2022-02-28 NOTE — H&P
HISTORY AND PHYSICAL             Name:  Miley Dickson /Age/Sex: 1950  (66 y.o. male)   MRN & CSN:  [id number] & [id number] Admission Date/Time: 2022 8:09 PM EST   Location:   PCP: Ed Jolly DO         Chief Complaint     Chief Complaint   Patient presents with   Garfield Sole     C/o multiple falls in the past few days, patient forgetting to use walker. Patients family unable to provide care. Patient soiled in urine, multiple abrasions/bruising noted. History Obtained From   patient, spouse, electronic medical record    History of Present Illness (4 elements)   Miley Dickson is a 66 y.o. who presents to the hospital for evaluation after a fall. Patient with history of frequent falls. Spouse and primary care giver, Zuri Garcia, states that patient has sustained 3 falls today. These falls were unwitnessed. EMS has had to come out to the home 3 times to help patient off the floor. Patient does have multiple comorbid's including coronary artery disease, Parkinson's disease, dementia, and hypertension. Spouse states that over the last 6 weeks patient's dementia has advanced significantly. Patient was seen in the ED just yesterday after a fall. Patient is on a daily baby aspirin but no other anticoagulants. Spouse states, \"he needs 24-hour care and I just cannot do it anymore\". Patient arrived to the ED awake and alert. Patient disheveled and unkempt. He smells of urine and there is visible dirt and stool noted to patient's lower extremities. He is oriented to name only. He is able to follow most commands. Spouse states that this is patient's baseline mental status. He is hemodynamically stable. Today's ED work-up has essentially been unremarkable. EKG was negative for ST elevation or ischemic changes. Imaging studies were negative for acute findings. Patient noted to have bilateral lower extremity skin changes consistent with venous stasis.   Patient also with a foul-smelling venous stasis ulcer to the left lower extremity, blood and wound cultures obtained. No evidence of sepsis. Patient will be started on IV vancomycin and IV cefepime. Patient admitted to inpatient unit for further evaluation and treatment. Patient and family in agreement with current plan of care. Past Medical History     Past Medical History:   Diagnosis Date    CAD (coronary artery disease)     with stent    Chronic low back pain     HX of laminectomy    Coronary arteriosclerosis     stent    [facility] [address]     Fracture     R ankle    Hypertension     Hypertensive disorder     Osteoarthritis     Parkinson disease ([facility] [address]     Small vessel disease, cerebrovascular     Tobacco abuse         Past Surgical History     Past Surgical History:   Procedure Laterality Date    ANKLE SURGERY      BACK SURGERY      CARDIAC SURGERY      heart cath    CORONARY ANGIOPLASTY WITH STENT PLACEMENT      HERNIA REPAIR      LUMBAR LAMINECTOMY      TONSILLECTOMY      VASECTOMY          Medications Prior to Admission     Prior to Admission medications    Medication Sig Start Date End Date Taking? Authorizing Provider   atorvastatin (LIPITOR) 20 MG tablet TAKE ONE TABLET BY MOUTH ONCE NIGHTLY 12/27/21  Yes Farrel Minors, DO   metoprolol succinate (TOPROL XL) 25 MG extended release tablet TAKE ONE TABLET BY MOUTH DAILY 11/29/21  Yes Farrel Minors, DO   docusate sodium (COLACE) 100 MG capsule Take 100 mg by mouth 2 times daily   Yes Historical Provider, MD   cyanocobalamin 1000 MCG tablet Take 1 tablet by mouth daily 10/9/19  Yes Farrel Minors, DO   furosemide (LASIX) 20 MG tablet Take 20 mg by mouth 2 times daily   Yes Babita Cherry MD   donepezil (ARICEPT) 10 MG tablet Take 10 mg by mouth nightly   Yes Historical Provider, MD   memantine (NAMENDA) 10 MG tablet Take 10 mg by mouth 2 times daily   Yes Historical Provider, MD   aspirin 81 MG EC tablet Take 81 mg by mouth daily.    Yes Historical Provider, MD   trihexyphenidyl (ARTANE) 2 MG tablet Take by mouth Take 1 1/2 tablet bid   Yes Historical Provider, MD   sulfamethoxazole-trimethoprim (BACTRIM DS) 800-160 MG per tablet Take 1 tablet by mouth 2 times daily for 10 days 2/26/22 3/8/22  Beck Arrington,    methylPREDNISolone (MEDROL, CIERRA,) 4 MG tablet Take by mouth. 22   Beck Arrington, DO   acetaminophen (TYLENOL) 325 MG tablet Take 650 mg by mouth every 4 hours as needed for Pain    Historical Provider, MD        Allergies   Chantix [varenicline] and Daypro [oxaprozin]    Social History     Social History     Tobacco History     Smoking Status  Current Every Day Smoker Smoking Start Date  1964 Smoking Frequency  0.5 packs/day for 62 years (28.5 pk yrs) Smoking Tobacco Type  Cigarettes    Smokeless Tobacco Use  Never Used          Alcohol History     Alcohol Use Status  Not Currently          Drug Use     Drug Use Status  No          Sexual Activity     Sexually Active  Not Asked                Family History     Family History   Problem Relation Age of Onset    High Blood Pressure Mother     High Blood Pressure Father     Heart Disease Father     Osteoporosis Sister          from allergy from an injection    No Known Problems Sister     No Known Problems Sister     No Known Problems Sister     No Known Problems Sister        Review of Systems (need 10 systems reviewed)   Review of Systems   Constitutional: Positive for activity change and fatigue. Negative for appetite change, chills, diaphoresis, fever and unexpected weight change. HENT: Negative for sneezing, sore throat and trouble swallowing. Eyes: Negative for pain, discharge and redness. Respiratory: Positive for cough. Negative for shortness of breath. Cardiovascular: Positive for leg swelling. Gastrointestinal: Negative for abdominal distention, abdominal pain, diarrhea and vomiting. Endocrine: Negative for polydipsia and polyuria.    Genitourinary: Negative for decreased urine volume and hematuria. Musculoskeletal: Positive for arthralgias, back pain, gait problem and myalgias. Negative for neck pain and neck stiffness. History of chronic back pain, chronic knee pain, no loss of bowel or bladder control, no saddle anesthesia or extremity numbness or tingling, no IV drug abuse, or any cancers. Skin: Positive for color change and wound. Allergic/Immunologic: Negative for environmental allergies, food allergies and immunocompromised state. Neurological: Positive for tremors and weakness. Negative for dizziness, seizures, speech difficulty, light-headedness and headaches. Hematological: Negative for adenopathy. Does not bruise/bleed easily. Psychiatric/Behavioral: Positive for confusion. Negative for suicidal ideas. H/o dementia         Physical Exam (need 8 systems)   BP (!) 159/71   Pulse 72   Temp 97.3 °F (36.3 °C) (Infrared)   Resp 16   Ht 6' 2\" (1.88 m)   Wt 193 lb 14.4 oz (88 kg)   SpO2 96%   BMI 24.90 kg/m²   Physical Exam  Vitals and nursing note reviewed. Constitutional:       Appearance: Normal appearance. He is ill-appearing. He is not toxic-appearing. Comments: Appears older than stated age, unkempt, disheveled, smells of urine, visible stool noted on legs and feet   HENT:      Head: Normocephalic and atraumatic. Right Ear: External ear normal.      Left Ear: External ear normal.      Nose: Nose normal.      Mouth/Throat:      Mouth: Mucous membranes are dry. Pharynx: Oropharynx is clear. Eyes:      General:         Right eye: No discharge. Left eye: No discharge. Extraocular Movements: Extraocular movements intact. Conjunctiva/sclera: Conjunctivae normal.   Cardiovascular:      Rate and Rhythm: Normal rate and regular rhythm. Pulses: Normal pulses. Heart sounds: Normal heart sounds. Pulmonary:      Effort: Pulmonary effort is normal. No respiratory distress.       Breath sounds: Decreased breath sounds present. No wheezing, rhonchi or rales. Abdominal:      General: Bowel sounds are normal.      Palpations: Abdomen is soft. Tenderness: There is no abdominal tenderness. There is no right CVA tenderness, left CVA tenderness or guarding. Musculoskeletal:         General: Tenderness present. Cervical back: Normal range of motion and neck supple. No rigidity or tenderness. Right lower leg: Edema present. Left lower leg: Edema present. Skin:     General: Skin is warm and dry. Capillary Refill: Capillary refill takes less than 2 seconds. Coloration: Skin is not jaundiced. Findings: Erythema and wound present. Comments: Left lower extremity with foul-smelling venous stasis ulcers noted. Purulent drainage noted. No fluctuance. No lymphangitic streaking. See images below. Neurological:      Mental Status: He is alert. GCS: GCS eye subscore is 4. GCS verbal subscore is 4. GCS motor subscore is 6. Psychiatric:         Attention and Perception: He is inattentive. Mood and Affect: Mood and affect normal.         Speech: Speech is tangential.         Behavior: Behavior normal. Behavior is cooperative. Thought Content: Thought content normal.         Cognition and Memory: Cognition is impaired. Memory is impaired. He exhibits impaired recent memory and impaired remote memory.                     Labs      Recent Results (from the past 24 hour(s))   CBC with Auto Differential    Collection Time: 02/27/22  4:30 PM   Result Value Ref Range    WBC 13.6 (H) 4.0 - 10.5 K/CU MM    RBC 4.90 4.6 - 6.2 M/CU MM    Hemoglobin 13.5 13.5 - 18.0 GM/DL    Hematocrit 43.5 42 - 52 %    MCV 88.8 78 - 100 FL    MCH 27.6 27 - 31 PG    MCHC 31.0 (L) 32.0 - 36.0 %    RDW 14.0 11.7 - 14.9 %    Platelets 388 159 - 583 K/CU MM    MPV 10.4 7.5 - 11.1 FL    Differential Type AUTOMATED DIFFERENTIAL     Segs Relative 78.9 (H) 36 - 66 %    Lymphocytes % 13.0 (L) 24 - 44 %    Monocytes % 7.2 (H) 0 - 4 %    Eosinophils % 0.3 0 - 3 %    Basophils % 0.2 0 - 1 %    Segs Absolute 10.8 K/CU MM    Lymphocytes Absolute 1.8 K/CU MM    Monocytes Absolute 1.0 K/CU MM    Eosinophils Absolute 0.0 K/CU MM    Basophils Absolute 0.0 K/CU MM    Immature Neutrophil % 0.4 0 - 0.43 %    Total Immature Neutrophil 0.05 K/CU MM   Comprehensive Metabolic Panel    Collection Time: 02/27/22  4:30 PM   Result Value Ref Range    Sodium 137 135 - 145 MMOL/L    Potassium 4.0 3.5 - 5.1 MMOL/L    Chloride 101 99 - 110 mMol/L    CO2 27 21 - 32 MMOL/L    BUN 15 6 - 23 MG/DL    CREATININE 0.9 0.9 - 1.3 MG/DL    Glucose 96 70 - 99 MG/DL    Calcium 9.8 8.3 - 10.6 MG/DL    Albumin 3.9 3.4 - 5.0 GM/DL    Total Protein 6.2 (L) 6.4 - 8.2 GM/DL    Total Bilirubin 0.6 0.0 - 1.0 MG/DL    ALT 16 10 - 40 U/L    AST 25 15 - 37 IU/L    Alkaline Phosphatase 110 40 - 129 IU/L    GFR Non-African American >60 >60 mL/min/1.73m2    GFR African American >60 >60 mL/min/1.73m2    Anion Gap 9 4 - 16   Urinalysis with Microscopic    Collection Time: 02/27/22  4:30 PM   Result Value Ref Range    Color, UA YELLOW YELLOW    Clarity, UA SL HAZY CLEAR    Glucose, Urine NEGATIVE NEGATIVE MG/DL    Bilirubin Urine NEGATIVE NEGATIVE MG/DL    Ketones, Urine NEGATIVE NEGATIVE MG/DL    Specific Gravity, UA 1.015 1.001 - 1.035    Blood, Urine TRACE NEGATIVE    pH, Urine 6.5 5.0 - 8.0    Protein, UA NEGATIVE NEGATIVE MG/DL    Urobilinogen, Urine 0.2 0.2 - 1.0 MG/DL    Nitrite Urine, Quantitative NEGATIVE NEGATIVE    Leukocyte Esterase, Urine NEGATIVE NEGATIVE    RBC, UA NEGATIVE 0 - 3 /HPF    WBC, UA NEGATIVE 0 - 2 /HPF    Epithelial Cells, UA 0 TO 1 /HPF    Cast Type NEGATIVE (A) NO CAST FORMS SEEN /HPF    Bacteria, UA FEW NEGATIVE /HPF    Crystal Type NEGATIVE NEGATIVE /HPF   EKG 12 Lead    Collection Time: 02/27/22  4:40 PM   Result Value Ref Range    Ventricular Rate 62 BPM    Atrial Rate 62 BPM    P-R Interval 140 ms    QRS Duration 78 ms HISTORY: Bilateral knee pain status post fall. FINDINGS: No acute fracture or dislocation of either knee. Mild osteoarthrosis. Chondrocalcinosis. Small joint effusions. Mild soft tissue swelling. No acute osseous abnormality of either knee. CT Cervical Spine WO Contrast    Result Date: 2/27/2022  EXAMINATION: CT OF THE CERVICAL SPINE WITHOUT CONTRAST 2/27/2022 4:33 pm TECHNIQUE: CT of the cervical spine was performed without the administration of intravenous contrast. Multiplanar reformatted images are provided for review. Dose modulation, iterative reconstruction, and/or weight based adjustment of the mA/kV was utilized to reduce the radiation dose to as low as reasonably achievable. COMPARISON: 11/12/2018 HISTORY: ORDERING SYSTEM PROVIDED HISTORY: fall, neck pain TECHNOLOGIST PROVIDED HISTORY: Reason for exam:->fall, neck pain Decision Support Exception - unselect if not a suspected or confirmed emergency medical condition->Emergency Medical Condition (MA) FINDINGS: BONES/ALIGNMENT: There is no acute fracture or traumatic malalignment. DEGENERATIVE CHANGES: Advanced multilevel degenerative disc disease and advanced multilevel facet arthropathy with osseous overgrowth. SOFT TISSUES: There is no prevertebral soft tissue swelling. No acute abnormality of the cervical spine. CT Lumbar Spine WO Contrast    Result Date: 2/27/2022  EXAMINATION: CT OF THE LUMBAR SPINE WITHOUT CONTRAST  2/27/2022 TECHNIQUE: CT of the lumbar spine was performed without the administration of intravenous contrast. Multiplanar reformatted images are provided for review. Adjustment of mA and/or kV according to patient size was utilized. Dose modulation, iterative reconstruction, and/or weight based adjustment of the mA/kV was utilized to reduce the radiation dose to as low as reasonably achievable. COMPARISON: CT abdomen and pelvis yesterday. CT lumbar spine 02/20/2022.  HISTORY: ORDERING SYSTEM PROVIDED HISTORY: fall back pain TECHNOLOGIST PROVIDED HISTORY: Reason for exam:->fall back pain Decision Support Exception - unselect if not a suspected or confirmed emergency medical condition->Emergency Medical Condition (MA) FINDINGS: BONES/ALIGNMENT: Status post discectomy and transpedicular fusion at L4-5. No evidence for hardware failure. The vertebral body heights are maintained. No listhesis. DEGENERATIVE CHANGES: Multilevel degenerative disc disease again noted with disc bulge at L2-3 and L3-4. Advanced facet arthropathy in the lower lumbar spine. SOFT TISSUES/RETROPERITONEUM: No soft tissue hematoma identified. Infrarenal enlargement measuring up to 3.1 x 2.6 cm. The rectum is distended with at least moderate stool burden, partially visualized. 1.  No acute osseous abnormality identified. 2.  Rectal distension with at least moderate stool burden, partially visualized. 3.  Infrarenal aortic enlargement measuring up to 3.1 cm. RECOMMENDATIONS: For management of fusiform AAA: 3.0-3.4 cm AAA, recommend follow-up every 3 years. d Note: Recommend Vascular consultation if a fusiform AAA enlarges by >0.5 cm in 6 months or >1 cm in 1 year or for a saccular AAA of any size. References: Maame Peyer Radiol 2013; 63(69):073-029; J Vasc Surg. 2018; 67:2-77     CT ABDOMEN PELVIS W IV CONTRAST    Result Date: 2/26/2022  EXAMINATION: CT OF THE ABDOMEN AND PELVIS WITH CONTRAST 2/26/2022 11:44 am TECHNIQUE: CT of the abdomen and pelvis was performed with the administration of 100 mL Isovue 370 intravenous contrast. Multiplanar reformatted images are provided for review. Dose modulation, iterative reconstruction, and/or weight based adjustment of the mA/kV was utilized to reduce the radiation dose to as low as reasonably achievable. COMPARISON: 10/07/2018 HISTORY: Acute abdominal pain. FINDINGS: Lower Chest: Dependent atelectasis at the lung bases. Mild lingular scarring. Organs: Renal cysts, left greater than right.   Calcified granulomas in the spleen and liver. Remaining solid abdominal organs and the gallbladder are unremarkable. GI/Bowel: No acute abnormality. Normal appendix. Mild colonic diverticulosis. Pelvis: Mild urinary bladder wall thickening could be due to incomplete distension or chronic outlet obstruction from mildly enlarged prostate. No lymphadenopathy or free fluid. Peritoneum/Retroperitoneum: No lymphadenopathy or ascites. Extensive atherosclerotic disease with ectasia of the infrarenal abdominal aorta. Bones/Soft Tissues: Posterior fusion changes at L4-L5. Multilevel degenerative changes of the visualized spine. No acute abnormality. XR CHEST PORTABLE    Result Date: 2/27/2022  EXAMINATION: ONE XRAY VIEW OF THE CHEST 2/27/2022 4:33 pm COMPARISON: 02/19/2022 HISTORY: ORDERING SYSTEM PROVIDED HISTORY: chest pain TECHNOLOGIST PROVIDED HISTORY: Reason for exam:->chest pain FINDINGS: The cardiomediastinal silhouette is unchanged in appearance. Generalized interstitial prominence again noted. Linear opacities in the left lung base likely represent subsegmental atelectasis or scarring. There is no consolidation, pneumothorax, or evidence of edema. No effusion is appreciated. The osseous structures are unchanged in appearance. Chronic findings in the chest without acute airspace disease identified. Assessment      Hospital Problems           Last Modified POA    * (Principal) Parkinson's disease ([address] 2/27/2022 Yes    Cellulitis 2/27/2022 Yes    Dementia ([address] 2/27/2022 Yes    Frequent falls 2/27/2022 Yes    General weakness 2/27/2022 Yes    Hyperlipidemia 2/27/2022 Yes    Hypertension 2/27/2022 Yes    Tobacco abuse 2/27/2022 Yes    Venous stasis of both lower extremities 2/27/2022 Yes          No problem-specific Assessment & Plan notes found for this encounter. Plan    1. Parkinson's disease-continue home Artane dose, fall precautions  2.  Cellulitis of left lower extremity- wound culture obtained, initiate antibiotic therapy (Vancomycin- MRSA coverage, Cefipime-pseudomonas coverage), wound care consult  3. Dementia- continue home dose of Aricept and Namenda  4. Frequent falls- fall risk precautions, up with with assistance  5. Generalized weakness- IV hydration, consult to dietician-nutrition supplements as needed, PT/OT consult  6. Hyperlipidemia- continue home Lipitor dose, low cholesterol diet  7. Hypertension-continue home Toprol XL dose, low sodium diet  8. Tobacco abuse- nicotine patch, encourage smoking cessation  9. Venous stasis of bilateral lower extremities- compression stockings, bilateral lower extremity elevation        Diet ADULT DIET; Regular; 5 carb choices (75 gm/meal); Low Fat/Low Chol/High Fiber/2 gm Na   DVT Prophylaxis [x] Lovenox, []  Heparin, [] SCDs, [] Ambulation   GI Prophylaxis [x] PPI,  [] H2 Blocker,  [] Carafate,  [] Diet/Tube Feeds   Code Status Full Code   Surrogate Decision Maker   (Spouse) Td Raymundo [phone]   Disposition Patient requires continued admission due to frequent falls, left lower extremity cellulitis, and generalized weakness.          Consultations Ordered:  PHARMACY TO DOSE VANCOMYCIN  IP CONSULT TO CASE MANAGEMENT  IP CONSULT TO DIETITIAN    Medications   Medications:    bacitracin   Topical Once    [START ON 2/28/2022] aspirin  81 mg Oral Daily    [START ON 2/28/2022] atorvastatin  20 mg Oral Daily    [START ON 2/28/2022] cyanocobalamin  1,000 mcg Oral Daily    docusate sodium  100 mg Oral BID    donepezil  10 mg Oral Nightly    furosemide  20 mg Oral BID    memantine  10 mg Oral BID    [START ON 2/28/2022] metoprolol succinate  25 mg Oral Daily    trihexyphenidyl  2 mg Oral BID    sodium chloride flush  5-40 mL IntraVENous 2 times per day    enoxaparin  40 mg SubCUTAneous Daily    [START ON 2/28/2022] vancomycin  15 mg/kg IntraVENous Q12H    ceFEPIme (MAXIPIME) IVPB  2,000 mg IntraVENous Q8H    [START ON 2/28/2022] pantoprazole  40 mg Oral QAM RAY    [START ON 2/28/2022] nicotine  1 patch TransDERmal Daily    vancomycin  20 mg/kg IntraVENous Once      Infusions:    sodium chloride      lactated ringers       PRN Meds: acetaminophen, 650 mg, Q4H PRN  sodium chloride flush, 5-40 mL, PRN  sodium chloride, 25 mL, PRN  ondansetron, 4 mg, Q8H PRN   Or  ondansetron, 4 mg, Q6H PRN  polyethylene glycol, 17 g, Daily PRN            Electronically signed by JARVIS Alcala CNP on 2/27/22 at 21:45

## 2022-02-28 NOTE — PROGRESS NOTES
[facility]  consulted by JARVIS Morris-CNP for monitoring and adjustment. Indication for treatment: LLE Cellulitis  Goal trough: [x] 10-15 mcg/mL or [] 15-20 mcg/ml  AUC/CASSIE: [x] <500 or [] 400-600    Pertinent Laboratory Values:   Temp Readings from Last 3 Encounters:   02/27/22 97.3 °F (36.3 °C) (Infrared)   02/26/22 97.7 °F (36.5 °C) (Oral)   02/24/22 97 °F (36.1 °C)     Recent Labs     02/26/22  1000 02/27/22  1630   WBC 12.8* 13.6*     Recent Labs     02/26/22  1000 02/27/22  1630   BUN 15 15   CREATININE 1.0 0.9     Estimated Creatinine Clearance: 86 mL/min (based on SCr of 0.9 mg/dL). No intake or output data in the 24 hours ending 02/28/22 0001    Pertinent Cultures:  Date    Source    Results  02/27   Wound    In process  02/27   Blood    In process    Assessment:  SCr = 0.9, BUN = 15, and no I/O data  Day(s) of therapy: 1  Vancomycin concentration:   03/01 - To be collected    Plan:  Vancomycin 1,750 mg IV initial dose; Follow with Vancomycin 750 mg IV Q 12 Hours  Predicted AUC: 409; Predicted Trough: 13.4  Pharmacy will continue to monitor patient and adjust therapy as indicated    Aneta 3 03/01/2022 @ 6 AM    Thank you for the consult.   Neftaly De La Cruz [facility]  2/28/2022 12:01 AM

## 2022-02-28 NOTE — H&P
Progress Note             Name:  Jeanmarie Webster /Age/Sex: 1950  (66 y.o. male)   MRN & CSN:  [id number] & [id number] Admission Date/Time: 2022 8:09 PM EST   Location:   PCP: Kimber Moncada DO         Chief Complaint     Chief Complaint   Patient presents with   Hamida José Antonio     C/o multiple falls in the past few days, patient forgetting to use walker. Patients family unable to provide care. Patient soiled in urine, multiple abrasions/bruising noted. History Obtained From   Patient, electronic medical record    Overnight events   No acute events. Endorses right hip pain from fall yesterday. Past Medical History     Past Medical History:   Diagnosis Date    CAD (coronary artery disease)     with stent    Chronic low back pain     HX of laminectomy    Coronary arteriosclerosis     stent    [facility]     Fracture     R ankle    Hypertension     Hypertensive disorder     Osteoarthritis     Parkinson disease ([facility] [address]     Small vessel disease, cerebrovascular     Tobacco abuse         Past Surgical History     Past Surgical History:   Procedure Laterality Date    ANKLE SURGERY      BACK SURGERY      CARDIAC SURGERY      heart cath    CORONARY ANGIOPLASTY WITH STENT PLACEMENT      HERNIA REPAIR      LUMBAR LAMINECTOMY      TONSILLECTOMY      VASECTOMY          Medications Prior to Admission     Prior to Admission medications    Medication Sig Start Date End Date Taking?  Authorizing Provider   atorvastatin (LIPITOR) 20 MG tablet TAKE ONE TABLET BY MOUTH ONCE NIGHTLY 21  Yes Kimber Moncada DO   metoprolol succinate (TOPROL XL) 25 MG extended release tablet TAKE ONE TABLET BY MOUTH DAILY 21  Yes Kimber Moncada DO   docusate sodium (COLACE) 100 MG capsule Take 100 mg by mouth 2 times daily   Yes Historical Provider, MD   cyanocobalamin 1000 MCG tablet Take 1 tablet by mouth daily 10/9/19  Yes Kimber Moncada DO   furosemide (LASIX) 20 MG tablet Take 20 mg by mouth 2 times daily   Yes Law Kaur MD   donepezil (ARICEPT) 10 MG tablet Take 10 mg by mouth nightly   Yes Historical Provider, MD   memantine (NAMENDA) 10 MG tablet Take 10 mg by mouth 2 times daily   Yes Historical Provider, MD   aspirin 81 MG EC tablet Take 81 mg by mouth daily. Yes Historical Provider, MD   trihexyphenidyl (ARTANE) 2 MG tablet Take by mouth Take 1 1/2 tablet bid   Yes Historical Provider, MD   sulfamethoxazole-trimethoprim (BACTRIM DS) 800-160 MG per tablet Take 1 tablet by mouth 2 times daily for 10 days 2/26/22 3/8/22  Kingsley De Jesus DO   methylPREDNISolone (MEDROL, CIERRA,) 4 MG tablet Take by mouth. 22   Kingsley De Jesus DO   acetaminophen (TYLENOL) 325 MG tablet Take 650 mg by mouth every 4 hours as needed for Pain    Historical Provider, MD        Allergies   Chantix [varenicline] and Daypro [oxaprozin]    Social History     Social History     Tobacco History     Smoking Status  Current Every Day Smoker Smoking Start Date  1964 Smoking Frequency  0.5 packs/day for 62 years (28.5 pk yrs) Smoking Tobacco Type  Cigarettes    Smokeless Tobacco Use  Never Used          Alcohol History     Alcohol Use Status  Not Currently          Drug Use     Drug Use Status  No          Sexual Activity     Sexually Active  Not Asked                Family History     Family History   Problem Relation Age of Onset    High Blood Pressure Mother     High Blood Pressure Father     Heart Disease Father     Osteoporosis Sister          from allergy from an injection    No Known Problems Sister     No Known Problems Sister     No Known Problems Sister     No Known Problems Sister        Physical Exam    BP (!) 181/82   Pulse 72   Temp 97 °F (36.1 °C) (Infrared)   Resp 18   Ht 6' 2\" (1.88 m)   Wt 193 lb 14.4 oz (88 kg)   SpO2 92%   BMI 24.90 kg/m²   Physical Exam  Vitals and nursing note reviewed. Constitutional:       Appearance: Normal appearance. He is ill-appearing. He is not toxic-appearing. Comments: Appears older than stated age, unkempt, disheveled, smells of urine, visible stool noted on legs and feet   HENT:      Head: Normocephalic and atraumatic. Right Ear: External ear normal.      Left Ear: External ear normal.      Nose: Nose normal.      Mouth/Throat:      Mouth: Mucous membranes are dry. Pharynx: Oropharynx is clear. Eyes:      General:         Right eye: No discharge. Left eye: No discharge. Extraocular Movements: Extraocular movements intact. Conjunctiva/sclera: Conjunctivae normal.   Cardiovascular:      Rate and Rhythm: Normal rate and regular rhythm. Pulses: Normal pulses. Heart sounds: Normal heart sounds. Pulmonary:      Effort: Pulmonary effort is normal. No respiratory distress. Breath sounds: Decreased breath sounds present. No wheezing, rhonchi or rales. Abdominal:      General: Bowel sounds are normal.      Palpations: Abdomen is soft. Tenderness: There is no abdominal tenderness. There is no right CVA tenderness, left CVA tenderness or guarding. Musculoskeletal:         General: Tenderness present. Cervical back: Normal range of motion and neck supple. No rigidity or tenderness. Right lower leg: Edema present. Left lower leg: Edema present. Skin:     General: Skin is warm and dry. Capillary Refill: Capillary refill takes less than 2 seconds. Coloration: Skin is not jaundiced. Findings: Erythema and wound present. Comments: Left lower extremity with foul-smelling venous stasis ulcers noted. Purulent drainage noted. No fluctuance. No lymphangitic streaking. See images below. Neurological:      Mental Status: He is alert. GCS: GCS eye subscore is 4. GCS verbal subscore is 4. GCS motor subscore is 6. Psychiatric:         Attention and Perception: He is inattentive.          Mood and Affect: Mood and affect normal.         Speech: Speech is tangential.         Behavior: Behavior normal. Behavior is cooperative. Thought Content: Thought content normal.         Cognition and Memory: Cognition is impaired. Memory is impaired. He exhibits impaired recent memory and impaired remote memory.                     Labs      Recent Results (from the past 24 hour(s))   CBC with Auto Differential    Collection Time: 02/27/22  4:30 PM   Result Value Ref Range    WBC 13.6 (H) 4.0 - 10.5 K/CU MM    RBC 4.90 4.6 - 6.2 M/CU MM    Hemoglobin 13.5 13.5 - 18.0 GM/DL    Hematocrit 43.5 42 - 52 %    MCV 88.8 78 - 100 FL    MCH 27.6 27 - 31 PG    MCHC 31.0 (L) 32.0 - 36.0 %    RDW 14.0 11.7 - 14.9 %    Platelets 925 957 - 488 K/CU MM    MPV 10.4 7.5 - 11.1 FL    Differential Type AUTOMATED DIFFERENTIAL     Segs Relative 78.9 (H) 36 - 66 %    Lymphocytes % 13.0 (L) 24 - 44 %    Monocytes % 7.2 (H) 0 - 4 %    Eosinophils % 0.3 0 - 3 %    Basophils % 0.2 0 - 1 %    Segs Absolute 10.8 K/CU MM    Lymphocytes Absolute 1.8 K/CU MM    Monocytes Absolute 1.0 K/CU MM    Eosinophils Absolute 0.0 K/CU MM    Basophils Absolute 0.0 K/CU MM    Immature Neutrophil % 0.4 0 - 0.43 %    Total Immature Neutrophil 0.05 K/CU MM   Comprehensive Metabolic Panel    Collection Time: 02/27/22  4:30 PM   Result Value Ref Range    Sodium 137 135 - 145 MMOL/L    Potassium 4.0 3.5 - 5.1 MMOL/L    Chloride 101 99 - 110 mMol/L    CO2 27 21 - 32 MMOL/L    BUN 15 6 - 23 MG/DL    CREATININE 0.9 0.9 - 1.3 MG/DL    Glucose 96 70 - 99 MG/DL    Calcium 9.8 8.3 - 10.6 MG/DL    Albumin 3.9 3.4 - 5.0 GM/DL    Total Protein 6.2 (L) 6.4 - 8.2 GM/DL    Total Bilirubin 0.6 0.0 - 1.0 MG/DL    ALT 16 10 - 40 U/L    AST 25 15 - 37 IU/L    Alkaline Phosphatase 110 40 - 129 IU/L    GFR Non-African American >60 >60 mL/min/1.73m2    GFR African American >60 >60 mL/min/1.73m2    Anion Gap 9 4 - 16   Urinalysis with Microscopic    Collection Time: 02/27/22  4:30 PM   Result Value Ref Range    Color, UA YELLOW YELLOW    Clarity, UA SL HAZY CLEAR Glucose, Urine NEGATIVE NEGATIVE MG/DL    Bilirubin Urine NEGATIVE NEGATIVE MG/DL    Ketones, Urine NEGATIVE NEGATIVE MG/DL    Specific Gravity, UA 1.015 1.001 - 1.035    Blood, Urine TRACE NEGATIVE    pH, Urine 6.5 5.0 - 8.0    Protein, UA NEGATIVE NEGATIVE MG/DL    Urobilinogen, Urine 0.2 0.2 - 1.0 MG/DL    Nitrite Urine, Quantitative NEGATIVE NEGATIVE    Leukocyte Esterase, Urine NEGATIVE NEGATIVE    RBC, UA NEGATIVE 0 - 3 /HPF    WBC, UA NEGATIVE 0 - 2 /HPF    Epithelial Cells, UA 0 TO 1 /HPF    Cast Type NEGATIVE (A) NO CAST FORMS SEEN /HPF    Bacteria, UA FEW NEGATIVE /HPF    Crystal Type NEGATIVE NEGATIVE /HPF   EKG 12 Lead    Collection Time: 02/27/22  4:40 PM   Result Value Ref Range    Ventricular Rate 62 BPM    Atrial Rate 62 BPM    P-R Interval 140 ms    QRS Duration 78 ms    Q-T Interval 432 ms    QTc Calculation (Bazett) 438 ms    P Axis 80 degrees    R Axis -35 degrees    T Axis -1 degrees    Diagnosis       Sinus rhythm with premature supraventricular complexes  Left axis deviation  Inferior infarct , age undetermined  Abnormal ECG  When compared with ECG of 27-FEB-2022 16:39,  fusion complexes are no longer present  premature ventricular complexes are no longer present  Inferior infarct is now present  ST no longer depressed in Lateral leads  Inverted T waves have replaced nonspecific T wave abnormality in Inferior leads     COVID-19, Rapid    Collection Time: 02/27/22  5:30 PM    Specimen: Nasopharyngeal   Result Value Ref Range    Source THROAT     SARS-CoV-2, NAAT NOT DETECTED NOT DETECTED   Troponin    Collection Time: 02/27/22  9:00 PM   Result Value Ref Range    Troponin T <0.010 <0.01 NG/ML   Basic Metabolic Panel w/ Reflex to MG    Collection Time: 02/28/22  5:55 AM   Result Value Ref Range    Sodium 137 135 - 145 MMOL/L    Potassium 3.9 3.5 - 5.1 MMOL/L    Chloride 103 99 - 110 mMol/L    CO2 26 21 - 32 MMOL/L    Anion Gap 8 4 - 16    BUN 12 6 - 23 MG/DL    CREATININE 0.9 0.9 - 1.3 MG/DL Glucose 86 70 - 99 MG/DL    Calcium 9.0 8.3 - 10.6 MG/DL    GFR Non-African American >60 >60 mL/min/1.73m2    GFR African American >60 >60 mL/min/1.73m2   CBC with Auto Differential    Collection Time: 02/28/22  5:55 AM   Result Value Ref Range    WBC 10.2 4.0 - 10.5 K/CU MM    RBC 4.58 (L) 4.6 - 6.2 M/CU MM    Hemoglobin 12.5 (L) 13.5 - 18.0 GM/DL    Hematocrit 40.8 (L) 42 - 52 %    MCV 89.1 78 - 100 FL    MCH 27.3 27 - 31 PG    MCHC 30.6 (L) 32.0 - 36.0 %    RDW 14.0 11.7 - 14.9 %    Platelets 901 (L) 642 - 440 K/CU MM    MPV 10.3 7.5 - 11.1 FL    Differential Type AUTOMATED DIFFERENTIAL     Segs Relative 73.8 (H) 36 - 66 %    Lymphocytes % 15.5 (L) 24 - 44 %    Monocytes % 8.7 (H) 0 - 4 %    Eosinophils % 1.2 0 - 3 %    Basophils % 0.4 0 - 1 %    Segs Absolute 7.5 K/CU MM    Lymphocytes Absolute 1.6 K/CU MM    Monocytes Absolute 0.9 K/CU MM    Eosinophils Absolute 0.1 K/CU MM    Basophils Absolute 0.0 K/CU MM    Immature Neutrophil % 0.4 0 - 0.43 %    Total Immature Neutrophil 0.04 K/CU MM        Imaging/Diagnostics Last 24 Hours   XR ELBOW LEFT (MIN 3 VIEWS)    Result Date: 2/26/2022  EXAMINATION: THREE XRAY VIEWS OF THE LEFT ELBOW 2/26/2022 10:47 am COMPARISON: 10/11/2018 HISTORY: Acute pain status post fall. FINDINGS: No acute fracture seen. No malalignment. However there is a small joint effusion. Soft tissue swelling noted posteriorly. No acute fracture seen but there is a small joint effusion. In the setting of trauma, an occult fracture cannot be excluded. Consider follow-up radiographs in 7-10 days if symptoms persist.     XR KNEE LEFT (3 VIEWS)    Result Date: 2/26/2022  EXAMINATION: THREE XRAY VIEWS OF THE LEFT KNEE; THREE XRAY VIEWS OF THE RIGHT KNEE 2/26/2022 10:48 am COMPARISON: Left knee radiographs on 11/04/2008 HISTORY: Bilateral knee pain status post fall. FINDINGS: No acute fracture or dislocation of either knee. Mild osteoarthrosis. Chondrocalcinosis. Small joint effusions.   Mild soft tissue swelling. No acute osseous abnormality of either knee. XR KNEE RIGHT (3 VIEWS)    Result Date: 2/26/2022  EXAMINATION: THREE XRAY VIEWS OF THE LEFT KNEE; THREE XRAY VIEWS OF THE RIGHT KNEE 2/26/2022 10:48 am COMPARISON: Left knee radiographs on 11/04/2008 HISTORY: Bilateral knee pain status post fall. FINDINGS: No acute fracture or dislocation of either knee. Mild osteoarthrosis. Chondrocalcinosis. Small joint effusions. Mild soft tissue swelling. No acute osseous abnormality of either knee. CT Cervical Spine WO Contrast    Result Date: 2/27/2022  EXAMINATION: CT OF THE CERVICAL SPINE WITHOUT CONTRAST 2/27/2022 4:33 pm TECHNIQUE: CT of the cervical spine was performed without the administration of intravenous contrast. Multiplanar reformatted images are provided for review. Dose modulation, iterative reconstruction, and/or weight based adjustment of the mA/kV was utilized to reduce the radiation dose to as low as reasonably achievable. COMPARISON: 11/12/2018 HISTORY: ORDERING SYSTEM PROVIDED HISTORY: fall, neck pain TECHNOLOGIST PROVIDED HISTORY: Reason for exam:->fall, neck pain Decision Support Exception - unselect if not a suspected or confirmed emergency medical condition->Emergency Medical Condition (MA) FINDINGS: BONES/ALIGNMENT: There is no acute fracture or traumatic malalignment. DEGENERATIVE CHANGES: Advanced multilevel degenerative disc disease and advanced multilevel facet arthropathy with osseous overgrowth. SOFT TISSUES: There is no prevertebral soft tissue swelling. No acute abnormality of the cervical spine. CT Lumbar Spine WO Contrast    Result Date: 2/27/2022  EXAMINATION: CT OF THE LUMBAR SPINE WITHOUT CONTRAST  2/27/2022 TECHNIQUE: CT of the lumbar spine was performed without the administration of intravenous contrast. Multiplanar reformatted images are provided for review. Adjustment of mA and/or kV according to patient size was utilized.   Dose modulation, iterative reconstruction, and/or weight based adjustment of the mA/kV was utilized to reduce the radiation dose to as low as reasonably achievable. COMPARISON: CT abdomen and pelvis yesterday. CT lumbar spine 02/20/2022. HISTORY: ORDERING SYSTEM PROVIDED HISTORY: fall back pain TECHNOLOGIST PROVIDED HISTORY: Reason for exam:->fall back pain Decision Support Exception - unselect if not a suspected or confirmed emergency medical condition->Emergency Medical Condition (MA) FINDINGS: BONES/ALIGNMENT: Status post discectomy and transpedicular fusion at L4-5. No evidence for hardware failure. The vertebral body heights are maintained. No listhesis. DEGENERATIVE CHANGES: Multilevel degenerative disc disease again noted with disc bulge at L2-3 and L3-4. Advanced facet arthropathy in the lower lumbar spine. SOFT TISSUES/RETROPERITONEUM: No soft tissue hematoma identified. Infrarenal enlargement measuring up to 3.1 x 2.6 cm. The rectum is distended with at least moderate stool burden, partially visualized. 1.  No acute osseous abnormality identified. 2.  Rectal distension with at least moderate stool burden, partially visualized. 3.  Infrarenal aortic enlargement measuring up to 3.1 cm. RECOMMENDATIONS: For management of fusiform AAA: 3.0-3.4 cm AAA, recommend follow-up every 3 years. d Note: Recommend Vascular consultation if a fusiform AAA enlarges by >0.5 cm in 6 months or >1 cm in 1 year or for a saccular AAA of any size. References: Lazaro Blazing Radiol 2013; 96(34):236-326; J Vasc Surg. 2018; 67:2-77     CT ABDOMEN PELVIS W IV CONTRAST    Result Date: 2/26/2022  EXAMINATION: CT OF THE ABDOMEN AND PELVIS WITH CONTRAST 2/26/2022 11:44 am TECHNIQUE: CT of the abdomen and pelvis was performed with the administration of 100 mL Isovue 370 intravenous contrast. Multiplanar reformatted images are provided for review.  Dose modulation, iterative reconstruction, and/or weight based adjustment of the mA/kV was utilized to reduce the radiation dose to as low as reasonably achievable. COMPARISON: 10/07/2018 HISTORY: Acute abdominal pain. FINDINGS: Lower Chest: Dependent atelectasis at the lung bases. Mild lingular scarring. Organs: Renal cysts, left greater than right. Calcified granulomas in the spleen and liver. Remaining solid abdominal organs and the gallbladder are unremarkable. GI/Bowel: No acute abnormality. Normal appendix. Mild colonic diverticulosis. Pelvis: Mild urinary bladder wall thickening could be due to incomplete distension or chronic outlet obstruction from mildly enlarged prostate. No lymphadenopathy or free fluid. Peritoneum/Retroperitoneum: No lymphadenopathy or ascites. Extensive atherosclerotic disease with ectasia of the infrarenal abdominal aorta. Bones/Soft Tissues: Posterior fusion changes at L4-L5. Multilevel degenerative changes of the visualized spine. No acute abnormality. XR CHEST PORTABLE    Result Date: 2/27/2022  EXAMINATION: ONE XRAY VIEW OF THE CHEST 2/27/2022 4:33 pm COMPARISON: 02/19/2022 HISTORY: ORDERING SYSTEM PROVIDED HISTORY: chest pain TECHNOLOGIST PROVIDED HISTORY: Reason for exam:->chest pain FINDINGS: The cardiomediastinal silhouette is unchanged in appearance. Generalized interstitial prominence again noted. Linear opacities in the left lung base likely represent subsegmental atelectasis or scarring. There is no consolidation, pneumothorax, or evidence of edema. No effusion is appreciated. The osseous structures are unchanged in appearance. Chronic findings in the chest without acute airspace disease identified.        Assessment      Hospital Problems           Last Modified POA    * (Principal) Parkinson's disease ([address] 2/27/2022 Yes    Hyperlipidemia 2/27/2022 Yes    Hypertension 2/27/2022 Yes    Dementia ([address] 2/27/2022 Yes    Tobacco abuse 2/27/2022 Yes    Cellulitis 2/27/2022 Yes    Frequent falls 2/27/2022 Yes    General weakness 2/27/2022 Yes    Venous stasis of both lower extremities 2/27/2022 Yes        Plan    1. Parkinson's disease-continue home Artane dose, fall precautions  2. Cellulitis of left lower extremity- wound culture obtained, initiate antibiotic therapy (Vancomycin- MRSA coverage, Cefipime-pseudomonas coverage), wound care consult  3. Dementia- continue home dose of Aricept and Namenda  4. Frequent falls- fall risk precautions, up with with assistance   5. Generalized weakness- consult to dietician-nutrition supplements as needed, PT/OT consult. CM consult for placement  6. Hyperlipidemia- continue home Lipitor dose, low cholesterol diet  7. Uncontrolled hypertension-May be 2/2 hip pain from falls, monitor, continue home Toprol XL dose, low sodium diet, if continues may need antihypertensives added  8. Tobacco abuse- nicotine patch, encourage smoking cessation  9. Venous stasis of bilateral lower extremities- compression stockings, bilateral lower extremity elevation, continue home lasix  10. CAD - continue home ASA    Diet ADULT DIET; Regular; 5 carb choices (75 gm/meal);  Low Fat/Low Chol/High Fiber/2 gm Na   DVT Prophylaxis [x] Lovenox, []  Heparin, [] SCDs, [] Ambulation   GI Prophylaxis [x] PPI,  [] H2 Blocker,  [] Carafate,  [] Diet/Tube Feeds   Code Status Full Code   Surrogate Decision Maker (Spouse) Nardalakshmi Paulo [phone]   Disposition Patient requires continued admission due to frequent falls, left lower extremity cellulitis, weakness, and possible placement     Consultations Ordered:  PHARMACY TO DOSE VANCOMYCIN  IP CONSULT TO CASE MANAGEMENT  IP CONSULT TO DIETITIAN    Medications   Medications:    vancomycin  750 mg IntraVENous Q12H    aspirin  81 mg Oral Daily    atorvastatin  20 mg Oral Daily    cyanocobalamin  1,000 mcg Oral Daily    docusate sodium  100 mg Oral BID    donepezil  10 mg Oral Nightly    furosemide  20 mg Oral BID    memantine  10 mg Oral BID    metoprolol succinate  25 mg Oral Daily    trihexyphenidyl

## 2022-02-28 NOTE — CARE COORDINATION
CM met with the patient for discharge planning. Patient lives in a mobile home with his wife (ramped entrance), has insurance with Rx coverage & PCP, required assistance with ADL's, and no longer drives (family provides transportation as needed). Patient stated that he has a cane, walker, and wheelchair at home but primarily uses a walker. Patient stated that he does not require home oxygen. Patient confirmed multiple falls recently and calls to EMS for assistance. Patient stated that he would prefer to return home with Estrellita Culver. CM discussed SNF placement considering his multiple falls and family's concern about being able to care for him at home. Patient stated that he has been to [facility] in the past and would prefer returning there since he is familiar with the facility. PT/OT evaluation are pending at this time. CM will follow. 12:02 PM  CM spoke with the patient's wife Bhavya Jeronimo regarding SNF placement and she is in agreement with the patient's choice of [facility]. CM will follow. 12:05 PM  CM left a voicemail for Candace at [facility] requesting a return call regarding bed availability and possible referral.  CM will follow. 1:03 PM  CM received return call from Candace at [facility] stating she will review the patient's clinical documentation for possible acceptance. CM will follow.

## 2022-02-28 NOTE — CONSULTS
[address] [id number] Continence Nurse  Consult Note       Lamont Andres  AGE: 66 y.o.    GENDER: male  : 1950  TODAY'S DATE:  2022    Subjective:     Reason for CWOCN Evaluation and Assessment: wound assessment      Lamont Andres is a 66 y.o. male referred by:   [x] Physician  [] Nursing  [] Other:     Wound Identification:  Wound Type: venous  Contributing Factors: edema, venous stasis, decreased mobility and smoking        PAST MEDICAL HISTORY        Diagnosis Date    CAD (coronary artery disease)     with stent    Chronic low back pain     HX of laminectomy    Coronary arteriosclerosis     stent    Dementia ([facility]     Fracture     R ankle    Hypertension     Hypertensive disorder     Osteoarthritis     Parkinson disease ([facility] [facility]     Small vessel disease, cerebrovascular     Tobacco abuse        PAST SURGICAL HISTORY    Past Surgical History:   Procedure Laterality Date    ANKLE SURGERY      BACK SURGERY      CARDIAC SURGERY      heart cath    CORONARY ANGIOPLASTY WITH STENT PLACEMENT      HERNIA REPAIR      LUMBAR LAMINECTOMY      TONSILLECTOMY      VASECTOMY         FAMILY HISTORY    Family History   Problem Relation Age of Onset    High Blood Pressure Mother     High Blood Pressure Father     Heart Disease Father     Osteoporosis Sister          from allergy from an injection    No Known Problems Sister     No Known Problems Sister     No Known Problems Sister     No Known Problems Sister        SOCIAL HISTORY    Social History     Tobacco Use    Smoking status: Current Every Day Smoker     Packs/day: 0.50     Years: 57.00     Pack years: 28.50     Types: Cigarettes     Start date: 1964    Smokeless tobacco: Never Used   Vaping Use    Vaping Use: Never used   Substance Use Topics    Alcohol use: Not Currently    Drug use: No       ALLERGIES    Allergies   Allergen Reactions    Chantix [Varenicline]     Daypro [Oxaprozin]        MEDICATIONS    No current facility-administered medications on file prior to encounter. Current Outpatient Medications on File Prior to Encounter   Medication Sig Dispense Refill    atorvastatin (LIPITOR) 20 MG tablet TAKE ONE TABLET BY MOUTH ONCE NIGHTLY 90 tablet 1    metoprolol succinate (TOPROL XL) 25 MG extended release tablet TAKE ONE TABLET BY MOUTH DAILY 90 tablet 0    docusate sodium (COLACE) 100 MG capsule Take 100 mg by mouth 2 times daily      cyanocobalamin 1000 MCG tablet Take 1 tablet by mouth daily 90 tablet 3    furosemide (LASIX) 20 MG tablet Take 20 mg by mouth 2 times daily      donepezil (ARICEPT) 10 MG tablet Take 10 mg by mouth nightly      memantine (NAMENDA) 10 MG tablet Take 10 mg by mouth 2 times daily      aspirin 81 MG EC tablet Take 81 mg by mouth daily.  trihexyphenidyl (ARTANE) 2 MG tablet Take 3 mg by mouth in the morning and at bedtime       sulfamethoxazole-trimethoprim (BACTRIM DS) 800-160 MG per tablet Take 1 tablet by mouth 2 times daily for 10 days 20 tablet 0    methylPREDNISolone (MEDROL, CIERRA,) 4 MG tablet Take by mouth.  1 kit 0    acetaminophen (TYLENOL) 325 MG tablet Take 650 mg by mouth every 4 hours as needed for Pain           Objective:      BP (!) 181/82   Pulse 72   Temp 97 °F (36.1 °C) (Infrared)   Resp 18   Ht 6' 2\" (1.88 m)   Wt 193 lb 14.4 oz (88 kg)   SpO2 92%   BMI 24.90 kg/m²   Waylon Risk Score: Waylon Scale Score: 19    LABS    CBC:   Lab Results   Component Value Date    WBC 10.2 02/28/2022    RBC 4.58 02/28/2022    HGB 12.5 02/28/2022    HCT 40.8 02/28/2022    MCV 89.1 02/28/2022    MCH 27.3 02/28/2022    MCHC 30.6 02/28/2022    RDW 14.0 02/28/2022     02/28/2022    MPV 10.3 02/28/2022     CMP:    Lab Results   Component Value Date     02/28/2022    K 3.9 02/28/2022     02/28/2022    CO2 26 02/28/2022    BUN 12 02/28/2022    CREATININE 0.9 02/28/2022    GFRAA >60 02/28/2022    AGRATIO 2.0 02/24/2022    LABGLOM >60 02/28/2022    GLUCOSE 86 02/28/2022    PROT 6.2 02/27/2022    PROT 7.8 10/14/2010    LABALBU 3.9 02/27/2022    CALCIUM 9.0 02/28/2022    BILITOT 0.6 02/27/2022    ALKPHOS 110 02/27/2022    AST 25 02/27/2022    ALT 16 02/27/2022     Albumin:    Lab Results   Component Value Date    LABALBU 3.9 02/27/2022     PT/INR:    Lab Results   Component Value Date    PROTIME 12.3 10/11/2018    PROTIME 11.6 02/25/2012    INR 1.06 10/11/2018     HgBA1c:  No results found for: LABA1C      Assessment:     Patient Active Problem List   Diagnosis    Right inguinal hernia    Hyperlipidemia    Hypertension    Parkinson's disease ([facility] [facility]    Dementia ([facility] [address] [facility]    Osteoarthritis    Small vessel disease, cerebrovascular    CAD (coronary artery disease)    Tobacco abuse    Cellulitis    Frequent falls    General weakness    Venous stasis of both lower extremities    Cellulitis of left leg       Measurements:  Wound 10/05/18 rt buttock (Active)   Number of days: 1241       Wound 02/28/22 Pretibial Left; Anterior (Active)   Wound Image   02/28/22 1445   Wound Etiology Venous 02/28/22 1445   Dressing Status New dressing applied 02/28/22 1445   Wound Cleansed Cleansed with saline 02/28/22 1445   Dressing/Treatment Foam impregnated with Ag; Ace wrap;Roll gauze 02/28/22 1445   Wound Length (cm) 3.8 cm 02/28/22 1445   Wound Width (cm) 1 cm 02/28/22 1445   Wound Depth (cm) 0.1 cm 02/28/22 1445   Wound Surface Area (cm^2) 3.8 cm^2 02/28/22 1445   Wound Volume (cm^3) 0.38 cm^3 02/28/22 1445   Distance Tunneling (cm) 0 cm 02/28/22 1445   Tunneling Position ___ O'Clock 0 02/28/22 1445   Undermining Starts ___ O'Clock 0 02/28/22 1445   Undermining Ends___ O'Clock 0 02/28/22 1445   Undermining Maxium Distance (cm) 0 02/28/22 1445   Wound Assessment [place]/red;Slough 02/28/22 1445   Drainage Amount Small 02/28/22 1445   Drainage Description Serosanguinous 02/28/22 1445   Odor None 02/28/22 1445   Josy-wound Assessment Intact;Edematous 02/28/22 1445   Margins Defined edges 02/28/22 1445   Wound Thickness Description not for Pressure Injury Full thickness 02/28/22 1445   Number of days: 0       Wound 02/28/22 Pretibial Left;Lateral (Active)   Wound Etiology Venous 02/28/22 1445   Dressing Status New dressing applied 02/28/22 1445   Wound Cleansed Cleansed with saline 02/28/22 1445   Dressing/Treatment Ace wrap; Foam impregnated with Ag;Roll gauze 02/28/22 1445   Wound Length (cm) 4.5 cm 02/28/22 1445   Wound Width (cm) 3.5 cm 02/28/22 1445   Wound Depth (cm) 0.1 cm 02/28/22 1445   Wound Surface Area (cm^2) 15.75 cm^2 02/28/22 1445   Wound Volume (cm^3) 1.575 cm^3 02/28/22 1445   Distance Tunneling (cm) 0 cm 02/28/22 1445   Tunneling Position ___ O'Clock 0 02/28/22 1445   Undermining Starts ___ O'Clock 0 02/28/22 1445   Undermining Ends___ O'Clock 0 02/28/22 1445   Undermining Maxium Distance (cm) 0 02/28/22 1445   Wound Assessment [place]/red;Slough 02/28/22 1445   Drainage Amount Small 02/28/22 1445   Drainage Description Serosanguinous; Yellow 02/28/22 1445   Odor None 02/28/22 1445   Josy-wound Assessment Intact;Edematous 02/28/22 1445   Margins Defined edges 02/28/22 1445   Wound Thickness Description not for Pressure Injury Full thickness 02/28/22 1445   Number of days: 0       Response to treatment:  Well tolerated by patient. Pain Assessment:  Severity:  none  Quality of pain: na  Wound Pain Timing/Severity: na  Premedicated: no    Plan:     Plan of Care: Wound 02/28/22 Pretibial Left; Anterior-Dressing/Treatment: Foam impregnated with Ag,Ace wrap,Roll gauze  Wound 02/28/22 Pretibial Left;Lateral-Dressing/Treatment: Ace wrap,Foam impregnated with Ag,Roll gauze     Pt in bed. Family with patient. Agreeable to wound assessment of left leg. Family member reported large drainage at home and bilateral leg edema. Stated leg edema is improved. History of venous stasis per chart. Left anterior and lateral leg with venous wounds. Minimal drainage presently.  Cleansed with NS and most slough removed with cleansing. Small slough remains. Picture and measurements taken. Optifoam AG, kerlix and ACE recommended and applied. Right leg appears intact. Ace wrap applied from above toes to below knee as well. Overgrown toenails noted. Heels intact. Posterior assessment negative per nurse report. Pt is generally not at risk for skin breakdown AEB Waylon. Updated nurse. Recommend follow up at outpatient wound clinic after discharge but pt may be going to SNF in which may have own wound care. Will send referral.     Specialty Bed Required : no  [] Low Air Loss   [] Pressure Redistribution  [] Fluid Immersion  [] Bariatric  [] Total Pressure Relief  [] Other:     Discharge Plan:  Placement for patient upon discharge: tbd  Hospice Care: no  Patient appropriate for Outpatient [address]: yes-referral sent    Patient/Caregiver Teaching:  Level of patient/caregiver understanding able to:   Needs reinforcement.        Electronically signed by Lencho Bryant RN, [id number] on 2/28/2022 at 3:02 PM

## 2022-02-28 NOTE — PROGRESS NOTES
Occupational Therapy   Occupational Therapy Initial Assessment  Date: 2022   Patient Name: Santos Cook  MRN: [id number]     : 1950    Date of Service: 2022    Discharge Recommendations:  Subacute/Skilled Nursing Facility       Assessment   Performance deficits / Impairments: Decreased functional mobility ; Decreased ADL status; Decreased ROM; Decreased strength;Decreased safe awareness;Decreased balance;Decreased endurance;Decreased coordination;Decreased posture  Assessment: 67 yo male admitted with dx of frequent falls and Parkinson Disease. He presents with decreased I transfers, Decreased I adls, decreased coordination and weakness. OT to see pt to maximize I and safety with transfers and adls and general strength. Pt will benefit from skilled OT after acute hospital stay  Prognosis: Fair  Decision Making: Medium Complexity  History: see above  Exam: see above  OT Education: OT Role;Transfer Training;ADL Adaptive Strategies  REQUIRES OT FOLLOW UP: Yes  Activity Tolerance  Activity Tolerance: Patient Tolerated treatment well  Safety Devices  Safety Devices in place: Yes  Type of devices: Left in chair;Call light within reach; Chair alarm in place           Patient Diagnosis(es): The primary encounter diagnosis was Parkinson's disease ([address] Diagnoses of Multiple falls and Generalized weakness were also pertinent to this visit. has a past medical history of CAD (coronary artery disease), Chronic low back pain, Coronary arteriosclerosis, Dementia ([address] Fracture, Hypertension, Hypertensive disorder, Osteoarthritis, Parkinson disease ([address] Small vessel disease, cerebrovascular, and Tobacco abuse.   has a past surgical history that includes back surgery; Vasectomy; Cardiac surgery; lumbar laminectomy; Coronary angioplasty with stent; hernia repair; Ankle surgery; and Tonsillectomy.            Restrictions  Restrictions/Precautions  Restrictions/Precautions: Fall Risk  Position Activity Restriction  Other position/activity restrictions: purewick for men    Subjective   General  Chart Reviewed: Yes  Patient assessed for rehabilitation services?: Yes  Family / Caregiver Present: No  Patient Currently in Pain: No  Pain Assessment  Pain Assessment: 0-10  Pain Level: 0  Patient's Stated Pain Goal: No pain  Vital Signs  Patient Currently in Pain: No  Social/Functional History  Social/Functional History  Lives With: Spouse  Type of Home: Mobile home  Home Layout: One level  Home Access: Ramped entrance  Bathroom Shower/Tub: Tub/Shower unit  Bathroom Toilet: Standard  Bathroom Equipment: Grab bars in shower  Home Equipment: Rolling walker  Receives Help From: Family  ADL Assistance: Needs assistance  Homemaking Assistance: Needs assistance  Homemaking Responsibilities: No  Ambulation Assistance: Needs assistance (uses RW)  Transfer Assistance: Needs assistance  Active : No  Occupation: Retired  Type of occupation:   Leisure & [address]: used to do [employer]       Objective   Vision Exceptions: Wears glasses at all times  Hearing Exceptions: Hard of hearing/hearing concerns    Orientation  Overall Orientation Status: Within Functional Limits  Observation/Palpation  Observation: in bed  Balance  Sitting Balance: Supervision  Standing Balance: Moderate assistance  Functional Mobility  Functional - Mobility Device: Rolling Walker  Assist Level:  Moderate assistance  ADL  Feeding: Setup (does have some shakiness)  UE Bathing: Contact guard assistance (simulated)  LE Bathing: Dependent/Total (can reach down to his knees)  LE Dressing: Dependent/Total  Toileting: Dependent/Total  Tone RUE  RUE Tone:  (shakey, tremors)  Tone LUE  LUE Tone:  (some shakiness)  Coordination  Movements Are Fluid And Coordinated: No  Coordination and Movement description: Left UE;Right UE  Quality of Movement Other  Comment: Parkinsons     Bed mobility  Supine to Sit: Maximum assistance (Per PT)  Transfers  Sit to stand: Moderate assistance  Stand to sit: Moderate assistance (vcs to lean forward to not plop)     Cognition  Overall Cognitive Status: Exceptions  Following Commands: Follows one step commands consistently  Attention Span: Appears intact  Safety Judgement: Decreased awareness of need for safety  Initiation: Requires cues for some  Sequencing: Requires cues for some        Sensation  Overall Sensation Status: Impaired  Additional Comments: some funny feelings like water dripping down in legs        LUE AROM (degrees)  LUE AROM : WFL  RUE AROM (degrees)  RUE AROM : WFL  LUE Strength  LUE Strength Comment: decreased shoulder flex, abd 4-/5  RUE Strength  RUE Strength Comment: 4/5                   Plan   Plan  Times per week: 3+  Current Treatment Recommendations: Strengthening,ROM,Balance Training,Functional Mobility Training,Endurance Training,Self-Care / ADL,Safety Education & Training,Patient/Caregiver Education & Training    G-Code     OutComes Score                                                  AM-PAC Score       AM-PAC 6 click short form for inpatient daily activity:   How much help from another person does the patient currently need. .. Unable  Dep A Lot  Max A A Lot   Mod A A Little  Min A A Little   CGA  SBA None   Mod I  Indep  Sup   1. Putting on and taking off regular lower body clothing? [x] 1    [] 2   [] 2   [] 3   [] 3   [] 4      2. Bathing (including washing, rinsing, drying)? [] 1   [x] 2   [] 2 [] 3 [] 3 [] 4   3. Toileting, which includes using toilet, bedpan, or urinal? [x] 1    [] 2   [] 2   [] 3   [] 3   [] 4     4. Putting on and taking off regular upper body clothing? [] 1   [] 2   [x] 2   [] 3   [] 3    [] 4      5. Taking care of personal grooming such as brushing teeth? [] 1   [] 2    [] 2 [] 3    [x] 3   [] 4      6. Eating meals?    [] 1   [] 2   [] 2   [] 3   [x] 3   [] 4      Raw Score:12/24  60-79% impaired    [24=0% impaired(CH), 23=1-19%(CI), 20-22=20-39%(CJ), 15-19=40-59%(CK), 10-14=60-79%(CL), 7-9=80-99%(CM), 6=100%(CN)]         Goals  Short term goals  Time Frame for Short term goals: until discharge  Short term goal 1: Pt will be Min A/CGA for transfer to chair, toilet or bed w/o LOB or falls following good walker safety  Short term goal 2: Pt will be SBA UB bathing; min A for LB bathing using necessary a.e. Short term goal 3: Pt will be CGA UB dressing/Min A LB dressing using necessary a.e.   Short term goal 4: Pt will be min vc for exercise program(like BIG exercises) to assist in balance, transfers, coordination and strength  Patient Goals   Patient goals : to be able to walk better and do more       Therapy Time   Individual Concurrent Group Co-treatment   Time In 1125     3740   Time Out 1140     1005   Minutes 15     25   Timed Code Treatment Minutes: [facility],

## 2022-03-01 LAB
ANION GAP SERPL CALCULATED.3IONS-SCNC: 6 MMOL/L (ref 4–16)
BUN BLDV-MCNC: 18 MG/DL (ref 6–23)
CALCIUM SERPL-MCNC: 9.1 MG/DL (ref 8.3–10.6)
CHLORIDE BLD-SCNC: 103 MMOL/L (ref 99–110)
CO2: 28 MMOL/L (ref 21–32)
CREAT SERPL-MCNC: 1.2 MG/DL (ref 0.9–1.3)
DOSE AMOUNT: NORMAL
DOSE TIME: NORMAL
GFR AFRICAN AMERICAN: >60 ML/MIN/1.73M2
GFR NON-AFRICAN AMERICAN: 60 ML/MIN/1.73M2
GLUCOSE BLD-MCNC: 107 MG/DL (ref 70–99)
HCT VFR BLD CALC: 39.2 % (ref 42–52)
HEMOGLOBIN: 12.3 GM/DL (ref 13.5–18)
MCH RBC QN AUTO: 27.8 PG (ref 27–31)
MCHC RBC AUTO-ENTMCNC: 31.4 % (ref 32–36)
MCV RBC AUTO: 88.7 FL (ref 78–100)
PDW BLD-RTO: 14.1 % (ref 11.7–14.9)
PLATELET # BLD: 141 K/CU MM (ref 140–440)
PMV BLD AUTO: 10.7 FL (ref 7.5–11.1)
POTASSIUM SERPL-SCNC: 3.9 MMOL/L (ref 3.5–5.1)
RBC # BLD: 4.42 M/CU MM (ref 4.6–6.2)
SODIUM BLD-SCNC: 137 MMOL/L (ref 135–145)
VANCOMYCIN TROUGH: 14.4 UG/ML (ref 10–20)
WBC # BLD: 9.6 K/CU MM (ref 4–10.5)

## 2022-03-01 PROCEDURE — 36415 COLL VENOUS BLD VENIPUNCTURE: CPT

## 2022-03-01 PROCEDURE — 97530 THERAPEUTIC ACTIVITIES: CPT

## 2022-03-01 PROCEDURE — 6370000000 HC RX 637 (ALT 250 FOR IP): Performed by: GENERAL ACUTE CARE HOSPITAL

## 2022-03-01 PROCEDURE — 80048 BASIC METABOLIC PNL TOTAL CA: CPT

## 2022-03-01 PROCEDURE — 97110 THERAPEUTIC EXERCISES: CPT

## 2022-03-01 PROCEDURE — 80202 ASSAY OF VANCOMYCIN: CPT

## 2022-03-01 PROCEDURE — 6370000000 HC RX 637 (ALT 250 FOR IP): Performed by: PHYSICIAN ASSISTANT

## 2022-03-01 PROCEDURE — 1200000000 HC SEMI PRIVATE

## 2022-03-01 PROCEDURE — 2580000003 HC RX 258: Performed by: GENERAL ACUTE CARE HOSPITAL

## 2022-03-01 PROCEDURE — 6360000002 HC RX W HCPCS: Performed by: GENERAL ACUTE CARE HOSPITAL

## 2022-03-01 PROCEDURE — 97116 GAIT TRAINING THERAPY: CPT

## 2022-03-01 PROCEDURE — 85027 COMPLETE CBC AUTOMATED: CPT

## 2022-03-01 RX ORDER — METOPROLOL SUCCINATE 50 MG/1
50 TABLET, EXTENDED RELEASE ORAL DAILY
Status: DISCONTINUED | OUTPATIENT
Start: 2022-03-02 | End: 2022-03-02 | Stop reason: HOSPADM

## 2022-03-01 RX ADMIN — DONEPEZIL HYDROCHLORIDE 10 MG: 10 TABLET, FILM COATED ORAL at 20:14

## 2022-03-01 RX ADMIN — VANCOMYCIN HYDROCHLORIDE 750 MG: 750 INJECTION, POWDER, LYOPHILIZED, FOR SOLUTION INTRAVENOUS at 12:23

## 2022-03-01 RX ADMIN — FUROSEMIDE 20 MG: 20 TABLET ORAL at 08:55

## 2022-03-01 RX ADMIN — SODIUM CHLORIDE 25 ML: 9 INJECTION, SOLUTION INTRAVENOUS at 23:33

## 2022-03-01 RX ADMIN — PANTOPRAZOLE SODIUM 40 MG: 40 TABLET, DELAYED RELEASE ORAL at 06:20

## 2022-03-01 RX ADMIN — METOPROLOL SUCCINATE 25 MG: 25 TABLET, EXTENDED RELEASE ORAL at 08:55

## 2022-03-01 RX ADMIN — DOCUSATE SODIUM 100 MG: 100 CAPSULE, LIQUID FILLED ORAL at 20:15

## 2022-03-01 RX ADMIN — FUROSEMIDE 20 MG: 20 TABLET ORAL at 17:48

## 2022-03-01 RX ADMIN — SODIUM CHLORIDE, PRESERVATIVE FREE 10 ML: 5 INJECTION INTRAVENOUS at 08:59

## 2022-03-01 RX ADMIN — ASPIRIN 81 MG: 81 TABLET, COATED ORAL at 08:55

## 2022-03-01 RX ADMIN — SODIUM CHLORIDE 25 ML: 9 INJECTION, SOLUTION INTRAVENOUS at 06:20

## 2022-03-01 RX ADMIN — SODIUM CHLORIDE 25 ML: 9 INJECTION, SOLUTION INTRAVENOUS at 20:19

## 2022-03-01 RX ADMIN — CEFEPIME 2000 MG: 2 INJECTION, POWDER, FOR SOLUTION INTRAVENOUS at 14:52

## 2022-03-01 RX ADMIN — SODIUM CHLORIDE, PRESERVATIVE FREE 10 ML: 5 INJECTION INTRAVENOUS at 06:20

## 2022-03-01 RX ADMIN — SODIUM CHLORIDE, PRESERVATIVE FREE 10 ML: 5 INJECTION INTRAVENOUS at 20:14

## 2022-03-01 RX ADMIN — SODIUM CHLORIDE 25 ML: 9 INJECTION, SOLUTION INTRAVENOUS at 12:22

## 2022-03-01 RX ADMIN — CEFEPIME 2000 MG: 2 INJECTION, POWDER, FOR SOLUTION INTRAVENOUS at 20:21

## 2022-03-01 RX ADMIN — TRIHEXYPHENIDYL HYDROCHLORIDE 3 MG: 2 TABLET ORAL at 20:15

## 2022-03-01 RX ADMIN — VANCOMYCIN HYDROCHLORIDE 750 MG: 750 INJECTION, POWDER, LYOPHILIZED, FOR SOLUTION INTRAVENOUS at 23:34

## 2022-03-01 RX ADMIN — TRIHEXYPHENIDYL HYDROCHLORIDE 3 MG: 2 TABLET ORAL at 08:55

## 2022-03-01 RX ADMIN — MEMANTINE 10 MG: 10 TABLET ORAL at 08:55

## 2022-03-01 RX ADMIN — DOCUSATE SODIUM 100 MG: 100 CAPSULE, LIQUID FILLED ORAL at 08:55

## 2022-03-01 RX ADMIN — ENOXAPARIN SODIUM 40 MG: 100 INJECTION SUBCUTANEOUS at 20:14

## 2022-03-01 RX ADMIN — CYANOCOBALAMIN TAB 1000 MCG 1000 MCG: 1000 TAB at 08:55

## 2022-03-01 RX ADMIN — CEFEPIME 2000 MG: 2 INJECTION, POWDER, FOR SOLUTION INTRAVENOUS at 06:21

## 2022-03-01 RX ADMIN — ATORVASTATIN CALCIUM 20 MG: 20 TABLET, FILM COATED ORAL at 20:14

## 2022-03-01 RX ADMIN — MEMANTINE 10 MG: 10 TABLET ORAL at 20:14

## 2022-03-01 ASSESSMENT — PAIN SCALES - GENERAL
PAINLEVEL_OUTOF10: 0

## 2022-03-01 NOTE — PROGRESS NOTES
[id number]  consulted by Myer Olszewski, APRN-CNP for monitoring and adjustment. Indication for treatment: LLE Cellulitis  Goal trough: [x] 10-15 mcg/mL or [] 15-20 mcg/ml  AUC/CASSIE: [x] <500 or [] 400-600    Pertinent Laboratory Values:   Temp Readings from Last 3 Encounters:   03/01/22 98.2 °F (36.8 °C) (Infrared)   02/26/22 97.7 °F (36.5 °C) (Oral)   02/24/22 97 °F (36.1 °C)     Recent Labs     02/27/22  1630 02/28/22  0555 03/01/22  0535   WBC 13.6* 10.2 9.6     Recent Labs     02/27/22  1630 02/28/22  0555 03/01/22  0535   BUN 15 12 18   CREATININE 0.9 0.9 1.2     Estimated Creatinine Clearance: 65 mL/min (based on SCr of 1.2 mg/dL). Intake/Output Summary (Last 24 hours) at 3/1/2022 1228  Last data filed at 3/1/2022 8238  Gross per 24 hour   Intake 2080.54 ml   Output 1500 ml   Net 580.54 ml     Pertinent Cultures:  Date    Source    Results  02/27   Wound    In process  02/27   Blood    No growth    Vancomycin level:   TROUGH:    Recent Labs     03/01/22  0535   VANCOTROUGH 14.4     RANDOM:  No results for input(s): VANCORANDOM in the last 72 hours. Assessment:  SCr, BUN: trending up slightly - will monitor  Day(s) of therapy: 3  Vancomycin concentration: 3/1: 14.4 drawn ~7 hours post dose    Plan:  Continue vancomycin 750mg IVPB Q12H  Pharmacy will continue to monitor patient and adjust therapy as indicated    Aneta 3 3/3 @ 0600    Thank you for the consult.   Katherin Gomez, [facility]  3/1/2022 12:28 PM

## 2022-03-01 NOTE — PROGRESS NOTES
Physical Therapy Treatment Note   Date: 3/1/2022 Room: [unfilled]   Name: Tamiko Carpenter : 1950   MRN: [id number] Admission Date:2022    Primary Problem:   Past Medical History:   Diagnosis Date    CAD (coronary artery disease)     with stent    Chronic low back pain     HX of laminectomy    Coronary arteriosclerosis     stent    Dementia ([facility]     Fracture     R ankle    Hypertension     Hypertensive disorder     Osteoarthritis     Parkinson disease ([facility]     Small vessel disease, cerebrovascular     Tobacco abuse      Past Surgical History:   Procedure Laterality Date    ANKLE SURGERY      BACK SURGERY      CARDIAC SURGERY      heart cath    CORONARY ANGIOPLASTY WITH STENT PLACEMENT      HERNIA REPAIR      LUMBAR LAMINECTOMY      TONSILLECTOMY      VASECTOMY       Rehabilitation Diagnosis: impaired mobility due to Parkinsons  Restrictions/Precautions: fall risk, purewick, IV    Subjective: Agreeable to therapy. Initial Pain level: I have some pain when the nurse was working with my leg. Did not rate pain.       Goals:                   Short term goals  Time Frame for Short term goals: 1 week  Short term goal 1: patient will safely perform bed mobility activity with min A  Short term goal 2: patient will safely perform transfer activity with min A  Short term goal 3: patient will safely ambulate 25 ft using RW with min A               Plan of Care:             Times per week: Mon- Sat 4x/wk            Current Treatment Recommendations: Strengthening,Transfer Training,Endurance Training,Gait Training,Balance Training,Functional Mobility Training      Objective Findings:    Date:  3/1/22 Date:  Date:  Date:  Date:    Bed mobility Min to Mod A x 2       Sit to stand transfer Min A x 2       Stand to sit transfer CGA       Commode transfer n/a       Standing tolerance SPT:CGA X 2       Ambulation Pt completed functional mobility with RW x 25' x 2 with CGA x 2       Stairs n/a Exercises:   Exercise/Equipment/Modalities  Date:   3/1/22 Date:  Date:  Date:    Sitting HR 2x10      Sitting TR 2x10      Sitting march 2x10      Sitting LAQ  2x10      Sitting hip add pillow squeeze 2x10                                             Modality/intervention used:   [ x] Therapeutic Exercise   [ ] Modalities:   [x ] Therapeutic Activity     [ ] Ultrasound   [ ] Elec Stim   [x ] Gait Training     [ ] Cervical Traction  [ ] Lumbar Traction   [ ] Neuromuscular Re-education   [ ] Cold/hotpack  [ ] Iontophoresis   [ ] Instruction in HEP     [ ] Vasopneumatic   [ ] Manual Therapy   [ ] Aquatic Therapy     Other Therapeutic Activities:    Home Exercise Program/Education provided to patient:     Manual Treatments:     Communication with other providers:  [id number] Deanna Paredes to see per nursing    Adverse reactions to treatment:     Treatment/Activity Tolerance:   [x ] Patient limited by fatigue [ ] Patient limited by pain [ ] Patient limited by other medical complications [ ] Patient tolerated therapy well  [ ] Other:     Post Tx Pain Rating:did not rate /10     Safety Precautions:   [x ] left in bed  [ ] left in chair [ x] call light within reach  [x ] bed alarm on  [ ] personal alarm on  [ ] other staff present:    Plan:   [x ] Continue per plan of care [ ] Leticia De La Torre current plan   [ ] Plan of care initiated [ ] Marlon Baez pending MD visit [ ] Discharge     Plan for Next Session:     Time In: 1320  Time Out: 1415  Total Treatment Minutes: 54'  Billed Units: 2 TE, 1 TA, 1 GT    Signed: [place] [id number],  MARISSA  3/1/2022, 2:22 PM

## 2022-03-01 NOTE — PROGRESS NOTES
Hospitalist Progress Note      Name:  Charmayne Coffin /Age/Sex: 1950  (66 y.o. male)   MRN & CSN:  [id number] & [id number] Admission Date/Time: 2022  4:19 PM   Location:   PCP: Antonia Russell Dr Day: 3    Assessment and Plan:   Charmayne Coffin is a 66 y.o.  male  who presents with Parkinson's disease ([facility] [address]    1. Frequent falls, this is most likely secondary to his Parkinson's disease as well as his dementia. Patient has been placed on fall precautions PT OT has been consulted  2. Cellulitis of the left lower extremity wound culture was obtained as well as blood cultures blood cultures have showed no growth in 48 hours wound culture shows Providencia rettgeri to heavy growth and Staph aureus, at this time continue cefepime as it is a late generation cephalosporin fourth-generation to be exact, and continue vancomycin await sensitivities. 3. Dementia continue Namenda and Aricept  4. Generalized weakness, physical and Occupational Therapy have been consulted. Patient's family is unable to care for him at home any longer Case management has also been consulted and is working on placement. 5. Hyperlipidemia continue Lipitor  6. Hypertension, continue Toprol-XL, patient blood pressure remains elevated at 176/81, 181/82, 177/115. Heart rate has a low of 57 and high of 92. Will increase Toprol to 50 mg daily. Will monitor heart rate and blood pressure. 7. Tobacco abuse, smoking cessation discussed and encouraged, nicotine patch has been ordered  8. Venous stasis bilateral lower extremities, elevate extremities, compression stockings continue home Lasix  9. CAD, no complaints of any chest pain continue aspirin    Diet ADULT DIET; Regular; 5 carb choices (75 gm/meal);  Low Fat/Low Chol/High Fiber/2 gm Na   DVT Prophylaxis [x] Lovenox, []  Heparin, [] SCDs, [] Ambulation   GI Prophylaxis [x] PPI,  [] H2 Blocker,  [] Carafate,  [] Diet/Tube Feeds   Code Status Full Code History of Present Illness:     Chief Complaint:        Ten point ROS reviewed negative, unless as noted above    Objective: Intake/Output Summary (Last 24 hours) at 3/1/2022 1450  Last data filed at 3/1/2022 1449  Gross per 24 hour   Intake 1675.04 ml   Output 1500 ml   Net 175.04 ml      Vitals:   Vitals:    03/01/22 0722   BP: (!) 176/81   Pulse: 57   Resp: 18   Temp: 98.2 °F (36.8 °C)   SpO2: 99%     Physical Exam:   GEN Awake male, sitting upright in bed in no apparent distress. Appears given age. EYES Pupils are equally round. No scleral erythema, discharge, or conjunctivitis. HENT Mucous membranes are moist. Oral pharynx without exudates, no evidence of thrush. NECK Supple, no apparent thyromegaly or masses. RESP diminished, no wheezes, rales or rhonchi. Symmetric chest movement while on room air. CARDIO/VASC S1/S2 auscultated. Regular rate without appreciable murmurs, rubs, or gallops. No JVD or carotid bruits. Peripheral pulses equal bilaterally and palpable. Bilateral lower extremity edema. GI Abdomen is soft without significant tenderness, masses, or guarding. Bowel sounds are normoactive. Rectal exam deferred.  No costovertebral angle tenderness. SKIN left lower extremity with foul-smelling venous stasis ulcers noted purulent drainage no fluctuance no lymphangitic streaking  NEURO Cranial nerves appear grossly intact, normal speech, no lateralizing weakness.   PSYCH this oriented, inattention    Medications:   Medications:    vancomycin  750 mg IntraVENous Q12H    trihexyphenidyl  3 mg Oral BID    atorvastatin  20 mg Oral Nightly    aspirin  81 mg Oral Daily    cyanocobalamin  1,000 mcg Oral Daily    docusate sodium  100 mg Oral BID    donepezil  10 mg Oral Nightly    furosemide  20 mg Oral BID    memantine  10 mg Oral BID    metoprolol succinate  25 mg Oral Daily    sodium chloride flush  5-40 mL IntraVENous 2 times per day    enoxaparin  40 mg SubCUTAneous Daily  ceFEPIme (MAXIPIME) IVPB  2,000 mg IntraVENous Q8H    pantoprazole  40 mg Oral QAM AC    nicotine  1 patch TransDERmal Daily      Infusions:    sodium chloride Stopped (03/01/22 1448)     PRN Meds: acetaminophen, 650 mg, Q4H PRN  sodium chloride flush, 5-40 mL, PRN  sodium chloride, 25 mL, PRN  ondansetron, 4 mg, Q8H PRN   Or  ondansetron, 4 mg, Q6H PRN  polyethylene glycol, 17 g, Daily PRN  diphenhydrAMINE, 12.5 mg, PRN      Collected: 02/27/22 1945    Updated: 03/01/22 1334    Specimen Source: Leg     Specimen LEG    Special Requests Left lower leg wound    Culture Prelim Report     PROVIDENCIA RETTGERI Heavy growth Sensitivity to follow Abnormal      STAPHYLOCOCCUS AUREUS Heavy growth Sensitivity to follow Abnormal                Electronically signed by JARVIS Fisher NP on 3/1/2022 at 2:50 PM

## 2022-03-01 NOTE — CARE COORDINATION
CM received a voicemail from Candace at [facility] stating that they are unable to accept the patient. CM will follow. 12:45 PM  CM spoke with the patient's wife Chance Samantha to notify her that the [facility] has declined the referral.  Chance Singh stated that she would be open to SNF placement anywhere in [place]. 12:50 PM  ANTHONY spoke with Roxy at [facility] and she does not plan on having a bed available until the end of this week at the earliest.  ANTHONY left a voicemail for Maria Guadalupe at [facility] requesting a return call regarding bed availability and possible referral.  CM will follow. 1:18 PM  CM received notification from [address] [place] [address] at [facility] that they are reviewing the patient's clinical documentation and will follow-up with this CM. CM will follow.

## 2022-03-01 NOTE — PROGRESS NOTES
Occupational Therapy    Occupational Therapy Treatment Note  Name: Natalia Norman MRN: [id number] :   1950   Date:  3/1/2022   Admission Date: 2022 Room:  [address] [place] [address]   Restrictions/Precautions:  Restrictions/Precautions  Restrictions/Precautions: Fall Risk     Communication with other providers:   Cleared for treatment by RN. A cotreat was completed this date with  [] PT    [] OT   [] ST      This pt requires simultaneous intervention of 2 skilled therapists to safely complete tasks to address complexity of deficits defined in the goals including:  [x]Attention   [x] Safety    [x]Problem Solving    []Sequencing     []Initiation    []Processing      []Recall of learned tasks  [] Communication  [] Self Feeding   [x]ADL's    []UE Function    [x]Motor planning   [x]Balance  []Energy Conservation   []Verbal cues   [x] Tactile Cues  [x]Barriers     [x]Transfers   []Device Use  Pt's response to cotreat:Good  Progress toward goals: Good      Subjective:  Patient states:  Agreeable for therapy  Pain:   Location, Type, Intensity (0/10 to 10/10): I have some pain when the nurse was working with my leg. Objective:    Observation:   Pt alert and oriented. Treatment, including education:  Transfers  Supine to sit :Min A x 2  Sit to supine Mod A x2  Scooting :Min A x 2  Sit to stand :Min A x 2  Stand to sit :CGA  SPT:CGA X 2      Therapeutic Exercise:  Pt completed UE exercises to increase strength and ROM to facilitate increase for ADLs and functional mobility. Pt completed B UEs with 3# dumbbell exercises with curls, shoulder presses, punch, stirring  x 20 reps with mod rest breaks due to fatigue. Pt completed red theraband pulls horizontally, punches and upwards x 20 reps with mod rest breaks.           Therapeutic Activity Training: Pt completed functional mobility with RW x 25' x 2 with CGA x 2        Safety Measures: Gait belt used, Left in bed, Pull/Bed Alarm activated and call light left in reach        Assessment / Impression:        Patient's tolerance of treatment: Good   Adverse Reaction: None  Significant change in status and impact:  None  Barriers to improvement:  Dec strength and Endurance    Plan for Next Session:    Continue with POC. Time in:  1320  Time out:  1415  Total treatment time:  55  Billed Units: 3 TA, 1 TE  Electronically signed by:    Ruchi Ruggiero, [address]    3/1/2022, 1:59 PM    Previously filed values:  Patient Goals   Patient goals : to be able to walk better and do more  Short term goals  Time Frame for Short term goals: until discharge  Short term goal 1: Pt will be Min A/CGA for transfer to chair, toilet or bed w/o LOB or falls following good walker safety  Short term goal 2: Pt will be SBA UB bathing; min A for LB bathing using necessary a.e. Short term goal 3: Pt will be CGA UB dressing/Min A LB dressing using necessary a.e.   Short term goal 4: Pt will be min vc for exercise program(like BIG exercises) to assist in balance, transfers, coordination and strength

## 2022-03-01 NOTE — PROGRESS NOTES
Comprehensive Nutrition Assessment    Type and Reason for Visit:  Initial,Wound,Consult,Positive Nutrition Screen    Nutrition Recommendations/Plan: Start diabetic oral supplement bid    Nutrition Assessment:  Pt with stated difficulty with chewing due to dentures at home, no shaking causing self feeding issues, Current intakes % of meal.  Will add diabetic oral supplement twice daily due to altered skin integrity. Malnutrition Assessment:  Malnutrition Status:  No malnutrition    Context:  Acute Illness       Estimated Daily Nutrient Needs:  Energy (kcal):  4548-0092; Weight Used for Energy Requirements:  Current     Protein (g):  ; Weight Used for Protein Requirements:  Current        Fluid (ml/day):  5693-6712; Method Used for Fluid Requirements:  1 ml/kcal      Nutrition Related Findings:  low H/H, slightly elevated glucose, no new A1c      Wounds:  Venous Stasis       Current Nutrition Therapies:    ADULT DIET; Regular; 5 carb choices (75 gm/meal); Low Fat/Low Chol/High Fiber/RAYO  ADULT ORAL NUTRITION SUPPLEMENT; Breakfast, Dinner; Diabetic Oral Supplement    Anthropometric Measures:  · Height: 6' 2\" (188 cm)  · Current Body Weight: 193 lb (87.5 kg)   · Admission Body Weight: 193 lb (87.5 kg)    · Usual Body Weight: 194 lb (88 kg) (prior encounters)     · Ideal Body Weight: 190 lbs; % Ideal Body Weight 101.6 %   · BMI: 24.8  · Adjusted Body Weight:  ; No Adjustment   · BMI Categories: Normal Weight (BMI 18.5-24. 9)       Nutrition Diagnosis:   · Biting/chewing (masticatory) difficulty,In context of acute illness or injury related to acute injury/trauma,cognitive or neurological impairment,partial or complete edentulism as evidenced by poor dentition,lab values      Nutrition Interventions:   Food and/or Nutrient Delivery:  Start Oral Nutrition Supplement,Modify Current Diet  Nutrition Education/Counseling:  Education not appropriate   Coordination of Nutrition Care:  Continue to monitor while inpatient    Goals:  Oral intakes will be % of most meals and supplements       Nutrition Monitoring and Evaluation:   Behavioral-Environmental Outcomes:  None Identified   Food/Nutrient Intake Outcomes:  Supplement Intake,Food and Nutrient Intake  Physical Signs/Symptoms Outcomes:  Chewing or Swallowing,Skin,Weight,Biochemical Data     Discharge Planning:    Continue Oral Nutrition Supplement,Continue current diet     Electronically signed by Martita Neri RD, LD on 3/1/22 at 5:11 PM EST    Contact: [phone]

## 2022-03-02 VITALS
BODY MASS INDEX: 24.88 KG/M2 | DIASTOLIC BLOOD PRESSURE: 75 MMHG | OXYGEN SATURATION: 96 % | WEIGHT: 193.9 LBS | RESPIRATION RATE: 16 BRPM | TEMPERATURE: 98.2 F | HEIGHT: 74 IN | HEART RATE: 54 BPM | SYSTOLIC BLOOD PRESSURE: 137 MMHG

## 2022-03-02 DIAGNOSIS — I10 ESSENTIAL HYPERTENSION: ICD-10-CM

## 2022-03-02 LAB
ANION GAP SERPL CALCULATED.3IONS-SCNC: 9 MMOL/L (ref 4–16)
BUN BLDV-MCNC: 16 MG/DL (ref 6–23)
CALCIUM SERPL-MCNC: 9.1 MG/DL (ref 8.3–10.6)
CHLORIDE BLD-SCNC: 104 MMOL/L (ref 99–110)
CO2: 26 MMOL/L (ref 21–32)
CREAT SERPL-MCNC: 1 MG/DL (ref 0.9–1.3)
GFR AFRICAN AMERICAN: >60 ML/MIN/1.73M2
GFR NON-AFRICAN AMERICAN: >60 ML/MIN/1.73M2
GLUCOSE BLD-MCNC: 98 MG/DL (ref 70–99)
HCT VFR BLD CALC: 38.6 % (ref 42–52)
HEMOGLOBIN: 12.2 GM/DL (ref 13.5–18)
MCH RBC QN AUTO: 27.9 PG (ref 27–31)
MCHC RBC AUTO-ENTMCNC: 31.6 % (ref 32–36)
MCV RBC AUTO: 88.3 FL (ref 78–100)
PDW BLD-RTO: 14.1 % (ref 11.7–14.9)
PLATELET # BLD: 135 K/CU MM (ref 140–440)
PMV BLD AUTO: 9.9 FL (ref 7.5–11.1)
POTASSIUM SERPL-SCNC: 4.2 MMOL/L (ref 3.5–5.1)
PROCALCITONIN: 0.18
RBC # BLD: 4.37 M/CU MM (ref 4.6–6.2)
SODIUM BLD-SCNC: 139 MMOL/L (ref 135–145)
WBC # BLD: 9.1 K/CU MM (ref 4–10.5)

## 2022-03-02 PROCEDURE — 2580000003 HC RX 258: Performed by: GENERAL ACUTE CARE HOSPITAL

## 2022-03-02 PROCEDURE — 36415 COLL VENOUS BLD VENIPUNCTURE: CPT

## 2022-03-02 PROCEDURE — 97530 THERAPEUTIC ACTIVITIES: CPT

## 2022-03-02 PROCEDURE — 94761 N-INVAS EAR/PLS OXIMETRY MLT: CPT

## 2022-03-02 PROCEDURE — 6360000002 HC RX W HCPCS: Performed by: GENERAL ACUTE CARE HOSPITAL

## 2022-03-02 PROCEDURE — 97116 GAIT TRAINING THERAPY: CPT

## 2022-03-02 PROCEDURE — 97110 THERAPEUTIC EXERCISES: CPT

## 2022-03-02 PROCEDURE — 6370000000 HC RX 637 (ALT 250 FOR IP): Performed by: PHYSICIAN ASSISTANT

## 2022-03-02 PROCEDURE — 6370000000 HC RX 637 (ALT 250 FOR IP): Performed by: GENERAL ACUTE CARE HOSPITAL

## 2022-03-02 PROCEDURE — 80048 BASIC METABOLIC PNL TOTAL CA: CPT

## 2022-03-02 PROCEDURE — 84145 PROCALCITONIN (PCT): CPT

## 2022-03-02 PROCEDURE — 6370000000 HC RX 637 (ALT 250 FOR IP): Performed by: NURSE PRACTITIONER

## 2022-03-02 PROCEDURE — 97535 SELF CARE MNGMENT TRAINING: CPT

## 2022-03-02 PROCEDURE — 85027 COMPLETE CBC AUTOMATED: CPT

## 2022-03-02 RX ORDER — METOPROLOL SUCCINATE 25 MG/1
TABLET, EXTENDED RELEASE ORAL
Qty: 90 TABLET | Refills: 0 | OUTPATIENT
Start: 2022-03-02

## 2022-03-02 RX ORDER — SULFAMETHOXAZOLE AND TRIMETHOPRIM 800; 160 MG/1; MG/1
1 TABLET ORAL EVERY 12 HOURS SCHEDULED
Qty: 28 TABLET | Refills: 0
Start: 2022-03-02 | End: 2022-03-16

## 2022-03-02 RX ORDER — SULFAMETHOXAZOLE AND TRIMETHOPRIM 800; 160 MG/1; MG/1
1 TABLET ORAL EVERY 12 HOURS SCHEDULED
Status: DISCONTINUED | OUTPATIENT
Start: 2022-03-02 | End: 2022-03-02 | Stop reason: HOSPADM

## 2022-03-02 RX ORDER — METOPROLOL SUCCINATE 50 MG/1
50 TABLET, EXTENDED RELEASE ORAL DAILY
Qty: 30 TABLET | Refills: 0 | Status: ON HOLD
Start: 2022-03-03 | End: 2022-03-25 | Stop reason: HOSPADM

## 2022-03-02 RX ORDER — PANTOPRAZOLE SODIUM 40 MG/1
40 TABLET, DELAYED RELEASE ORAL
Qty: 30 TABLET | Refills: 0
Start: 2022-03-03

## 2022-03-02 RX ADMIN — SODIUM CHLORIDE 25 ML: 9 INJECTION, SOLUTION INTRAVENOUS at 12:26

## 2022-03-02 RX ADMIN — CEFEPIME 2000 MG: 2 INJECTION, POWDER, FOR SOLUTION INTRAVENOUS at 06:00

## 2022-03-02 RX ADMIN — FUROSEMIDE 20 MG: 20 TABLET ORAL at 08:17

## 2022-03-02 RX ADMIN — TRIHEXYPHENIDYL HYDROCHLORIDE 3 MG: 2 TABLET ORAL at 08:17

## 2022-03-02 RX ADMIN — DOCUSATE SODIUM 100 MG: 100 CAPSULE, LIQUID FILLED ORAL at 08:17

## 2022-03-02 RX ADMIN — METOPROLOL SUCCINATE 50 MG: 50 TABLET, EXTENDED RELEASE ORAL at 08:17

## 2022-03-02 RX ADMIN — SODIUM CHLORIDE 25 ML: 9 INJECTION, SOLUTION INTRAVENOUS at 05:59

## 2022-03-02 RX ADMIN — CEFEPIME 2000 MG: 2 INJECTION, POWDER, FOR SOLUTION INTRAVENOUS at 15:03

## 2022-03-02 RX ADMIN — MEMANTINE 10 MG: 10 TABLET ORAL at 08:17

## 2022-03-02 RX ADMIN — SODIUM CHLORIDE, PRESERVATIVE FREE 10 ML: 5 INJECTION INTRAVENOUS at 08:18

## 2022-03-02 RX ADMIN — VANCOMYCIN HYDROCHLORIDE 750 MG: 750 INJECTION, POWDER, LYOPHILIZED, FOR SOLUTION INTRAVENOUS at 12:27

## 2022-03-02 RX ADMIN — FUROSEMIDE 20 MG: 20 TABLET ORAL at 17:57

## 2022-03-02 RX ADMIN — PANTOPRAZOLE SODIUM 40 MG: 40 TABLET, DELAYED RELEASE ORAL at 06:01

## 2022-03-02 RX ADMIN — CYANOCOBALAMIN TAB 1000 MCG 1000 MCG: 1000 TAB at 08:17

## 2022-03-02 RX ADMIN — ASPIRIN 81 MG: 81 TABLET, COATED ORAL at 08:17

## 2022-03-02 ASSESSMENT — PAIN SCALES - GENERAL: PAINLEVEL_OUTOF10: 0

## 2022-03-02 NOTE — DISCHARGE SUMMARY
Discharge Summary    Name:  Talita Payan /Age/Sex: 1950  (66 y.o. male)   MRN & CSN:  [id number] & [id number] Admission Date/Time: 2022  4:19 PM   Attending:  Nadya Pinto MD Discharging Physician: JARVIS Canales NP     Hospital Course:   Talita Payan is a 66 y.o.  male  who presents with Parkinson's disease ([facility] [address]  Talita Payan is a 66 y.o. who presents to the hospital for evaluation after a fall. Patient with history of frequent falls. Spouse and primary care giver, Charlotte Cook, states that patient has sustained 3 falls today. These falls were unwitnessed. EMS has had to come out to the home 3 times to help patient off the floor. Patient does have multiple comorbid's including coronary artery disease, Parkinson's disease, dementia, and hypertension. Spouse states that over the last 6 weeks patient's dementia has advanced significantly. Patient was seen in the ED just yesterday after a fall. Patient is on a daily baby aspirin but no other anticoagulants. Spouse states, \"he needs 24-hour care and I just cannot do it anymore\".     Patient arrived to the ED awake and alert. Patient disheveled and unkempt. He smells of urine and there is visible dirt and stool noted to patient's lower extremities. He is oriented to name only. He is able to follow most commands. Spouse states that this is patient's baseline mental status. He is hemodynamically stable. Today's ED work-up has essentially been unremarkable. EKG was negative for ST elevation or ischemic changes. Imaging studies were negative for acute findings. Patient noted to have bilateral lower extremity skin changes consistent with venous stasis. Patient also with a foul-smelling venous stasis ulcer to the left lower extremity, blood and wound cultures obtained. No evidence of sepsis. Patient will be started on IV vancomycin and IV cefepime. Patient admitted to inpatient unit for further evaluation and treatment.   Patient and family in agreement with current plan of care. Wound cultures back, sensiitve to bactrimDS      1. Frequent falls, this is most likely secondary to his Parkinson's disease as well as his dementia. Patient has been placed on fall precautions PT OT has been consulted  2. Cellulitis of the left lower extremity wound culture was obtained as well as blood cultures blood cultures have showed no growth in 72 hours wound culture shows Providencia rettgeri to heavy growth and Staph aureus,and proteus vulgaris as per sensitivities all 3 are sensitive to Bactrim DS, will d/c on this antibiotic. 3. Dementia continue Namenda and Aricept  4. Generalized weakness, physical and Occupational Therapy have been consulted. Patient's family is unable to care for him at home any longer patient will be going to [facility] he will be discharged today  5. Hyperlipidemia continue Lipitor  6. Hypertension, continue Toprol-XL, blood pressure has improved after increase of Toprol to 50 mg.  7. Tobacco abuse, smoking cessation discussed and encouraged, nicotine patch has been ordered  8. Venous stasis bilateral lower extremities, elevate extremities, compression stockings continue home Lasix  9.  CAD, no complaints of any chest pain continue aspirin      Consults this admission:  [address] [facility] [address] [facility]ETIT[facility]    Discharge Instruction:     Primary care physician:  within 2 weeks    Diet:  low fat, low cholesterol diet   Activity: activity as tolerated  Disposition: Discharged to:   []Home, [][facility], [x]SNF, []Acute Rehab, []Hospice   Condition on discharge: Stable    Discharge Medications:        Medication List      START taking these medications    pantoprazole 40 MG tablet  Commonly known as: PROTONIX  Take 1 tablet by mouth every morning (before breakfast)  Start taking on: March 3, 2022        CHANGE how you take these medications    metoprolol succinate 50 MG extended release tablet  Commonly known as: Chanell Silverman XL  Take 1 tablet by mouth daily  Start taking on: March 3, 2022  What changed:   · medication strength  · See the new instructions. sulfamethoxazole-trimethoprim 800-160 MG per tablet  Commonly known as: BACTRIM DS;SEPTRA DS  Take 1 tablet by mouth every 12 hours for 14 days  What changed: when to take this        CONTINUE taking these medications    acetaminophen 325 MG tablet  Commonly known as: TYLENOL     aspirin 81 MG EC tablet     atorvastatin 20 MG tablet  Commonly known as: LIPITOR  TAKE ONE TABLET BY MOUTH ONCE NIGHTLY     cyanocobalamin 1000 MCG tablet  Take 1 tablet by mouth daily     docusate sodium 100 MG capsule  Commonly known as: COLACE     donepezil 10 MG tablet  Commonly known as: ARICEPT     furosemide 20 MG tablet  Commonly known as: LASIX     memantine 10 MG tablet  Commonly known as: NAMENDA     trihexyphenidyl 2 MG tablet  Commonly known as: ARTANE        STOP taking these medications    methylPREDNISolone 4 MG tablet  Commonly known as: MEDROL (CIERRA)           Where to Get Your Medications      Information about where to get these medications is not yet available    Ask your nurse or doctor about these medications  · metoprolol succinate 50 MG extended release tablet  · pantoprazole 40 MG tablet  · sulfamethoxazole-trimethoprim 800-160 MG per tablet         Objective Findings at Discharge:   /75   Pulse 54   Temp 98.2 °F (36.8 °C) (Infrared)   Resp 16   Ht 6' 2\" (1.88 m)   Wt 193 lb 14.4 oz (88 kg)   SpO2 96%   BMI 24.90 kg/m²            PHYSICAL EXAM   GEN Awake male, sitting upright in bed in no apparent distress. Appears given age. EYES Pupils are equally round. No scleral erythema, discharge, or conjunctivitis. HENT Mucous membranes are moist. Oral pharynx without exudates, no evidence of thrush. NECK Supple, no apparent thyromegaly or masses. RESP Clear to auscultation, no wheezes, rales or rhonchi. Symmetric chest movement while on room air.   CARDIO/VASC S1/S2 auscultated. Regular rate without appreciable murmurs, rubs, or gallops. No JVD or carotid bruits. Peripheral pulses equal bilaterally and palpable. + peripheral edema. GI Abdomen is soft without significant tenderness, masses, or guarding. Bowel sounds are normoactive. Rectal exam deferred.  No costovertebral angle tenderness. MSK No gross joint deformities  SKIN Normal coloration, warm, dry. Ace wraps in place  NEURO Cranial nerves appear grossly intact, normal speech, no lateralizing weakness.   PSYCH Awake, alert, disoriented  BMP/CBC  Recent Labs     02/28/22  0555 03/01/22  0535 03/02/22  0540    137 139   K 3.9 3.9 4.2    103 104   CO2 26 28 26   BUN 12 18 16   CREATININE 0.9 1.2 1.0   WBC 10.2 9.6 9.1   HCT 40.8* 39.2* 38.6*   * 141 135*       WOUND CULTURE     PROVIDENCIA RETTGERI Heavy growth Abnormal       STAPHYLOCOCCUS AUREUS Heavy growth Abnormal      PROTEUS VULGARIS Heavy growth Abnormal    ALL SENSITIVE TO BACTRIM DS  PHOTOS FROM DAY OF ADMISSION           Discharge Time of 35 minutes    Electronically signed by JARVIS Christiansen NP on 3/2/2022 at 3:58 PM

## 2022-03-02 NOTE — PROGRESS NOTES
Occupational Therapy    Occupational Therapy Treatment Note  Name: Corinne Marti MRN: [id number] :   1950   Date:  3/2/2022   Admission Date: 2022 Room:  [address] [place] [address]   Restrictions/Precautions:  Restrictions/Precautions  Restrictions/Precautions: Fall Risk     Communication with other providers:   Cleared for treatment by RN. A cotreat was completed this date with  [x] PT    [] OT   [] ST      This pt requires simultaneous intervention of 2 skilled therapists to safely complete tasks to address complexity of deficits defined in the goals including:  [x]Attention   [x] Safety    [x]Problem Solving    []Sequencing     []Initiation    []Processing      []Recall of learned tasks  [] Communication  [] Self Feeding   [x]ADL's    []UE Function    [x]Motor planning   [x]Balance  []Energy Conservation   []Verbal cues   [x] Tactile Cues  [x]Barriers     [x]Transfers   []Device Use  Pt's response to cotreat:Good  Progress toward goals: Good      Subjective:  Patient states:  Agreeable for therapy  Pain:   Location, Type, Intensity (0/10 to 10/10): My heels have been burning. Therapist report to nursing. Objective:    Observation:   Pt alert and oriented. Treatment, including education:  Transfers  Supine to sit :Min A   Scooting :Min A   Sit to stand :CGA to Min A   Stand to sit :CGA  SPT:CGA   Toilet: Min A    Self Care Training:   Cues were given for safety, sequence, UE/LE placement, visual cues, and balance. Activities performed today included toileting. Pt Max A for clothes up and down. Therapeutic Exercise:  Pt completed UE exercises to increase strength and ROM to facilitate increase for ADLs and functional mobility. Pt completed B UEs with 3# dumbbell exercises with curls, shoulder presses, punch, stirring  x 20 reps with mod rest breaks due to fatigue. Therapeutic Activity Training: Pt completed functional mobility with RW x 40' x 2 and 12' with CGA.         Safety Measures: Gait belt used, Left in bed, Pull/Bed Alarm activated and call light left in reach        Assessment / Impression:        Patient's tolerance of treatment: Good   Adverse Reaction: None  Significant change in status and impact:  None  Barriers to improvement:  Dec strength and Endurance    Plan for Next Session:    Continue with POC. Time in:  1324  Time out:  1417  Total treatment time: 53   Billed Units: 2 TA, 1 TE, 1 ADL  Electronically signed by:    Riley Brown, [address]    3/2/2022, 2:03 PM    Previously filed values:  Patient Goals   Patient goals : to be able to walk better and do more  Short term goals  Time Frame for Short term goals: until discharge  Short term goal 1: Pt will be Min A/CGA for transfer to chair, toilet or bed w/o LOB or falls following good walker safety  Short term goal 2: Pt will be SBA UB bathing; min A for LB bathing using necessary a.e. Short term goal 3: Pt will be CGA UB dressing/Min A LB dressing using necessary a.e.   Short term goal 4: Pt will be min vc for exercise program(like BIG exercises) to assist in balance, transfers, coordination and strength

## 2022-03-02 NOTE — DISCHARGE INSTR - COC
Continuity of Care Form    Patient Name: Paul Orantes   :  1950  MRN:  [id number]    Admit date:  2022  Discharge date:  3/2/2022    Code Status Order: Full Code   Advance Directives:      Admitting Physician:  Lakesha Matson MD  PCP: Anne Roberto DO    Discharging Nurse: [facility] Unit/Room#: 005/005-01  Discharging Unit Phone Number: ***    Emergency Contact:   Extended Emergency Contact Information  Primary Emergency Contact: Aries Blackburn  Address: [address] [place] [address], [address] [place] [address] Phone: [phone]  Mobile Phone: [phone]  Relation: Spouse  Secondary Emergency Contact:  Alex Vivar Phone: [phone]  Relation: Child    Past Surgical History:  Past Surgical History:   Procedure Laterality Date    ANKLE SURGERY      BACK SURGERY      CARDIAC SURGERY      heart cath    CORONARY ANGIOPLASTY WITH STENT PLACEMENT      HERNIA REPAIR      LUMBAR LAMINECTOMY      TONSILLECTOMY      VASECTOMY         Immunization History:   Immunization History   Administered Date(s) Administered    COVID-19, Moderna, Booster, PF, 50mcg/0.25ml 12/10/2021    COVID-19, Fawn Bora, Primary or Immunocompromised, PF, 100mcg/0.5mL 2021, 2021    Influenza, Triv, inactivated, subunit, adjuvanted, IM (Fluad 65 yrs and older) 10/09/2019       Active Problems:  Patient Active Problem List   Diagnosis Code    Right inguinal hernia K40.90    Hyperlipidemia E78.5    Hypertension I10    Parkinson's disease ([facility] G20    Dementia ([facility] F03.90    Osteoarthritis M19.90    Small vessel disease, cerebrovascular I67.9    CAD (coronary artery disease) I25.10    Tobacco abuse Z72.0    Cellulitis L03.90    Frequent falls R29.6    General weakness R53.1    Venous stasis of both lower extremities I87.8    Cellulitis of left leg L03.116       Isolation/Infection:   Isolation            No Isolation          Patient Infection Status       None to display            Nurse Assessment:  Last Vital Signs: /75   Pulse 54   Temp 98.2 °F (36.8 °C) (Infrared)   Resp 16   Ht 6' 2\" (1.88 m)   Wt 193 lb 14.4 oz (88 kg)   SpO2 96%   BMI 24.90 kg/m²     Last documented pain score (0-10 scale): Pain Level: 0  Last Weight:   Wt Readings from Last 1 Encounters:   02/27/22 193 lb 14.4 oz (88 kg)     Mental Status:  oriented and alert    IV Access:  - None    Nursing Mobility/ADLs:  Walking   Assisted  Transfer  Assisted  Bathing  Assisted  Dressing  Assisted  Toileting  Assisted  Feeding  Independent  Med Admin  Independent  Med Delivery   whole    Wound Care Documentation and Therapy:  Wound 10/05/18 rt buttock (Active)   Number of days: 5273       Wound 02/28/22 Pretibial Left; Anterior (Active)   Wound Image   02/28/22 1445   Wound Etiology Venous 03/02/22 0823   Dressing Status Clean;Dry; Intact 03/02/22 0823   Wound Cleansed Cleansed with saline 03/01/22 1337   Dressing/Treatment Foam impregnated with Ag; Ace wrap 03/02/22 0823   Wound Length (cm) 3.8 cm 02/28/22 1445   Wound Width (cm) 1 cm 02/28/22 1445   Wound Depth (cm) 0.1 cm 02/28/22 1445   Wound Surface Area (cm^2) 3.8 cm^2 02/28/22 1445   Wound Volume (cm^3) 0.38 cm^3 02/28/22 1445   Distance Tunneling (cm) 0 cm 02/28/22 1445   Tunneling Position ___ O'Clock 0 02/28/22 1445   Undermining Starts ___ O'Clock 0 02/28/22 1445   Undermining Ends___ O'Clock 0 02/28/22 1445   Undermining Maxium Distance (cm) 0 02/28/22 1445   Wound Assessment Pink/red 03/01/22 1337   Drainage Amount Small 02/28/22 1445   Drainage Description Serosanguinous 03/01/22 1337   Odor None 02/28/22 1445   Josy-wound Assessment Intact 03/01/22 1337   Margins Defined edges 03/02/22 0823   Wound Thickness Description not for Pressure Injury Full thickness 03/02/22 0823   Number of days: 1       Wound 02/28/22 Pretibial Left;Lateral (Active)   Wound Etiology Venous 03/01/22 1337   Dressing Status Clean;Dry; Intact 03/02/22 0823   Wound Cleansed Cleansed with saline 03/02/22 0823   Dressing/Treatment Ace wrap; Foam impregnated with Ag 03/01/22 1337   Wound Length (cm) 4.5 cm 02/28/22 1445   Wound Width (cm) 3.5 cm 02/28/22 1445   Wound Depth (cm) 0.1 cm 02/28/22 1445   Wound Surface Area (cm^2) 15.75 cm^2 02/28/22 1445   Wound Volume (cm^3) 1.575 cm^3 02/28/22 1445   Distance Tunneling (cm) 0 cm 02/28/22 1445   Tunneling Position ___ O'Clock 0 02/28/22 1445   Undermining Starts ___ O'Clock 0 02/28/22 1445   Undermining Ends___ O'Clock 0 02/28/22 1445   Undermining Maxium Distance (cm) 0 02/28/22 1445   Wound Assessment Pink/red 03/01/22 1337   Drainage Amount Small 02/28/22 1445   Drainage Description Serosanguinous; Yellow 02/28/22 1445   Odor None 03/01/22 1337   Josy-wound Assessment Intact 03/01/22 1337   Margins Defined edges 03/01/22 1337   Wound Thickness Description not for Pressure Injury Full thickness 03/01/22 1337   Number of days: 1       Wound 03/01/22 Tibial Left;Posterior (Active)   Wound Etiology Traumatic 03/01/22 1439   Dressing Status New dressing applied 03/01/22 1439   Wound Cleansed Cleansed with saline 03/01/22 1439   Dressing/Treatment Ace wrap; Foam impregnated with Ag 03/01/22 1439   Wound Assessment Pink/red 03/01/22 1439   Drainage Amount Small 03/01/22 1439   Margins Defined edges 03/01/22 1439   Wound Thickness Description not for Pressure Injury Full thickness 03/01/22 1439   Number of days: 0        Elimination:  Continence: Bowel: {YES / FA:35613}  Bladder: {YES / QJ:32918}  Urinary Catheter: None   Colostomy/Ileostomy/Ileal Conduit: {YES / NV:43122}       Date of Last BM: ***    Intake/Output Summary (Last 24 hours) at 3/2/2022 1030  Last data filed at 3/2/2022 0933  Gross per 24 hour   Intake 1764.39 ml   Output 3000 ml   Net -1235.61 ml     I/O last 3 completed shifts: In: 2366.5 [P.O.:1040;  I.V.:355; IV Piggyback:971.5]  Out: 4000 [Urine:4000]    Safety Concerns:     History of Falls (last 30 days) and At Risk for Falls    Impairments/Disabilities:      None    Nutrition Therapy:  Current Nutrition Therapy:   - Oral Diet:  Carb Control 5 carbs/meal (2000kcals/day) and Low Sodium (3-4gm)    Routes of Feeding: Oral  Liquids: {Slp liquid thickness:93319}  Daily Fluid Restriction: no  Last Modified Barium Swallow with Video (Video Swallowing Test): not done    Treatments at the Time of Hospital Discharge:   Respiratory Treatments: ***  Oxygen Therapy:  is not on home oxygen therapy. Ventilator:    - No ventilator support    Rehab Therapies: {THERAPEUTIC INTERVENTION:0084229965}  Weight Bearing Status/Restrictions: No weight bearing restirctions  Other Medical Equipment (for information only, NOT a DME order):  {EQUIPMENT:765896504}  Other Treatments: ***    Patient's personal belongings (please select all that are sent with patient):  Misty    RN SIGNATURE:  Electronically signed by Jose Angel Vicente RN on 3/2/22 at 5:35 PM EST    CASE MANAGEMENT/SOCIAL WORK SECTION    Inpatient Status Date: 2/27/22    Readmission Risk Assessment Score:  Readmission Risk              Risk of Unplanned Readmission:  15           Discharging to Facility/ Agency   Name: [facility]   Address: [place] [address]  Phone: [phone]  Fax: [phone]    Dialysis Facility (if applicable)   Name:  Address:  Dialysis Schedule:  Phone:  Fax:    / signature: Electronically signed by Brian Arce RN on 3/2/22 at 10:31 AM EST    PHYSICIAN SECTION    Prognosis: Good    Condition at Discharge: Stable    Rehab Potential (if transferring to Rehab): Fair    Recommended Labs or Other Treatments After Discharge: see wound care orders    Physician Certification: I certify the above information and transfer of Shelly Lee  is necessary for the continuing treatment of the diagnosis listed and that he requires Reece Rouseuel for greater 30 days.      Update Admission H&P: No change in H&P    PHYSICIAN SIGNATURE:  Electronically signed by JARVIS Villa NP on 3/2/22 at 3:58 PM EST

## 2022-03-02 NOTE — PROGRESS NOTES
Report called and given to Lucho Mosher for [employer]. Patient will eat dinner first and then be taken over to [place]. All questions answered. BEV completed as well as discharge paperwork. Arloa Nageotte called and notified of patient being moved over to [place].

## 2022-03-02 NOTE — CARE COORDINATION
NURSING: The patient has been accepted by [facility] and will be discharged today. Please complete the BEV form, call report to [facility], arrange transportation with [facility] staff, and notify the patient's family.

## 2022-03-02 NOTE — PROGRESS NOTES
Physical Therapy Treatment Note   Date: 3/2/2022 Room: [unfilled]   Name: Charmayne Coffin : 1950   MRN: [id number] Admission Date:2022    Primary Problem:   Past Medical History:   Diagnosis Date    CAD (coronary artery disease)     with stent    Chronic low back pain     HX of laminectomy    Coronary arteriosclerosis     stent    Dementia ([facility]     Fracture     R ankle    Hypertension     Hypertensive disorder     Osteoarthritis     Parkinson disease ([facility]     Small vessel disease, cerebrovascular     Tobacco abuse      Past Surgical History:   Procedure Laterality Date    ANKLE SURGERY      BACK SURGERY      CARDIAC SURGERY      heart cath    CORONARY ANGIOPLASTY WITH STENT PLACEMENT      HERNIA REPAIR      LUMBAR LAMINECTOMY      TONSILLECTOMY      VASECTOMY       Rehabilitation Diagnosis: impaired mobility due to Parkinsons  Restrictions/Precautions: fall risk, JEANNETTE miranda    Subjective: my stomach is gurgling, might need to stop at toilet. \" Heels stinging and hurting\", relieved when heels floated. Difficulty stand to sit to toilet \" my legs just got tight and locked up\"  Initial Pain level: denies pain other than heels. Goals:                   Short term goals  Time Frame for Short term goals: 1 week  Short term goal 1: patient will safely perform bed mobility activity with min A  Short term goal 2: patient will safely perform transfer activity with min A  Short term goal 3: patient will safely ambulate 25 ft using RW with min A               Plan of Care:             Times per week: Mon- Sat 4x/wk            Current Treatment Recommendations: Strengthening,Transfer Training,Endurance Training,Gait Training,Balance Training,Functional Mobility Training      Objective Findings:    Date:  3/1/22 Date: 3-2-22 Date:  Date:  Date:    Bed mobility Min to Mod A x 2 Min, but very slow.  vc for sequencing      Sit to stand transfer Min A x 2 Min x2      Stand to sit transfer CGA Mod x 2      Commode transfer n/a Mod x 2      Standing tolerance SPT:CGA X 2 3-4 mins with toilet trans and gait      Ambulation Pt completed functional mobility with RW x 25' x 2 with CGA x 2 Gait training x 12 +40+40 ft with RW with cg. Very slow pace, standing rest break for posture corrections. Reciprocal but shuffling pattern      Stairs n/a x        Exercises:   Exercise/Equipment/Modalities  Date:   3/1/22 Date: 3-2-22 Date:  Date:    Sitting HR 2x10      Sitting TR 2x10      Sitting march 2x10 x10 ea     Sitting LAQ  2x10 x10 ea     Sitting hip add pillow squeeze 2x10      Hip abd  Reclined x 10   AAROM LLE     Heel slides  Reclined x 10 AAROM LLE                              Modality/intervention used:   [ x] Therapeutic Exercise   [ ] Modalities:   [x ] Therapeutic Activity     [ ] Ultrasound   [ ] Elec Stim   [x ] Gait Training     [ ] Cervical Traction  [ ] Lumbar Traction   [ ] Neuromuscular Re-education   [ ] Cold/hotpack  [ ] Iontophoresis   [ ] Instruction in HEP     [ ] Vasopneumatic   [ ] Manual Therapy   [ ] Aquatic Therapy     Other Therapeutic Activities:static standing for clothing management    Home Exercise Program/Education provided to patient: x    Manual Treatments: x    Communication with other providers:  [id number] Deanna Paredes to see per nursing, notified of heel pain    Adverse reactions to treatment: extreme difficulty lowering self to toilet.     Treatment/Activity Tolerance:   [x ] Patient limited by fatigue [ ] Patient limited by pain [ ] Patient limited by other medical complications [ ] Patient tolerated therapy well  [ ] Other:     Post Tx Pain Rating:did not rate /10     Safety Precautions:   [x ] left in bed  [ ] left in chair [ x] call light within reach  [ ] bed alarm on  [x ] personal alarm on  [ ] other staff present:    Plan:   [x ] Continue per plan of care [ ] Wong Huffman current plan   [ ] Plan of care initiated [ ] Hold pending MD visit [ ] Discharge     Plan for Next Session:     Time In: 1324  Time Out: 1417  Total Treatment Minutes: 53  Billed Units: 4/53= 1te, 1 gt, 2 ta    Tessy Lorenz PTA,  [id number] 3/2/2022, 1:40 PM

## 2022-03-02 NOTE — CARE COORDINATION
CM notified by [facility] that they can accept the patient today. 9:50 AM  CM notified the NP via [employer] that [facility] can accept the patient today and that he will need a rapid COVID test.        10:05 AM  PASRR completed online.

## 2022-03-03 LAB
CULTURE: ABNORMAL
Lab: ABNORMAL
SPECIMEN: ABNORMAL

## 2022-03-04 LAB
CULTURE: NORMAL
CULTURE: NORMAL
Lab: NORMAL
Lab: NORMAL
SPECIMEN: NORMAL
SPECIMEN: NORMAL

## 2022-03-07 ENCOUNTER — HOSPITAL ENCOUNTER (OUTPATIENT)
Dept: WOUND CARE | Age: 72
Discharge: HOME | End: 2022-03-07
Payer: MEDICARE

## 2022-03-07 ENCOUNTER — HOSPITAL ENCOUNTER (OUTPATIENT)
Age: 72
Setting detail: SPECIMEN
Discharge: HOME | End: 2022-03-07

## 2022-03-07 VITALS
RESPIRATION RATE: 16 BRPM | TEMPERATURE: 97.5 F | SYSTOLIC BLOOD PRESSURE: 107 MMHG | DIASTOLIC BLOOD PRESSURE: 71 MMHG | HEART RATE: 52 BPM

## 2022-03-07 DIAGNOSIS — L97.922 NON-PRESSURE ULCER OF LEFT LOWER EXTREMITY, WITH FAT LAYER EXPOSED (HCC): ICD-10-CM

## 2022-03-07 DIAGNOSIS — S81.802A TRAUMATIC OPEN WOUND OF LEFT LOWER LEG, INITIAL ENCOUNTER: ICD-10-CM

## 2022-03-07 LAB
ANION GAP SERPL CALCULATED.3IONS-SCNC: 11 MMOL/L (ref 4–16)
BUN BLDV-MCNC: 34 MG/DL (ref 6–23)
CALCIUM SERPL-MCNC: 9.4 MG/DL (ref 8.3–10.6)
CHLORIDE BLD-SCNC: 102 MMOL/L (ref 99–110)
CO2: 25 MMOL/L (ref 21–32)
CREAT SERPL-MCNC: 1.5 MG/DL (ref 0.9–1.3)
GFR AFRICAN AMERICAN: 56 ML/MIN/1.73M2
GFR NON-AFRICAN AMERICAN: 46 ML/MIN/1.73M2
GLUCOSE FASTING: 96 MG/DL (ref 70–99)
POTASSIUM SERPL-SCNC: 4.5 MMOL/L (ref 3.5–5.1)
SODIUM BLD-SCNC: 138 MMOL/L (ref 135–145)

## 2022-03-07 PROCEDURE — 36415 COLL VENOUS BLD VENIPUNCTURE: CPT

## 2022-03-07 PROCEDURE — 99203 OFFICE O/P NEW LOW 30 MIN: CPT | Performed by: NURSE PRACTITIONER

## 2022-03-07 PROCEDURE — 80048 BASIC METABOLIC PNL TOTAL CA: CPT

## 2022-03-07 PROCEDURE — 99213 OFFICE O/P EST LOW 20 MIN: CPT

## 2022-03-07 PROCEDURE — 11042 DBRDMT SUBQ TIS 1ST 20SQCM/<: CPT | Performed by: NURSE PRACTITIONER

## 2022-03-07 PROCEDURE — 11042 DBRDMT SUBQ TIS 1ST 20SQCM/<: CPT

## 2022-03-07 RX ORDER — GINSENG 100 MG
CAPSULE ORAL ONCE
OUTPATIENT
Start: 2022-03-07 | End: 2022-03-07

## 2022-03-07 RX ORDER — GENTAMICIN SULFATE 1 MG/G
OINTMENT TOPICAL ONCE
OUTPATIENT
Start: 2022-03-07 | End: 2022-03-07

## 2022-03-07 RX ORDER — BETAMETHASONE DIPROPIONATE 0.05 %
OINTMENT (GRAM) TOPICAL ONCE
OUTPATIENT
Start: 2022-03-07 | End: 2022-03-07

## 2022-03-07 RX ORDER — LIDOCAINE HYDROCHLORIDE 40 MG/ML
SOLUTION TOPICAL ONCE
OUTPATIENT
Start: 2022-03-07 | End: 2022-03-07

## 2022-03-07 RX ORDER — LIDOCAINE HYDROCHLORIDE 20 MG/ML
JELLY TOPICAL ONCE
OUTPATIENT
Start: 2022-03-07 | End: 2022-03-07

## 2022-03-07 RX ORDER — CLOBETASOL PROPIONATE 0.5 MG/G
OINTMENT TOPICAL ONCE
OUTPATIENT
Start: 2022-03-07 | End: 2022-03-07

## 2022-03-07 RX ORDER — LIDOCAINE 40 MG/G
CREAM TOPICAL ONCE
OUTPATIENT
Start: 2022-03-07 | End: 2022-03-07

## 2022-03-07 RX ORDER — BACITRACIN, NEOMYCIN, POLYMYXIN B 400; 3.5; 5 [USP'U]/G; MG/G; [USP'U]/G
OINTMENT TOPICAL ONCE
OUTPATIENT
Start: 2022-03-07 | End: 2022-03-07

## 2022-03-07 RX ORDER — BACITRACIN ZINC AND POLYMYXIN B SULFATE 500; 1000 [USP'U]/G; [USP'U]/G
OINTMENT TOPICAL ONCE
OUTPATIENT
Start: 2022-03-07 | End: 2022-03-07

## 2022-03-07 RX ORDER — LIDOCAINE 50 MG/G
OINTMENT TOPICAL ONCE
OUTPATIENT
Start: 2022-03-07 | End: 2022-03-07

## 2022-03-07 NOTE — PROGRESS NOTES
Multilayer Compression Wrap   (Not Unna) Below the Knee    NAME:  Miley Dickson  YOB: 1950  MEDICAL RECORD NUMBER:  [id number]  DATE:  3/7/2022    Multilayer compression wrap: Removed old Multilayer wrap if indicated and wash leg with mild soap/water. Applied moisturizing agent to dry skin as needed. Applied primary and secondary dressing as ordered. Applied multilayered dressing below the knee to left lower leg. Instructed patient/caregiver not to remove dressing and to keep it clean and dry. Instructed patient/caregiver on complications to report to provider, such as pain, numbness in toes, heavy drainage, and slippage of dressing. Instructed patient on purpose of compression dressing and on activity and exercise recommendations.       Electronically signed by Yeni Soliman LPN on 2/6/5987 at 38:09 AM

## 2022-03-07 NOTE — PROGRESS NOTES
[facility] Initial Visit      Lamont Miranda Husbands  AGE: 66 y.o. GENDER: male  : 1950  EPISODE DATE:  3/7/2022   Referred by: Geovanny Izaguirre    Subjective:     CHIEF COMPLAINT Traumatic wound to left lower extremity      HISTORY of PRESENT ILLNESS      Lamont Miranda Husbands is a 66 y.o. male who presents to the [address] [place] [address] for an initial visit for evaluation and treatment of Acute traumatic  ulcer(s) of  L lower leg lateral.  The condition is of moderate severity. The ulcer has been present for 1 weeks. The underlying cause is thought to be trauma from a fall, as patient suffers from Parkinsons. The patients care to date has included a trip to the ED, where he was cultured and subsequently placed on Bactrim. The patient has significant underlying medical conditions as below. Patient is not a diabetic and is not on a blood thinner currently. He was previously a smoker, but is not allowed to smoke at his facility.     Wound Pain Timing/Severity: waxing and waning  Quality of pain: tender  Severity of pain:  3 / 10   Modifying Factors: edema, venous stasis, decreased mobility, shear force and smoking  Associated Signs/Symptoms: edema, erythema, drainage and pain        PAST MEDICAL HISTORY        Diagnosis Date    CAD (coronary artery disease)     with stent    Chronic low back pain     HX of laminectomy    Coronary arteriosclerosis     stent    Dementia ([address]     Fracture     R ankle    Hypertension     Hypertensive disorder     Osteoarthritis     Parkinson disease ([address]     Small vessel disease, cerebrovascular     Tobacco abuse        PAST SURGICAL HISTORY    Past Surgical History:   Procedure Laterality Date    ANKLE SURGERY      BACK SURGERY      CARDIAC SURGERY      heart cath    CORONARY ANGIOPLASTY WITH STENT PLACEMENT      HERNIA REPAIR      LUMBAR LAMINECTOMY      TONSILLECTOMY      VASECTOMY         FAMILY HISTORY    Family History   Problem Relation Age of Onset    High Blood Pressure Mother     High Blood Pressure Father     Heart Disease Father     Osteoporosis Sister          from allergy from an injection    No Known Problems Sister     No Known Problems Sister     No Known Problems Sister     No Known Problems Sister        SOCIAL HISTORY    Social History     Tobacco Use    Smoking status: Current Every Day Smoker     Packs/day: 0.50     Years: 57.00     Pack years: 28.50     Types: Cigarettes     Start date: 1964    Smokeless tobacco: Never Used   Vaping Use    Vaping Use: Never used   Substance Use Topics    Alcohol use: Not Currently    Drug use: No       ALLERGIES    Allergies   Allergen Reactions    Chantix [Varenicline]     Daypro [Oxaprozin]        MEDICATIONS    Current Outpatient Medications on File Prior to Encounter   Medication Sig Dispense Refill    metoprolol succinate (TOPROL XL) 50 MG extended release tablet Take 1 tablet by mouth daily 30 tablet 0    sulfamethoxazole-trimethoprim (BACTRIM DS;SEPTRA DS) 800-160 MG per tablet Take 1 tablet by mouth every 12 hours for 14 days 28 tablet 0    pantoprazole (PROTONIX) 40 MG tablet Take 1 tablet by mouth every morning (before breakfast) 30 tablet 0    atorvastatin (LIPITOR) 20 MG tablet TAKE ONE TABLET BY MOUTH ONCE NIGHTLY 90 tablet 1    docusate sodium (COLACE) 100 MG capsule Take 100 mg by mouth 2 times daily      cyanocobalamin 1000 MCG tablet Take 1 tablet by mouth daily 90 tablet 3    furosemide (LASIX) 20 MG tablet Take 20 mg by mouth 2 times daily      acetaminophen (TYLENOL) 325 MG tablet Take 650 mg by mouth every 4 hours as needed for Pain      donepezil (ARICEPT) 10 MG tablet Take 10 mg by mouth nightly      memantine (NAMENDA) 10 MG tablet Take 10 mg by mouth 2 times daily      aspirin 81 MG EC tablet Take 81 mg by mouth daily.       trihexyphenidyl (ARTANE) 2 MG tablet Take 3 mg by mouth in the morning and at bedtime        No current facility-administered medications on file prior to encounter. PROBLEM LIST    Patient Active Problem List   Diagnosis    Right inguinal hernia    Hyperlipidemia    Hypertension    [facility]'s disease [facility]    Dementia ([facility]    Osteoarthritis    Small vessel disease, cerebrovascular    CAD (coronary artery disease)    Tobacco abuse    Cellulitis    Frequent falls    General weakness    Venous stasis of both lower extremities    Cellulitis of left leg    WD-Traumatic open wound of left lower leg    WD-Non-pressure ulcer of left lower extremity, with fat layer exposed ([facility]       REVIEW OF SYSTEMS    Constitutional: negative for anorexia, chills, fatigue, fevers, malaise, sweats and weight loss  Respiratory: negative for pneumonia, shortness of breath, sputum, stridor and wheezing  Cardiovascular: negative for near-syncope, orthopnea, palpitations, paroxysmal nocturnal dyspnea, syncope and tachypnea  Integument/breast: positive for skin lesion(s)      Objective:      /71   Pulse 52   Temp 97.5 °F (36.4 °C) (Temporal)   Resp 16     PHYSICAL EXAM  General Appearance: alert and oriented to person, place and time, well-developed and well-nourished, in no acute distress  Pulmonary/Chest: clear to auscultation bilaterally- no wheezes, rales or rhonchi, normal air movement, no respiratory distress  Cardiovascular: normal rate, normal S1 and S2, no gallops, intact distal pulses and no carotid bruits  Dermatologic exam: Visual inspection of the periwound reveals the skin to be dry, cool  and coarse  Wound exam: see wound description below in procedure note      Assessment:       Lamont Flaherty  appears to have a non-healing wound of the left lower extremity . The etiology of the wound is felt to be traumatic. There are multiple complicating factors including edema, venous stasis, decreased mobility, shear force and smoking. A comprehensive wound management program would be helpful to heal this wound.  Assessments completed include fall risk and nutritional, functional,and psychological status. At this time appropriate care would include: periodic debridement and wound care as below. Problem List Items Addressed This Visit     WD-Traumatic open wound of left lower leg    WD-Non-pressure ulcer of left lower extremity, with fat layer exposed ([address]            Procedure Note    Indications:  Based on my examination of this patient's wound(s) today, sharp excision into necrotic subcutaneous tissue is required to promote healing and evaluate the extent of previous healing. Performed by: JARVIS Arevalo CNP    Consent obtained: Yes    Time out taken:  Yes    Pain Control: N/A       Debridement:Excisional Debridement    Using #15 blade scalpel the wound(s) was/were sharply debrided down through and including the removal of subcutaneous tissue. Devitalized Tissue Debrided:  fibrin, biofilm, slough and exudate    Pre Debridement Measurements:  Are located in the Wound Documentation Flow Sheet    All active wounds listed below with today's date are evaluated  Wound(s)    debrided this date include # : 1     Post  Debridement Measurements:  Wound 10/05/18 rt buttock (Active)   Number of days: 1248       Wound 03/07/22 Pretibial Left; Anterior #1 LOWER LEG CLUSTER (Active)   Wound Image   03/07/22 1052   Wound Cleansed Wound cleanser 03/07/22 1052   Offloading for Diabetic Foot Ulcers Offloading not required 03/07/22 1052   Wound Length (cm) 6.5 cm 03/07/22 1052   Wound Width (cm) 3.5 cm 03/07/22 1052   Wound Depth (cm) 0.1 cm 03/07/22 1052   Wound Surface Area (cm^2) 22.75 cm^2 03/07/22 1052   Wound Volume (cm^3) 2.275 cm^3 03/07/22 1052   Post-Procedure Length (cm) 6.5 cm 03/07/22 1108   Post-Procedure Width (cm) 3.5 cm 03/07/22 1108   Post-Procedure Depth (cm) 0.1 cm 03/07/22 1108   Post-Procedure Surface Area (cm^2) 22.75 cm^2 03/07/22 1108   Post-Procedure Volume (cm^3) 2.275 cm^3 03/07/22 1108   Distance Tunneling (cm) 0 cm periods of time. Apply wraps/stockings in AM and remove at bedtime. Elevate legs to the level of the heart or above for 30 minutes 4-5 times a day and/or when sitting. When taking antibiotics take entire prescription as ordered by MD do not stop taking until medicine is all gone. Orders for this week (3/7/22): Left lower leg- Wash with mild soap and water, rinse with saline, pat dry with 4x4  Apply A&D to intact skin  Apply saline dampened monse to wound bed  Cover with ABD  Wrap with Coban 2 lite and secure with tape. Leave in place x 1 week    Follow up with Paulette Irizarry CNP in 1 weeks in the wound care center  Call [phone] for any questions or concerns.   Date__________   Time____________                  Treatment Note      Written Patient Dismissal Instructions Given          Electronically signed by JARVIS Ochoa CNP on 3/7/2022 at 11:10 AM

## 2022-03-08 ENCOUNTER — HOSPITAL ENCOUNTER (OUTPATIENT)
Age: 72
Setting detail: SPECIMEN
Discharge: HOME | End: 2022-03-08
Payer: MEDICARE

## 2022-03-08 PROCEDURE — 86480 TB TEST CELL IMMUN MEASURE: CPT

## 2022-03-08 PROCEDURE — 36415 COLL VENOUS BLD VENIPUNCTURE: CPT

## 2022-03-12 LAB
QUANTI TB1 MINUS NIL: 0 IU/ML (ref 0–0.34)
QUANTI TB2 MINUS NIL: 0 IU/ML (ref 0–0.34)
QUANTIFERON (R) TB GOLD (INCUBATED): NEGATIVE IU/ML
QUANTIFERON MITOGEN MINUS NIL: >10 IU/ML
QUANTIFERON NIL: 0.05 IU/ML

## 2022-03-15 ENCOUNTER — HOSPITAL ENCOUNTER (OUTPATIENT)
Dept: WOUND CARE | Age: 72
Discharge: HOME | End: 2022-03-15
Payer: MEDICARE

## 2022-03-15 VITALS
RESPIRATION RATE: 18 BRPM | HEART RATE: 56 BPM | SYSTOLIC BLOOD PRESSURE: 112 MMHG | DIASTOLIC BLOOD PRESSURE: 75 MMHG | TEMPERATURE: 98.8 F

## 2022-03-15 DIAGNOSIS — S81.802D TRAUMATIC OPEN WOUND OF LEFT LOWER LEG, SUBSEQUENT ENCOUNTER: ICD-10-CM

## 2022-03-15 DIAGNOSIS — L97.922 NON-PRESSURE ULCER OF LEFT LOWER EXTREMITY, WITH FAT LAYER EXPOSED (HCC): Primary | ICD-10-CM

## 2022-03-15 PROCEDURE — 11719 TRIM NAIL(S) ANY NUMBER: CPT

## 2022-03-15 PROCEDURE — 97597 DBRDMT OPN WND 1ST 20 CM/<: CPT | Performed by: NURSE PRACTITIONER

## 2022-03-15 PROCEDURE — 97597 DBRDMT OPN WND 1ST 20 CM/<: CPT

## 2022-03-15 PROCEDURE — 11719 TRIM NAIL(S) ANY NUMBER: CPT | Performed by: NURSE PRACTITIONER

## 2022-03-15 NOTE — PROGRESS NOTES
Wound Care Center Progress Note With Procedure    Lamont Green  AGE: 66 y.o. GENDER: male  : 1950  EPISODE DATE:  3/15/2022     Subjective:     Chief Complaint   Patient presents with    Wound Check         HISTORY of PRESENT ILLNESS      Lamont Green is a 66 y.o. male who presents to the [address] [place] [address] for a visit for evaluation and treatment of Acute traumatic  ulcer(s) of  L lower leg lateral.  The condition is of moderate severity. The underlying cause is thought to be trauma from a fall, as patient suffers from Parkinsons. The patients care to date has included a trip to the ED, where he was cultured and subsequently placed on Bactrim. The patient has significant underlying medical conditions as below. Patient is not a diabetic and is not on a blood thinner currently. He was previously a smoker, but is not allowed to smoke at his facility.     Patient is healed today     Wound Pain Timing/Severity: waxing and waning  Quality of pain: tender  Severity of pain:  3 / 10   Modifying Factors: edema, venous stasis, decreased mobility, shear force and smoking  Associated Signs/Symptoms: edema, erythema, drainage and pain        PAST MEDICAL HISTORY        Diagnosis Date    CAD (coronary artery disease)     with stent    Chronic low back pain     HX of laminectomy    Coronary arteriosclerosis     stent    Dementia ([address]     Fracture     R ankle    Hypertension     Hypertensive disorder     Osteoarthritis     Parkinson disease ([address]     Small vessel disease, cerebrovascular     Tobacco abuse        PAST SURGICAL HISTORY    Past Surgical History:   Procedure Laterality Date    ANKLE SURGERY      BACK SURGERY      CARDIAC SURGERY      heart cath    CORONARY ANGIOPLASTY WITH STENT PLACEMENT      HERNIA REPAIR      LUMBAR LAMINECTOMY      TONSILLECTOMY      VASECTOMY         FAMILY HISTORY    Family History   Problem Relation Age of Onset    High Blood Pressure Mother     High Blood Pressure Father     Heart Disease Father     Osteoporosis Sister          from allergy from an injection    No Known Problems Sister     No Known Problems Sister     No Known Problems Sister     No Known Problems Sister        SOCIAL HISTORY    Social History     Tobacco Use    Smoking status: Current Every Day Smoker     Packs/day: 0.50     Years: 57.00     Pack years: 28.50     Types: Cigarettes     Start date: 1964    Smokeless tobacco: Never Used   Vaping Use    Vaping Use: Never used   Substance Use Topics    Alcohol use: Not Currently    Drug use: No       ALLERGIES    Allergies   Allergen Reactions    Chantix [Varenicline]     Daypro [Oxaprozin]        MEDICATIONS    Current Outpatient Medications on File Prior to Encounter   Medication Sig Dispense Refill    metoprolol succinate (TOPROL XL) 50 MG extended release tablet Take 1 tablet by mouth daily 30 tablet 0    sulfamethoxazole-trimethoprim (BACTRIM DS;SEPTRA DS) 800-160 MG per tablet Take 1 tablet by mouth every 12 hours for 14 days 28 tablet 0    pantoprazole (PROTONIX) 40 MG tablet Take 1 tablet by mouth every morning (before breakfast) 30 tablet 0    atorvastatin (LIPITOR) 20 MG tablet TAKE ONE TABLET BY MOUTH ONCE NIGHTLY 90 tablet 1    docusate sodium (COLACE) 100 MG capsule Take 100 mg by mouth 2 times daily      cyanocobalamin 1000 MCG tablet Take 1 tablet by mouth daily 90 tablet 3    furosemide (LASIX) 20 MG tablet Take 20 mg by mouth 2 times daily      acetaminophen (TYLENOL) 325 MG tablet Take 650 mg by mouth every 4 hours as needed for Pain      donepezil (ARICEPT) 10 MG tablet Take 10 mg by mouth nightly      memantine (NAMENDA) 10 MG tablet Take 10 mg by mouth 2 times daily      aspirin 81 MG EC tablet Take 81 mg by mouth daily.  trihexyphenidyl (ARTANE) 2 MG tablet Take 3 mg by mouth in the morning and at bedtime        No current facility-administered medications on file prior to encounter. REVIEW OF SYSTEMS    Pertinent items are noted in HPI. Constitutional: Negative for systemic symptoms including fever, chills and malaise. Objective:      /75   Pulse 56   Temp 98.8 °F (37.1 °C) (Temporal)   Resp 18     PHYSICAL EXAM      General: The patient is in no acute distress. Mental status:  Patient is appropriate, is  oriented to place and plan of care. Dermatologic exam: Visual inspection of the periwound reveals the skin to be normal in turgor and texture, dry and clammy  Wound exam: see wound description below in procedure note      Assessment:     Problem List Items Addressed This Visit     WD-Traumatic open wound of left lower leg    WD-Non-pressure ulcer of left lower extremity, with fat layer exposed ([address] - Primary        Procedure Note    Indications:  Based on my examination of this patient's wound(s) today, sharp excision into necrotic devitalized tissue is required to promote healing and evaluate the extent of previous healing. Performed by: JARVIS Herr CNP    Consent obtained: Yes    Time out taken:  Yes    Pain Control: N/A       Debridement:Excisional Debridement    Using forceps the wound(s) was/were sharply debrided down through and including the removal of devitalized tissue. Devitalized Tissue Debrided:  exudate    Pre Debridement Measurements:  Are located in the Wound Documentation Flow Sheet    All active wounds listed below with today's date are evaluated  Wound(s)    debrided this date include # : left lower leg     Post  Debridement Measurements:  Wound 10/05/18 rt buttock (Active)   Number of days: 4128       Percent of Wound(s) Debrided: approximately 100%    Total  Area  Debrided:  10 sq cm     Bleeding:  None    Hemostasis Achieved:  not needed    Procedural Pain:  0  / 10     Post Procedural Pain:  0 / 10     Response to treatment:  Well tolerated by patient.      Status of wound progress and description from last visit: Healed. Discharge at this time. Patient knows that they may return or call with any questions, comments, or concerns. Discussed strategies for preventing future wounds, including regular compression, daily moisturizing, regular exercise, and performing regular foot checks. Patient verbalized understanding       The patient's toe nails were trimmed in length, totaling 10 nails using tissue nippers. Medical necessity includes advanced atrophic changes to include decrease hair growth lower legs, thickening, discolored toenails,rubor, burning and edema L60.2. The primary care provider is Lucía Gomez, who patient last saw on 2-            Plan:       Discharge Instructions       PHYSICIAN ORDERS AND DISCHARGE INSTRUCTIONS     NOTE: Upon discharge from the [address], you will receive a patient experience survey via E-mail. We would be grateful if you would take the time to fill this survey out.     Wound care order history:                 ELMER's   Right       Left    Date               Vascular studies: . Cultures: . Antibiotics: .Bactrim 3/3/22               Compression/Lymph Pumps: Thresa Abed Grafts: Marmarcela Pink: . Continuing wound care orders and information:              Residence: . [address] with:. N/A               Your wound-care supplies will be provided by: . [facility] Man: . Mariela                            Wound cleansing:                           Do not scrub or use excessive force. Wash hands with soap and water before and after dressing changes. Prior to applying a clean dressing, cleanse wound with normal saline,                          wound cleanser, or mild soap and water. Ask your physician or nurse before getting the wound(s) wet in the shower.               Daily Wound management: Keep weight off wounds and reposition every 2 hours. Avoid standing for long periods of time. Apply wraps/stockings in AM and remove at bedtime. Elevate legs to the level of the heart or above for 30 minutes 4-5 times a day and/or when sitting. When taking antibiotics take entire prescription as ordered by MD do not stop taking until medicine is all gone.                                                                 Orders for this week (3/15/22):  Paint toe nails with betadine     Follow up with Roger Beach CNP if needed  weeks in the wound care center  Call [phone] for any questions or concerns.   Date__________   Time____________        Treatment Note      Written Patient Dismissal Instructions Given            Electronically signed by JARVIS Johnson CNP on 3/15/2022 at 3:46 PM

## 2022-03-18 ENCOUNTER — PARAMEDICINE (OUTPATIENT)
Dept: OTHER | Age: 72
End: 2022-03-18

## 2022-03-18 NOTE — PROGRESS NOTES
Patient was referred to the CP Program by [facility] for multiple falls. Last EMS run was 2/27/22. After the patient was see in the ED he was admitted to [facility]. The patient is currently residing at [facility]. I will graduate this patient from the program since he is placed in a facility. Should the patient return home and another referral is made I will open another file.

## 2022-03-21 ENCOUNTER — TELEPHONE (OUTPATIENT)
Dept: INTERNAL MEDICINE CLINIC | Age: 72
End: 2022-03-21

## 2022-03-23 ENCOUNTER — APPOINTMENT (OUTPATIENT)
Dept: GENERAL RADIOLOGY | Age: 72
DRG: 057 | End: 2022-03-23
Payer: MEDICARE

## 2022-03-23 ENCOUNTER — APPOINTMENT (OUTPATIENT)
Dept: CT IMAGING | Age: 72
DRG: 057 | End: 2022-03-23
Payer: MEDICARE

## 2022-03-23 ENCOUNTER — HOSPITAL ENCOUNTER (INPATIENT)
Age: 72
LOS: 2 days | Discharge: NURSING, REHAB OR LONG-TERM CARE | DRG: 057 | End: 2022-03-25
Attending: EMERGENCY MEDICINE | Admitting: INTERNAL MEDICINE
Payer: MEDICARE
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
## 2022-03-23 DIAGNOSIS — R53.81 DEBILITY: ICD-10-CM

## 2022-03-23 PROBLEM — R54 AGE-RELATED PHYSICAL DEBILITY: Status: ACTIVE | Noted: 2022-02-27

## 2022-03-23 PROBLEM — G20.C PRIMARY PARKINSONISM (HCC): Status: ACTIVE | Noted: 2018-10-04

## 2022-03-23 LAB
ALBUMIN SERPL-MCNC: 4.1 GM/DL (ref 3.4–5)
ALP BLD-CCNC: 90 IU/L (ref 40–129)
ALT SERPL-CCNC: 19 U/L (ref 10–40)
ANION GAP SERPL CALCULATED.3IONS-SCNC: 11 MMOL/L (ref 4–16)
AST SERPL-CCNC: 17 IU/L (ref 15–37)
BASOPHILS ABSOLUTE: 0 K/CU MM
BASOPHILS RELATIVE PERCENT: 0.4 % (ref 0–1)
BILIRUB SERPL-MCNC: 0.3 MG/DL (ref 0–1)
BILIRUBIN URINE: NEGATIVE MG/DL
BLOOD, URINE: ABNORMAL
BUN BLDV-MCNC: 30 MG/DL (ref 6–23)
CALCIUM SERPL-MCNC: 10.1 MG/DL (ref 8.3–10.6)
CHLORIDE BLD-SCNC: 111 MMOL/L (ref 99–110)
CLARITY: CLEAR
CO2: 23 MMOL/L (ref 21–32)
COLOR: YELLOW
CREAT SERPL-MCNC: 1 MG/DL (ref 0.9–1.3)
DIFFERENTIAL TYPE: ABNORMAL
EOSINOPHILS ABSOLUTE: 0.1 K/CU MM
EOSINOPHILS RELATIVE PERCENT: 1.1 % (ref 0–3)
GFR AFRICAN AMERICAN: >60 ML/MIN/1.73M2
GFR NON-AFRICAN AMERICAN: >60 ML/MIN/1.73M2
GLUCOSE BLD-MCNC: 96 MG/DL (ref 70–99)
GLUCOSE, URINE: NEGATIVE MG/DL
HCT VFR BLD CALC: 40.9 % (ref 42–52)
HEMOGLOBIN: 12.4 GM/DL (ref 13.5–18)
IMMATURE NEUTROPHIL %: 0.5 % (ref 0–0.43)
KETONES, URINE: NEGATIVE MG/DL
LEUKOCYTE ESTERASE, URINE: NEGATIVE
LYMPHOCYTES ABSOLUTE: 1.7 K/CU MM
LYMPHOCYTES RELATIVE PERCENT: 16.7 % (ref 24–44)
MCH RBC QN AUTO: 27.3 PG (ref 27–31)
MCHC RBC AUTO-ENTMCNC: 30.3 % (ref 32–36)
MCV RBC AUTO: 90.1 FL (ref 78–100)
MONOCYTES ABSOLUTE: 0.8 K/CU MM
MONOCYTES RELATIVE PERCENT: 7.6 % (ref 0–4)
NITRITE URINE, QUANTITATIVE: NEGATIVE
PDW BLD-RTO: 14.6 % (ref 11.7–14.9)
PH, URINE: 7 (ref 5–8)
PLATELET # BLD: 153 K/CU MM (ref 140–440)
PMV BLD AUTO: 9.6 FL (ref 7.5–11.1)
POTASSIUM SERPL-SCNC: 4.4 MMOL/L (ref 3.5–5.1)
PROTEIN UA: NEGATIVE MG/DL
RBC # BLD: 4.54 M/CU MM (ref 4.6–6.2)
SEGMENTED NEUTROPHILS ABSOLUTE COUNT: 7.6 K/CU MM
SEGMENTED NEUTROPHILS RELATIVE PERCENT: 73.7 % (ref 36–66)
SODIUM BLD-SCNC: 145 MMOL/L (ref 135–145)
SPECIFIC GRAVITY UA: 1.02 (ref 1–1.03)
TOTAL IMMATURE NEUTOROPHIL: 0.05 K/CU MM
TOTAL PROTEIN: 6.3 GM/DL (ref 6.4–8.2)
UROBILINOGEN, URINE: 0.2 MG/DL (ref 0.2–1)
WBC # BLD: 10.3 K/CU MM (ref 4–10.5)

## 2022-03-23 PROCEDURE — 72125 CT NECK SPINE W/O DYE: CPT

## 2022-03-23 PROCEDURE — 6370000000 HC RX 637 (ALT 250 FOR IP): Performed by: PHYSICIAN ASSISTANT

## 2022-03-23 PROCEDURE — 85025 COMPLETE CBC W/AUTO DIFF WBC: CPT

## 2022-03-23 PROCEDURE — 80053 COMPREHEN METABOLIC PANEL: CPT

## 2022-03-23 PROCEDURE — 72128 CT CHEST SPINE W/O DYE: CPT

## 2022-03-23 PROCEDURE — 71045 X-RAY EXAM CHEST 1 VIEW: CPT

## 2022-03-23 PROCEDURE — 2580000003 HC RX 258: Performed by: PHYSICIAN ASSISTANT

## 2022-03-23 PROCEDURE — 93005 ELECTROCARDIOGRAM TRACING: CPT | Performed by: EMERGENCY MEDICINE

## 2022-03-23 PROCEDURE — 81001 URINALYSIS AUTO W/SCOPE: CPT

## 2022-03-23 PROCEDURE — 6360000002 HC RX W HCPCS: Performed by: PHYSICIAN ASSISTANT

## 2022-03-23 PROCEDURE — 1200000000 HC SEMI PRIVATE

## 2022-03-23 PROCEDURE — 99283 EMERGENCY DEPT VISIT LOW MDM: CPT

## 2022-03-23 PROCEDURE — 70450 CT HEAD/BRAIN W/O DYE: CPT

## 2022-03-23 PROCEDURE — 72170 X-RAY EXAM OF PELVIS: CPT

## 2022-03-23 PROCEDURE — 72131 CT LUMBAR SPINE W/O DYE: CPT

## 2022-03-23 RX ORDER — ACETAMINOPHEN 325 MG/1
650 TABLET ORAL EVERY 4 HOURS PRN
Status: DISCONTINUED | OUTPATIENT
Start: 2022-03-23 | End: 2022-03-23 | Stop reason: SDUPTHER

## 2022-03-23 RX ORDER — ATORVASTATIN CALCIUM 20 MG/1
20 TABLET, FILM COATED ORAL NIGHTLY
Status: DISCONTINUED | OUTPATIENT
Start: 2022-03-23 | End: 2022-03-25 | Stop reason: HOSPADM

## 2022-03-23 RX ORDER — ACETAMINOPHEN 650 MG/1
650 SUPPOSITORY RECTAL EVERY 6 HOURS PRN
Status: DISCONTINUED | OUTPATIENT
Start: 2022-03-23 | End: 2022-03-25 | Stop reason: HOSPADM

## 2022-03-23 RX ORDER — DOCUSATE SODIUM 100 MG/1
100 CAPSULE, LIQUID FILLED ORAL 2 TIMES DAILY
Status: DISCONTINUED | OUTPATIENT
Start: 2022-03-23 | End: 2022-03-25 | Stop reason: HOSPADM

## 2022-03-23 RX ORDER — PANTOPRAZOLE SODIUM 40 MG/1
40 TABLET, DELAYED RELEASE ORAL
Status: DISCONTINUED | OUTPATIENT
Start: 2022-03-24 | End: 2022-03-25 | Stop reason: HOSPADM

## 2022-03-23 RX ORDER — DONEPEZIL HYDROCHLORIDE 10 MG/1
10 TABLET, FILM COATED ORAL NIGHTLY
Status: DISCONTINUED | OUTPATIENT
Start: 2022-03-23 | End: 2022-03-25 | Stop reason: HOSPADM

## 2022-03-23 RX ORDER — ONDANSETRON 2 MG/ML
4 INJECTION INTRAMUSCULAR; INTRAVENOUS EVERY 6 HOURS PRN
Status: DISCONTINUED | OUTPATIENT
Start: 2022-03-23 | End: 2022-03-25 | Stop reason: HOSPADM

## 2022-03-23 RX ORDER — ACETAMINOPHEN 325 MG/1
650 TABLET ORAL EVERY 6 HOURS PRN
Status: DISCONTINUED | OUTPATIENT
Start: 2022-03-23 | End: 2022-03-25 | Stop reason: HOSPADM

## 2022-03-23 RX ORDER — MEMANTINE HYDROCHLORIDE 10 MG/1
10 TABLET ORAL 2 TIMES DAILY
Status: DISCONTINUED | OUTPATIENT
Start: 2022-03-23 | End: 2022-03-25 | Stop reason: HOSPADM

## 2022-03-23 RX ORDER — POLYETHYLENE GLYCOL 3350 17 G/17G
17 POWDER, FOR SOLUTION ORAL DAILY PRN
Status: DISCONTINUED | OUTPATIENT
Start: 2022-03-23 | End: 2022-03-25 | Stop reason: HOSPADM

## 2022-03-23 RX ORDER — SODIUM CHLORIDE 9 MG/ML
25 INJECTION, SOLUTION INTRAVENOUS PRN
Status: DISCONTINUED | OUTPATIENT
Start: 2022-03-23 | End: 2022-03-25 | Stop reason: HOSPADM

## 2022-03-23 RX ORDER — LANOLIN ALCOHOL/MO/W.PET/CERES
1000 CREAM (GRAM) TOPICAL DAILY
Status: DISCONTINUED | OUTPATIENT
Start: 2022-03-24 | End: 2022-03-25 | Stop reason: HOSPADM

## 2022-03-23 RX ORDER — ONDANSETRON 4 MG/1
4 TABLET, ORALLY DISINTEGRATING ORAL EVERY 8 HOURS PRN
Status: DISCONTINUED | OUTPATIENT
Start: 2022-03-23 | End: 2022-03-25 | Stop reason: HOSPADM

## 2022-03-23 RX ORDER — SODIUM CHLORIDE 0.9 % (FLUSH) 0.9 %
5-40 SYRINGE (ML) INJECTION PRN
Status: DISCONTINUED | OUTPATIENT
Start: 2022-03-23 | End: 2022-03-25 | Stop reason: HOSPADM

## 2022-03-23 RX ORDER — TRIHEXYPHENIDYL HYDROCHLORIDE 2 MG/1
3 TABLET ORAL 2 TIMES DAILY
Status: DISCONTINUED | OUTPATIENT
Start: 2022-03-23 | End: 2022-03-25 | Stop reason: HOSPADM

## 2022-03-23 RX ORDER — ASPIRIN 81 MG/1
81 TABLET ORAL DAILY
Status: DISCONTINUED | OUTPATIENT
Start: 2022-03-24 | End: 2022-03-25 | Stop reason: HOSPADM

## 2022-03-23 RX ORDER — FUROSEMIDE 20 MG/1
20 TABLET ORAL 2 TIMES DAILY
Status: DISCONTINUED | OUTPATIENT
Start: 2022-03-23 | End: 2022-03-25 | Stop reason: HOSPADM

## 2022-03-23 RX ORDER — SODIUM CHLORIDE 0.9 % (FLUSH) 0.9 %
5-40 SYRINGE (ML) INJECTION EVERY 12 HOURS SCHEDULED
Status: DISCONTINUED | OUTPATIENT
Start: 2022-03-23 | End: 2022-03-25 | Stop reason: HOSPADM

## 2022-03-23 RX ORDER — METOPROLOL SUCCINATE 50 MG/1
50 TABLET, EXTENDED RELEASE ORAL DAILY
Status: DISCONTINUED | OUTPATIENT
Start: 2022-03-24 | End: 2022-03-25 | Stop reason: HOSPADM

## 2022-03-23 RX ADMIN — ENOXAPARIN SODIUM 40 MG: 100 INJECTION SUBCUTANEOUS at 22:00

## 2022-03-23 RX ADMIN — DOCUSATE SODIUM 100 MG: 100 CAPSULE, LIQUID FILLED ORAL at 22:00

## 2022-03-23 RX ADMIN — MEMANTINE 10 MG: 10 TABLET ORAL at 22:01

## 2022-03-23 RX ADMIN — ATORVASTATIN CALCIUM 20 MG: 20 TABLET, FILM COATED ORAL at 22:01

## 2022-03-23 RX ADMIN — TRIHEXYPHENIDYL HYDROCHLORIDE 3 MG: 2 TABLET ORAL at 22:00

## 2022-03-23 RX ADMIN — Medication 6 ML: at 22:00

## 2022-03-23 RX ADMIN — FUROSEMIDE 20 MG: 20 TABLET ORAL at 22:01

## 2022-03-23 RX ADMIN — DONEPEZIL HYDROCHLORIDE 10 MG: 10 TABLET, FILM COATED ORAL at 22:01

## 2022-03-23 ASSESSMENT — ENCOUNTER SYMPTOMS
COUGH: 0
BACK PAIN: 1
EYES NEGATIVE: 1
CHEST TIGHTNESS: 0
SHORTNESS OF BREATH: 0
ABDOMINAL PAIN: 0
ALLERGIC/IMMUNOLOGIC NEGATIVE: 1
COLOR CHANGE: 1

## 2022-03-23 ASSESSMENT — PAIN SCALES - GENERAL: PAINLEVEL_OUTOF10: 0

## 2022-03-23 NOTE — CARE COORDINATION
ANTHONY Martinez was consulted for SNF vs LTC placement. ANTHONY Bee and Marta Tejeda worked on consult. Pt was d/c 3/22 from [facility] for skilled. Pt was previously admitted 2/26-3/2 when he d/c to above. Pt returned to ed today for a fall at home. Noted C called and told ed pt needed more care. Pt has a history of dementia, Parkinson multiple falls. Wife states unable to take care of pt and pt is unable to take care of self. ANTHONY Martinez tried to call [facility] @ [phone]. Let message. Dr. Adalid Sheehan was updated and is going to admit. Wife Alexa Aquino [phone] was called to discuss options. ANTHONY Martinez talked to Alexa Miles about the care at home. The differences between SNF and LTC. Pt's wife Alexa Aquino is agreeable to LTC. Noted pt has Medicare and [employer] Pt was in [employer] for 20 years. Gave numbers to check into the [facility] for help with LTC. Jayce Coordinator [phone] ext [phone]   Fax [phone]    [facility]  [phone] ext [phone] [facility] [phone]    Wife states normally does things at [facility] [place]. Wife is going to make phone calls to these people tomorrow and speak to [address] when he is in.     Nursing updated in [place]. Sticky note placed for follow up with pt and wife.

## 2022-03-23 NOTE — ED NOTES
Before patient arrived, Darren Vivianas with [facility] [phone] called to talk to a nurse about this patient. She was wanting to give an update and a recommendation on patient nursing home placement . Jerry Moser We took her name and number to consult her when patient was to be discharged. This tech did call our case management at patient arrival, though they had left for the day. This tech, has now called [facility] Case management to consult for discharge, though had to leave a message for them to call us back.         Tamar Ivey  03/23/22 6855

## 2022-03-23 NOTE — ED PROVIDER NOTES
Emergency Department Encounter    Patient: Michelle Crow  MRN: [id number]  : 1950  Date of Evaluation: 3/23/2022  ED Provider:  Flores Mcgowan MD    Triage Chief Complaint:   Amberly Snowden at home)    [place]:  Michelle Crow is a 66 y.o. male that presents after reported fall. EMS brought him in. Reported that it was witnessed, but that he had been laying for a few hours. He tells me that he had gotten up from his chair and that he went to turn and lost his footing and fell, report was he had left hip pain. He complains of right hip pain to me. Then when I asked him if he has back pain he tells me yes but he cannot tell me where. He does not think he hit his head but then told me maybe he did and that he did not feel good after he stood up again. But it does not sound like he actually stood up afterward. He does have a history of dementia, Parkinson's disease. A family member had apparently called even prior to his arrival and spoken to the unit clerk, stating that they were supposed to have home health care today but he does not qualify and they think he needs a facility. EMS told us that they had apparently been waiting for home health to arrive and she arrived just at the same time they did, so they had not even ever seen the patient. He denies any difficulty breathing or shortness of breath or chest pain. He denies nausea and vomiting.   Denies abdominal pain        ROS - see HPI, below listed is current ROS at time of my eval:  Limited secondary to patient's dementia, confusion    Past Medical History:   Diagnosis Date    CAD (coronary artery disease)     with stent    Chronic low back pain     HX of laminectomy    Coronary arteriosclerosis     stent    Dementia ([facility] [address]     Fracture     R ankle    Hypertension     Hypertensive disorder     Osteoarthritis     Parkinson disease ([facility] [address]     Small vessel disease, cerebrovascular     Tobacco abuse      Past Surgical History:   Procedure Friends and Family: Not on file    Attends [employer] Services: Not on file    Active Member of Clubs or Organizations: Not on file    Attends Club or Organization Meetings: Not on file    Marital Status: Not on file   Intimate Partner Violence:     Fear of Current or Ex-Partner: Not on file    Emotionally Abused: Not on file    Physically Abused: Not on file    Sexually Abused: Not on file   Housing Stability:     Unable to Pay for Housing in the Last Year: Not on file    Number of [place] in the Last Year: Not on file    Unstable Housing in the Last Year: Not on file     No current facility-administered medications for this encounter. Current Outpatient Medications   Medication Sig Dispense Refill    metoprolol succinate (TOPROL XL) 50 MG extended release tablet Take 1 tablet by mouth daily 30 tablet 0    pantoprazole (PROTONIX) 40 MG tablet Take 1 tablet by mouth every morning (before breakfast) 30 tablet 0    atorvastatin (LIPITOR) 20 MG tablet TAKE ONE TABLET BY MOUTH ONCE NIGHTLY 90 tablet 1    docusate sodium (COLACE) 100 MG capsule Take 100 mg by mouth 2 times daily      cyanocobalamin 1000 MCG tablet Take 1 tablet by mouth daily 90 tablet 3    furosemide (LASIX) 20 MG tablet Take 20 mg by mouth 2 times daily      acetaminophen (TYLENOL) 325 MG tablet Take 650 mg by mouth every 4 hours as needed for Pain      donepezil (ARICEPT) 10 MG tablet Take 10 mg by mouth nightly      memantine (NAMENDA) 10 MG tablet Take 10 mg by mouth 2 times daily      aspirin 81 MG EC tablet Take 81 mg by mouth daily.       trihexyphenidyl (ARTANE) 2 MG tablet Take 3 mg by mouth in the morning and at bedtime        Allergies   Allergen Reactions    Chantix [Varenicline]     Daypro [Oxaprozin]        Nursing Notes Reviewed    Physical Exam:  Triage VS:    ED Triage Vitals [03/23/22 1508]   Enc Vitals Group      BP (!) 179/83      Pulse 72      Resp 18      Temp 98.3 °F (36.8 °C)      Temp Source Oral SpO2 100 %      Weight 194 lb (88 kg)      Height       Head Circumference       Peak Flow       Pain Score       Pain Loc       Pain Edu? Excl. in [address]? My pulse ox interpretation is - normal    Primary exam:  Airway: Intact. Speaking in normal voice. Breathing: Spontaneous. Equal chest rise and breath sounds. Circulation: Heart RRR. Pulses 2+. Secondary exam:   GENERAL APPEARANCE: Awake and alert. Cooperative. No acute distress. HEAD: Normocephalic. Atraumatic. No depressed skull fractures. EYES: EOM's grossly intact. Sclera anicteric. No [place] Eyes. ENT: Oral mucosa moist, Tolerates saliva. No Malcolm sign. NECK: Trachea midline, no obvious masses  CHEST/LUNGS: Non-tender. Lungs clear to auscultation bilaterally. Respirations nonlabored. HEART: Regular rate and rhythm. ABDOMEN: Soft. Non-distended. Non-tender. No guarding or rebound. Normal bowel sounds. BACK: Log-rolled for exam: No cervical thoracic or lumbar midline tenderness to palpation, step-offs or deformities. EXTREMITIES: Upper and lower extremities have no acute deformities and they are non-tender to palpation except over right hip. Good ROM. SKIN: Warm and dry. No acute wounds  NEUROLOGICAL: Alert and oriented. No gross facial drooping. Strength 4/5 in upper and lower extremities Light touch sensation intact throughout.   Perfusion:  pulses intact and equal in all extremities      I have reviewed and interpreted all of the currently available lab results from this visit (if applicable):  Results for orders placed or performed during the hospital encounter of 03/23/22   CBC with Auto Differential   Result Value Ref Range    WBC 10.3 4.0 - 10.5 K/CU MM    RBC 4.54 (L) 4.6 - 6.2 M/CU MM    Hemoglobin 12.4 (L) 13.5 - 18.0 GM/DL    Hematocrit 40.9 (L) 42 - 52 %    MCV 90.1 78 - 100 FL    MCH 27.3 27 - 31 PG    MCHC 30.3 (L) 32.0 - 36.0 %    RDW 14.6 11.7 - 14.9 %    Platelets 306 952 - 643 K/CU MM    MPV 9.6 7.5 - 11.1 FL    Differential Type AUTOMATED DIFFERENTIAL     Segs Relative 73.7 (H) 36 - 66 %    Lymphocytes % 16.7 (L) 24 - 44 %    Monocytes % 7.6 (H) 0 - 4 %    Eosinophils % 1.1 0 - 3 %    Basophils % 0.4 0 - 1 %    Segs Absolute 7.6 K/CU MM    Lymphocytes Absolute 1.7 K/CU MM    Monocytes Absolute 0.8 K/CU MM    Eosinophils Absolute 0.1 K/CU MM    Basophils Absolute 0.0 K/CU MM    Immature Neutrophil % 0.5 (H) 0 - 0.43 %    Total Immature Neutrophil 0.05 K/CU MM   Comprehensive Metabolic Panel   Result Value Ref Range    Sodium 145 135 - 145 MMOL/L    Potassium 4.4 3.5 - 5.1 MMOL/L    Chloride 111 (H) 99 - 110 mMol/L    CO2 23 21 - 32 MMOL/L    BUN 30 (H) 6 - 23 MG/DL    CREATININE 1.0 0.9 - 1.3 MG/DL    Glucose 96 70 - 99 MG/DL    Calcium 10.1 8.3 - 10.6 MG/DL    Albumin 4.1 3.4 - 5.0 GM/DL    Total Protein 6.3 (L) 6.4 - 8.2 GM/DL    Total Bilirubin 0.3 0.0 - 1.0 MG/DL    ALT 19 10 - 40 U/L    AST 17 15 - 37 IU/L    Alkaline Phosphatase 90 40 - 129 IU/L    GFR Non-African American >60 >60 mL/min/1.73m2    GFR African American >60 >60 mL/min/1.73m2    Anion Gap 11 4 - 16   EKG 12 Lead   Result Value Ref Range    Ventricular Rate 61 BPM    Atrial Rate 61 BPM    P-R Interval 146 ms    QRS Duration 70 ms    Q-T Interval 392 ms    QTc Calculation (Bazett) 394 ms    P Axis 87 degrees    R Axis -21 degrees    T Axis 18 degrees    Diagnosis       Normal sinus rhythm  Inferior infarct (cited on or before 27-FEB-2022)  Abnormal ECG  When compared with ECG of 27-FEB-2022 16:40,  premature supraventricular complexes are no longer present  Questionable change in initial forces of Inferior leads        Radiographs (if obtained):  Radiologist's Report Reviewed:  No results found. EKG (if obtained): (All EKG's are interpreted by myself in the absence of a cardiologist)  Normal sinus rhythm with rate of 61 bpm, normal intervals. No ST elevation.   No previous compare      MDM:  69-year-old male with history as above presents with concern for a fall. He complains of right hip pain to me but left hip pain to EMS, complain of some back pain, has some chronic back issues with peers on review of the chart. Exam is reassuring, we will obtain imaging given his dementia, not clear if he struck his head or exactly what happened, hip pain, etc.  He is agreeable. Imaging is negative, CBC and CMP are stable compared to previous. More history obtained after wife had arrived, he had literally just left [address] yesterday, he had been admitted for rehab, he \"does not do what he supposed to\" and try to get up today and fell. She states he is dead weight and she cannot get him up, they had called the squad. Home health care stated that he was too weak to have gone home. They do not think he can stay at home. Case management has been consulted to help with dispo as patient's imaging is negative, no medical need to admit him to the hospital other than him needing more help/debilitated. .     Clinical Impression:  1. Fall, initial encounter    2. Debility      Disposition referral (if applicable):  No follow-up provider specified. Disposition medications (if applicable):  New Prescriptions    No medications on file       Comment: Please note this report has been produced using speech recognition software and may contain errors related to that system including errors in grammar, punctuation, and spelling, as well as words and phrases that may be inappropriate. Efforts were made to edit the dictations. Laura Patton MD  03/23/22 2005      Spoke with Juan with case management, he will see if we can get him back to [facility], will call us back     Laura Patton MD  03/23/22 1728      They are unable to get him to [facility], he may need long-term care, he is unable to ambulate, we will have to admit him here overnight and case management will coordinate in the morning.   Hospitalist consult placed     Laura Patton MD  03/23/22 [address] [place] [address]

## 2022-03-24 LAB
ANION GAP SERPL CALCULATED.3IONS-SCNC: 11 MMOL/L (ref 4–16)
BASOPHILS ABSOLUTE: 0 K/CU MM
BASOPHILS RELATIVE PERCENT: 0.3 % (ref 0–1)
BUN BLDV-MCNC: 22 MG/DL (ref 6–23)
CALCIUM SERPL-MCNC: 9.4 MG/DL (ref 8.3–10.6)
CHLORIDE BLD-SCNC: 106 MMOL/L (ref 99–110)
CO2: 23 MMOL/L (ref 21–32)
CREAT SERPL-MCNC: 0.8 MG/DL (ref 0.9–1.3)
DIFFERENTIAL TYPE: ABNORMAL
EOSINOPHILS ABSOLUTE: 0.2 K/CU MM
EOSINOPHILS RELATIVE PERCENT: 2.3 % (ref 0–3)
GFR AFRICAN AMERICAN: >60 ML/MIN/1.73M2
GFR NON-AFRICAN AMERICAN: >60 ML/MIN/1.73M2
GLUCOSE BLD-MCNC: 129 MG/DL (ref 70–99)
GLUCOSE BLD-MCNC: 85 MG/DL (ref 70–99)
GLUCOSE BLD-MCNC: 95 MG/DL (ref 70–99)
HCT VFR BLD CALC: 37.6 % (ref 42–52)
HEMOGLOBIN: 11.5 GM/DL (ref 13.5–18)
IMMATURE NEUTROPHIL %: 0.3 % (ref 0–0.43)
LYMPHOCYTES ABSOLUTE: 1.8 K/CU MM
LYMPHOCYTES RELATIVE PERCENT: 19.8 % (ref 24–44)
MCH RBC QN AUTO: 27.3 PG (ref 27–31)
MCHC RBC AUTO-ENTMCNC: 30.6 % (ref 32–36)
MCV RBC AUTO: 89.1 FL (ref 78–100)
MONOCYTES ABSOLUTE: 0.6 K/CU MM
MONOCYTES RELATIVE PERCENT: 7.2 % (ref 0–4)
PDW BLD-RTO: 14.6 % (ref 11.7–14.9)
PLATELET # BLD: 130 K/CU MM (ref 140–440)
PMV BLD AUTO: 9.2 FL (ref 7.5–11.1)
POTASSIUM SERPL-SCNC: 4 MMOL/L (ref 3.5–5.1)
RBC # BLD: 4.22 M/CU MM (ref 4.6–6.2)
SEGMENTED NEUTROPHILS ABSOLUTE COUNT: 6.2 K/CU MM
SEGMENTED NEUTROPHILS RELATIVE PERCENT: 70.1 % (ref 36–66)
SODIUM BLD-SCNC: 140 MMOL/L (ref 135–145)
TOTAL IMMATURE NEUTOROPHIL: 0.03 K/CU MM
WBC # BLD: 8.9 K/CU MM (ref 4–10.5)

## 2022-03-24 PROCEDURE — 82962 GLUCOSE BLOOD TEST: CPT

## 2022-03-24 PROCEDURE — 36415 COLL VENOUS BLD VENIPUNCTURE: CPT

## 2022-03-24 PROCEDURE — 97162 PT EVAL MOD COMPLEX 30 MIN: CPT

## 2022-03-24 PROCEDURE — 6360000002 HC RX W HCPCS: Performed by: PHYSICIAN ASSISTANT

## 2022-03-24 PROCEDURE — 97530 THERAPEUTIC ACTIVITIES: CPT

## 2022-03-24 PROCEDURE — 2580000003 HC RX 258: Performed by: PHYSICIAN ASSISTANT

## 2022-03-24 PROCEDURE — 97166 OT EVAL MOD COMPLEX 45 MIN: CPT

## 2022-03-24 PROCEDURE — 1200000000 HC SEMI PRIVATE

## 2022-03-24 PROCEDURE — 6370000000 HC RX 637 (ALT 250 FOR IP): Performed by: PHYSICIAN ASSISTANT

## 2022-03-24 PROCEDURE — 94761 N-INVAS EAR/PLS OXIMETRY MLT: CPT

## 2022-03-24 PROCEDURE — 80048 BASIC METABOLIC PNL TOTAL CA: CPT

## 2022-03-24 PROCEDURE — 85025 COMPLETE CBC W/AUTO DIFF WBC: CPT

## 2022-03-24 RX ADMIN — ATORVASTATIN CALCIUM 20 MG: 20 TABLET, FILM COATED ORAL at 20:42

## 2022-03-24 RX ADMIN — CYANOCOBALAMIN TAB 1000 MCG 1000 MCG: 1000 TAB at 09:25

## 2022-03-24 RX ADMIN — Medication 6 ML: at 20:43

## 2022-03-24 RX ADMIN — PANTOPRAZOLE SODIUM 40 MG: 40 TABLET, DELAYED RELEASE ORAL at 05:59

## 2022-03-24 RX ADMIN — FUROSEMIDE 20 MG: 20 TABLET ORAL at 16:33

## 2022-03-24 RX ADMIN — Medication 5 ML: at 16:33

## 2022-03-24 RX ADMIN — ASPIRIN 81 MG: 81 TABLET, COATED ORAL at 09:25

## 2022-03-24 RX ADMIN — ENOXAPARIN SODIUM 40 MG: 100 INJECTION SUBCUTANEOUS at 20:42

## 2022-03-24 RX ADMIN — MEMANTINE 10 MG: 10 TABLET ORAL at 09:25

## 2022-03-24 RX ADMIN — METOPROLOL SUCCINATE 50 MG: 50 TABLET, EXTENDED RELEASE ORAL at 09:25

## 2022-03-24 RX ADMIN — DONEPEZIL HYDROCHLORIDE 10 MG: 10 TABLET, FILM COATED ORAL at 20:42

## 2022-03-24 RX ADMIN — FUROSEMIDE 20 MG: 20 TABLET ORAL at 09:25

## 2022-03-24 RX ADMIN — MEMANTINE 10 MG: 10 TABLET ORAL at 20:42

## 2022-03-24 RX ADMIN — DOCUSATE SODIUM 100 MG: 100 CAPSULE, LIQUID FILLED ORAL at 20:42

## 2022-03-24 RX ADMIN — TRIHEXYPHENIDYL HYDROCHLORIDE 3 MG: 2 TABLET ORAL at 09:26

## 2022-03-24 RX ADMIN — TRIHEXYPHENIDYL HYDROCHLORIDE 3 MG: 2 TABLET ORAL at 20:42

## 2022-03-24 ASSESSMENT — PAIN SCALES - GENERAL: PAINLEVEL_OUTOF10: 0

## 2022-03-24 NOTE — H&P
HISTORY AND PHYSICAL             Name:  Nahun Harkins /Age/Sex: 1950  (66 y.o. male)   MRN & CSN:  [id number] & [id number] Admission Date/Time: 3/23/2022 8:08 PM EDT   Location:   PCP: Luis Zimmer DO         Chief Complaint     Chief Complaint   Patient presents with   Mickicaden Miramontes at home          History Obtained From     Patient and EMS and recent discharge information from [facility] unit. History of Present Illness (4 elements)   Nahun Harkins is a 66 y.o. who presents to the hospital by EMS from home. Lives with wife. Patient was admitted this facility  and ultimately discharged to [facility]. Patient discharged yesterday from the rehab center. Amanda Lord today. Unable to get up. Wife not able to help. Patient with known frequent fall disorder, physical debility and Parkinson's disease. Somewhat complicated by dementia. Patient with complaint pain involving his hips. Patient does not recall the mechanism of fall. I did speak with ED physician who was performed a CT head, cervical spine, thoracic spine, lumbar spine, XR pelvis and XR chest.  These are all negative. ED physician speaks with case management. The patient will be admitted for multiple fall/frequent fall, physical debility and Parkinson's. He will be placed likely in a long-term facility. The patient without complaint chest pain, abdominal pain, shortness of breath, headache, neck pain. Patient with complaint of hip pain.       Past Medical History     Past Medical History:   Diagnosis Date    CAD (coronary artery disease)     with stent    Chronic low back pain     HX of laminectomy    Coronary arteriosclerosis     stent    Dementia ([address]     Fracture     R ankle    Hypertension     Hypertensive disorder     Osteoarthritis     Parkinson disease ([address]     Small vessel disease, cerebrovascular     Tobacco abuse         Past Surgical History     Past Surgical History:   Procedure Laterality Date    ANKLE SURGERY      BACK SURGERY      CARDIAC SURGERY      heart cath    CORONARY ANGIOPLASTY WITH STENT PLACEMENT      HERNIA REPAIR      LUMBAR LAMINECTOMY      TONSILLECTOMY      VASECTOMY          Medications Prior to Admission     Prior to Admission medications    Medication Sig Start Date End Date Taking? Authorizing Provider   metoprolol succinate (TOPROL XL) 50 MG extended release tablet Take 1 tablet by mouth daily 3/3/22  Yes JARVIS Gonzalez NP   pantoprazole (PROTONIX) 40 MG tablet Take 1 tablet by mouth every morning (before breakfast) 3/3/22  Yes JARVIS Gonzalez NP   atorvastatin (LIPITOR) 20 MG tablet TAKE ONE TABLET BY MOUTH ONCE NIGHTLY 12/27/21  Yes Toney Armas DO   docusate sodium (COLACE) 100 MG capsule Take 100 mg by mouth 2 times daily   Yes Historical Provider, MD   cyanocobalamin 1000 MCG tablet Take 1 tablet by mouth daily 10/9/19  Yes Toney Armas DO   furosemide (LASIX) 20 MG tablet Take 20 mg by mouth 2 times daily   Yes Lucille Pierre MD   acetaminophen (TYLENOL) 325 MG tablet Take 650 mg by mouth every 4 hours as needed for Pain   Yes Historical Provider, MD   donepezil (ARICEPT) 10 MG tablet Take 10 mg by mouth nightly   Yes Historical Provider, MD   memantine (NAMENDA) 10 MG tablet Take 10 mg by mouth 2 times daily   Yes Historical Provider, MD   aspirin 81 MG EC tablet Take 81 mg by mouth daily.    Yes Historical Provider, MD   trihexyphenidyl (ARTANE) 2 MG tablet Take 3 mg by mouth in the morning and at bedtime    Yes Historical Provider, MD        Allergies   Chantix [varenicline] and Daypro [oxaprozin]    Social History     Social History     Tobacco History     Smoking Status  Current Every Day Smoker Smoking Start Date  2/5/1964 Smoking Frequency  0.5 packs/day for 62 years (28.5 pk yrs) Smoking Tobacco Type  Cigarettes    Smokeless Tobacco Use  Never Used          Alcohol History     Alcohol Use Status  Not Currently          Drug Use Drug Use Status  No          Sexual Activity     Sexually Active  Not Asked                Family History     Family History   Problem Relation Age of Onset    High Blood Pressure Mother     High Blood Pressure Father     Heart Disease Father     Osteoporosis Sister          from allergy from an injection    No Known Problems Sister     No Known Problems Sister     No Known Problems Sister     No Known Problems Sister        Review of Systems (need 10 systems reviewed)   Review of Systems   Constitutional: Negative for fever. HENT: Negative. Eyes: Negative. Respiratory: Negative for cough, chest tightness and shortness of breath. Cardiovascular: Negative for chest pain. Gastrointestinal: Negative for abdominal pain. Endocrine: Negative. Genitourinary:        Patient smells of urine. I did ask regarding incontinence. He says that he has occasional trouble with urine. Musculoskeletal: Positive for arthralgias, back pain and gait problem. Skin: Positive for color change. Patient with some assist dermatitis subtle erythematous changes bilateral extremities with mild associated edema. Allergic/Immunologic: Negative. Neurological: Positive for tremors and weakness. Hematological: Negative. Psychiatric/Behavioral: Positive for confusion. Baseline confusion dementia. Ten point ROS reviewed negative, unless as noted above  Physical Exam (need 8 systems)   BP (!) 142/85   Pulse 65   Temp 98.7 °F (37.1 °C) (Infrared)   Resp 12   Ht 6' 2\" (1.88 m)   Wt 193 lb 4 oz (87.7 kg)   SpO2 97%   BMI 24.81 kg/m²   Physical Exam  Vitals and nursing note reviewed. Constitutional:       Appearance: Normal appearance. He is well-developed and normal weight. HENT:      Head: Normocephalic and atraumatic. Right Ear: External ear normal.      Left Ear: External ear normal.      Mouth/Throat:      Pharynx: Oropharynx is clear.    Eyes:      General: No scleral icterus. Right eye: No discharge. Left eye: No discharge. Conjunctiva/sclera: Conjunctivae normal.   Cardiovascular:      Rate and Rhythm: Normal rate and regular rhythm. Heart sounds: Normal heart sounds. Pulmonary:      Effort: Pulmonary effort is normal.      Breath sounds: Normal breath sounds. Abdominal:      General: Abdomen is flat. Bowel sounds are normal.      Palpations: Abdomen is soft. Musculoskeletal:         General: Normal range of motion. Cervical back: Normal range of motion and neck supple. Right lower leg: Edema present. Left lower leg: Edema present. Comments: Patient with chronic stasis extremity changes subtle erythema and trace-1+ edema. Skin:     General: Skin is warm and dry. Neurological:      General: No focal deficit present. Mental Status: He is alert. Mental status is at baseline. Motor: Weakness present. Gait: Gait abnormal.      Comments: I did not attempt gait as patient has history of frequent fall. Will defer to OT/PT service. Psychiatric:         Mood and Affect: Mood normal.         Behavior: Behavior normal.      Comments: Patient in good spirits. Patient with known dementia and without behavioral disturbance.             Labs      Recent Results (from the past 24 hour(s))   CBC with Auto Differential    Collection Time: 03/23/22  3:30 PM   Result Value Ref Range    WBC 10.3 4.0 - 10.5 K/CU MM    RBC 4.54 (L) 4.6 - 6.2 M/CU MM    Hemoglobin 12.4 (L) 13.5 - 18.0 GM/DL    Hematocrit 40.9 (L) 42 - 52 %    MCV 90.1 78 - 100 FL    MCH 27.3 27 - 31 PG    MCHC 30.3 (L) 32.0 - 36.0 %    RDW 14.6 11.7 - 14.9 %    Platelets 155 799 - 502 K/CU MM    MPV 9.6 7.5 - 11.1 FL    Differential Type AUTOMATED DIFFERENTIAL     Segs Relative 73.7 (H) 36 - 66 %    Lymphocytes % 16.7 (L) 24 - 44 %    Monocytes % 7.6 (H) 0 - 4 %    Eosinophils % 1.1 0 - 3 %    Basophils % 0.4 0 - 1 %    Segs Absolute 7.6 K/CU MM    Lymphocytes Absolute 1.7 K/CU MM    Monocytes Absolute 0.8 K/CU MM    Eosinophils Absolute 0.1 K/CU MM    Basophils Absolute 0.0 K/CU MM    Immature Neutrophil % 0.5 (H) 0 - 0.43 %    Total Immature Neutrophil 0.05 K/CU MM   Comprehensive Metabolic Panel    Collection Time: 03/23/22  3:30 PM   Result Value Ref Range    Sodium 145 135 - 145 MMOL/L    Potassium 4.4 3.5 - 5.1 MMOL/L    Chloride 111 (H) 99 - 110 mMol/L    CO2 23 21 - 32 MMOL/L    BUN 30 (H) 6 - 23 MG/DL    CREATININE 1.0 0.9 - 1.3 MG/DL    Glucose 96 70 - 99 MG/DL    Calcium 10.1 8.3 - 10.6 MG/DL    Albumin 4.1 3.4 - 5.0 GM/DL    Total Protein 6.3 (L) 6.4 - 8.2 GM/DL    Total Bilirubin 0.3 0.0 - 1.0 MG/DL    ALT 19 10 - 40 U/L    AST 17 15 - 37 IU/L    Alkaline Phosphatase 90 40 - 129 IU/L    GFR Non-African American >60 >60 mL/min/1.73m2    GFR African American >60 >60 mL/min/1.73m2    Anion Gap 11 4 - 16   EKG 12 Lead    Collection Time: 03/23/22  4:43 PM   Result Value Ref Range    Ventricular Rate 61 BPM    Atrial Rate 61 BPM    P-R Interval 146 ms    QRS Duration 70 ms    Q-T Interval 392 ms    QTc Calculation (Bazett) 394 ms    P Axis 87 degrees    R Axis -21 degrees    T Axis 18 degrees    Diagnosis       Normal sinus rhythm  Inferior infarct (cited on or before 27-FEB-2022)  Abnormal ECG  When compared with ECG of 27-FEB-2022 16:40,  premature supraventricular complexes are no longer present  Questionable change in initial forces of Inferior leads          Imaging/Diagnostics Last 24 Hours   XR PELVIS (1-2 VIEWS)    Result Date: 3/23/2022  EXAMINATION: ONE XRAY VIEW OF THE PELVIS 3/23/2022 3:27 pm COMPARISON: Chest radiograph 02/27/2022. HISTORY: ORDERING SYSTEM PROVIDED HISTORY: trauma TECHNOLOGIST PROVIDED HISTORY: Reason for exam:->trauma Reason for Exam: fall, dementia Additional signs and symptoms: fall, dementia FINDINGS: Postoperative changes of the lower lumbar spine. The bilateral sacroiliac joints and pubic symphysis are intact.   Mild degenerative changes of the bilateral hips. No fracture or dislocation identified. No suspicious lytic or blastic osseous lesion. The soft tissues are unremarkable. No acute osseous abnormality. CT HEAD WO CONTRAST    Result Date: 3/23/2022  EXAM: CT Head Without Intravenous Contrast EXAM DATE/TIME: 3/23/2022 3:26 pm CLINICAL HISTORY: ORDERING SYSTEM PROVIDED  fall, dementia  TECHNOLOGIST PROVIDED HISTORY: Reason for exam:->fall, dementia  Has a \"code stroke\" or \"stroke alert\" been called? ->No  Decision Support Exception - unselect if not a suspected or confirmed emergency medical condition->Emergency Medical Condition (MA) Reason for Exam: fall, dementia  Additional signs and symptoms: fall, dementia TECHNIQUE: Axial computed tomography images of the head/brain without intravenous contrast.  This CT exam was performed using one or more of the following dose reduction techniques:  automated exposure control, adjustment of the mA and/or kV according to patient size, and/or use of iterative reconstruction technique. COMPARISON: 02/27/2022 FINDINGS: Brain:  No evidence of acute intracranial process. Prominence of the sulci and/or CSF spaces suggests a mild degree of cerebral atrophy. No acute ischemia. No mass or mass effect. No acute intracranial hemorrhage. Mild periventricular deep white matter hypodensity consistent with small vessel ischemic deep white matter disease. Ventricles:  No acute findings. No ventriculomegaly. Bones/joints:  No acute findings. No acute fracture. Soft tissues:  No acute findings. Sinuses:  Unremarkable as visualized. No acute sinusitis. Mastoid air cells:  Unremarkable as visualized. No mastoid effusion. 1.  No evidence of acute intracranial process. 2.  Findings of presumed small vessel ischemic deep white matter disease. 3.  Prominence of the sulci and/or CSF spaces suggests a mild degree of cerebral atrophy.      CT CERVICAL SPINE WO CONTRAST    Result Date: 3/23/2022  EXAM: CT Cervical Spine Without Intravenous Contrast EXAM DATE/TIME: 3/23/2022 3:26 pm CLINICAL HISTORY: ORDERING SYSTEM PROVIDED  fall, dementia  TECHNOLOGIST PROVIDED HISTORY: Reason for exam:->fall, dementia  Decision Support Exception - unselect if not a suspected or confirmed emergency medical condition->Emergency Medical Condition (MA)  Reason for Exam: fall, dementia  Additional signs and symptoms: fall, dementia TECHNIQUE: Axial computed tomography images of the cervical spine without intravenous contrast.  This CT exam was performed using one or more of the following dose reduction techniques:  automated exposure control, adjustment of the mA and/or kV according to patient size, and/or use of iterative reconstruction technique. COMPARISON: 02/27/2022 FINDINGS: Vertebrae:  No acute findings. No acute fracture. Discs/spinal canal/neural foramina:  Disc height loss C4 to C7 suggest mild degenerative disc disease. Mild degenerative joint disease in the mid upper cervical facets with some hypertrophic changes. This combined with some uncovertebral hypertrophy results in C3-C6 bony foraminal narrowing. Soft tissues:  No acute findings. Vasculature:  Scattered calcified atherosclerotic plaque is noted within the arterial system. No evidence of fracture with multilevel degenerative changes as described.      CT THORACIC SPINE WO CONTRAST    Result Date: 3/23/2022  EXAM: CT Thoracic Spine Without Intravenous Contrast EXAM DATE/TIME: 3/23/2022 3:26 pm CLINICAL HISTORY: ORDERING SYSTEM PROVIDED  fall, dementia  TECHNOLOGIST PROVIDED HISTORY:  CT T-Spine w/o Contrast  Reason for exam:->fall, dementia  Reason for Exam: fall, dementia  Additional signs and symptoms: fall, dementia TECHNIQUE: Axial computed tomography images of the thoracic spine without intravenous contrast.  This CT exam was performed using one or more of the following dose reduction techniques:  automated exposure control, adjustment of the mA and/or kV according to patient size, and/or use of iterative reconstruction technique. COMPARISON: MRI of the thoracic spine 08/02/2016 FINDINGS: Vertebrae:  Minimal dextroscoliosis of the thoracic spine. Probable Schmorl's node involving the anterior inferior endplate of L1. No acute fracture. Discs/spinal canal/neural foramina:  Mild multilevel degenerative disc disease most evident in the mid to lower thoracic spine. No spinal canal stenosis. Soft tissues:  No acute findings. Lungs:  Some scattered lung scarring is noted. Mediastinum:  Right lung calcified granuloma and calcified right hilar and mediastinal lymph nodes are consistent with prior granulomatous disease. No evidence of fracture with mild multilevel degenerative changes as described. CT LUMBAR SPINE WO CONTRAST    Result Date: 3/23/2022  EXAMINATION: CT OF THE LUMBAR SPINE WITHOUT CONTRAST  3/23/2022 TECHNIQUE: CT of the lumbar spine was performed without the administration of intravenous contrast. Multiplanar reformatted images are provided for review. Adjustment of mA and/or kV according to patient size was utilized. Dose modulation, iterative reconstruction, and/or weight based adjustment of the mA/kV was utilized to reduce the radiation dose to as low as reasonably achievable. COMPARISON: 02/27/2022 HISTORY: ORDERING SYSTEM PROVIDED HISTORY: fall, dementia TECHNOLOGIST PROVIDED HISTORY: Reason for exam:->fall, dementia Decision Support Exception - unselect if not a suspected or confirmed emergency medical condition->Emergency Medical Condition (MA) Reason for Exam: fall, dementia Additional signs and symptoms: fall, dementia FINDINGS: BONES/ALIGNMENT: There is no acute fracture or suspect osseous lesion. Vertebral body heights are maintained. The patient is status post prior L4 and L5 laminectomies and anterior and posterior L4-5 surgical fusion. No evidence of hardware failure or loosening.   There is an old healed posterior left 11th rib fracture deformity. DEGENERATIVE CHANGES: No high-grade bony spinal canal stenosis or neural foraminal narrowing. SOFT TISSUES/RETROPERITONEUM: Stable 3.1 cm fusiform infrarenal abdominal aortic aneurysm. 1. No acute osseous abnormality of the lumbar spine. 2. Stable changes of prior L4 and L5 laminectomies and anterior and posterior L4-5 surgical fusion. 3. Stable 3.1 cm infrarenal abdominal aortic aneurysm. RECOMMENDATIONS: 3.1 cm infrarenal abdominal aortic aneurysm. Recommend follow-up every 3 years. Reference: J Am Gurjit Radiol 1706;36:350-291. XR CHEST PORTABLE    Result Date: 3/23/2022  EXAMINATION: ONE XRAY VIEW OF THE CHEST 3/23/2022 3:27 pm COMPARISON: 02/27/2022 HISTORY: ORDERING SYSTEM PROVIDED HISTORY: fall, dementia TECHNOLOGIST PROVIDED HISTORY: Reason for exam:->fall, dementia Reason for Exam: fall, dementia Additional signs and symptoms: fall, dementia FINDINGS: The lungs are clear. The cardiac and mediastinal contours are normal.  There is no pleural effusion or pneumothorax. No acute osseous abnormality is identified. Calcified right hilar lymph node is consistent with sequela of prior granulomatous disease. No acute cardiopulmonary abnormality. Assessment      Hospital Problems           Last Modified POA    * (Principal) Frequent falls 3/23/2022 Yes    Primary hypertension 3/23/2022 Yes    Primary parkinsonism ([address] 3/23/2022 Yes    Age-related physical debility 3/23/2022 Yes          No problem-specific Assessment & Plan notes found for this encounter. Plan    1. Frequent falls-patient been admitted previously. Frequent falls associated with his known Parkinson's disorder and general age-related physical debility. Patient with hip and pelvic complaints. CT scans head, neck, C-spine, T-spine and L-spine are negative. X-ray pelvis and chest negative for osseous abnormality. 2. Physical debility-evaluate PT/OT.   Case management is involved and will work on placement of this patient tomorrow March 24.  3. Hypertension/other health issues-continue all medications as currently prescribed. 4. Parkinsonism-continue medications without change. Diet ADULT DIET; Regular   DVT Prophylaxis [x] Lovenox, []  Heparin, [] SCDs, [] Ambulation [] Eliquis    GI Prophylaxis [x] PPI,  [] H2 Blocker,  [] Carafate,  [] Diet/Tube Feeds   Code Status Full Code   Surrogate Decision Maker  none   Disposition Patient requires continued admission due to history of frequent falls, progressive physical debility. Social service contacted by ED physician patient will be brought in for long-term placement consideration.          Consultations Ordered:  IP CONSULT TO CASE MANAGEMENT  IP CONSULT TO HOSPITALIST  IP CONSULT TO CASE MANAGEMENT    Medications   Medications:    Infusions:   PRN Meds:         Electronically signed by Ene Broderick PA-C on 3/23/22 at 11:59 PM

## 2022-03-24 NOTE — PROGRESS NOTES
Alejandra Medina MD, [address]                Internal Medicine Hospitalist             Daily Progress  Note   Subjective:     Chief Complaint   Patient presents with   Watkins Snowman at home     Mr. Kalyan Abebe of nil, he has been unsteady on his feet, has had frequent falls. Objective:    BP (!) 155/82   Pulse 55   Temp 97.3 °F (36.3 °C) (Temporal)   Resp 16   Ht 6' 2\" (1.88 m)   Wt 193 lb 4 oz (87.7 kg)   SpO2 96%   BMI 24.81 kg/m²      Intake/Output Summary (Last 24 hours) at 3/24/2022 0945  Last data filed at 3/24/2022 0913  Gross per 24 hour   Intake 290 ml   Output 1250 ml   Net -960 ml      Physical Exam:  Heart: Distant heart sounds  Lungs: Mostly clear to auscultation, decreased breath sounds at bases. No wheezes appreciated no crackles heard. Abdomen: Soft, non distended. Bowel sounds appreciated. No obvious liver or spleen enlargement. Non tender, no rebound noted. Extremities: Non tender, no swelling noted, strength 5/5 both legs. CNS: Grossly intact.     Labs:  CBC with Differential:    Lab Results   Component Value Date    WBC 8.9 03/24/2022    RBC 4.22 03/24/2022    HGB 11.5 03/24/2022    HCT 37.6 03/24/2022     03/24/2022    MCV 89.1 03/24/2022    MCH 27.3 03/24/2022    MCHC 30.6 03/24/2022    RDW 14.6 03/24/2022    SEGSPCT 70.1 03/24/2022    LYMPHOPCT 19.8 03/24/2022    MONOPCT 7.2 03/24/2022    EOSPCT 1.0 04/06/2021    BASOPCT 0.3 03/24/2022    MONOSABS 0.6 03/24/2022    LYMPHSABS 1.8 03/24/2022    EOSABS 0.2 03/24/2022    BASOSABS 0.0 03/24/2022    DIFFTYPE AUTOMATED DIFFERENTIAL 03/24/2022     CMP:    Lab Results   Component Value Date     03/24/2022    K 4.0 03/24/2022     03/24/2022    CO2 23 03/24/2022    BUN 22 03/24/2022    CREATININE 0.8 03/24/2022    GFRAA >60 03/24/2022    AGRATIO 2.0 02/24/2022    LABGLOM >60 03/24/2022    GLUCOSE 85 03/24/2022    PROT 6.3 03/23/2022    PROT 7.8 10/14/2010    LABALBU 4.1 03/23/2022    CALCIUM 9.4 03/24/2022    BILITOT 0.3 03/23/2022    ALKPHOS 90 03/23/2022    AST 17 03/23/2022    ALT 19 03/23/2022     No results for input(s): TROPONINT in the last 72 hours. Lab Results   Component Value Date    [facility] 2.910 10/06/2018         sodium chloride        aspirin  81 mg Oral Daily    atorvastatin  20 mg Oral Nightly    cyanocobalamin  1,000 mcg Oral Daily    docusate sodium  100 mg Oral BID    donepezil  10 mg Oral Nightly    furosemide  20 mg Oral BID    memantine  10 mg Oral BID    metoprolol succinate  50 mg Oral Daily    pantoprazole  40 mg Oral QAM AC    trihexyphenidyl  3 mg Oral BID    sodium chloride flush  5-40 mL IntraVENous 2 times per day    enoxaparin  40 mg SubCUTAneous Daily         Assessment:       Patient Active Problem List    Diagnosis Date Noted    WD-Traumatic open wound of left lower leg 03/07/2022    WD-Non-pressure ulcer of left lower extremity, with fat layer exposed ([facility] 03/07/2022    Cellulitis 02/27/2022    Frequent falls 02/27/2022    Age-related physical debility 02/27/2022    Venous stasis of both lower extremities 02/27/2022    Cellulitis of left leg 02/27/2022    Dementia ([address]     Osteoarthritis     Small vessel disease, cerebrovascular     CAD (coronary artery disease)     Tobacco abuse     Hyperlipidemia 10/04/2018    Primary hypertension 10/04/2018    Primary parkinsonism ([address] 10/04/2018    Right inguinal hernia 10/05/2015       Plan:     Problems being addressed this admission:   Frequent falls / failure to thrive  3/24/2022 it seems like is not able to take care of himself it has been suggested that he would need long-term care we will have case management and PT OT work on him. Initial radiology work-up done in the emergency room has been negative for any fractures. Dementia / PD  3/24/2022 he is on Aricept 10 mg daily and Namenda 10 mg twice a day.   He is on Artane 3 mg twice a day for his Parkinson's disease    Hypertension  3/24/2022 blood pressure 155/82 today he is on metoprolol 30 mg daily and Lasix 20 mg daily    Disposition  3/24/2022 we will consult case management PT OT may need long-term care. General Orders:  Repeat basic labs again in am.  I have explained to the patient and discussed with him/her the treatment plan. The above chart was generated partly using Dragon dictation system, it may contain dictation errors given the limitations of this technology.      Gayle Steinberg MD, Raulmay Spears

## 2022-03-24 NOTE — ACP (ADVANCE CARE PLANNING)
ANTHONY spoke with Harris Augustine at [facility] who advised they have beds available at this time. ANTHONY was also notified by Myesha Grimaldo at [address] [place] [address] that she may have 1 bed available as one of their patients is anticipated to discharge today. 12:15 PM  CM spoke with the patient's wife Lidia Downey who advised that she would like the patient placed for skilled therapy and anticipates the patient then transitioning into LTC afterwards. ANTHONY advised that [facility] ([place], [place]) and [facility] have beds available. Linette Mccullough stated that she would like a referral made to [address] [place] [address]. PT/OT evaluations are pending at this time. CM will follow. 12:24 PM  CM faxed clinical documentation to Harris Augustine at [facility] to initiate referral.  PT/OT evaluations are pending at this time, CM will fax the evaluations to [facility] once available. 1:59 PM   ANTHONY received a return call from Harris Augustine now stating that [facility] does not have a male bed available as originally stated. Harris Augustine advised that their SNF's in [place] [address] both have beds available if the patient/wife would be agreeable. ANTHONY spoke with the patient's wife Linette Mccullough who asked that a referral be made to [address] [place] [address]. 2 PM  ANTHONY spoke with Roxy at [address] [place] [address] who stated that she will review the patient's clinical documentation and follow-up with this CM once a determination has been made. PT/OT evaluations are still pending at this time. CM will follow.

## 2022-03-24 NOTE — PLAN OF CARE
Problem: Falls - Risk of:  Goal: Will remain free from falls  Description: Will remain free from falls  Outcome: Ongoing  Goal: Absence of physical injury  Description: Absence of physical injury  Outcome: Ongoing     Problem: Discharge Planning:  Goal: Discharged to appropriate level of care  Description: Discharged to appropriate level of care  Outcome: Ongoing     Problem:  Activity:  Goal: Ability to tolerate increased activity will improve  Description: Ability to tolerate increased activity will improve  Outcome: Ongoing

## 2022-03-24 NOTE — PROGRESS NOTES
Physical Therapy    Facility/Department: [facility] UNIT  Initial Assessment    NAME: Janna Waller  : 1950  MRN: [id number]    Date of Service: 3/24/2022    Discharge Recommendations:  Subacute/Skilled Nursing Facility        Assessment   Body structures, Functions, Activity limitations: Decreased high-level IADLs;Decreased functional mobility ; Decreased endurance;Decreased ADL status; Decreased strength  Assessment: Patient is a 67 yo male admitted to [facility] after falling @ home; patient has Hx of falls and will benefit from PT to address issues related to patient's fall  Treatment Diagnosis: debility/Hx of falls  Prognosis: Good  Decision Making: Medium Complexity  History: PF - Hx of falls  Exam: ROM/ strength/ bed mobility/transfers/ gait  Clinical Presentation: evolving  Barriers to Learning: none  REQUIRES PT FOLLOW UP: Yes       Patient Diagnosis(es): The primary encounter diagnosis was Fall, initial encounter. A diagnosis of Debility was also pertinent to this visit. has a past medical history of CAD (coronary artery disease), Chronic low back pain, Coronary arteriosclerosis, Dementia ([address] Fracture, Hypertension, Hypertensive disorder, Osteoarthritis, Parkinson disease ([address] Small vessel disease, cerebrovascular, and Tobacco abuse.   has a past surgical history that includes back surgery; Vasectomy; Cardiac surgery; lumbar laminectomy; Coronary angioplasty with stent; hernia repair; Ankle surgery; and Tonsillectomy.     Restrictions  Restrictions/Precautions  Restrictions/Precautions: Contact Precautions,Fall Risk  Vision/Hearing  Vision: Impaired  Vision Exceptions: Wears glasses at all times  Hearing: Within functional limits     Subjective  General  Chart Reviewed: Yes  Patient assessed for rehabilitation services?: Yes  Follows Commands: Within Functional Limits  Pain Screening  Patient Currently in Pain: Denies     Pre Treatment Pain Screening  Pain at present: 0    Orientation  Orientation  Overall Orientation Status: Within Normal Limits  Social/Functional History  Social/Functional History  Lives With: Spouse  Type of Home: Trailer  Home Layout: One level  Home Access: Stairs to enter with rails  Entrance Stairs - Number of Steps: 4 mario  Bathroom Shower/Tub: Tub/Shower unit  Bathroom Toilet: Handicap height  Bathroom Equipment: Grab bars in shower,Grab bars around toilet  Home Equipment: Rolling walker  Receives Help From: Family  ADL Assistance: Independent  Homemaking Responsibilities: No  Ambulation Assistance: Needs assistance (RW)  Transfer Assistance: Needs assistance  Active : No  Patient's  Info: wife drives  Occupation: Retired  Type of occupation: [employer],   Leisure & Hobbies: fishing  Cognition        Objective     Observation/Palpation  Posture: Fair  Observation: Pt was supine in bed HOB elevated    PROM RLE (degrees)  RLE PROM: WFL  PROM LLE (degrees)  LLE PROM: WFL  Strength RLE  Comment: I SLR; strength grossly 3+/5  Strength LLE  Comment: weak I SLR, strength groslly3 to 3+/5        Bed mobility  Supine to Sit: Moderate assistance  Scooting: Moderate assistance  Transfers  Sit to Stand: Contact guard assistance  Stand to sit: Contact guard assistance  Ambulation  Ambulation?: Yes  Ambulation 1  Device: Rolling Walker  Assistance: Contact guard assistance  Gait Deviations: Slow [place]; Increased EDUAR  Distance: 10 ft              Plan   Plan  Times per week: Mon- Sat 4+/wk  Current Treatment Recommendations: Strengthening,Transfer Training,Endurance Training,Gait Training,Functional Mobility Training  Safety Devices  Type of devices: Chair alarm in place,Call light within reach,Left in chair    G-Code       OutComes Score                                                  AM-PAC Score        Basic Mobility Six Clicks Form [facility] AM-PAC Score Conversion Table   How much difficulty does the patient currently have Unable (pt is unable to do activity) A Lot   (activity is a struggle, requires great effort/time) A Little   (pt can manage, but takes more effort/time than should) None   (pt has no difficulty) Raw Score Standardized Score CMS -100% Score CMS Modifier        6 23.55 100% CN   Turning over in bed (including adjusting bedclothes, sheets, and blankets)? []1 []2  [x]3  []4  7 26.42 92.36% CM        8 28.58 86.62% CM   Sitting down on and standing up from a chair with arms (e.g. wheelchair, bedside commode, etc.)? []1 []2 [x]3   []4   9 30.55 81.38% CM        10 32.29 76.75% CL   Moving from lying on back to sitting on the side of the bed? []1 [x]2  []3   []4   11 33.86 72.57% CL        12 35.33 68.66% CL   How much help from another person does the patient currently need Total   (Total/Dependent Assist) A Lot   (Max/Mod Assist) A Little   (Min/CGA/Supervision) None   (No human assistance) 13 36.74 64.91% CL        14 38.1 61.29% CL   Moving to and from a bed to a chair (including a wheelchair)? []1  []2   [x]3  []4   15 39.45 57.70% CK        16 40.78 54.16% CK   To walk in a hospital room? []1 []2   [x]3    []4  17 42.13 50.57% CK        18 43.63 46.58% CK   Climbing 3-5 steps with a railing?  []1  [x]2   []3    []4  19 45.44 41.77% CK        20 47.67 35.83% CJ   Raw Score 16  21 50.25 28.97% CJ   Standardized Score   22 53.28 20.91% CJ   CMS 0-100% Score   23 56.93 11.20% CI   CMS Modifier  24 61.14 0.00% CH     CH = 0% impaired  CI = 1-20% impaired  CJ = 20-40% impaired  CK = 40-60% impaired  CL = 60-80% impaired  CM = % impaired  CN = 100% impaired          Goals  Short term goals  Time Frame for Short term goals: 1 week  Short term goal 1: patient anton perform bed mobility activities with CGA  Short term goal 2: patient will safely perform transfers wiith SBA  Short term goal 3: patient will safely ambulate 50 ft using RW with SBA       Therapy Time   Individual Concurrent Group Co-treatment   Time In       7777 Time Out       1421   Minutes       24   Timed Code Treatment Minutes: [address] Aimee Christiansen, PT

## 2022-03-24 NOTE — PROGRESS NOTES
Occupational Therapy   Occupational Therapy Initial Assessment  Date: 3/24/2022   Patient Name: Ana Perry  MRN: [id number]     : 1950    Date of Service: 3/24/2022    Discharge Recommendations:  Subacute/Skilled Nursing Facility       Assessment   Performance deficits / Impairments: Decreased functional mobility ; Decreased ADL status; Decreased strength;Decreased safe awareness;Decreased balance;Decreased endurance;Decreased posture  Assessment: 67 yo male admitted to hospital after fall. He presents with decreased I adls, transfers and endurance. OT to see pt to maximize I with adls and transfers. Pt will benefit from skilled OT following acute stay  Prognosis: Fair  Decision Making: Medium Complexity  History: see above  Exam: see above  OT Education: OT Role;Transfer Training  REQUIRES OT FOLLOW UP: Yes  Activity Tolerance  Activity Tolerance: Patient Tolerated treatment well;Patient limited by fatigue  Safety Devices  Safety Devices in place: Yes  Type of devices: Call light within reach; Chair alarm in place; Left in chair           Patient Diagnosis(es): The primary encounter diagnosis was Fall, initial encounter. A diagnosis of Debility was also pertinent to this visit. has a past medical history of CAD (coronary artery disease), Chronic low back pain, Coronary arteriosclerosis, Dementia ([address] Fracture, Hypertension, Hypertensive disorder, Osteoarthritis, Parkinson disease ([address] Small vessel disease, cerebrovascular, and Tobacco abuse.   has a past surgical history that includes back surgery; Vasectomy; Cardiac surgery; lumbar laminectomy; Coronary angioplasty with stent; hernia repair; Ankle surgery; and Tonsillectomy.            Restrictions  Restrictions/Precautions  Restrictions/Precautions: Contact Precautions,Fall Risk (MDRO)    Subjective   General  Chart Reviewed: Yes  Patient assessed for rehabilitation services?: Yes  Family / Caregiver Present: No  Patient Currently in Pain: Denies  Pain Assessment  Pain Assessment: 0-10  Vital Signs  Patient Currently in Pain: Denies  Social/Functional History  Social/Functional History  Lives With: Spouse  Type of Home: Trailer  Home Layout: One level  Home Access: Stairs to enter with rails  Entrance Stairs - Number of Steps: 4 mario  Bathroom Shower/Tub: Tub/Shower unit (shower is coming apart)  Bathroom Toilet: Handicap height  Bathroom Equipment: Grab bars in shower,Grab bars around toilet (2 in tub. 1 outside of tub)  Home Equipment: Rolling walker  Receives Help From: Family  ADL Assistance: Independent  Homemaking Responsibilities: No (wife does)  Ambulation Assistance: Needs assistance (rolling walker)  Transfer Assistance: Needs assistance  Active : No  Occupation: Retired  Type of occupation: [employer],   Leisure & Hobbies: fishing       Objective   Vision: Impaired  Vision Exceptions: Wears glasses at all times  Hearing: Within functional limits    Orientation  Overall Orientation Status: Impaired  Orientation Level: Oriented to place; Disoriented to time (thought it was April)  Observation/Palpation  Observation: Pt was supine in bed HOB elevated  Balance  Sitting Balance: Contact guard assistance  Standing Balance: Contact guard assistance  Functional Mobility  Functional - Mobility Device: Rolling Walker  Activity:  (around bed and back to chair)  Assist Level: Contact guard assistance  ADL  Feeding: Setup  Grooming: Stand by assistance  UE Bathing: Stand by assistance  LE Bathing: Maximum assistance (simulated)  LE Dressing: Dependent/Total  Tone RUE  RUE Tone: Normotonic  Tone LUE  LUE Tone: Normotonic  Coordination  Movements Are Fluid And Coordinated: No  Coordination and Movement description: Tremors     Bed mobility  Supine to Sit: Moderate assistance  Scooting:  Moderate assistance  Transfers  Sit to stand: Contact guard assistance  Stand to sit: Contact guard assistance     Cognition  Overall Cognitive Status: Exceptions  Following Commands: Follows one step commands consistently  Memory: Decreased recall of recent events;Decreased short term memory  Safety Judgement: Decreased awareness of need for safety  Problem Solving: Assistance required to generate solutions;Assistance required to implement solutions;Decreased awareness of errors;Assistance required to correct errors made;Assistance required to identify errors made  Insights: Decreased awareness of deficits  Initiation: Requires cues for some  Sequencing: Requires cues for some        Sensation  Overall Sensation Status: WFL        LUE AROM (degrees)  LUE AROM : WFL  RUE AROM (degrees)  RUE AROM : WFL  LUE Strength  LUE Strength Comment: 4-/5 shoulder strength, elbow, wrist WFL  RUE Strength  RUE Strength Comment: 4-/5 shoulder strength, elbow, wrist WFL                   Plan   Plan  Times per week: 3+  Current Treatment Recommendations: Strengthening,Balance Training,Functional Mobility Training,Endurance Training,Wheelchair Mobility Training,Safety Education & Training,Self-Care / ADL,Patient/Caregiver Education & Training,ROM    G-Code     OutComes Score                                                  AM-PAC Score    AM-PAC 6 click short form for inpatient daily activity:   How much help from another person does the patient currently need. .. Unable  Dep A Lot  Max A A Lot   Mod A A Little  Min A A Little   CGA  SBA None   Mod I  Indep  Sup   1. Putting on and taking off regular lower body clothing? [x] 1    [] 2   [] 2   [] 3   [] 3   [] 4      2. Bathing (including washing, rinsing, drying)? [] 1   [x] 2   [] 2 [] 3 [] 3 [] 4   3. Toileting, which includes using toilet, bedpan, or urinal? [x] 1    [] 2   [] 2   [] 3   [] 3   [] 4     4. Putting on and taking off regular upper body clothing? [] 1   [] 2   [] 2   [x] 3   [] 3    [] 4      5. Taking care of personal grooming such as brushing teeth? [] 1   [] 2    [] 2 [] 3    [x] 3   [] 4      6.  Eating meals?   [] 1   [] 2   [] 2   [] 3   [] 3   [x] 4      Raw Score: 15/24 40-59% impaired    [24=0% impaired(CH), 23=1-19%(CI), 20-22=20-39%(CJ), 15-19=40-59%(CK), 10-14=60-79%(CL), 7-9=80-99%(CM), 6=100%(CN)]            Goals  Short term goals  Time Frame for Short term goals: until discharge  Short term goal 1: Pt will be S/MI for functional adl transfers to chair, toilet, and bed w/o LOB or falls using good safety  Short term goal 2: Pt will be SBA  for UB/LB adls using necessary a.e. Short term goal 3: Pt will be S/MI for toileting  Short term goal 4: Pt will be min vc for BUE strengthening to assist with transfers and strength and balance exercises  Patient Goals   Patient goals :  To get stronger, stop falling       Therapy Time   Individual Concurrent Group Co-treatment   Time In       1357   Time Out       1421   Minutes       [facility],

## 2022-03-25 VITALS
HEART RATE: 45 BPM | RESPIRATION RATE: 16 BRPM | SYSTOLIC BLOOD PRESSURE: 136 MMHG | TEMPERATURE: 98 F | HEIGHT: 74 IN | DIASTOLIC BLOOD PRESSURE: 67 MMHG | WEIGHT: 193.25 LBS | OXYGEN SATURATION: 94 % | BODY MASS INDEX: 24.8 KG/M2

## 2022-03-25 LAB
ALBUMIN SERPL-MCNC: 3.7 GM/DL (ref 3.4–5)
ALP BLD-CCNC: 86 IU/L (ref 40–129)
ALT SERPL-CCNC: 13 U/L (ref 10–40)
ANION GAP SERPL CALCULATED.3IONS-SCNC: 12 MMOL/L (ref 4–16)
AST SERPL-CCNC: 13 IU/L (ref 15–37)
BASOPHILS ABSOLUTE: 0 K/CU MM
BASOPHILS RELATIVE PERCENT: 0.3 % (ref 0–1)
BILIRUB SERPL-MCNC: 0.4 MG/DL (ref 0–1)
BUN BLDV-MCNC: 21 MG/DL (ref 6–23)
CALCIUM SERPL-MCNC: 9.4 MG/DL (ref 8.3–10.6)
CHLORIDE BLD-SCNC: 105 MMOL/L (ref 99–110)
CO2: 24 MMOL/L (ref 21–32)
CREAT SERPL-MCNC: 1 MG/DL (ref 0.9–1.3)
DIFFERENTIAL TYPE: ABNORMAL
EKG ATRIAL RATE: 61 BPM
EKG DIAGNOSIS: NORMAL
EKG P AXIS: 87 DEGREES
EKG P-R INTERVAL: 146 MS
EKG Q-T INTERVAL: 392 MS
EKG QRS DURATION: 70 MS
EKG QTC CALCULATION (BAZETT): 394 MS
EKG R AXIS: -21 DEGREES
EKG T AXIS: 18 DEGREES
EKG VENTRICULAR RATE: 61 BPM
EOSINOPHILS ABSOLUTE: 0.2 K/CU MM
EOSINOPHILS RELATIVE PERCENT: 2 % (ref 0–3)
GFR AFRICAN AMERICAN: >60 ML/MIN/1.73M2
GFR NON-AFRICAN AMERICAN: >60 ML/MIN/1.73M2
GLUCOSE BLD-MCNC: 89 MG/DL (ref 70–99)
HCT VFR BLD CALC: 38.3 % (ref 42–52)
HEMOGLOBIN: 11.9 GM/DL (ref 13.5–18)
IMMATURE NEUTROPHIL %: 0.3 % (ref 0–0.43)
LYMPHOCYTES ABSOLUTE: 2.3 K/CU MM
LYMPHOCYTES RELATIVE PERCENT: 25.1 % (ref 24–44)
MCH RBC QN AUTO: 27.7 PG (ref 27–31)
MCHC RBC AUTO-ENTMCNC: 31.1 % (ref 32–36)
MCV RBC AUTO: 89.1 FL (ref 78–100)
MONOCYTES ABSOLUTE: 0.8 K/CU MM
MONOCYTES RELATIVE PERCENT: 8.4 % (ref 0–4)
PDW BLD-RTO: 14.7 % (ref 11.7–14.9)
PLATELET # BLD: 131 K/CU MM (ref 140–440)
PMV BLD AUTO: 10 FL (ref 7.5–11.1)
POTASSIUM SERPL-SCNC: 4 MMOL/L (ref 3.5–5.1)
RBC # BLD: 4.3 M/CU MM (ref 4.6–6.2)
SARS-COV-2, NAAT: NOT DETECTED
SEGMENTED NEUTROPHILS ABSOLUTE COUNT: 5.8 K/CU MM
SEGMENTED NEUTROPHILS RELATIVE PERCENT: 63.9 % (ref 36–66)
SODIUM BLD-SCNC: 141 MMOL/L (ref 135–145)
SOURCE: NORMAL
TOTAL IMMATURE NEUTOROPHIL: 0.03 K/CU MM
TOTAL PROTEIN: 6.2 GM/DL (ref 6.4–8.2)
WBC # BLD: 9.1 K/CU MM (ref 4–10.5)

## 2022-03-25 PROCEDURE — 2580000003 HC RX 258: Performed by: PHYSICIAN ASSISTANT

## 2022-03-25 PROCEDURE — 85025 COMPLETE CBC W/AUTO DIFF WBC: CPT

## 2022-03-25 PROCEDURE — 87635 SARS-COV-2 COVID-19 AMP PRB: CPT

## 2022-03-25 PROCEDURE — 36415 COLL VENOUS BLD VENIPUNCTURE: CPT

## 2022-03-25 PROCEDURE — 6370000000 HC RX 637 (ALT 250 FOR IP): Performed by: PHYSICIAN ASSISTANT

## 2022-03-25 PROCEDURE — 93010 ELECTROCARDIOGRAM REPORT: CPT | Performed by: INTERNAL MEDICINE

## 2022-03-25 PROCEDURE — 80053 COMPREHEN METABOLIC PANEL: CPT

## 2022-03-25 PROCEDURE — 94761 N-INVAS EAR/PLS OXIMETRY MLT: CPT

## 2022-03-25 RX ORDER — METOPROLOL SUCCINATE 25 MG/1
25 TABLET, EXTENDED RELEASE ORAL DAILY
Qty: 30 TABLET | Refills: 0 | Status: ON HOLD | OUTPATIENT
Start: 2022-03-25 | End: 2022-08-02 | Stop reason: HOSPADM

## 2022-03-25 RX ADMIN — CYANOCOBALAMIN TAB 1000 MCG 1000 MCG: 1000 TAB at 09:03

## 2022-03-25 RX ADMIN — MEMANTINE 10 MG: 10 TABLET ORAL at 09:03

## 2022-03-25 RX ADMIN — ASPIRIN 81 MG: 81 TABLET, COATED ORAL at 09:03

## 2022-03-25 RX ADMIN — DOCUSATE SODIUM 100 MG: 100 CAPSULE, LIQUID FILLED ORAL at 09:03

## 2022-03-25 RX ADMIN — PANTOPRAZOLE SODIUM 40 MG: 40 TABLET, DELAYED RELEASE ORAL at 06:00

## 2022-03-25 RX ADMIN — Medication 5 ML: at 09:04

## 2022-03-25 RX ADMIN — FUROSEMIDE 20 MG: 20 TABLET ORAL at 09:03

## 2022-03-25 ASSESSMENT — PAIN SCALES - GENERAL
PAINLEVEL_OUTOF10: 0
PAINLEVEL_OUTOF10: 0

## 2022-03-25 NOTE — PROGRESS NOTES
Report called to Mahendra Stewart RN, all questions answered, verbalized understanding, informed patient will be leaving around 11 am.

## 2022-03-25 NOTE — CARE COORDINATION
CM received a call from [facility] stating that they can accept the patient today or over the weekend. CM notified Dr. Homero Quintero via [employer]. 8:30 AM  CM arranged transportation at 12 PM through [employer] and notified Roxy at [facility]. 8:34 AM  CM left a voicemail for the patient's spouse Christina Upton with update regarding discharge to [facility] today. 8:58 AM  PASRR completed online.

## 2022-03-25 NOTE — DISCHARGE SUMMARY
Malik Flood MD, [address]   Internal Medicine Hospitalist  Discharge Summary    Enrique Pacheco  :  1950  MRN:  [id number]    Admit date:  3/23/2022  Discharge date:  3/25/2022    Admitting Physician:  Malik Flood MD    Discharge Diagnoses:    Patient Active Problem List   Diagnosis    Right inguinal hernia    Hyperlipidemia    Primary hypertension    [facility]    Dementia ([facility]    Osteoarthritis    Small vessel disease, cerebrovascular    CAD (coronary artery disease)    Tobacco abuse    Cellulitis    Frequent falls    Age-related physical debility    Venous stasis of both lower extremities    Cellulitis of left leg    WD-Traumatic open wound of left lower leg    WD-Non-pressure ulcer of left lower extremity, with fat layer exposed ([facility]       Admission Condition:  fair    Discharged Condition:  stable    Hospital Course:     (copied from admission history)  Enrique Pacheco is a 66 y.o. who presents to the hospital by EMS from home. Lives with wife. Patient was admitted this facility  and ultimately discharged to [facility]. Patient discharged yesterday from the rehab center. Cindy Retana today. Unable to get up. Wife not able to help. Patient with known frequent fall disorder, physical debility and Parkinson's disease. Somewhat complicated by dementia. Patient with complaint pain involving his hips. Patient does not recall the mechanism of fall. I did speak with ED physician who was performed a CT head, cervical spine, thoracic spine, lumbar spine, XR pelvis and XR chest.  These are all negative. ED physician speaks with case management. The patient will be admitted for multiple fall/frequent fall, physical debility and Parkinson's. He will be placed likely in a long-term facility. The patient without complaint chest pain, abdominal pain, shortness of breath, headache, neck pain. Patient with complaint of hip pain.     3/25/2022 I saw him today he is doing okay eager to be going to a skilled care may need long-term care. I am holding his metoprolol from today as heart rate is low we will probably need to keep a close eye on that. I will cut down the dose of metoprolol to 25 mg. Hospital Course:  (copied from last soap)  Frequent falls / failure to thrive  3/24/2022 it seems like is not able to take care of himself it has been suggested that he would need long-term care we will have case management and PT OT work on him. Initial radiology work-up done in the emergency room has been negative for any fractures.      Dementia / PD  3/24/2022 he is on Aricept 10 mg daily and Namenda 10 mg twice a day. He is on Artane 3 mg twice a day for his Parkinson's disease     Hypertension  3/24/2022 blood pressure 155/82 today he is on metoprolol 30 mg daily and Lasix 20 mg daily     Disposition  3/24/2022 we will consult case management PT OT may need long-term care. Follow up appointment / plans: Needs to be seen within 7 days by his/her physician, patient to call for an appointment. On examination today  Heart is RRR, Lungs are CTA, abdomen is soft and non tender, CNS is grossly intact. Please see detailed consultation notes and radiology dictations as below. Vitals: Blood pressure 136/67, pulse (!) 45, temperature 98 °F (36.7 °C), resp. rate 16, height 6' 2\" (1.88 m), weight 193 lb 4 oz (87.7 kg), SpO2 94 %.     Discharge Medications:        Medication List      CHANGE how you take these medications    metoprolol succinate 25 MG extended release tablet  Commonly known as: TOPROL XL  Take 1 tablet by mouth daily  What changed:   · medication strength  · how much to take        CONTINUE taking these medications    acetaminophen 325 MG tablet  Commonly known as: TYLENOL     aspirin 81 MG EC tablet     atorvastatin 20 MG tablet  Commonly known as: LIPITOR  TAKE ONE TABLET BY MOUTH ONCE NIGHTLY     cyanocobalamin 1000 MCG tablet  Take 1 tablet by mouth daily docusate sodium 100 MG capsule  Commonly known as: COLACE     donepezil 10 MG tablet  Commonly known as: ARICEPT     furosemide 20 MG tablet  Commonly known as: LASIX     memantine 10 MG tablet  Commonly known as: NAMENDA     pantoprazole 40 MG tablet  Commonly known as: PROTONIX  Take 1 tablet by mouth every morning (before breakfast)     trihexyphenidyl 2 MG tablet  Commonly known as: ARTANE           Where to Get Your Medications      You can get these medications from any pharmacy    Bring a paper prescription for each of these medications  · metoprolol succinate 25 MG extended release tablet         Significant Diagnostic Studies:   Blood work reviewed.    CBC with Differential:    Lab Results   Component Value Date    WBC 9.1 03/25/2022    RBC 4.30 03/25/2022    HGB 11.9 03/25/2022    HCT 38.3 03/25/2022     03/25/2022    MCV 89.1 03/25/2022    MCH 27.7 03/25/2022    MCHC 31.1 03/25/2022    RDW 14.7 03/25/2022    SEGSPCT 63.9 03/25/2022    LYMPHOPCT 25.1 03/25/2022    MONOPCT 8.4 03/25/2022    EOSPCT 1.0 04/06/2021    BASOPCT 0.3 03/25/2022    MONOSABS 0.8 03/25/2022    LYMPHSABS 2.3 03/25/2022    EOSABS 0.2 03/25/2022    BASOSABS 0.0 03/25/2022    DIFFTYPE AUTOMATED DIFFERENTIAL 03/25/2022     CMP:    Lab Results   Component Value Date     03/25/2022    K 4.0 03/25/2022     03/25/2022    CO2 24 03/25/2022    BUN 21 03/25/2022    CREATININE 1.0 03/25/2022    GFRAA >60 03/25/2022    AGRATIO 2.0 02/24/2022    LABGLOM >60 03/25/2022    GLUCOSE 89 03/25/2022    PROT 6.2 03/25/2022    PROT 7.8 10/14/2010    LABALBU 3.7 03/25/2022    CALCIUM 9.4 03/25/2022    BILITOT 0.4 03/25/2022    ALKPHOS 86 03/25/2022    AST 13 03/25/2022    ALT 13 03/25/2022      CT LUMBAR SPINE WO CONTRAST [[id number]] Collected: 03/23/22 1703     Order Status: Completed Updated: 03/23/22 1709     Narrative:       EXAMINATION:   CT OF THE LUMBAR SPINE WITHOUT CONTRAST  3/23/2022     TECHNIQUE:   CT of the lumbar spine was performed without the administration of   intravenous contrast. Multiplanar reformatted images are provided for review. Adjustment of mA and/or kV according to patient size was utilized. Dose   modulation, iterative reconstruction, and/or weight based adjustment of the   mA/kV was utilized to reduce the radiation dose to as low as reasonably   achievable. COMPARISON:   02/27/2022     HISTORY:   ORDERING SYSTEM PROVIDED HISTORY: fall, dementia   TECHNOLOGIST PROVIDED HISTORY:   Reason for exam:->fall, dementia   Decision Support Exception - unselect if not a suspected or confirmed   emergency medical condition->Emergency Medical Condition (MA)   Reason for Exam: fall, dementia   Additional signs and symptoms: fall, dementia     FINDINGS:   BONES/ALIGNMENT: There is no acute fracture or suspect osseous lesion. Vertebral body heights are maintained. The patient is status post prior L4   and L5 laminectomies and anterior and posterior L4-5 surgical fusion. No   evidence of hardware failure or loosening. There is an old healed posterior   left 11th rib fracture deformity. DEGENERATIVE CHANGES: No high-grade bony spinal canal stenosis or neural   foraminal narrowing. SOFT TISSUES/RETROPERITONEUM: Stable 3.1 cm fusiform infrarenal abdominal   aortic aneurysm. Impression:       1. No acute osseous abnormality of the lumbar spine. 2. Stable changes of prior L4 and L5 laminectomies and anterior and posterior   L4-5 surgical fusion. 3. Stable 3.1 cm infrarenal abdominal aortic aneurysm. RECOMMENDATIONS:   3.1 cm infrarenal abdominal aortic aneurysm. Recommend follow-up every 3   years. Reference: J Am Gurjit Radiol 5977;24:504-181.       CT THORACIC SPINE WO CONTRAST [[id number]] Collected: 03/23/22 1702     Order Status: Completed Updated: 03/23/22 1708     Narrative:         EXAM:     CT Thoracic Spine Without Intravenous Contrast     EXAM DATE/TIME:     3/23/2022 3:26 pm     CLINICAL HISTORY: ORDERING SYSTEM PROVIDED  fall, dementia  TECHNOLOGIST PROVIDED HISTORY:  CT   T-Spine w/o Contrast  Reason for exam:->fall, dementia  Reason for Exam:   fall, dementia  Additional signs and symptoms: fall, dementia     TECHNIQUE:     Axial computed tomography images of the thoracic spine without intravenous   contrast.  This CT exam was performed using one or more of the following dose   reduction techniques:  automated exposure control, adjustment of the mA   and/or kV according to patient size, and/or use of iterative reconstruction   technique. COMPARISON:     MRI of the thoracic spine 08/02/2016     FINDINGS:     Vertebrae: Minimal dextroscoliosis of the thoracic spine. Probable   Schmorl's node involving the anterior inferior endplate of L1. No acute   fracture. Discs/spinal canal/neural foramina:  Mild multilevel degenerative disc   disease most evident in the mid to lower thoracic spine. No spinal canal   stenosis. Soft tissues:  No acute findings. Lungs:  Some scattered lung scarring is noted. Mediastinum:  Right lung calcified granuloma and calcified right hilar and   mediastinal lymph nodes are consistent with prior granulomatous disease. Impression:         No evidence of fracture with mild multilevel degenerative changes as   described. XR PELVIS (1-2 VIEWS) [[id number]] Collected: 03/23/22 1702     Order Status: Completed Updated: 03/23/22 1707     Narrative:       EXAMINATION:   ONE XRAY VIEW OF THE PELVIS     3/23/2022 3:27 pm     COMPARISON:   Chest radiograph 02/27/2022. HISTORY:   ORDERING SYSTEM PROVIDED HISTORY: trauma   TECHNOLOGIST PROVIDED HISTORY:   Reason for exam:->trauma   Reason for Exam: fall, dementia   Additional signs and symptoms: fall, dementia     FINDINGS:   Postoperative changes of the lower lumbar spine. The bilateral sacroiliac   joints and pubic symphysis are intact. Mild degenerative changes of the   bilateral hips.   No fracture or dislocation identified. No suspicious lytic   or blastic osseous lesion. The soft tissues are unremarkable. Impression:       No acute osseous abnormality. XR CHEST PORTABLE [[id number]] Collected: 03/23/22 1702     Order Status: Completed Updated: 03/23/22 1706     Narrative:       EXAMINATION:   ONE XRAY VIEW OF THE CHEST     3/23/2022 3:27 pm     COMPARISON:   02/27/2022     HISTORY:   ORDERING SYSTEM PROVIDED HISTORY: fall, dementia   TECHNOLOGIST PROVIDED HISTORY:   Reason for exam:->fall, dementia   Reason for Exam: fall, dementia   Additional signs and symptoms: fall, dementia     FINDINGS:   The lungs are clear. The cardiac and mediastinal contours are normal.  There   is no pleural effusion or pneumothorax. No acute osseous abnormality is   identified. Calcified right hilar lymph node is consistent with sequela of   prior granulomatous disease. Impression:       No acute cardiopulmonary abnormality. CT CERVICAL SPINE WO CONTRAST [[id number]] Collected: 03/23/22 1659     Order Status: Completed Updated: 03/23/22 1705     Narrative:         EXAM:     CT Cervical Spine Without Intravenous Contrast     EXAM DATE/TIME:     3/23/2022 3:26 pm     CLINICAL HISTORY:     ORDERING SYSTEM PROVIDED  fall, dementia  TECHNOLOGIST PROVIDED HISTORY:   Reason for exam:->fall, dementia  Decision Support Exception - unselect if   not a suspected or confirmed emergency medical condition->Emergency Medical   Condition (MA)  Reason for Exam: fall, dementia  Additional signs and   symptoms: fall, dementia     TECHNIQUE:     Axial computed tomography images of the cervical spine without intravenous   contrast.  This CT exam was performed using one or more of the following dose   reduction techniques:  automated exposure control, adjustment of the mA   and/or kV according to patient size, and/or use of iterative reconstruction   technique.      COMPARISON:     02/27/2022     FINDINGS:     Vertebrae: No acute findings. No acute fracture. Discs/spinal canal/neural foramina:  Disc height loss C4 to C7 suggest mild   degenerative disc disease. Mild degenerative joint disease in the mid upper   cervical facets with some hypertrophic changes. This combined with some   uncovertebral hypertrophy results in C3-C6 bony foraminal narrowing. Soft tissues:  No acute findings. Vasculature:  Scattered calcified atherosclerotic plaque is noted within the   arterial system. Impression:         No evidence of fracture with multilevel degenerative changes as described. CT HEAD WO CONTRAST [[id number]] Collected: 03/23/22 1656     Order Status: Completed Updated: 03/23/22 1701     Narrative:         EXAM:     CT Head Without Intravenous Contrast     EXAM DATE/TIME:     3/23/2022 3:26 pm     CLINICAL HISTORY:     ORDERING SYSTEM PROVIDED  fall, dementia  TECHNOLOGIST PROVIDED HISTORY:   Reason for exam:->fall, dementia  Has a \"code stroke\" or \"stroke alert\" been   called? ->No  Decision Support Exception - unselect if not a suspected or   confirmed emergency medical condition->Emergency Medical Condition (MA)   Reason for Exam: fall, dementia  Additional signs and symptoms: fall, dementia     TECHNIQUE:     Axial computed tomography images of the head/brain without intravenous   contrast.  This CT exam was performed using one or more of the following dose   reduction techniques:  automated exposure control, adjustment of the mA   and/or kV according to patient size, and/or use of iterative reconstruction   technique. COMPARISON:     02/27/2022     FINDINGS:     Brain: No evidence of acute intracranial process. Prominence of the sulci   and/or CSF spaces suggests a mild degree of cerebral atrophy. No acute   ischemia. No mass or mass effect. No acute intracranial hemorrhage. Mild   periventricular deep white matter hypodensity consistent with small vessel   ischemic deep white matter disease.      Ventricles: No acute findings. No ventriculomegaly. Bones/joints:  No acute findings. No acute fracture. Soft tissues:  No acute findings. Sinuses:  Unremarkable as visualized. No acute sinusitis. Mastoid air cells:  Unremarkable as visualized. No mastoid effusion. Impression:         1. No evidence of acute intracranial process. 2.  Findings of presumed small vessel ischemic deep white matter disease. 3.  Prominence of the sulci and/or CSF spaces suggests a mild degree of   cerebral atrophy. Disposition:   [facility]  All the above has been explained to patient and or family. Patient would need F/U with his/her PCP in 7 days. Explained that it is the patients responsibility to have blood work or radiology testing done as an out patient. He/She has to take the discharge papers from the hospital for out patient follow up visit with the PCP/Physician. Time spent  >30 minutes. The above chart was partly created by using Dragon dictation system, it may contain dictation errors given the limitations of this technology.      Signed:  Katherin Mcginnis MD MD, FACP  3/25/2022, 8:55 AM

## 2022-03-25 NOTE — PROGRESS NOTES
Patient transferred to [facility] on stretcher with all belongings including dentures upper and lower and glasses, by [facility] [employer].

## 2022-03-25 NOTE — CARE COORDINATION
NURSING: [facility] [place] has accepted the patient and he will be discharged today. Please complete the [address] form, call report to [phone], and notify the patient's family. Transportation has been arranged at 12 PM through [employer] .

## 2022-03-25 NOTE — PLAN OF CARE
Problem: Falls - Risk of:  Goal: Will remain free from falls  Description: Will remain free from falls  Outcome: Completed  Goal: Absence of physical injury  Description: Absence of physical injury  Outcome: Completed     Problem: Discharge Planning:  Goal: Discharged to appropriate level of care  Description: Discharged to appropriate level of care  Outcome: Completed     Problem:  Activity:  Goal: Ability to tolerate increased activity will improve  Description: Ability to tolerate increased activity will improve  Outcome: Completed

## 2022-03-25 NOTE — DISCHARGE INSTR - COC
Continuity of Care Form    Patient Name: Leno Hunt   :  1950  MRN:  [id number]    Admit date:  3/23/2022  Discharge date:  2022    Code Status Order: Full Code   Advance Directives:      Admitting Physician:  Ania Hansen MD  PCP: Cecil Moncada DO    Discharging Nurse: Emma Gomes RN  [address] Unit/Room#: 011/011-01  Discharging Unit Phone Number: [phone]    Emergency Contact:   Extended Emergency Contact Information  Primary Emergency Contact: Kamaljit Ott  Address: [address] [place] [address] Phone: [phone]  Mobile Phone: [phone]  Relation: Spouse  Secondary Emergency Contact:  Alex Vivar Phone: [phone]  Relation: Child    Past Surgical History:  Past Surgical History:   Procedure Laterality Date    ANKLE SURGERY      BACK SURGERY      CARDIAC SURGERY      heart cath    CORONARY ANGIOPLASTY WITH STENT PLACEMENT      HERNIA REPAIR      LUMBAR LAMINECTOMY      TONSILLECTOMY      VASECTOMY         Immunization History:   Immunization History   Administered Date(s) Administered    COVID-19, Moderna, Booster, PF, 50mcg/0.25ml 12/10/2021    COVID-19, Verlena Irving, Primary or Immunocompromised, PF, 100mcg/0.5mL 2021, 2021    Influenza, Triv, inactivated, subunit, adjuvanted, IM (Fluad 65 yrs and older) 10/09/2019       Active Problems:  Patient Active Problem List   Diagnosis Code    Right inguinal hernia K40.90    Hyperlipidemia E78.5    Primary hypertension I10    Primary parkinsonism ([address] G20    Dementia ([address] F03.90    Osteoarthritis M19.90    Small vessel disease, cerebrovascular I67.9    CAD (coronary artery disease) I25.10    Tobacco abuse Z72.0    Cellulitis L03.90    Frequent falls R29.6    Age-related physical debility R54    Venous stasis of both lower extremities I87.8    Cellulitis of left leg L03.116    WD-Traumatic open wound of left lower leg S81.802A    WD-Non-pressure ulcer of left lower extremity, with fat layer exposed ([facility] K71.029       Isolation/Infection:   Isolation            Contact          Patient Infection Status       Infection Onset Added Last Indicated Last Indicated By Review Planned Expiration Resolved Resolved By    MDRO (multi-drug resistant organism) 02/27/22 03/07/22 03/07/22 Narendra Winkler RN        Jluis vulgaris            Nurse Assessment:  Last Vital Signs: /67   Pulse (!) 45   Temp 98 °F (36.7 °C)   Resp 16   Ht 6' 2\" (1.88 m)   Wt 193 lb 4 oz (87.7 kg)   SpO2 94%   BMI 24.81 kg/m²     Last documented pain score (0-10 scale): Pain Level: 0  Last Weight:   Wt Readings from Last 1 Encounters:   03/23/22 193 lb 4 oz (87.7 kg)     Mental Status:  oriented and alert    IV Access:  - None    Nursing Mobility/ADLs:  Walking   Assisted  Transfer  Assisted  Bathing  Assisted  Dressing  Assisted  Toileting  Assisted  Feeding  Independent  Med Admin  Assisted  Med Delivery   whole    Wound Care Documentation and Therapy:  Wound 10/05/18 rt buttock (Active)   Number of days: 1266        Elimination:  Continence: Bowel: No  Bladder: No  Urinary Catheter: None   Colostomy/Ileostomy/Ileal Conduit: No       Date of Last BM: 03/24/2022    Intake/Output Summary (Last 24 hours) at 3/25/2022 0838  Last data filed at 3/25/2022 0717  Gross per 24 hour   Intake 720 ml   Output 600 ml   Net 120 ml     I/O last 3 completed shifts: In: 200 [P.O.:770]  Out: 1250 [Urine:1250]    Safety Concerns:     History of Falls (last 30 days) and At Risk for Falls    Impairments/Disabilities:      Vision    Nutrition Therapy:  Current Nutrition Therapy:   - Oral Diet:  General    Routes of Feeding: Oral  Liquids: Thin Liquids  Daily Fluid Restriction: no  Last Modified Barium Swallow with Video (Video Swallowing Test): not done    Treatments at the Time of Hospital Discharge:   Respiratory Treatments: NA  Oxygen Therapy:  is not on home oxygen therapy.   Ventilator:    - No ventilator support    Rehab Therapies: Physical Therapy and Occupational Therapy  Weight Bearing Status/Restrictions: No weight bearing restrictions  Other Medical Equipment (for information only, NOT a DME order):  walker  Other Treatments: NA    Patient's personal belongings (please select all that are sent with patient):  Glasses, Dentures {DESC; UPPER/LOWER/BOTH:[id number]}    RN SIGNATURE:  Electronically signed by Joshua Lewis RN on 3/25/22 at 10:27 AM EDT    CASE MANAGEMENT/SOCIAL WORK SECTION    Inpatient Status Date: 03/23/2022    Readmission Risk Assessment Score:  Readmission Risk              Risk of Unplanned Readmission:  16           Discharging to Facility/ Agency   Name: [facility]   Address: [address] [place] [address]  Phone: [phone]  Fax: [phone]    Dialysis Facility (if applicable)   Name:  Address:  Dialysis Schedule:  Phone:  Fax:    / signature: Electronically signed by Jewels Keen RN on 3/25/22 at 8:39 AM EDT    PHYSICIAN SECTION    Prognosis: Fair    Condition at Discharge: Stable    Rehab Potential (if transferring to Rehab): Fair    Recommended Labs or Other Treatments After Discharge: CBC and BMP in 3-5 days    Physician Certification: I certify the above information and transfer of Gerhard [facility]  is necessary for the continuing treatment of the diagnosis listed and that he requires [facility] for greater 30 days.      Update Admission H&P: No change in H&P    PHYSICIAN SIGNATURE:  Electronically signed by Gena Rae MD on 3/25/22 at 8:51 AM EDT

## 2022-06-10 ENCOUNTER — HOSPITAL ENCOUNTER (EMERGENCY)
Age: 72
Discharge: HOME | End: 2022-06-10
Attending: EMERGENCY MEDICINE
Payer: MEDICARE

## 2022-06-10 ENCOUNTER — APPOINTMENT (OUTPATIENT)
Dept: GENERAL RADIOLOGY | Age: 72
End: 2022-06-10
Payer: MEDICARE

## 2022-06-10 ENCOUNTER — APPOINTMENT (OUTPATIENT)
Dept: CT IMAGING | Age: 72
End: 2022-06-10
Payer: MEDICARE

## 2022-06-10 VITALS
OXYGEN SATURATION: 94 % | RESPIRATION RATE: 18 BRPM | HEIGHT: 74 IN | HEART RATE: 65 BPM | BODY MASS INDEX: 24.51 KG/M2 | TEMPERATURE: 98.2 F | SYSTOLIC BLOOD PRESSURE: 140 MMHG | DIASTOLIC BLOOD PRESSURE: 70 MMHG | WEIGHT: 191 LBS

## 2022-06-10 DIAGNOSIS — S60.221A CONTUSION OF RIGHT HAND, INITIAL ENCOUNTER: ICD-10-CM

## 2022-06-10 DIAGNOSIS — S50.01XA CONTUSION OF RIGHT ELBOW, INITIAL ENCOUNTER: ICD-10-CM

## 2022-06-10 DIAGNOSIS — T14.8XXA MULTIPLE SKIN TEARS: ICD-10-CM

## 2022-06-10 DIAGNOSIS — S70.01XA CONTUSION OF RIGHT HIP, INITIAL ENCOUNTER: ICD-10-CM
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
## 2022-06-10 PROCEDURE — 72125 CT NECK SPINE W/O DYE: CPT

## 2022-06-10 PROCEDURE — 6370000000 HC RX 637 (ALT 250 FOR IP)

## 2022-06-10 PROCEDURE — 73110 X-RAY EXAM OF WRIST: CPT

## 2022-06-10 PROCEDURE — 72128 CT CHEST SPINE W/O DYE: CPT

## 2022-06-10 PROCEDURE — 73502 X-RAY EXAM HIP UNI 2-3 VIEWS: CPT

## 2022-06-10 PROCEDURE — 70450 CT HEAD/BRAIN W/O DYE: CPT

## 2022-06-10 PROCEDURE — 73080 X-RAY EXAM OF ELBOW: CPT

## 2022-06-10 PROCEDURE — 73130 X-RAY EXAM OF HAND: CPT

## 2022-06-10 PROCEDURE — 6370000000 HC RX 637 (ALT 250 FOR IP): Performed by: EMERGENCY MEDICINE

## 2022-06-10 PROCEDURE — 99284 EMERGENCY DEPT VISIT MOD MDM: CPT

## 2022-06-10 RX ORDER — ACETAMINOPHEN 325 MG/1
650 TABLET ORAL ONCE
Status: COMPLETED | OUTPATIENT
Start: 2022-06-10 | End: 2022-06-10

## 2022-06-10 RX ORDER — DIAPER,BRIEF,INFANT-TODD,DISP
EACH MISCELLANEOUS ONCE
Status: COMPLETED | OUTPATIENT
Start: 2022-06-10 | End: 2022-06-10

## 2022-06-10 RX ORDER — DIAPER,BRIEF,INFANT-TODD,DISP
EACH MISCELLANEOUS
Status: COMPLETED
Start: 2022-06-10 | End: 2022-06-10

## 2022-06-10 RX ORDER — GINSENG 100 MG
CAPSULE ORAL 3 TIMES DAILY
Qty: 1 EACH | Refills: 0 | Status: SHIPPED | OUTPATIENT
Start: 2022-06-10

## 2022-06-10 RX ADMIN — BACITRACIN ZINC: 500 OINTMENT TOPICAL at 20:30

## 2022-06-10 RX ADMIN — ACETAMINOPHEN 650 MG: 325 TABLET ORAL at 20:10

## 2022-06-10 RX ADMIN — BACITRACIN ZINC 1 G: 500 OINTMENT TOPICAL at 20:11

## 2022-06-10 ASSESSMENT — PAIN DESCRIPTION - ORIENTATION
ORIENTATION: RIGHT
ORIENTATION: RIGHT

## 2022-06-10 ASSESSMENT — PAIN SCALES - GENERAL
PAINLEVEL_OUTOF10: 4
PAINLEVEL_OUTOF10: 3

## 2022-06-10 ASSESSMENT — PAIN DESCRIPTION - LOCATION
LOCATION: HAND
LOCATION: HAND

## 2022-06-10 ASSESSMENT — PAIN DESCRIPTION - DESCRIPTORS: DESCRIPTORS: PATIENT UNABLE TO DESCRIBE

## 2022-06-10 NOTE — ED PROVIDER NOTES
EMERGENCY DEPARTMENT ENCOUNTER    Patient: Chaim Au  MRN: [id number]  : 1950  Date of Evaluation: 6/10/2022  ED Provider:  Chance Chun MD    CHIEF COMPLAINT  Chief Complaint   Patient presents with   Trevor Story     has rt hip pain and skin tears to rt arm       HPI  Chaim Au is a 67 y.o. male who presents after a fall which occurred shortly before arrival to the emergency department. Patient reports that he fell as he was stepping backwards and lost his footing. He denies hitting his head and denies loss of consciousness. He has some pain in the right elbow and right wrist and right hip. Mild to moderate in severity and worse with movement and palpation. Has multiple skin tears on the dorsum of fingers 2 through 5 on the right hand and on the dorsum right hand as well as on the posterior lateral right elbow. Denies any loss of sensation focal weakness. Denies decreased range of motion. Denies pain anywhere else. Denies any other associated symptoms or complaints or concerns.       REVIEW OF SYSTEMS    Constitutional: negative for fever, chills  Neurological: negative for HA, focal weakness, loss of sensation  Ophthalmic: negative for vision change  ENT: negative for congestion, rhinorrhea  Cardiovascular: negative for chest pain  Respiratory: negative for SOB, cough  GI: negative for abdominal pain, nausea, vomiting, diarrhea, constipation  : negative for dysuria, hematuria  Musculoskeletal: negative for decreased ROM  Dermatological: negative for rash  Heme: Negative for bleeding, bruising      PAST MEDICAL HISTORY  Past Medical History:   Diagnosis Date    CAD (coronary artery disease)     with stent    Chronic low back pain     HX of laminectomy    Coronary arteriosclerosis     stent    [facility] [address]     Fracture     R ankle    Hypertension     Hypertensive disorder     Osteoarthritis     Parkinson disease ([facility] [address]     Small vessel disease, cerebrovascular     Tobacco abuse        CURRENT MEDICATIONS  [unfilled]    ALLERGIES  Allergies   Allergen Reactions    Chantix [Varenicline]     Daypro [Oxaprozin]        SURGICAL HISTORY  Past Surgical History:   Procedure Laterality Date    ANKLE SURGERY      BACK SURGERY      CARDIAC SURGERY      heart cath    CORONARY ANGIOPLASTY WITH STENT PLACEMENT      HERNIA REPAIR      LUMBAR LAMINECTOMY      TONSILLECTOMY      VASECTOMY         FAMILY HISTORY  Family History   Problem Relation Age of Onset    High Blood Pressure Mother     High Blood Pressure Father     Heart Disease Father     Osteoporosis Sister          from allergy from an injection    No Known Problems Sister     No Known Problems Sister     No Known Problems Sister     No Known Problems Sister        SOCIAL HISTORY  Social History     Socioeconomic History    Marital status:      Spouse name: None    Number of children: None    Years of education: None    Highest education level: None   Occupational History    None   Tobacco Use    Smoking status: Current Every Day Smoker     Packs/day: 0.50     Years: 57.00     Pack years: 28.50     Types: Cigarettes     Start date: 1964    Smokeless tobacco: Never Used   Vaping Use    Vaping Use: Never used   Substance and Sexual Activity    Alcohol use: Not Currently    Drug use: No    Sexual activity: None   Other Topics Concern    None   Social History Narrative    None     Social Determinants of Health     Financial Resource Strain:     Difficulty of Paying Living Expenses: Not on file   Food Insecurity:     Worried About Running Out of Food in the Last Year: Not on file    Sergey of Food in the Last Year: Not on file   Transportation Needs:     Lack of Transportation (Medical): Not on file    Lack of Transportation (Non-Medical):  Not on file   Physical Activity:     Days of Exercise per Week: Not on file    Minutes of Exercise per Session: Not on file   Stress:     Feeling of Stress : Not on file   Social Connections:     Frequency of Communication with Friends and Family: Not on file    Frequency of Social Gatherings with Friends and Family: Not on file    Attends Anabaptist Services: Not on file    Active Member of Clubs or Organizations: Not on file    Attends Club or Organization Meetings: Not on file    Marital Status: Not on file   Intimate Partner Violence:     Fear of Current or Ex-Partner: Not on file    Emotionally Abused: Not on file    Physically Abused: Not on file    Sexually Abused: Not on file   Housing Stability:     Unable to Pay for Housing in the Last Year: Not on file    Number of [place] in the Last Year: Not on file    Unstable Housing in the Last Year: Not on file         **Past medical, family and social histories, and nursing notes reviewed and verified by me**      PHYSICAL EXAM  VITAL SIGNS:   ED Triage Vitals   Enc Vitals Group      BP 06/10/22 1859 (!) 149/74      Heart Rate 06/10/22 1856 70      Resp 06/10/22 1856 18      Temp 06/10/22 1856 98.2 °F (36.8 °C)      Temp Source 06/10/22 1856 Oral      SpO2 06/10/22 1856 99 %      Weight 06/10/22 1856 191 lb (86.6 kg)      Height 06/10/22 1856 6' 2\" (1.88 m)      Head Circumference --       Peak Flow --       Pain Score --       Pain Loc --       Pain Edu? --       Excl. in [address] during ED course were reviewed and are as charted. Constitutional: Minimal distress, Non-toxic appearance  Eyes: Conjunctiva normal, No discharge PERRL, EOMI  HENT: Normocephalic, Atraumatic, oropharynx moist  Neck: Supple, no stridor, no grossly visible or palpable masses  Cardiovascular: Regular rate and rhythm, No murmurs, No rubs, No gallops. 2+ symmetrical distal pulses with cap refill less than 2 seconds  Pulmonary/Chest: Normal breath sounds, No respiratory distress or accessory muscle use, No wheezing, crackles or rhonchi.   Abdomen: Soft, nondistended and nonrigid, No tenderness or peritoneal signs, No masses, normal bowel sounds  Back: No midline point tenderness, No paraspinous muscle tenderness. No CVA tenderness  Extremities: No gross deformities, no edema, no tenderness, normal range of motion of all joints  Neurologic: Cranial nerves II through XII grossly intact as tested, normal motor function, Normal sensory function, No focal deficits  Skin: Multiple skin tears on the dorsum of the fingers 2 through 5 of the right hand as well as on dorsum right hand and posterior lateral right elbow. These are very superficial with very thin skin flaps. Otherwise skin elsewhere is warm, Dry, No erythema, No rash, No cyanosis, No mottling  Psychiatric: Alert and oriented x3, Affect normal        RADIOLOGY/PROCEDURES/LABS/MEDICATIONS ADMINISTERED:    I have reviewed and interpreted all of the currently available lab results from this visit (if applicable):  No results found for this visit on 06/10/22. ABNORMAL LABS:  Labs Reviewed - No data to display      IMAGING STUDIES ORDERED:  CT HEAD WO CONTRAST  CT CERVICAL SPINE WO CONTRAST  CT THORACIC SPINE WO CONTRAST  XR HAND RIGHT (MIN 3 VIEWS)  XR WRIST RIGHT (MIN 3 VIEWS)  XR ELBOW RIGHT (MIN 3 VIEWS)  XR HIP 2-3 VW W PELVIS RIGHT  WOUND CARE  APPLY DRESSING    I have personally viewed the imaging studies. The radiologist interpretation is:   CT THORACIC SPINE WO CONTRAST   Final Result   Cervical spine CT: No acute fracture. Thoracic spine CT: No acute fracture. Unchanged chronic appearing inferior   endplate compression fracture of L1 with 10% height loss. Head CT: No evidence for acute intracranial hemorrhage, territorial   infarction or intracranial mass lesion. Mild chronic microangiopathic ischemic disease. Mild generalized volume loss. CT Cervical Spine WO Contrast   Final Result   Cervical spine CT: No acute fracture. Thoracic spine CT: No acute fracture.   Unchanged chronic appearing inferior   endplate compression fracture of L1 with 10% height loss. Head CT: No evidence for acute intracranial hemorrhage, territorial   infarction or intracranial mass lesion. Mild chronic microangiopathic ischemic disease. Mild generalized volume loss. CT Head WO Contrast   Final Result   Cervical spine CT: No acute fracture. Thoracic spine CT: No acute fracture. Unchanged chronic appearing inferior   endplate compression fracture of L1 with 10% height loss. Head CT: No evidence for acute intracranial hemorrhage, territorial   infarction or intracranial mass lesion. Mild chronic microangiopathic ischemic disease. Mild generalized volume loss. XR HIP 2-3 VW W PELVIS RIGHT   Final Result   No definite acute osseous abnormality. If there is any persistent clinical   concern for right hip fracture, further evaluation with cross-sectional   imaging is recommended. XR ELBOW RIGHT (MIN 3 VIEWS)   Final Result   No acute osseous abnormality of the hand, wrist, or elbow         XR WRIST RIGHT (MIN 3 VIEWS)   Final Result   No acute osseous abnormality of the hand, wrist, or elbow         XR HAND RIGHT (MIN 3 VIEWS)   Final Result   No acute osseous abnormality of the hand, wrist, or elbow               MEDICATIONS ADMINISTERED:  Medications   bacitracin zinc 500 UNIT/GM ointment (has no administration in time range)   acetaminophen (TYLENOL) tablet 650 mg (650 mg Oral Given 6/10/22 2010)   bacitracin zinc ointment (1 g Topical Given 6/10/22 2011)         COURSE & MEDICAL DECISION MAKING  Last vitals: BP (!) 149/74   Pulse 70   Temp 98.2 °F (36.8 °C) (Oral)   Resp 18   Ht 6' 2\" (1.88 m)   Wt 191 lb (86.6 kg)   SpO2 99%   BMI 24.52 kg/m²     67-year-old male who presented for evaluation after mechanical fall. No clinical or radiographic evidence for serious injuries. Does have several skin tears which were cleaned and antibiotic dressings applied to.   No wounds amenable to repair with sutures. He is neurovascularly intact. Hemodynamically stable. Pain well controlled. Additional workup and treatment in the ED as documented above. Patient reassured and will be discharged home. I have explained to the patient in appropriate terminology our work-up in the ED and their diagnosis. I have also given anticipatory guidance and expectant management of their condition as an outpatient as per my custom. The patient was given clear discharge and follow-up instructions including return to the ER immediately for worsening concerns. The patient has been advised to follow-up with their primary care physician and/or referred physician in the next two to three days or sooner if worsening and to return to the ER immediately as above with any concerns. I provided the patient counseling with regard to my customary list of strict return precautions as well as return precautions specific to the cause for today's emergency department visit. The patient will return under these provided conditions, but should also return for new concerns or further worsening. Pt and/or family understand and agree with plan. Clinical Impression:  1. Fall, initial encounter    2. Contusion of right elbow, initial encounter    3. Contusion of right hand, initial encounter    4. Contusion of right hip, initial encounter    5. Multiple skin tears        Disposition referral (if applicable):  Javed JanuaryDO [phone]    Schedule an appointment as soon as possible for a visit       [facility] Emergency Department  [facility] [address]  [address] [facility]  [phone]    If symptoms worsen      Disposition medications (if applicable):  New Prescriptions    BACITRACIN 500 UNIT/GM OINTMENT    Apply topically 3 times daily Apply topically 3 times daily.        ED Provider Disposition Time  DISPOSITION            Electronically signed by: Remy Doll M.D., 6/10/2022 8:58 PM      This dictation was created with voice recognition software. While attempts have been made to review the dictation as it is transcribed, on occasion the spoken word can be misinterpreted by the technology leading to omissions or inappropriate words, phrases or sentences.         Michael Mora MD  06/10/22 9287

## 2022-07-30 ENCOUNTER — HOSPITAL ENCOUNTER (EMERGENCY)
Age: 72
Discharge: ANOTHER HOSPITAL | End: 2022-07-30
Attending: EMERGENCY MEDICINE
Payer: MEDICARE

## 2022-07-30 ENCOUNTER — APPOINTMENT (OUTPATIENT)
Dept: GENERAL RADIOLOGY | Age: 72
End: 2022-07-30
Payer: MEDICARE

## 2022-07-30 ENCOUNTER — HOSPITAL ENCOUNTER (OUTPATIENT)
Age: 72
Setting detail: OBSERVATION
Discharge: NURSING, REHAB OR LONG-TERM CARE | End: 2022-08-02
Attending: EMERGENCY MEDICINE
Payer: MEDICARE

## 2022-07-30 VITALS
SYSTOLIC BLOOD PRESSURE: 138 MMHG | RESPIRATION RATE: 17 BRPM | WEIGHT: 198 LBS | OXYGEN SATURATION: 96 % | HEART RATE: 53 BPM | HEIGHT: 74 IN | DIASTOLIC BLOOD PRESSURE: 70 MMHG | BODY MASS INDEX: 25.41 KG/M2

## 2022-07-30 DIAGNOSIS — R07.9 CHEST PAIN, UNSPECIFIED TYPE: Primary | ICD-10-CM

## 2022-07-30 DIAGNOSIS — E78.5 HYPERLIPIDEMIA, UNSPECIFIED HYPERLIPIDEMIA TYPE: ICD-10-CM

## 2022-07-30 LAB
ANION GAP SERPL CALCULATED.3IONS-SCNC: 8 MMOL/L (ref 4–16)
BASE EXCESS MIXED: 1.4 (ref 0–1.2)
BASE EXCESS: ABNORMAL (ref 0–3.3)
BASOPHILS ABSOLUTE: 0 K/CU MM
BASOPHILS RELATIVE PERCENT: 0.3 % (ref 0–1)
BUN BLDV-MCNC: 17 MG/DL (ref 6–23)
CALCIUM SERPL-MCNC: 9.8 MG/DL (ref 8.3–10.6)
CHLORIDE BLD-SCNC: 101 MMOL/L (ref 99–110)
CO2: 30 MMOL/L (ref 21–32)
CO2: 31 MMOL/L (ref 21–32)
CREAT SERPL-MCNC: 1 MG/DL (ref 0.9–1.3)
DIFFERENTIAL TYPE: ABNORMAL
EKG ATRIAL RATE: 56 BPM
EKG DIAGNOSIS: NORMAL
EKG P AXIS: 75 DEGREES
EKG P-R INTERVAL: 132 MS
EKG Q-T INTERVAL: 436 MS
EKG QRS DURATION: 82 MS
EKG QTC CALCULATION (BAZETT): 420 MS
EKG R AXIS: -26 DEGREES
EKG T AXIS: 8 DEGREES
EKG VENTRICULAR RATE: 56 BPM
EOSINOPHILS ABSOLUTE: 0.2 K/CU MM
EOSINOPHILS RELATIVE PERCENT: 1.4 % (ref 0–3)
GFR AFRICAN AMERICAN: >60 ML/MIN/1.73M2
GFR AFRICAN AMERICAN: >60 ML/MIN/1.73M2
GFR NON-AFRICAN AMERICAN: >60 ML/MIN/1.73M2
GFR NON-AFRICAN AMERICAN: >60 ML/MIN/1.73M2
GLUCOSE BLD-MCNC: 154 MG/DL (ref 70–99)
GLUCOSE BLD-MCNC: 162 MG/DL (ref 70–99)
HCO3 ARTERIAL: 28.8 MMOL/L (ref 18–23)
HCT VFR BLD CALC: 43.3 % (ref 42–52)
HEMOGLOBIN: 13.3 GM/DL (ref 13.5–18)
IMMATURE NEUTROPHIL %: 0.4 % (ref 0–0.43)
LYMPHOCYTES ABSOLUTE: 1.9 K/CU MM
LYMPHOCYTES RELATIVE PERCENT: 17.6 % (ref 24–44)
MAGNESIUM: 2.2 MG/DL (ref 1.8–2.4)
MCH RBC QN AUTO: 27.6 PG (ref 27–31)
MCHC RBC AUTO-ENTMCNC: 30.7 % (ref 32–36)
MCV RBC AUTO: 89.8 FL (ref 78–100)
MONOCYTES ABSOLUTE: 0.7 K/CU MM
MONOCYTES RELATIVE PERCENT: 6 % (ref 0–4)
O2 SATURATION: 77.3 % (ref 96–97)
PCO2 ARTERIAL: 55.1 MMHG (ref 32–45)
PDW BLD-RTO: 13.9 % (ref 11.7–14.9)
PH BLOOD: 7.33 (ref 7.34–7.45)
PHOSPHORUS: 3.1 MG/DL (ref 2.5–4.9)
PLATELET # BLD: 170 K/CU MM (ref 140–440)
PMV BLD AUTO: 9.9 FL (ref 7.5–11.1)
PO2 ARTERIAL: 46 MMHG (ref 75–100)
POC CALCIUM: 1.29 MMOL/L (ref 1.12–1.32)
POC CHLORIDE: 103 MMOL/L (ref 98–109)
POC CREATININE: 1.1 MG/DL (ref 0.9–1.3)
POTASSIUM SERPL-SCNC: 4 MMOL/L (ref 3.5–4.5)
POTASSIUM SERPL-SCNC: 4.2 MMOL/L (ref 3.5–5.1)
PRO-BNP: 641.8 PG/ML
RBC # BLD: 4.82 M/CU MM (ref 4.6–6.2)
SEGMENTED NEUTROPHILS ABSOLUTE COUNT: 8 K/CU MM
SEGMENTED NEUTROPHILS RELATIVE PERCENT: 74.3 % (ref 36–66)
SODIUM BLD-SCNC: 139 MMOL/L (ref 135–145)
SODIUM BLD-SCNC: 141 MMOL/L (ref 138–146)
SOURCE, BLOOD GAS: ABNORMAL
TOTAL IMMATURE NEUTOROPHIL: 0.04 K/CU MM
TROPONIN T: <0.01 NG/ML
WBC # BLD: 10.8 K/CU MM (ref 4–10.5)

## 2022-07-30 PROCEDURE — 6360000002 HC RX W HCPCS: Performed by: EMERGENCY MEDICINE

## 2022-07-30 PROCEDURE — 93005 ELECTROCARDIOGRAM TRACING: CPT | Performed by: EMERGENCY MEDICINE

## 2022-07-30 PROCEDURE — 83735 ASSAY OF MAGNESIUM: CPT

## 2022-07-30 PROCEDURE — 84484 ASSAY OF TROPONIN QUANT: CPT

## 2022-07-30 PROCEDURE — 84100 ASSAY OF PHOSPHORUS: CPT

## 2022-07-30 PROCEDURE — 82803 BLOOD GASES ANY COMBINATION: CPT

## 2022-07-30 PROCEDURE — 85025 COMPLETE CBC W/AUTO DIFF WBC: CPT

## 2022-07-30 PROCEDURE — 71045 X-RAY EXAM CHEST 1 VIEW: CPT

## 2022-07-30 PROCEDURE — 85014 HEMATOCRIT: CPT

## 2022-07-30 PROCEDURE — G0379 DIRECT REFER HOSPITAL OBSERV: HCPCS

## 2022-07-30 PROCEDURE — 82565 ASSAY OF CREATININE: CPT

## 2022-07-30 PROCEDURE — 2580000003 HC RX 258: Performed by: EMERGENCY MEDICINE

## 2022-07-30 PROCEDURE — 36415 COLL VENOUS BLD VENIPUNCTURE: CPT

## 2022-07-30 PROCEDURE — 6370000000 HC RX 637 (ALT 250 FOR IP): Performed by: INTERNAL MEDICINE

## 2022-07-30 PROCEDURE — 80048 BASIC METABOLIC PNL TOTAL CA: CPT

## 2022-07-30 PROCEDURE — 85018 HEMOGLOBIN: CPT

## 2022-07-30 PROCEDURE — 80053 COMPREHEN METABOLIC PANEL: CPT

## 2022-07-30 PROCEDURE — 83880 ASSAY OF NATRIURETIC PEPTIDE: CPT

## 2022-07-30 PROCEDURE — 82962 GLUCOSE BLOOD TEST: CPT

## 2022-07-30 PROCEDURE — 96374 THER/PROPH/DIAG INJ IV PUSH: CPT

## 2022-07-30 PROCEDURE — G0378 HOSPITAL OBSERVATION PER HR: HCPCS

## 2022-07-30 PROCEDURE — 99285 EMERGENCY DEPT VISIT HI MDM: CPT

## 2022-07-30 PROCEDURE — 80051 ELECTROLYTE PANEL: CPT

## 2022-07-30 PROCEDURE — 93010 ELECTROCARDIOGRAM REPORT: CPT | Performed by: INTERNAL MEDICINE

## 2022-07-30 RX ORDER — ACETAMINOPHEN 650 MG/1
650 SUPPOSITORY RECTAL EVERY 6 HOURS PRN
Status: DISCONTINUED | OUTPATIENT
Start: 2022-07-30 | End: 2022-08-02 | Stop reason: HOSPADM

## 2022-07-30 RX ORDER — ACETAMINOPHEN 325 MG/1
650 TABLET ORAL EVERY 6 HOURS PRN
Status: DISCONTINUED | OUTPATIENT
Start: 2022-07-30 | End: 2022-08-02 | Stop reason: HOSPADM

## 2022-07-30 RX ORDER — SODIUM CHLORIDE 0.9 % (FLUSH) 0.9 %
5-40 SYRINGE (ML) INJECTION 2 TIMES DAILY
Status: DISCONTINUED | OUTPATIENT
Start: 2022-07-31 | End: 2022-08-02 | Stop reason: HOSPADM

## 2022-07-30 RX ORDER — METOPROLOL SUCCINATE 25 MG/1
25 TABLET, EXTENDED RELEASE ORAL DAILY
Status: DISCONTINUED | OUTPATIENT
Start: 2022-07-31 | End: 2022-08-01

## 2022-07-30 RX ORDER — LANOLIN ALCOHOL/MO/W.PET/CERES
1000 CREAM (GRAM) TOPICAL DAILY
Status: DISCONTINUED | OUTPATIENT
Start: 2022-07-31 | End: 2022-08-02 | Stop reason: HOSPADM

## 2022-07-30 RX ORDER — DOCUSATE SODIUM 100 MG/1
100 CAPSULE, LIQUID FILLED ORAL 2 TIMES DAILY
Status: DISCONTINUED | OUTPATIENT
Start: 2022-07-31 | End: 2022-08-02 | Stop reason: HOSPADM

## 2022-07-30 RX ORDER — 0.9 % SODIUM CHLORIDE 0.9 %
500 INTRAVENOUS SOLUTION INTRAVENOUS ONCE
Status: COMPLETED | OUTPATIENT
Start: 2022-07-30 | End: 2022-07-30

## 2022-07-30 RX ORDER — MEMANTINE HYDROCHLORIDE 10 MG/1
10 TABLET ORAL 2 TIMES DAILY
Status: DISCONTINUED | OUTPATIENT
Start: 2022-07-31 | End: 2022-08-02 | Stop reason: HOSPADM

## 2022-07-30 RX ORDER — FUROSEMIDE 20 MG/1
20 TABLET ORAL 2 TIMES DAILY
Status: DISCONTINUED | OUTPATIENT
Start: 2022-07-31 | End: 2022-07-31

## 2022-07-30 RX ORDER — POLYETHYLENE GLYCOL 3350 17 G/17G
17 POWDER, FOR SOLUTION ORAL DAILY PRN
Status: DISCONTINUED | OUTPATIENT
Start: 2022-07-30 | End: 2022-08-02 | Stop reason: HOSPADM

## 2022-07-30 RX ORDER — ONDANSETRON 4 MG/1
4 TABLET, ORALLY DISINTEGRATING ORAL EVERY 8 HOURS PRN
Status: DISCONTINUED | OUTPATIENT
Start: 2022-07-30 | End: 2022-08-02 | Stop reason: HOSPADM

## 2022-07-30 RX ORDER — KETOROLAC TROMETHAMINE 15 MG/ML
15 INJECTION, SOLUTION INTRAMUSCULAR; INTRAVENOUS ONCE
Status: COMPLETED | OUTPATIENT
Start: 2022-07-30 | End: 2022-07-30

## 2022-07-30 RX ORDER — ATORVASTATIN CALCIUM 40 MG/1
40 TABLET, FILM COATED ORAL NIGHTLY
Status: DISCONTINUED | OUTPATIENT
Start: 2022-07-31 | End: 2022-08-02 | Stop reason: HOSPADM

## 2022-07-30 RX ORDER — TRIHEXYPHENIDYL HYDROCHLORIDE 2 MG/1
3 TABLET ORAL 2 TIMES DAILY
Status: DISCONTINUED | OUTPATIENT
Start: 2022-07-31 | End: 2022-08-02 | Stop reason: HOSPADM

## 2022-07-30 RX ORDER — DONEPEZIL HYDROCHLORIDE 10 MG/1
10 TABLET, FILM COATED ORAL NIGHTLY
Status: DISCONTINUED | OUTPATIENT
Start: 2022-07-31 | End: 2022-08-02 | Stop reason: HOSPADM

## 2022-07-30 RX ORDER — SODIUM CHLORIDE 0.9 % (FLUSH) 0.9 %
5-40 SYRINGE (ML) INJECTION PRN
Status: DISCONTINUED | OUTPATIENT
Start: 2022-07-30 | End: 2022-08-02 | Stop reason: HOSPADM

## 2022-07-30 RX ORDER — ONDANSETRON 2 MG/ML
4 INJECTION INTRAMUSCULAR; INTRAVENOUS EVERY 6 HOURS PRN
Status: DISCONTINUED | OUTPATIENT
Start: 2022-07-30 | End: 2022-08-02 | Stop reason: HOSPADM

## 2022-07-30 RX ORDER — PANTOPRAZOLE SODIUM 40 MG/1
40 TABLET, DELAYED RELEASE ORAL
Status: DISCONTINUED | OUTPATIENT
Start: 2022-07-31 | End: 2022-08-02 | Stop reason: HOSPADM

## 2022-07-30 RX ORDER — SODIUM CHLORIDE 9 MG/ML
INJECTION, SOLUTION INTRAVENOUS PRN
Status: DISCONTINUED | OUTPATIENT
Start: 2022-07-30 | End: 2022-08-02 | Stop reason: HOSPADM

## 2022-07-30 RX ORDER — ENOXAPARIN SODIUM 100 MG/ML
40 INJECTION SUBCUTANEOUS DAILY
Status: DISCONTINUED | OUTPATIENT
Start: 2022-07-31 | End: 2022-08-02 | Stop reason: HOSPADM

## 2022-07-30 RX ORDER — ASPIRIN 81 MG/1
81 TABLET, CHEWABLE ORAL DAILY
Status: DISCONTINUED | OUTPATIENT
Start: 2022-07-31 | End: 2022-08-02 | Stop reason: HOSPADM

## 2022-07-30 RX ADMIN — ATORVASTATIN CALCIUM 40 MG: 40 TABLET, FILM COATED ORAL at 23:20

## 2022-07-30 RX ADMIN — TRIHEXYPHENIDYL HYDROCHLORIDE 3 MG: 2 TABLET ORAL at 23:19

## 2022-07-30 RX ADMIN — MEMANTINE 10 MG: 10 TABLET ORAL at 23:20

## 2022-07-30 RX ADMIN — DONEPEZIL HYDROCHLORIDE 10 MG: 10 TABLET ORAL at 23:20

## 2022-07-30 RX ADMIN — KETOROLAC TROMETHAMINE 15 MG: 15 INJECTION, SOLUTION INTRAMUSCULAR; INTRAVENOUS at 15:53

## 2022-07-30 RX ADMIN — SODIUM CHLORIDE 500 ML: 9 INJECTION, SOLUTION INTRAVENOUS at 15:53

## 2022-07-30 ASSESSMENT — PAIN SCALES - GENERAL
PAINLEVEL_OUTOF10: 3
PAINLEVEL_OUTOF10: 5

## 2022-07-30 ASSESSMENT — PAIN DESCRIPTION - LOCATION
LOCATION: ARM
LOCATION: CHEST

## 2022-07-30 ASSESSMENT — PAIN DESCRIPTION - DESCRIPTORS
DESCRIPTORS: ACHING;DISCOMFORT
DESCRIPTORS: SHARP

## 2022-07-30 ASSESSMENT — PAIN DESCRIPTION - ORIENTATION
ORIENTATION: LEFT
ORIENTATION: RIGHT;LEFT;MID;UPPER

## 2022-07-30 ASSESSMENT — PAIN DESCRIPTION - FREQUENCY: FREQUENCY: INTERMITTENT

## 2022-07-30 ASSESSMENT — LIFESTYLE VARIABLES
HOW MANY STANDARD DRINKS CONTAINING ALCOHOL DO YOU HAVE ON A TYPICAL DAY: PATIENT DOES NOT DRINK
HOW OFTEN DO YOU HAVE A DRINK CONTAINING ALCOHOL: NEVER

## 2022-07-30 ASSESSMENT — PAIN - FUNCTIONAL ASSESSMENT: PAIN_FUNCTIONAL_ASSESSMENT: 0-10

## 2022-07-30 ASSESSMENT — HEART SCORE: ECG: 0

## 2022-07-30 ASSESSMENT — PAIN DESCRIPTION - PAIN TYPE: TYPE: ACUTE PAIN

## 2022-07-30 NOTE — ED NOTES
Pt incontinent of urine. Josy care provided and external male catheter applied. Wife at bedside.      Jeffrey Duffy RN  26/78/03 8026

## 2022-07-30 NOTE — ED PROVIDER NOTES
Emergency Department Encounter  Location: [facility] [address] [place] [address]    Patient: Lidia Gibbs  MRN: [id number]  : 1950  Date of evaluation: 2022  ED Provider: Hector Lozano DO, FACEP    Chief Complaint:    Chest Pain (Chest pains off and on for 4 to 5 days. At this time pt c/o pains to lt shoulder radiating across upper abd)    [place]:  Lidia Gibbs is a 67 y.o. male that presents to the emergency department with complaint plaints of chest pain. He is a resident of [facility]. He had chest pain today where he received 1 nitro while he was there at the nursing home. His blood pressure dropped into the 80s and he was transported to the emergency department by squad. He states the nitro did take his pain away but it took [place] little while\". He is pain-free upon my evaluation. Patient has had a history of heart problems and has seen Dr. Asuncion Harrington in the past but its been a long time since he seen that doctor according to his wife. He denies chest pain at this time he states he did not feel short of breath. He had no sweating and he had no nausea associated with the chest pain. He describes pain in the left side of his chest radiating into his left arm. He states it lasted longer than 15 minutes. Patient has a history of Parkinson's disease with frequent falls. He states he has not fallen with injury lately      ROS - see HPI, below listed is current ROS at time of my eval:  At least 10 systems reviewed and otherwise acutely negative except as in the [address]   General:  No fevers, no chills, no weakness  Eyes:  No recent vison changes, no discharge  ENT:  No sore throat, no nasal congestion, no hearing changes  Cardiovascular: Positive for chest pain, no palpitations  Respiratory:  No shortness of breath, no cough, no wheezing  Gastrointestinal:  No pain, no nausea, no vomiting, no diarrhea  Musculoskeletal:  No muscle pain, no joint pain  Skin:  No rash, no pruritis, no easy bruising  Neurologic:  No speech problems, no headache, no extremity numbness, no extremity tingling, no extremity weakness  Psychiatric:  No anxiety  Genitourinary:  No dysuria, no hematuria  Endocrine:  No unexpected weight gain, no unexpected weight loss  Extremities:  no edema, no pain    Past Medical History:   Diagnosis Date    CAD (coronary artery disease)     with stent    Chronic low back pain     HX of laminectomy    Coronary arteriosclerosis     stent    Dementia (HCC)     Fracture     R ankle    Hypertension     Hypertensive disorder     Osteoarthritis     Parkinson disease ([address]     Small vessel disease, cerebrovascular     Tobacco abuse      Past Surgical History:   Procedure Laterality Date    ANKLE SURGERY      BACK SURGERY      CARDIAC SURGERY      heart cath    CORONARY ANGIOPLASTY WITH STENT PLACEMENT      HERNIA REPAIR      LUMBAR LAMINECTOMY      TONSILLECTOMY      VASECTOMY       Family History   Problem Relation Age of Onset    High Blood Pressure Mother     High Blood Pressure Father     Heart Disease Father     Osteoporosis Sister          from allergy from an injection    No Known Problems Sister     No Known Problems Sister     No Known Problems Sister     No Known Problems Sister      Social History     Socioeconomic History    Marital status:      Spouse name: Not on file    Number of children: Not on file    Years of education: Not on file    Highest education level: Not on file   Occupational History    Not on file   Tobacco Use    Smoking status: Every Day     Packs/day: 0.50     Years: 57.00     Pack years: 28.50     Types: Cigarettes     Start date: 1964    Smokeless tobacco: Never   Vaping Use    Vaping Use: Never used   Substance and Sexual Activity    Alcohol use: Not Currently    Drug use: No    Sexual activity: Not Currently   Other Topics Concern    Not on file   Social History Narrative    Not on file     Social Determinants of Health     Financial Resource Strain: Not on file   Food Insecurity: Not on file   Transportation Needs: Not on file   Physical Activity: Not on file   Stress: Not on file   Social Connections: Not on file   Intimate Partner Violence: Not on file   Housing Stability: Not on file     No current facility-administered medications for this encounter. Current Outpatient Medications   Medication Sig Dispense Refill    bacitracin 500 UNIT/GM ointment Apply topically 3 times daily Apply topically 3 times daily. 1 each 0    metoprolol succinate (TOPROL XL) 25 MG extended release tablet Take 1 tablet by mouth daily 30 tablet 0    pantoprazole (PROTONIX) 40 MG tablet Take 1 tablet by mouth every morning (before breakfast) 30 tablet 0    atorvastatin (LIPITOR) 20 MG tablet TAKE ONE TABLET BY MOUTH ONCE NIGHTLY 90 tablet 1    docusate sodium (COLACE) 100 MG capsule Take 100 mg by mouth 2 times daily      cyanocobalamin 1000 MCG tablet Take 1 tablet by mouth daily 90 tablet 3    furosemide (LASIX) 20 MG tablet Take 20 mg by mouth 2 times daily      acetaminophen (TYLENOL) 325 MG tablet Take 650 mg by mouth every 4 hours as needed for Pain      donepezil (ARICEPT) 10 MG tablet Take 10 mg by mouth nightly      memantine (NAMENDA) 10 MG tablet Take 10 mg by mouth 2 times daily      aspirin 81 MG EC tablet Take 81 mg by mouth daily. trihexyphenidyl (ARTANE) 2 MG tablet Take 3 mg by mouth in the morning and at bedtime        Allergies   Allergen Reactions    Chantix [Varenicline]     Daypro [Oxaprozin]        Nursing Notes Reviewed    Physical Exam:  ED Triage Vitals   Enc Vitals Group      BP 07/30/22 1421 121/63      Heart Rate 07/30/22 1426 56      Resp 07/30/22 1426 16      Temp --       Temp src --       SpO2 07/30/22 1426 100 %      Weight 07/30/22 1426 198 lb (89.8 kg)      Height 07/30/22 1426 6' 2\" (1.88 m)      Head Circumference --       Peak Flow --       Pain Score --       Pain Loc --       Pain Edu? --       Excl.  in [address]? -- GENERAL APPEARANCE: Awake and alert. Cooperative. No acute distress. Nontoxic in appearance  HEAD: Normocephalic. Atraumatic. EYES: EOM's grossly intact. Sclera anicteric. ENT: Tolerates saliva. No trismus. NECK: Supple. Trachea midline. CARDIO: RRR. Radial pulse 2+. LUNGS: Respirations unlabored. CTAB. ABDOMEN: Soft. Non-distended. Non-tender. EXTREMITIES: No acute deformities. SKIN: Warm and dry. NEUROLOGICAL: No gross facial drooping. Moves all 4 extremities spontaneously. PSYCHIATRIC: Normal mood.      Labs:  Results for orders placed or performed during the hospital encounter of 07/30/22   Brain Natriuretic Peptide   Result Value Ref Range    Pro-.8 (H) <300 PG/ML   CBC with Auto Differential   Result Value Ref Range    WBC 10.8 (H) 4.0 - 10.5 K/CU MM    RBC 4.82 4.6 - 6.2 M/CU MM    Hemoglobin 13.3 (L) 13.5 - 18.0 GM/DL    Hematocrit 43.3 42 - 52 %    MCV 89.8 78 - 100 FL    MCH 27.6 27 - 31 PG    MCHC 30.7 (L) 32.0 - 36.0 %    RDW 13.9 11.7 - 14.9 %    Platelets 300 025 - 324 K/CU MM    MPV 9.9 7.5 - 11.1 FL    Differential Type AUTOMATED DIFFERENTIAL     Segs Relative 74.3 (H) 36 - 66 %    Lymphocytes % 17.6 (L) 24 - 44 %    Monocytes % 6.0 (H) 0 - 4 %    Eosinophils % 1.4 0 - 3 %    Basophils % 0.3 0 - 1 %    Segs Absolute 8.0 K/CU MM    Lymphocytes Absolute 1.9 K/CU MM    Monocytes Absolute 0.7 K/CU MM    Eosinophils Absolute 0.2 K/CU MM    Basophils Absolute 0.0 K/CU MM    Immature Neutrophil % 0.4 0 - 0.43 %    Total Immature Neutrophil 0.04 K/CU MM   Troponin   Result Value Ref Range    Troponin T <0.010 <0.01 NG/ML   POC CRITICAL CARE PROFILE   Result Value Ref Range    pH, Bld 7.33 (L) 7.34 - 7.45    pCO2, Arterial 55.1 (H) 32 - 45 MMHG    pO2, Arterial 46.0 (L) 75 - 100 MMHG    HCO3, Arterial 28.8 (H) 18 - 23 MMOL/L    Base Excess HIDE 0 - 3.3    Base Exc, Mixed 1.4 (H) 0 - 1.2    O2 Sat 77.3 (L) 96 - 97 %    CO2 31 21 - 32 MMOL/L    Sodium 141 138 - 146 MMOL/L Potassium 4.0 3.5 - 4.5 MMOL/L    POC CALCIUM 1.29 1.12 - 1.32 MMOL/L    POC Glucose 162 (H) 70 - 99 MG/DL    POC Chloride 103 98 - 109 MMOL/L    POC Creatinine 1.1 0.9 - 1.3 MG/DL    GFR Non-African American >60 >60 mL/min/1.73m2    GFR African American >60 >60 mL/min/1.73m2    Source: Venous        EKG (if obtained): (All EKG's are interpreted by myself in the absence of a cardiologist)  The Ekg interpreted by me in the absence of a cardiologist shows. sinus bradycardia, rate=56  with a rate of 56  Axis is   Normal  QTc is   420  Intervals and Durations are unremarkable. No specific ST-T wave changes appreciated. No evidence of acute ischemia. No significant change from prior EKG dated 23 March 2022    Radiographs (if obtained):  [] The following radiograph was interpreted by myself in the absence of a radiologist:  [x] Radiologist's Report reviewed at time of ED visit:  XR CHEST PORTABLE   Final Result   No acute consolidation or infiltrate. Probable left lung base atelectasis. ED Course and MDM:  This patient presents to the emergency department with chest pain at the nursing home. He was given nitro there which did help his pain. He has been relatively pain-free here. His work-up is unremarkable. I discussed his case with the hospitalist who felt that the patient would be better served at [facility] since he has had a history of stents and has not followed up with a cardiologist since 2018 when he had his stents placed. The case was then discussed with Dr. Lance Davis who has agreed to accept the patient to the hospitalist service in [facility]. The patient will be transferred in stable condition for continued evaluation and treatment. The patient with his cardiac history will be admitted for chest pain rule out MI. Final Impression:  1. Chest pain, unspecified type      DISPOSITION Decision To Transfer    Patient referred to: No follow-up provider specified.   Discharge medications:  New Prescriptions    No medications on file     (Please note that portions of this note may have been completed with a voice recognition program. Efforts were made to edit the dictations but occasionally words are mis-transcribed.)    Malissa Dumont DO, [facility]  Board certified in [facility]lyssa [place], [place]  07/30/22 1293

## 2022-07-31 LAB
APTT: 30.7 SECONDS (ref 25.1–37.1)
CHOLESTEROL: 138 MG/DL
EKG ATRIAL RATE: 53 BPM
EKG DIAGNOSIS: NORMAL
EKG P AXIS: 86 DEGREES
EKG P-R INTERVAL: 136 MS
EKG Q-T INTERVAL: 444 MS
EKG QRS DURATION: 82 MS
EKG QTC CALCULATION (BAZETT): 416 MS
EKG R AXIS: -10 DEGREES
EKG T AXIS: 28 DEGREES
EKG VENTRICULAR RATE: 53 BPM
HDLC SERPL-MCNC: 37 MG/DL
INR BLD: 0.98 INDEX
LDL CHOLESTEROL CALCULATED: 82 MG/DL
PROTHROMBIN TIME: 12.6 SECONDS (ref 11.7–14.5)
T4 FREE: 1.45 NG/DL (ref 0.9–1.8)
TRIGL SERPL-MCNC: 96 MG/DL
TROPONIN T: <0.01 NG/ML
TROPONIN T: <0.01 NG/ML
TSH HIGH SENSITIVITY: 1.63 UIU/ML (ref 0.27–4.2)

## 2022-07-31 PROCEDURE — 6370000000 HC RX 637 (ALT 250 FOR IP): Performed by: INTERNAL MEDICINE

## 2022-07-31 PROCEDURE — G0378 HOSPITAL OBSERVATION PER HR: HCPCS

## 2022-07-31 PROCEDURE — 36415 COLL VENOUS BLD VENIPUNCTURE: CPT

## 2022-07-31 PROCEDURE — 2580000003 HC RX 258: Performed by: INTERNAL MEDICINE

## 2022-07-31 PROCEDURE — 84484 ASSAY OF TROPONIN QUANT: CPT

## 2022-07-31 PROCEDURE — 96372 THER/PROPH/DIAG INJ SC/IM: CPT

## 2022-07-31 PROCEDURE — 6360000002 HC RX W HCPCS: Performed by: INTERNAL MEDICINE

## 2022-07-31 PROCEDURE — 84439 ASSAY OF FREE THYROXINE: CPT

## 2022-07-31 PROCEDURE — 80061 LIPID PANEL: CPT

## 2022-07-31 PROCEDURE — 84443 ASSAY THYROID STIM HORMONE: CPT

## 2022-07-31 PROCEDURE — 85730 THROMBOPLASTIN TIME PARTIAL: CPT

## 2022-07-31 PROCEDURE — 94761 N-INVAS EAR/PLS OXIMETRY MLT: CPT

## 2022-07-31 PROCEDURE — 85610 PROTHROMBIN TIME: CPT

## 2022-07-31 PROCEDURE — 93005 ELECTROCARDIOGRAM TRACING: CPT | Performed by: INTERNAL MEDICINE

## 2022-07-31 PROCEDURE — 93010 ELECTROCARDIOGRAM REPORT: CPT | Performed by: INTERNAL MEDICINE

## 2022-07-31 RX ORDER — ISOSORBIDE MONONITRATE 30 MG/1
30 TABLET, EXTENDED RELEASE ORAL DAILY
Status: DISCONTINUED | OUTPATIENT
Start: 2022-07-31 | End: 2022-08-02 | Stop reason: HOSPADM

## 2022-07-31 RX ORDER — RANOLAZINE 500 MG/1
500 TABLET, EXTENDED RELEASE ORAL 2 TIMES DAILY
Status: DISCONTINUED | OUTPATIENT
Start: 2022-07-31 | End: 2022-08-02 | Stop reason: HOSPADM

## 2022-07-31 RX ORDER — FUROSEMIDE 20 MG/1
20 TABLET ORAL DAILY
Status: DISCONTINUED | OUTPATIENT
Start: 2022-08-01 | End: 2022-08-02 | Stop reason: HOSPADM

## 2022-07-31 RX ADMIN — SODIUM CHLORIDE, PRESERVATIVE FREE 10 ML: 5 INJECTION INTRAVENOUS at 21:38

## 2022-07-31 RX ADMIN — PANTOPRAZOLE SODIUM 40 MG: 40 TABLET, DELAYED RELEASE ORAL at 06:31

## 2022-07-31 RX ADMIN — RANOLAZINE 500 MG: 500 TABLET, FILM COATED, EXTENDED RELEASE ORAL at 21:34

## 2022-07-31 RX ADMIN — DONEPEZIL HYDROCHLORIDE 10 MG: 10 TABLET ORAL at 21:33

## 2022-07-31 RX ADMIN — DOCUSATE SODIUM 100 MG: 100 CAPSULE, LIQUID FILLED ORAL at 10:24

## 2022-07-31 RX ADMIN — ATORVASTATIN CALCIUM 40 MG: 40 TABLET, FILM COATED ORAL at 21:34

## 2022-07-31 RX ADMIN — MEMANTINE 10 MG: 10 TABLET ORAL at 10:24

## 2022-07-31 RX ADMIN — TRIHEXYPHENIDYL HYDROCHLORIDE 3 MG: 2 TABLET ORAL at 10:24

## 2022-07-31 RX ADMIN — TRIHEXYPHENIDYL HYDROCHLORIDE 3 MG: 2 TABLET ORAL at 21:34

## 2022-07-31 RX ADMIN — ISOSORBIDE MONONITRATE 30 MG: 30 TABLET, EXTENDED RELEASE ORAL at 12:42

## 2022-07-31 RX ADMIN — ASPIRIN 81 MG CHEWABLE TABLET 81 MG: 81 TABLET CHEWABLE at 10:24

## 2022-07-31 RX ADMIN — DOCUSATE SODIUM 100 MG: 100 CAPSULE, LIQUID FILLED ORAL at 21:34

## 2022-07-31 RX ADMIN — ENOXAPARIN SODIUM 40 MG: 100 INJECTION SUBCUTANEOUS at 10:24

## 2022-07-31 RX ADMIN — CYANOCOBALAMIN TAB 1000 MCG 1000 MCG: 1000 TAB at 10:24

## 2022-07-31 RX ADMIN — MEMANTINE 10 MG: 10 TABLET ORAL at 21:34

## 2022-07-31 RX ADMIN — RANOLAZINE 500 MG: 500 TABLET, FILM COATED, EXTENDED RELEASE ORAL at 12:42

## 2022-07-31 RX ADMIN — FUROSEMIDE 20 MG: 20 TABLET ORAL at 10:24

## 2022-07-31 RX ADMIN — SODIUM CHLORIDE, PRESERVATIVE FREE 10 ML: 5 INJECTION INTRAVENOUS at 10:25

## 2022-07-31 ASSESSMENT — ENCOUNTER SYMPTOMS
VOICE CHANGE: 0
ABDOMINAL PAIN: 0
CHEST TIGHTNESS: 0
SHORTNESS OF BREATH: 0
DIARRHEA: 0
BACK PAIN: 1
SINUS PRESSURE: 0
COUGH: 0
BLOOD IN STOOL: 0
EYE PAIN: 0
VOMITING: 0
EYE DISCHARGE: 0
NAUSEA: 0
SINUS PAIN: 0

## 2022-07-31 NOTE — H&P
History and Physical      Name:  Geni Ardon /Age/Sex: 1950  (67 y.o. male)   MRN & CSN:  [id number] & [id number] Encounter Date/Time: 2022 9:54 PM EDT   Location:  [address] [place] [address]-A PCP: Darren Cabrera Day: 1    Assessment and Plan:     #. Chest pain: Evaluate for ACS  -Troponin x1 negative, EKG with no acute ST-T wave changes. -repeat troponin, repeat EKG. -Consulted cardiology-Dr. Oral Mercado    #. Coronary artery disease  -cath- 2016-stent to diagonal  -Patient is on aspirin, metoprolol, statin. #.  Hypertension  -Patient is on metoprolol    #. Hyperlipidemia-atorvastatin    #. Dementia-donepezil, memantine    #. History of recurrent falls    #. Parkinson's disease-continue trihexyphenidyl    #. Chronic anemia    Disposition:   Current Living situation: [facility]    Diet ADULT DIET; Regular; Low Fat/Low Chol/High Fiber/2 gm Na; No Caffeine   DVT Prophylaxis [x] Lovenox, []  Heparin, [] SCDs, [] Ambulation,  [] Eliquis, [] Xarelto   Code Status Full Code   Surrogate Decision Maker/ POA      History from:   EMR, patient. History of Present Illness:     Chief Complaint: Chest pain  Geni Ardon is a 67 y.o. male with coronary artery disease, hypertension, hyperlipidemia, Parkinson's disease, dementia, history of frequent falls, chronic anemia was taken to [facility] with complaints of chest pain. Given the patient is oriented to place, time, person, appears to be confused on further conversation. Patient thinks that he is here because of his ankle pain, states he lives in a trailer, also stated that he came to [facility] to live in a Motel and to be seen in the hospital in the morning. Most of the information was on review of records. As per ED documentation, patient was sent to ED from nursing home due to complaint of chest pain. Patient received nitro at the nursing home but his blood pressure dropped to 80s. Currently patient denying any chest pain.   Denied any other complaints no headache, visual disturbance, nausea, vomiting, abdominal pain, denied any urinary complaints, denied any constipation or diarrhea. Patient is requesting coffee. At presentation patient was noted to have /63, HR 56, RR 16, temp-afebrile, saturating 100% on room air. Troponin x1 negative, EKG with no acute ST-T wave changes. Patient transferred to [facility] for further evaluation and management. Review of Systems: Need 10 Elements   10 point review of systems conducted and pertinent positives and negatives as per HPI. Objective:   No intake or output data in the 24 hours ending 07/30/22 2154   Vitals:   Vitals:    07/30/22 2123   BP: (!) 147/78   Pulse: 59   Resp: 19   Temp: 98.2 °F (36.8 °C)   TempSrc: Oral   SpO2: 97%       Medications Prior to Admission   Reviewed medications with patient    Prior to Admission medications    Medication Sig Start Date End Date Taking? Authorizing Provider   bacitracin 500 UNIT/GM ointment Apply topically 3 times daily Apply topically 3 times daily.  6/10/22   Avis Fuentes MD   metoprolol succinate (TOPROL XL) 25 MG extended release tablet Take 1 tablet by mouth daily 3/25/22   Gladys Valdez MD   pantoprazole (PROTONIX) 40 MG tablet Take 1 tablet by mouth every morning (before breakfast) 3/3/22   JARVIS Gonzalez - NP   atorvastatin (LIPITOR) 20 MG tablet TAKE ONE TABLET BY MOUTH ONCE NIGHTLY 12/27/21   Lashon Warren DO   docusate sodium (COLACE) 100 MG capsule Take 100 mg by mouth 2 times daily    Historical Provider, MD   cyanocobalamin 1000 MCG tablet Take 1 tablet by mouth daily 10/9/19   Lashon Warren DO   furosemide (LASIX) 20 MG tablet Take 20 mg by mouth 2 times daily    Landy Adame MD   acetaminophen (TYLENOL) 325 MG tablet Take 650 mg by mouth every 4 hours as needed for Pain    Historical Provider, MD   donepezil (ARICEPT) 10 MG tablet Take 10 mg by mouth nightly    Historical Provider, MD   memantine (NAMENDA) 10 MG tablet Take 10 mg by mouth 2 times daily    Historical Provider, MD   aspirin 81 MG EC tablet Take 81 mg by mouth daily. Historical Provider, MD   trihexyphenidyl (ARTANE) 2 MG tablet Take 3 mg by mouth in the morning and at bedtime     Historical Provider, MD       Physical Exam: Need 8 Elements   Physical Exam     GEN  -Awake, alert, NAD.   EYES   -PERRL. Joey Rolando HENT  -MM are moist.   RESP  -LS CTA equal bilat, no wheezes, rales or rhonchi. Symmetric chest movement. No respiratory distress noted. C/V  -S1/S2 auscultated, bradycardia without appreciable M/R/G.   GI  -Abdomen is soft, non-distended, no significant tenderness. No masses or guarding. + BS in all quadrants. Rectal exam deferred.   -No CVA tenderness. Escobar catheter is not present. MS  -B/L extremities - No gross joint deformities. No swelling, intact sensation symmetrical.   SKIN  -Normal coloration, warm, dry. NEURO  - Awake, alert, oriented x 3, no focal deficits. Patient knew his name, knew that he was in the hospital, knew the year was 2022, thought the month was August.  Knew his date of birth. Past Medical History:   Reviewed patient's past medical, surgical, social, family history and allergies. PMHx   Past Medical History:   Diagnosis Date    CAD (coronary artery disease)     with stent    Chronic low back pain     HX of laminectomy    Coronary arteriosclerosis     stent    Dementia (HCC)     Fracture     R ankle    Hypertension     Hypertensive disorder     Osteoarthritis     Parkinson disease ([facility] [address]     Small vessel disease, cerebrovascular     Tobacco abuse      PSHX:  has a past surgical history that includes back surgery; Vasectomy; Cardiac surgery; lumbar laminectomy; Coronary angioplasty with stent; hernia repair; Ankle surgery; and Tonsillectomy. Allergies:    Allergies   Allergen Reactions    Chantix [Varenicline]     Daypro [Oxaprozin]      Fam HX: family history includes Heart Disease in his father; High Blood Pressure in his father and mother; No Known Problems in his sister, sister, sister, and sister; Osteoporosis in his sister. Soc HX:   Social History     Socioeconomic History    Marital status:    Tobacco Use    Smoking status: Every Day     Packs/day: 0.50     Years: 57.00     Pack years: 28.50     Types: Cigarettes     Start date: 2/5/1964    Smokeless tobacco: Never   Vaping Use    Vaping Use: Never used   Substance and Sexual Activity    Alcohol use: Not Currently    Drug use: No    Sexual activity: Not Currently       Medications:   Medications:    sodium chloride flush  5-40 mL IntraVENous 2 times per day    [START ON 7/31/2022] aspirin  81 mg Oral Daily    atorvastatin  40 mg Oral Nightly    [START ON 7/31/2022] enoxaparin  40 mg SubCUTAneous Daily      Infusions:    sodium chloride       PRN Meds: sodium chloride flush, 5-40 mL, PRN  sodium chloride, , PRN  ondansetron, 4 mg, Q8H PRN   Or  ondansetron, 4 mg, Q6H PRN  acetaminophen, 650 mg, Q6H PRN   Or  acetaminophen, 650 mg, Q6H PRN  polyethylene glycol, 17 g, Daily PRN        Labs      CBC:   Recent Labs     07/30/22  1500   WBC 10.8*   HGB 13.3*        BMP:    Recent Labs     07/30/22  1500 07/30/22  1508    141   K 4.2 4.0     --    CO2 30 31   BUN 17  --    CREATININE 1.0 1.1   GLUCOSE 154*  --      Hepatic: No results for input(s): AST, ALT, ALB, BILITOT, ALKPHOS in the last 72 hours. Lipids:   Lab Results   Component Value Date/Time    CHOL 118 02/24/2022 08:52 AM    HDL 56 02/24/2022 08:52 AM    TRIG 72 02/24/2022 08:52 AM     Hemoglobin A1C: No results found for: LABA1C  TSH: No results found for: TSH  Troponin:   Lab Results   Component Value Date/Time    TROPONINT <0.010 07/30/2022 03:00 PM    TROPONINT <0.010 02/27/2022 09:00 PM    TROPONINT <0.010 11/12/2018 01:57 PM     Lactic Acid: No results for input(s): LACTA in the last 72 hours.   BNP:   Recent Labs     07/30/22  1500   PROBNP 641.8*     UA:  Lab Results   Component Value Date/Time [address] NEGATIVE 03/23/2022 03:15 PM    COLORU YELLOW 03/23/2022 03:15 PM    [address] NEGATIVE 02/27/2022 04:30 PM    RBCUA NEGATIVE 02/27/2022 04:30 PM    MUCUS RARE 10/07/2018 06:45 AM    TRICHOMONAS NONE SEEN 10/07/2018 06:45 AM    YEAST FEW 06/26/2016 10:37 PM    BACTERIA FEW 02/27/2022 04:30 PM    CLARITYU CLEAR 03/23/2022 03:15 PM    SPECGRAV 1.020 03/23/2022 03:15 PM    LEUKOCYTESUR NEGATIVE 03/23/2022 03:15 PM    UROBILINOGEN 0.2 03/23/2022 03:15 PM    BILIRUBINUR NEGATIVE 03/23/2022 03:15 PM    BLOODU TRACE 03/23/2022 03:15 PM    KETUA NEGATIVE 03/23/2022 03:15 PM    AMORPHOUS RARE 06/26/2016 10:37 PM     Urine Cultures: No results found for: Chung Pill  Blood Cultures: No results found for: BC  No results found for: BLOODCULT2  Organism: No results found for: ORG    Imaging/Diagnostics Last 24 Hours   XR CHEST PORTABLE    Result Date: 7/30/2022  EXAMINATION: ONE XRAY VIEW OF THE CHEST 7/30/2022 2:59 pm COMPARISON: The previous study performed 03/23/2022. HISTORY: ORDERING SYSTEM PROVIDED HISTORY: chest pain TECHNOLOGIST PROVIDED HISTORY: Reason for exam:->chest pain Reason for Exam: chest pain Additional signs and symptoms: off and on chest pain x 5 days FINDINGS: There is no evidence of acute consolidation or infiltrate. Faint, linear densities are seen at the left lung base, probably representing atelectasis. No active pleural disease is seen. A calcified right hilar lymph node is again present. The cardiac silhouette is within normal limits in size. The thoracic aorta is again atherosclerotic. The tracheobronchial tree is midline and patent. Osteo-degenerative changes are again noted involving the thoracic spine. No acute consolidation or infiltrate. Probable left lung base atelectasis. Personally reviewed Lab Studies, Imaging.     Electronically signed by Jacob Menendez MD on 7/30/2022 at 9:54 PM

## 2022-07-31 NOTE — PROGRESS NOTES
Will see pt at [facility], [address] Known to office;  Last seen 2021  Chest pain, Troponin not elevated    Radiated into left arm  Resolved with Nitro  Hx of CAD  [facility]  [facility]     Echo 08/17/2021  Echo (EF 0.55 (55%), TDS, Impaired Relaxation Diastolic Dysfunction, Mild AR, Mild-Moderate MR, LAE, LA Diam 4.1 cm, Est RVSP 30 mmHg, Normal LVEDP) - 8/17/2021    Stress test  06/03/2019  EF 55%, ESV 59,  Perfusion:                -Normal; no active myocardial ischemia      Electronically signed by Nahid Newman MD on 7/30/22 at 8:50 PM EDT'

## 2022-07-31 NOTE — CONSULTS
[address] [address], [address] [facility]                                  CONSULTATION    PATIENT NAME: Jacquelyn Bailey                    :        1950  MED REC NO:   [id number]                          ROOM:       3010  ACCOUNT NO:   [de-identified]                           ADMIT DATE: 2022  PROVIDER:     Ezequiel Crawford    CONSULT DATE:  2022    INDICATION:  Chest discomfort. HISTORY OF PRESENT ILLNESS:  This patient is a 78-year-old male who was  presented to the Emergency Department yesterday with chest discomfort. He is a resident of [address] [place] [address]. He had chest pain and he  received one nitro at the nursing facility and _____ rule out, therefore  he became chest pain free. Blood pressure also dropped at that time. He has a history of CAD in the past with previous stenting. In , he  had stent to the dia. Troponins have been negative x3. EKG shows no  ischemic changes. Today he did not complain of any chest discomfort. Last stress test was in . Last echo was in . At that time, EF  was 50% to 55%, impaired relaxation, diastolic dysfunction,  mild-to-moderate MR, normal LVEDP. BNP mildly elevated, however given  his age he does appear normal and does not appear to be fluid  overloaded. Today he denies any sort of chest pain, no shortness of  breath. He was having shortness of breath yesterday with breathing  heavy. He is _____ with the chest discomfort. No palpitations,  dizziness, no edema present. PAST MEDICAL HISTORY:  He has a past medical history of coronary artery  disease, lower back pain, coronary atherosclerosis, dementia, ankle  fracture, hypertension, osteoarthritis, Parkinson's. PAST SURGICAL HISTORY:  Ankle surgery, back surgery, cardiac  catheterization, stent placement, hernia repair, lumbar laminectomy,  tonsillectomy and vasectomy.     SOCIAL HISTORY:  He is

## 2022-07-31 NOTE — PROGRESS NOTES
V2.0  [facility] Hospitalist Progress Note      Name:  Bobby Masters /Age/Sex: 1950  (67 y.o. male)   MRN & CSN:  [id number] & [id number] Encounter Date/Time: 2022 10:08 AM EDT    [facility]A PCP: Edward Sky [address] Day: 2    Assessment and Plan:   Bobby Masters is a 67 y.o. male with pmh of hypertension, hyperlipidemia, CAD, dementia, Parkinson disease, chronic anemia, history of recurrent falls who presents with Chest pain    Plan:  Atypical chest pain ACS rule out: Negative troponins. Pain is better. EKG showed no acute ST-T wave changes. Cardiology consult in place. Echocardiogram planned for tomorrow by cardiology. GDMT for now. EKG showed sinus bradycardia with nonspecific changes. Diplopia: Underlying history of Parkinson. No nystagmus noticed. Neurology consult appreciated. Continue to monitor. Also has some slurring of the speech. Coronary artery disease: S/p PCI with stent to diagonal in 2016. On aspirin started on metoprolol  Benign essential hypertension currently on metoprolol. Blood pressures is doing well. Leukocytosis likely reactive. Continue to monitor BMP tomorrow  Hyperlipidemia on statin  History of recurrent falls. Dementia on donepezil and memantine  Parkinson disease continue trihexyphenidyl  Normocytic normochromic anemia  History of MDRO on contact precautions    Diet ADULT DIET; Regular; 5 carb choices (75 gm/meal)   DVT Prophylaxis [x] Lovenox, []  Heparin, [] SCDs, [] Ambulation,  [] Eliquis, [] Xarelto  [] Coumadin   Code Status Full Code   Disposition From: [facility]  Expected Disposition: [facility]  Estimated Date of Discharge: tomorrow  Patient requires continued admission due to echo tomorrow   Surrogate Decision Maker/ POA      Subjective:     Chief Complaint: No chief complaint on file. Bobby Masters is a 67 y.o. male who presents with atypical chest pain ACS rule out. The patient was seen at the bedside.   No acute events. While talking to him I did notice that his speech was a little bit slow and incomprehensible at times. He also said that he is having diplopia for a month. Neurology consult appreciated. No other acute symptoms identified. History of MDRO on contact precautions. Review of Systems:    Review of Systems   Constitutional:  Positive for activity change, appetite change and unexpected weight change. Negative for chills and fever. HENT:  Negative for ear pain, hearing loss, sinus pressure, sinus pain and voice change. Eyes:  Positive for visual disturbance. Negative for pain and discharge. Respiratory:  Negative for cough, chest tightness and shortness of breath. Cardiovascular:  Positive for chest pain. Negative for palpitations and leg swelling. Gastrointestinal:  Negative for abdominal pain, blood in stool, diarrhea, nausea and vomiting. Genitourinary:  Negative for dysuria and frequency. Musculoskeletal:  Positive for arthralgias, back pain, gait problem and myalgias. Neurological:  Positive for speech difficulty. Negative for dizziness, weakness, light-headedness and headaches. Psychiatric/Behavioral:  Negative for sleep disturbance. The patient is nervous/anxious. Objective:   No intake or output data in the 24 hours ending 07/31/22 1008     Vitals:   Vitals:    07/31/22 0630   BP: 129/67   Pulse: 55   Resp: 19   Temp: 98.3 °F (36.8 °C)   SpO2: 94%       Physical Exam:   Physical Exam  Vitals reviewed. Constitutional:       Appearance: Normal appearance. He is normal weight. HENT:      Head: Normocephalic. Right Ear: Tympanic membrane normal.      Left Ear: Tympanic membrane normal.      Nose: Nose normal.      Mouth/Throat:      Mouth: Mucous membranes are moist.   Eyes:      Conjunctiva/sclera: Conjunctivae normal.      Pupils: Pupils are equal, round, and reactive to light. Cardiovascular:      Rate and Rhythm: Normal rate and regular rhythm. Calculation (Bazeneela) 420 ms    P Axis 75 degrees    R Axis -26 degrees    T Axis 8 degrees    Diagnosis       Sinus bradycardia  Inferior infarct (cited on or before 27-FEB-2022)  Abnormal ECG  When compared with ECG of 23-MAR-2022 16:43,  Questionable change in initial forces of Inferior leads  Confirmed by [facility] Devin SCHWARTZ ([id number]) on 7/30/2022 10:06:54 PM     Brain Natriuretic Peptide    Collection Time: 07/30/22  3:00 PM   Result Value Ref Range    Pro-.8 (H) <300 PG/ML   CBC with Auto Differential    Collection Time: 07/30/22  3:00 PM   Result Value Ref Range    WBC 10.8 (H) 4.0 - 10.5 K/CU MM    RBC 4.82 4.6 - 6.2 M/CU MM    Hemoglobin 13.3 (L) 13.5 - 18.0 GM/DL    Hematocrit 43.3 42 - 52 %    MCV 89.8 78 - 100 FL    MCH 27.6 27 - 31 PG    MCHC 30.7 (L) 32.0 - 36.0 %    RDW 13.9 11.7 - 14.9 %    Platelets 560 878 - 705 K/CU MM    MPV 9.9 7.5 - 11.1 FL    Differential Type AUTOMATED DIFFERENTIAL     Segs Relative 74.3 (H) 36 - 66 %    Lymphocytes % 17.6 (L) 24 - 44 %    Monocytes % 6.0 (H) 0 - 4 %    Eosinophils % 1.4 0 - 3 %    Basophils % 0.3 0 - 1 %    Segs Absolute 8.0 K/CU MM    Lymphocytes Absolute 1.9 K/CU MM    Monocytes Absolute 0.7 K/CU MM    Eosinophils Absolute 0.2 K/CU MM    Basophils Absolute 0.0 K/CU MM    Immature Neutrophil % 0.4 0 - 0.43 %    Total Immature Neutrophil 0.04 K/CU MM   Troponin    Collection Time: 07/30/22  3:00 PM   Result Value Ref Range    Troponin T <0.010 <0.01 NG/ML   Critical Care Panel    Collection Time: 07/30/22  3:00 PM   Result Value Ref Range    Sodium 139 135 - 145 MMOL/L    Potassium 4.2 3.5 - 5.1 MMOL/L    Chloride 101 99 - 110 mMol/L    CO2 30 21 - 32 MMOL/L    Anion Gap 8 4 - 16    BUN 17 6 - 23 MG/DL    Creatinine 1.0 0.9 - 1.3 MG/DL    Glucose 154 (H) 70 - 99 MG/DL    Calcium 9.8 8.3 - 10.6 MG/DL    GFR Non-African American >60 >60 mL/min/1.73m2    GFR African American >60 >60 mL/min/1.73m2    Phosphorus 3.1 2.5 - 4.9 MG/DL    Magnesium 2.2 1.8 - 2.4 representing atelectasis. No active pleural disease is seen. A calcified right hilar lymph node is again present. The cardiac silhouette is within normal limits in size. The thoracic aorta is again atherosclerotic. The tracheobronchial tree is midline and patent. Osteo-degenerative changes are again noted involving the thoracic spine. No acute consolidation or infiltrate. Probable left lung base atelectasis.        Electronically signed by Maixne Tucker MD, MD on 7/31/2022 at 10:08 AM

## 2022-07-31 NOTE — ED NOTES
Leads and wires put back on. Pt aware that he is going to [facility] but is pulling at his stuff so reoriented to the fact that he needs to leave stuff on. Has pulled male cathether loose so taken clear off.      Fabian Gonzalez RN  07/30/22 2034

## 2022-07-31 NOTE — CONSULTS
Dictation # [id number]  Echo tomorrow  Chest pain and heavy breathing yesterday   Has resolved  Troponin negative x3  EKG no ischemic changes  Pleasantly confused. Conservative treatment    Patient is awake alert to person== this is very confused  Does not have any chest pain today  Troponins are negative EKG is negative  Suggestions medical treatment  Check an echo in the morning  Stress test 2019 was negative for ischemia  Echo 2021 LV function was preserved  Advanced dementia  Conservative treatment  Had PCI done of the diagonal branch in 2016  Plan to have Imdur Ranexa-check echo in the morning-low-dose beta-blockers and aspirin-Lipitor  Change Lasix to once a day       cath-2016  FINDINGS:    Left main patent. LAD is proximally patent. Distal LAD around 50-to 60% stenosed. Diagonal is a medium-sized artery. Diagonal at the mid is  around 80% stenosed. Left circumflex is a large artery. The stent is  patent. Obtuse marginal 1 is a large artery which is patent. Obtuse  marginal 2 is patent artery. Right coronary artery is diffusely diseased to  up to 30% stenosed. LV angiogram was done with a pigtail catheter. LV  angiogram shows LV ejection fraction more than 55% with LV degree of 10 mmHg.     PCI of diagonal branch    Electronically signed by Pop Lazcano MD on 7/31/22 at 10:50 AM EDT

## 2022-07-31 NOTE — PLAN OF CARE
Problem: Discharge Planning  Goal: Discharge to home or other facility with appropriate resources  Outcome: Progressing  Flowsheets  Taken 7/31/2022 1013 by Denisse Mattson RN  Discharge to home or other facility with appropriate resources:   Identify barriers to discharge with patient and caregiver   Arrange for needed discharge resources and transportation as appropriate   Identify discharge learning needs (meds, wound care, etc)   Arrange for interpreters to assist at discharge as needed   Refer to discharge planning if patient needs post-hospital services based on physician order or complex needs related to functional status, cognitive ability or social support system  Taken 7/30/2022 2243 by Ekta Damian RN  Discharge to home or other facility with appropriate resources:   Identify barriers to discharge with patient and caregiver   Identify discharge learning needs (meds, wound care, etc)   Refer to discharge planning if patient needs post-hospital services based on physician order or complex needs related to functional status, cognitive ability or social support system     Problem: Skin/Tissue Integrity  Goal: Absence of new skin breakdown  Description: 1. Monitor for areas of redness and/or skin breakdown  2. Assess vascular access sites hourly  3. Every 4-6 hours minimum:  Change oxygen saturation probe site  4. Every 4-6 hours:  If on nasal continuous positive airway pressure, respiratory therapy assess nares and determine need for appliance change or resting period.   Outcome: Progressing     Problem: Safety - Adult  Goal: Free from fall injury  Outcome: Progressing

## 2022-07-31 NOTE — ED NOTES
Report given to [facility].       Carlton Nevarez RN  07/30/22 2027

## 2022-08-01 ENCOUNTER — APPOINTMENT (OUTPATIENT)
Dept: CT IMAGING | Age: 72
End: 2022-08-01
Attending: EMERGENCY MEDICINE
Payer: MEDICARE

## 2022-08-01 ENCOUNTER — APPOINTMENT (OUTPATIENT)
Dept: MRI IMAGING | Age: 72
End: 2022-08-01
Attending: EMERGENCY MEDICINE
Payer: MEDICARE

## 2022-08-01 LAB
LV EF: 55 %
LVEF MODALITY: NORMAL
SARS-COV-2, NAAT: NOT DETECTED
SOURCE: NORMAL

## 2022-08-01 PROCEDURE — 70551 MRI BRAIN STEM W/O DYE: CPT

## 2022-08-01 PROCEDURE — G0378 HOSPITAL OBSERVATION PER HR: HCPCS

## 2022-08-01 PROCEDURE — 70450 CT HEAD/BRAIN W/O DYE: CPT

## 2022-08-01 PROCEDURE — 94761 N-INVAS EAR/PLS OXIMETRY MLT: CPT

## 2022-08-01 PROCEDURE — 6360000002 HC RX W HCPCS: Performed by: INTERNAL MEDICINE

## 2022-08-01 PROCEDURE — 93306 TTE W/DOPPLER COMPLETE: CPT

## 2022-08-01 PROCEDURE — 6370000000 HC RX 637 (ALT 250 FOR IP): Performed by: INTERNAL MEDICINE

## 2022-08-01 PROCEDURE — 87635 SARS-COV-2 COVID-19 AMP PRB: CPT

## 2022-08-01 PROCEDURE — 2580000003 HC RX 258: Performed by: INTERNAL MEDICINE

## 2022-08-01 PROCEDURE — 96372 THER/PROPH/DIAG INJ SC/IM: CPT

## 2022-08-01 PROCEDURE — 99220 PR INITIAL OBSERVATION CARE/DAY 70 MINUTES: CPT | Performed by: PSYCHIATRY & NEUROLOGY

## 2022-08-01 RX ORDER — METOPROLOL SUCCINATE 25 MG/1
25 TABLET, EXTENDED RELEASE ORAL NIGHTLY
Status: DISCONTINUED | OUTPATIENT
Start: 2022-08-01 | End: 2022-08-02 | Stop reason: HOSPADM

## 2022-08-01 RX ADMIN — TRIHEXYPHENIDYL HYDROCHLORIDE 3 MG: 2 TABLET ORAL at 20:48

## 2022-08-01 RX ADMIN — TRIHEXYPHENIDYL HYDROCHLORIDE 3 MG: 2 TABLET ORAL at 09:41

## 2022-08-01 RX ADMIN — DONEPEZIL HYDROCHLORIDE 10 MG: 10 TABLET ORAL at 20:48

## 2022-08-01 RX ADMIN — MEMANTINE 10 MG: 10 TABLET ORAL at 20:48

## 2022-08-01 RX ADMIN — CYANOCOBALAMIN TAB 1000 MCG 1000 MCG: 1000 TAB at 09:35

## 2022-08-01 RX ADMIN — FUROSEMIDE 20 MG: 20 TABLET ORAL at 09:35

## 2022-08-01 RX ADMIN — ISOSORBIDE MONONITRATE 30 MG: 30 TABLET, EXTENDED RELEASE ORAL at 09:35

## 2022-08-01 RX ADMIN — ENOXAPARIN SODIUM 40 MG: 100 INJECTION SUBCUTANEOUS at 09:35

## 2022-08-01 RX ADMIN — RANOLAZINE 500 MG: 500 TABLET, FILM COATED, EXTENDED RELEASE ORAL at 20:48

## 2022-08-01 RX ADMIN — MEMANTINE 10 MG: 10 TABLET ORAL at 09:35

## 2022-08-01 RX ADMIN — RANOLAZINE 500 MG: 500 TABLET, FILM COATED, EXTENDED RELEASE ORAL at 09:35

## 2022-08-01 RX ADMIN — PANTOPRAZOLE SODIUM 40 MG: 40 TABLET, DELAYED RELEASE ORAL at 06:25

## 2022-08-01 RX ADMIN — ASPIRIN 81 MG CHEWABLE TABLET 81 MG: 81 TABLET CHEWABLE at 09:35

## 2022-08-01 RX ADMIN — ATORVASTATIN CALCIUM 40 MG: 40 TABLET, FILM COATED ORAL at 20:48

## 2022-08-01 RX ADMIN — SODIUM CHLORIDE, PRESERVATIVE FREE 10 ML: 5 INJECTION INTRAVENOUS at 09:34

## 2022-08-01 RX ADMIN — DOCUSATE SODIUM 100 MG: 100 CAPSULE, LIQUID FILLED ORAL at 20:48

## 2022-08-01 RX ADMIN — SODIUM CHLORIDE, PRESERVATIVE FREE 10 ML: 5 INJECTION INTRAVENOUS at 20:48

## 2022-08-01 RX ADMIN — DOCUSATE SODIUM 100 MG: 100 CAPSULE, LIQUID FILLED ORAL at 09:35

## 2022-08-01 ASSESSMENT — ENCOUNTER SYMPTOMS
NAUSEA: 0
CHEST TIGHTNESS: 0
EYE PAIN: 0
ABDOMINAL PAIN: 0
VOICE CHANGE: 0
VOMITING: 0
SINUS PAIN: 0
COUGH: 0
DIARRHEA: 0
SHORTNESS OF BREATH: 0
BACK PAIN: 1
EYE DISCHARGE: 0
SINUS PRESSURE: 0
BLOOD IN STOOL: 0

## 2022-08-01 ASSESSMENT — PAIN SCALES - GENERAL: PAINLEVEL_OUTOF10: 0

## 2022-08-01 NOTE — PROGRESS NOTES
Daily Progress Note  Subjective:    Pt. Awake, mildly confused, and doing ok  States he has had a few sharp chest pains this morning that only last for seconds at a time, no SOB  Overall feeling better per pt. Attending Note:  Patient is awake alert--but confused-not oriented to place  He denies any chest pain  Heart remains stable-sinus rhythm  Will continue medical treatment from CAD--have angioplasty done in 2016 after diagonal branch--keep on aspirin  Echo pending will review  Conservative treatment=--troponins are negative also    Impression and Plan:     Chest pain    Atypical-nonexertional sharp CP    Trop. Neg. 3 times    EKG reassuring overall    Hx of CAD s/p PCI in 2016    Echo pending for today    On ranexa and imdur-anti-anginal Tx. For now    He is also on Toprol- watch for bradycardia on tele    Currently rec. Med. Tx. D/t his advanced dementia-will f/u on echo today    Will follow    PAST MEDICAL HISTORY:  He has a past medical history of coronary artery  disease, lower back pain, coronary atherosclerosis, dementia, ankle  fracture, hypertension, osteoarthritis, Parkinson's. PAST SURGICAL HISTORY:  Ankle surgery, back surgery, cardiac  catheterization, stent placement, hernia repair, lumbar laminectomy,  tonsillectomy and vasectomy. SOCIAL HISTORY:  He is a former smoker. He does not drink any alcohol. He does not use any illicit drugs. HOME MEDICATIONS:  He is on metoprolol 25, pantoprazole, atorvastatin,  Vitamin B12, Lasix, Aricept, Namenda, and aspirin. ALLERGIES:  He has allergies to [facility] and [address] ([facility]).   Objective:   /83   Pulse 55   Temp 98 °F (36.7 °C) (Oral)   Resp 15   Ht 6' 2\" (1.88 m)   Wt 193 lb 6.4 oz (87.7 kg)   SpO2 95%   BMI 24.83 kg/m²     Intake/Output Summary (Last 24 hours) at 8/1/2022 0858  Last data filed at 8/1/2022 0823  Gross per 24 hour   Intake 480 ml   Output 100 ml   Net 380 ml       Medications:   Scheduled Meds:   ranolazine  500 mg Oral BID    furosemide  20 mg Oral Daily    isosorbide mononitrate  30 mg Oral Daily    sodium chloride flush  5-40 mL IntraVENous BID    aspirin  81 mg Oral Daily    atorvastatin  40 mg Oral Nightly    enoxaparin  40 mg SubCUTAneous Daily    cyanocobalamin  1,000 mcg Oral Daily    docusate sodium  100 mg Oral BID    donepezil  10 mg Oral Nightly    memantine  10 mg Oral BID    metoprolol succinate  25 mg Oral Daily    pantoprazole  40 mg Oral QAM AC    trihexyphenidyl  3 mg Oral BID      Infusions:   sodium chloride        PRN Meds:  sodium chloride flush, sodium chloride, ondansetron **OR** ondansetron, acetaminophen **OR** acetaminophen, polyethylene glycol     Physical Exam:  Vitals:    08/01/22 0302   BP: 127/83   Pulse: 55   Resp: 15   Temp: 98 °F (36.7 °C)   SpO2: 95%        General: AAO, NAD  Chest: Nontender  Cardiac: First and Second Heart Sounds are Normal, No Murmurs or Gallops noted  Lungs:Clear to auscultation and percussion. Abdomen: Soft, NT, ND, +BS  Extremities: No clubbing, no edema  Vascular:  Equal 2+ peripheral pulses. Lab Data:  CBC:   Recent Labs     07/30/22  1500   WBC 10.8*   HGB 13.3*   HCT 43.3   MCV 89.8        BMP:   Recent Labs     07/30/22  1500 07/30/22  1508    141   K 4.2 4.0     --    CO2 30 31   PHOS 3.1  --    BUN 17  --    CREATININE 1.0 1.1     LIVER PROFILE: No results for input(s): AST, ALT, LIPASE, BILIDIR, BILITOT, ALKPHOS in the last 72 hours. Invalid input(s): AMYLASE,  ALB  PT/INR:   Recent Labs     07/31/22  1804   PROTIME 12.6   INR 0.98     APTT:   Recent Labs     07/31/22  1804   APTT 30.7     BNP:  No results for input(s): BNP in the last 72 hours.       Assessment:  Patient Active Problem List    Diagnosis Date Noted    Chest pain 07/30/2022    WD-Traumatic open wound of left lower leg 03/07/2022    WD-Non-pressure ulcer of left lower extremity, with fat layer exposed ([facility] [address] 03/07/2022    Cellulitis 02/27/2022    Frequent falls 02/27/2022 Age-related physical debility 02/27/2022    Venous stasis of both lower extremities 02/27/2022    Cellulitis of left leg 02/27/2022    Dementia ([facility] [address]     Osteoarthritis     Small vessel disease, cerebrovascular     CAD (coronary artery disease)     Tobacco abuse     Hyperlipidemia 10/04/2018    Primary hypertension 10/04/2018    Primary parkinsonism ([facility] [address] 10/04/2018    Right inguinal hernia 10/05/2015       Electronically signed by Luis Eduardo Vieira PA-C on 8/1/2022 at 8:58 AM    Electronically signed by Annamaria Mora MD on 8/1/22 at 9:30 AM EDT

## 2022-08-01 NOTE — CONSULTS
Neurology Service Consult Note  [facility]  Patient Name: Renetta Servin  : 1950        Subjective:   Reason for consult: diploplia, Parkinson's disease, abnormal speech  67 y.o. male with history of CAD, HTN, HLD, dementia, frequent falls, Parkinson's presenting to [facility] as a transfer from [facility] ED where he initially presented with chest pain and hypotension after receiving a dose of nitro at his nursing facility. In addition to chest pain, the patient also reported a 1 month history of double vision and was felt to have slow and slurred speech. Today patient is alert to self, month and year but confused as to where he is and reason for being in hospital. He is pleasant and cooperative. He denies headache but does confirm side by side diplopia for about a month. This does not resolve when one eye is covered. He does not have any maria teresa tremor on exam but reports that his tremor is in the upper extremities, right greater than left, with activity. Patient is edentulous which does have some impact on clarity of speech but overall speech is clear and language is fluent. He is able to follow all commands appropriately.        Past Medical History:   Diagnosis Date    CAD (coronary artery disease)     with stent    Chronic low back pain     HX of laminectomy    Coronary arteriosclerosis     stent    Dementia (HCC)     Fracture     R ankle    Hypertension     Hypertensive disorder     Osteoarthritis     Parkinson disease ([address]     Small vessel disease, cerebrovascular     Tobacco abuse     :   Past Surgical History:   Procedure Laterality Date    ANKLE SURGERY      BACK SURGERY      CARDIAC SURGERY      heart cath    CORONARY ANGIOPLASTY WITH STENT PLACEMENT      HERNIA REPAIR      LUMBAR LAMINECTOMY      TONSILLECTOMY      VASECTOMY       Medications:  Scheduled Meds:   metoprolol succinate  25 mg Oral Nightly    ranolazine  500 mg Oral BID    furosemide  20 mg Oral Daily isosorbide mononitrate  30 mg Oral Daily    sodium chloride flush  5-40 mL IntraVENous BID    aspirin  81 mg Oral Daily    atorvastatin  40 mg Oral Nightly    enoxaparin  40 mg SubCUTAneous Daily    cyanocobalamin  1,000 mcg Oral Daily    docusate sodium  100 mg Oral BID    donepezil  10 mg Oral Nightly    memantine  10 mg Oral BID    pantoprazole  40 mg Oral QAM AC    trihexyphenidyl  3 mg Oral BID     Continuous Infusions:   sodium chloride       PRN Meds:.sodium chloride flush, sodium chloride, ondansetron **OR** ondansetron, acetaminophen **OR** acetaminophen, polyethylene glycol    Allergies   Allergen Reactions    Chantix [Varenicline]     Daypro [Oxaprozin]      Social History     Socioeconomic History    Marital status:      Spouse name: Not on file    Number of children: Not on file    Years of education: Not on file    Highest education level: Not on file   Occupational History    Not on file   Tobacco Use    Smoking status: Every Day     Packs/day: 0.50     Years: 57.00     Pack years: 28.50     Types: Cigarettes     Start date: 1964    Smokeless tobacco: Never   Vaping Use    Vaping Use: Never used   Substance and Sexual Activity    Alcohol use: Not Currently    Drug use: No    Sexual activity: Not Currently   Other Topics Concern    Not on file   Social History Narrative    Not on file     Social Determinants of Health     Financial Resource Strain: Not on file   Food Insecurity: Not on file   Transportation Needs: Not on file   Physical Activity: Not on file   Stress: Not on file   Social Connections: Not on file   Intimate Partner Violence: Not on file   Housing Stability: Not on file      Family History   Problem Relation Age of Onset    High Blood Pressure Mother     High Blood Pressure Father     Heart Disease Father     Osteoporosis Sister          from allergy from an injection    No Known Problems Sister     No Known Problems Sister     No Known Problems Sister     No Known Problems Sister          ROS (10 systems)  In addition to that documented in the HPI above, the additional ROS was obtained:  Constitutional: Denies fevers or chills  Eyes: Reports double vision x 1 month  ENMT: Denies sore throat  CV: Having episodes of chest pain  Resp: Denies SOB  GI: Denies vomiting or diarrhea  : Denies painful urination  MSK: Denies recent trauma  Skin: Denies new rashes  Neuro: Denies new numbness or tingling or weakness  Endocrine: Denies unexpected weight loss  Heme: Denies bleeding disorders    Physical Exam:       Wt Readings from Last 3 Encounters:   08/01/22 195 lb 14.4 oz (88.9 kg)   07/30/22 198 lb (89.8 kg)   06/10/22 191 lb (86.6 kg)     Temp Readings from Last 3 Encounters:   08/01/22 97.5 °F (36.4 °C) (Oral)   06/10/22 98.2 °F (36.8 °C) (Oral)   03/25/22 98 °F (36.7 °C) (Infrared)     BP Readings from Last 3 Encounters:   08/01/22 122/75   07/30/22 138/70   06/10/22 (!) 140/70     Pulse Readings from Last 3 Encounters:   08/01/22 54   07/30/22 53   06/10/22 65        Gen: A&O x 2, NAD, cooperative  HEENT: NC/AT, EOMI, PERRL, mmm, no carotid bruits, neck supple, no meningeal signs  Heart: NSR/SB  Lungs: Respirations even and unlabored  Ext: no edema, no calf tenderness b/l  Psych: normal mood and affect  Skin: no rashes or lesions    NEUROLOGIC EXAM:    Mental Status: A&O to self, month and year, confused to location and reason for being in hospital, NAD, speech clear, language fluent, repetition and naming intact, follows commands appropriately    Cranial Nerve Exam:   CN II-XII: PERRL, VFF, no nystagmus, no gaze paresis, sensation V1-V3 intact b/l, muscles of facial expression symmetric; hearing intact to conversational tone, palate elevates symmetrically, shoulder elevation symmetric and tongue protrudes midline with movement side to side.     Motor Exam:       Strength 5/5 UE's/LE's b/l  Tone and bulk normal   No pronator drift    Deep Tendon Reflexes: 2/4 biceps, triceps, brachioradialis, patellar, and achilles b/l; flexor plantar responses b/l    Sensation: Intact light touch/pinprick/vibration UE's/LE's b/l    Coordination/Cerebellum:       Tremors--none      Rapidly alternating movements: no dysdiadochokinesia b/l                Heel-to-Shin: no dysmetria b/l      Finger-to-Nose: no dysmetria b/l    Gait and stance:      Gait: deferred      LABS:     Recent Labs     07/30/22  1500 07/30/22  1508 07/31/22  1804   WBC 10.8*  --   --     141  --    K 4.2 4.0  --      --   --    CO2 30 31  --    BUN 17  --   --    CREATININE 1.0 1.1  --    GLUCOSE 154*  --   --    INR  --   --  0.98       IMAGING:    CT HEAD W/O CONTRAST  Pending      ASSESSMENT/PLAN:   67year old male presenting with chest pain and hypotension also reporting double vision for 1 month. On exam patient is alert to self, month and year but confused to location and reason for being in hospital. He is pleasant and cooperative. He reports side by side double vision in both eyes. It does not improve with covering one eye. He believes this has been present for about one month. EOM's are intact, face is symmetrical, speech is clear and language is fluent. He has no focal weakness or sensory change on exam.   Patient reporting 1 month history of diplopia that does not correct when covering one eye. CT head has been ordered to evaluate for stroke however can not exclude progression of Parkinson's disease or effect from trihexyphenidyl as a contributing factor. CT head - pending  Continue aspirin 81 mg and atorvastatin 20 mg for secondary stroke prevention  Patient has frequent falls, use caution with ambulation especially given reports of double vision  PT/OT as recommended  Once imaging has been reviewed may consider medication adjustments due to concern for side effects however patients Parkinson's symptoms seem well controlled on current medication.      Patient was discussed with attending neurologist  Omar.     > 80 minutes of time spent included chart review, obtaining history, patient examination, developing plan of care, and documentation. Thank you for allowing us to participate in the care of your patient. If there are any questions regarding evaluation please feel free to contact us. JARVIS Daniels - CNS, 8/1/2022     Attending Note:  I have rounded on this patient with YUNIOR Barrera. I have reviewed the chart and we have discussed this case in detail. The patient was seen and examined by myself. Pertinent labs and imaging have been personally reviewed. Our findings and impressions were discussed with the patient. I concur with the Nurse Practioner's assessment and plan.     Electronically signed by Armaan Russell DO on 8/1/2022 at 1:12 PM

## 2022-08-01 NOTE — PROGRESS NOTES
V2.0  [facility] Hospitalist Progress Note      Name:  Katerine Ferrer /Age/Sex: 1950  (67 y.o. male)   MRN & CSN:  [id number] & [id number] Encounter Date/Time: 2022 10:08 AM EDT    Location:  [facility] PCP: Michael Rogers [address] [place] [address] Day: 3    Assessment and Plan:   Katerine Ferrer is a 67 y.o. male with pmh of hypertension, hyperlipidemia, CAD, dementia, Parkinson disease, chronic anemia, history of recurrent falls who presents with Chest pain    Patient is hemodynamically stable. Pending Echo cardiogram  Possible discharge tomorrow     Plan:  Atypical chest pain ACS rule out: Negative troponins. Pain is better. EKG showed no acute ST-T wave changes. Cardiology consult in place. Echocardiogram done- report pending  EKG showed sinus bradycardia with nonspecific changes. Diplopia: Underlying history of Parkinson. No nystagmus noticed. Neurology consult appreciated. Continue to monitor. Also has some slurring of the speech. Coronary artery disease: S/p PCI with stent to diagonal in 2016. On aspirin started on metoprolol  Benign essential hypertension currently on metoprolol. Blood pressures is doing well. Leukocytosis likely reactive. Continue to monitor BMP tomorrow  Hyperlipidemia on statin  History of recurrent falls. Dementia on donepezil and memantine  Parkinson disease continue trihexyphenidyl  Normocytic normochromic anemia  History of MDRO on contact precautions    Diet ADULT DIET; Regular; 5 carb choices (75 gm/meal)   DVT Prophylaxis [x] Lovenox, []  Heparin, [] SCDs, [] Ambulation,  [] Eliquis, [] Xarelto  [] Coumadin   Code Status Full Code   Disposition From: [facility]  Expected Disposition: [facility]  Estimated Date of Discharge: tomorrow  Patient requires continued admission due to echo tomorrow   Surrogate Decision Maker/ POA      Subjective:     Chief Complaint: No chief complaint on file.        Katerine Ferrer is a 67 y.o. male who presents with atypical chest pain ACS rule out. The patient was seen at the bedside. No acute events. Possible discherge 8/02       Review of Systems:    Review of Systems   Constitutional:  Positive for activity change, appetite change and unexpected weight change. Negative for chills and fever. HENT:  Negative for ear pain, hearing loss, sinus pressure, sinus pain and voice change. Eyes:  Positive for visual disturbance. Negative for pain and discharge. Respiratory:  Negative for cough, chest tightness and shortness of breath. Cardiovascular:  Positive for chest pain. Negative for palpitations and leg swelling. Gastrointestinal:  Negative for abdominal pain, blood in stool, diarrhea, nausea and vomiting. Genitourinary:  Negative for dysuria and frequency. Musculoskeletal:  Positive for arthralgias, back pain, gait problem and myalgias. Neurological:  Positive for speech difficulty. Negative for dizziness, weakness, light-headedness and headaches. Psychiatric/Behavioral:  Negative for sleep disturbance. The patient is nervous/anxious. Objective: Intake/Output Summary (Last 24 hours) at 8/1/2022 1633  Last data filed at 8/1/2022 0934  Gross per 24 hour   Intake 370 ml   Output --   Net 370 ml          Vitals:   Vitals:    08/01/22 1520   BP: 138/69   Pulse: 56   Resp: 16   Temp: 97.6 °F (36.4 °C)   SpO2: 95%       Physical Exam:   Physical Exam  Vitals reviewed. Constitutional:       Appearance: Normal appearance. He is normal weight. HENT:      Head: Normocephalic. Right Ear: Tympanic membrane normal.      Left Ear: Tympanic membrane normal.      Nose: Nose normal.      Mouth/Throat:      Mouth: Mucous membranes are moist.   Eyes:      Conjunctiva/sclera: Conjunctivae normal.      Pupils: Pupils are equal, round, and reactive to light. Cardiovascular:      Rate and Rhythm: Normal rate and regular rhythm. Pulses: Normal pulses. Heart sounds: Normal heart sounds.  No murmur heard.  Pulmonary:      Effort: Pulmonary effort is normal.      Breath sounds: Normal breath sounds. No wheezing, rhonchi or rales. Abdominal:      General: Abdomen is flat. Bowel sounds are normal. There is no distension. Palpations: Abdomen is soft. Tenderness: There is no abdominal tenderness. Musculoskeletal:         General: No deformity. Normal range of motion. Cervical back: Normal range of motion and neck supple. Right lower leg: No edema. Left lower leg: No edema. Skin:     Coloration: Skin is not jaundiced or pale. Neurological:      General: No focal deficit present. Mental Status: He is alert and oriented to person, place, and time. Mental status is at baseline. Psychiatric:         Mood and Affect: Mood normal.         Behavior: Behavior normal.         Thought Content:  Thought content normal.          Medications:   Medications:    metoprolol succinate  25 mg Oral Nightly    ranolazine  500 mg Oral BID    furosemide  20 mg Oral Daily    isosorbide mononitrate  30 mg Oral Daily    sodium chloride flush  5-40 mL IntraVENous BID    aspirin  81 mg Oral Daily    atorvastatin  40 mg Oral Nightly    enoxaparin  40 mg SubCUTAneous Daily    cyanocobalamin  1,000 mcg Oral Daily    docusate sodium  100 mg Oral BID    donepezil  10 mg Oral Nightly    memantine  10 mg Oral BID    pantoprazole  40 mg Oral QAM AC    trihexyphenidyl  3 mg Oral BID      Infusions:    sodium chloride       PRN Meds: sodium chloride flush, 5-40 mL, PRN  sodium chloride, , PRN  ondansetron, 4 mg, Q8H PRN   Or  ondansetron, 4 mg, Q6H PRN  acetaminophen, 650 mg, Q6H PRN   Or  acetaminophen, 650 mg, Q6H PRN  polyethylene glycol, 17 g, Daily PRN      Labs      Recent Results (from the past 24 hour(s))   TSH    Collection Time: 07/31/22  6:04 PM   Result Value Ref Range    TSH, High Sensitivity 1.630 0.270 - 4.20 uIu/ml   T4, Free    Collection Time: 07/31/22  6:04 PM   Result Value Ref Range    T4 Free

## 2022-08-01 NOTE — PLAN OF CARE
Problem: Discharge Planning  Goal: Discharge to home or other facility with appropriate resources  Outcome: Progressing  Flowsheets (Taken 8/1/2022 8162)  Discharge to home or other facility with appropriate resources: Identify barriers to discharge with patient and caregiver     Problem: Skin/Tissue Integrity  Goal: Absence of new skin breakdown  Description: 1. Monitor for areas of redness and/or skin breakdown  2. Assess vascular access sites hourly  3. Every 4-6 hours minimum:  Change oxygen saturation probe site  4. Every 4-6 hours:  If on nasal continuous positive airway pressure, respiratory therapy assess nares and determine need for appliance change or resting period.   Outcome: Progressing     Problem: Safety - Adult  Goal: Free from fall injury  Outcome: Progressing

## 2022-08-01 NOTE — PLAN OF CARE
Problem: Discharge Planning  Goal: Discharge to home or other facility with appropriate resources  7/31/2022 2251 by Ced Parkinson LPN  Outcome: Progressing  7/31/2022 1030 by Valeriy Casarez RN  Outcome: Progressing  Flowsheets  Taken 7/31/2022 1013 by Valeriy Casarez RN  Discharge to home or other facility with appropriate resources:   Identify barriers to discharge with patient and caregiver   Arrange for needed discharge resources and transportation as appropriate   Identify discharge learning needs (meds, wound care, etc)   Arrange for interpreters to assist at discharge as needed   Refer to discharge planning if patient needs post-hospital services based on physician order or complex needs related to functional status, cognitive ability or social support system  Taken 7/30/2022 2243 by Tanna Amaro RN  Discharge to home or other facility with appropriate resources:   Identify barriers to discharge with patient and caregiver   Identify discharge learning needs (meds, wound care, etc)   Refer to discharge planning if patient needs post-hospital services based on physician order or complex needs related to functional status, cognitive ability or social support system     Problem: Skin/Tissue Integrity  Goal: Absence of new skin breakdown  Description: 1. Monitor for areas of redness and/or skin breakdown  2. Assess vascular access sites hourly  3. Every 4-6 hours minimum:  Change oxygen saturation probe site  4. Every 4-6 hours:  If on nasal continuous positive airway pressure, respiratory therapy assess nares and determine need for appliance change or resting period.   7/31/2022 2251 by Ced Parkinson LPN  Outcome: Progressing  7/31/2022 1030 by Valeriy Casarez RN  Outcome: Progressing     Problem: Safety - Adult  Goal: Free from fall injury  7/31/2022 2251 by Ced Parkinson LPN  Outcome: Progressing  7/31/2022 1030 by Valeriy Casarez RN  Outcome: Progressing

## 2022-08-01 NOTE — DISCHARGE INSTR - COC
WD-Traumatic open wound of left lower leg S81.802A    WD-Non-pressure ulcer of left lower extremity, with fat layer exposed ([address] L97.922    Chest pain R07.9       Isolation/Infection:   Isolation            Contact          Patient Infection Status       Infection Onset Added Last Indicated Last Indicated By Review Planned Expiration Resolved Resolved By    MDRO (multi-drug resistant organism) 02/27/22 03/07/22 03/07/22 David Koenig RN        Prote vulgaris            Nurse Assessment:  Last Vital Signs: /69   Pulse 56   Temp 97.6 °F (36.4 °C) (Oral)   Resp 16   Ht 6' 2\" (1.88 m)   Wt 195 lb 14.4 oz (88.9 kg)   SpO2 95%   BMI 25.15 kg/m²     Last documented pain score (0-10 scale):    Last Weight:   Wt Readings from Last 1 Encounters:   08/01/22 195 lb 14.4 oz (88.9 kg)     Mental Status:  disoriented    IV Access:  - None    Nursing Mobility/ADLs:  Walking   Dependent  Transfer  Dependent  Bathing  Dependent  Dressing  Dependent  Toileting  Dependent  Feeding  Independent  Med Admin  Dependent  Med Delivery   none    Wound Care Documentation and Therapy:  Wound 10/05/18 rt buttock (Active)   Number of days: 7159        Elimination:  Continence: Bowel: Yes  Bladder: No  Urinary Catheter: None   Colostomy/Ileostomy/Ileal Conduit: No       Date of Last BM: unknown    Intake/Output Summary (Last 24 hours) at 8/1/2022 1527  Last data filed at 8/1/2022 0934  Gross per 24 hour   Intake 370 ml   Output --   Net 370 ml     I/O last 3 completed shifts:   In: 360 [P.O.:360]  Out: 100 [Urine:100]    Safety Concerns:     Sundowners [place] and At Risk for Falls    Impairments/Disabilities:      None      Patient's personal belongings (please select all that are sent with patient):  Glasses    RN SIGNATURE:  Electronically signed by Tete Cr RN on 8/2/22 at 1:47 PM EDT      PHYSICIAN SECTION    Nutrition Therapy:  Current Nutrition Therapy:   - Oral Diet:  Cardiac    Routes of Feeding: None  Liquids: No Restrictions  Daily Fluid Restriction: no  Last Modified Barium Swallow with Video (Video Swallowing Test): not done    Treatments at the Time of Hospital Discharge:   Respiratory Treatments: none  Oxygen Therapy:  O2 2L NC if [employer]  Ventilator:    - No ventilator support    Rehab Therapies: Physical Therapy  Weight Bearing Status/Restrictions: No weight bearing restrictions  Other Medical Equipment (for information only, NOT a DME order):  hospital bed  Other Treatments: OT/ST    Prognosis: Fair    Condition at Discharge: Stable    Rehab Potential (if transferring to Rehab): Fair    Recommended Labs or Other Treatments After Discharge: none    Physician Certification: I certify the above information and transfer of Sully Norman  is necessary for the continuing treatment of the diagnosis listed and that he requires [facility] for greater 30 days.      Update Admission H&P: No change in H&P    PHYSICIAN SIGNATURE:  Electronically signed by JARVIS Escobar CNP on 8/2/22 at 2:56 PM EDT

## 2022-08-01 NOTE — CARE COORDINATION
LSW reviewed patient medical record and discussed the patient in the IDR team meeting. Patient is from [address] [place] [address]. Call to [facility]. Patient is from their LTC and will be able to return when medically stable. No precert needed. Pt will need a rapid Covid at discharge.

## 2022-08-02 VITALS
BODY MASS INDEX: 24.42 KG/M2 | DIASTOLIC BLOOD PRESSURE: 70 MMHG | HEART RATE: 59 BPM | HEIGHT: 74 IN | OXYGEN SATURATION: 98 % | RESPIRATION RATE: 15 BRPM | SYSTOLIC BLOOD PRESSURE: 131 MMHG | TEMPERATURE: 98 F | WEIGHT: 190.31 LBS

## 2022-08-02 PROBLEM — R07.9 CHEST PAIN: Status: RESOLVED | Noted: 2022-07-30 | Resolved: 2022-08-02

## 2022-08-02 PROCEDURE — 94761 N-INVAS EAR/PLS OXIMETRY MLT: CPT

## 2022-08-02 PROCEDURE — 6370000000 HC RX 637 (ALT 250 FOR IP): Performed by: INTERNAL MEDICINE

## 2022-08-02 PROCEDURE — 6360000002 HC RX W HCPCS: Performed by: INTERNAL MEDICINE

## 2022-08-02 PROCEDURE — 96372 THER/PROPH/DIAG INJ SC/IM: CPT

## 2022-08-02 PROCEDURE — 2580000003 HC RX 258: Performed by: INTERNAL MEDICINE

## 2022-08-02 PROCEDURE — G0378 HOSPITAL OBSERVATION PER HR: HCPCS

## 2022-08-02 RX ORDER — ISOSORBIDE MONONITRATE 30 MG/1
30 TABLET, EXTENDED RELEASE ORAL DAILY
Qty: 30 TABLET | Refills: 3 | Status: SHIPPED | OUTPATIENT
Start: 2022-08-03

## 2022-08-02 RX ORDER — POLYETHYLENE GLYCOL 3350 17 G/17G
17 POWDER, FOR SOLUTION ORAL DAILY PRN
Qty: 527 G | Refills: 1 | Status: SHIPPED | OUTPATIENT
Start: 2022-08-02 | End: 2022-09-01

## 2022-08-02 RX ORDER — FUROSEMIDE 20 MG/1
20 TABLET ORAL DAILY
Qty: 60 TABLET | Refills: 3
Start: 2022-08-03

## 2022-08-02 RX ORDER — ATORVASTATIN CALCIUM 40 MG/1
40 TABLET, FILM COATED ORAL NIGHTLY
Qty: 30 TABLET | Refills: 3 | Status: SHIPPED | OUTPATIENT
Start: 2022-08-02

## 2022-08-02 RX ORDER — METOPROLOL SUCCINATE 25 MG/1
25 TABLET, EXTENDED RELEASE ORAL NIGHTLY
Qty: 30 TABLET | Refills: 3 | Status: SHIPPED | OUTPATIENT
Start: 2022-08-02

## 2022-08-02 RX ORDER — RANOLAZINE 500 MG/1
500 TABLET, EXTENDED RELEASE ORAL 2 TIMES DAILY
Qty: 60 TABLET | Refills: 3 | Status: SHIPPED | OUTPATIENT
Start: 2022-08-02

## 2022-08-02 RX ADMIN — RANOLAZINE 500 MG: 500 TABLET, FILM COATED, EXTENDED RELEASE ORAL at 08:46

## 2022-08-02 RX ADMIN — PANTOPRAZOLE SODIUM 40 MG: 40 TABLET, DELAYED RELEASE ORAL at 05:54

## 2022-08-02 RX ADMIN — ASPIRIN 81 MG CHEWABLE TABLET 81 MG: 81 TABLET CHEWABLE at 08:46

## 2022-08-02 RX ADMIN — DOCUSATE SODIUM 100 MG: 100 CAPSULE, LIQUID FILLED ORAL at 08:46

## 2022-08-02 RX ADMIN — FUROSEMIDE 20 MG: 20 TABLET ORAL at 08:47

## 2022-08-02 RX ADMIN — CYANOCOBALAMIN TAB 1000 MCG 1000 MCG: 1000 TAB at 08:47

## 2022-08-02 RX ADMIN — SODIUM CHLORIDE, PRESERVATIVE FREE 10 ML: 5 INJECTION INTRAVENOUS at 08:47

## 2022-08-02 RX ADMIN — MEMANTINE 10 MG: 10 TABLET ORAL at 08:46

## 2022-08-02 RX ADMIN — ENOXAPARIN SODIUM 40 MG: 100 INJECTION SUBCUTANEOUS at 08:47

## 2022-08-02 RX ADMIN — TRIHEXYPHENIDYL HYDROCHLORIDE 3 MG: 2 TABLET ORAL at 09:37

## 2022-08-02 RX ADMIN — ISOSORBIDE MONONITRATE 30 MG: 30 TABLET, EXTENDED RELEASE ORAL at 08:47

## 2022-08-02 ASSESSMENT — PAIN SCALES - GENERAL
PAINLEVEL_OUTOF10: 0
PAINLEVEL_OUTOF10: 0

## 2022-08-02 NOTE — DISCHARGE SUMMARY
Discharge Summary    Name:  Rajiv Deshpande /Age/Sex: 1950  (67 y.o. male)   MRN & CSN:  [id number] & [id number] Admission Date/Time: 2022  9:19 PM   Attending:  Martha Curry MD Discharging Physician: Marielos Delaney, [address] [facility] Course:   Rajiv Deshpande is a 67 y.o.  male  who presents with Chest pain  Patient is currently chest pain-free, pleasant, and cooperative. Patient is alert and oriented x4. The patient expressed appropriate understanding of and agreement with the discharge recommendations, medications, and plan. Consults this admission:  Franki Young TO NEUROLOGY  Uriel Rodríguez MD      Consulting Physician - Cardiology     Since 2022     [phone]     DANIELLE Lyons [address] Physician - Neurology     Since 2022     [phone]     Discharge Instruction:   Handoff to PCP:   Crow Peña DO      [facility] Provider - Family Medicine     [phone]       Follow up appointments: Follow-up in 1 to 2 weeks call to make your appointment. Primary care physician: Crow Peña DO    Diet:  cardiac diet   Activity: activity as tolerated  Disposition: Discharged to:   []Home, []C, [x]SNF, []Acute Rehab, []Hospice   Condition on discharge: Stable    Discharge Medications:        Medication List        ASK your doctor about these medications      acetaminophen 325 MG tablet  Commonly known as: TYLENOL     aspirin 81 MG EC tablet     atorvastatin 20 MG tablet  Commonly known as: LIPITOR  TAKE ONE TABLET BY MOUTH ONCE NIGHTLY     bacitracin 500 UNIT/GM ointment  Apply topically 3 times daily Apply topically 3 times daily.      cyanocobalamin 1000 MCG tablet  Take 1 tablet by mouth daily     docusate sodium 100 MG capsule  Commonly known as: COLACE     donepezil 10 MG tablet  Commonly known as: ARICEPT     furosemide 20 MG tablet  Commonly known as: LASIX     memantine 10 MG tablet  Commonly known as: NAMENDA     metoprolol succinate 25 MG extended release tablet  Commonly known as: TOPROL XL  Take 1 tablet by mouth daily     pantoprazole 40 MG tablet  Commonly known as: PROTONIX  Take 1 tablet by mouth every morning (before breakfast)     trihexyphenidyl 2 MG tablet  Commonly known as: ARTANE              Objective Findings at Discharge:       BMP/CBC  Recent Labs     07/30/22  1500 07/30/22  1508    141   K 4.2 4.0     --    CO2 30 31   BUN 17  --    CREATININE 1.0 1.1   WBC 10.8*  --    HCT 43.3  --      --        IMAGING:  None    Additional Information: Patient seen and examined day of discharge.  For more information regarding patient's care please contact [facility] [phone]    Discharge Time of 35 minutes    Electronically signed by JARVIS Gunderson CNP on 8/2/2022 at 12:05 PM

## 2022-08-02 NOTE — CARE COORDINATION
Pt is on discharge to return to [facility]. [place] to  pt at 5:00. LSW went to fax the AVS with the [address] [place] and noticed that the NP has not completed the [address] [place]. LSW PS Annette Kyle and asked if she could complete her part tf the BEV. Once that is completed LSW will fax it to the NH.  RNJanett at [facility] and pt wife have been informed of  time. LSW received completed BEV. AVS with both COCs were faxed to [facility].

## 2022-08-02 NOTE — PROGRESS NOTES
Daily Progress Note  Subjective:    Pt. Awake, mildly confused, and doing ok  No CP or SOB today per pt. 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 Neg. 3 times    EKG reassuring overall    Hx of CAD s/p PCI in 2016    Echo done-will review today    On ranexa and imdur-anti-anginal Tx. For now    He is also on Toprol- watch for bradycardia on tele    Currently rec. Med. Tx. D/t his advanced dementia     Will follow    PAST MEDICAL HISTORY:  He has a past medical history of coronary artery  disease, lower back pain, coronary atherosclerosis, dementia, ankle  fracture, hypertension, osteoarthritis, Parkinson's. PAST SURGICAL HISTORY:  Ankle surgery, back surgery, cardiac  catheterization, stent placement, hernia repair, lumbar laminectomy,  tonsillectomy and vasectomy. SOCIAL HISTORY:  He is a former smoker. He does not drink any alcohol. He does not use any illicit drugs. HOME MEDICATIONS:  He is on metoprolol 25, pantoprazole, atorvastatin,  Vitamin B12, Lasix, Aricept, Namenda, and aspirin. ALLERGIES:  He has allergies to [facility] and [address] ([facility]).     Objective:   BP (!) 151/77   Pulse 59   Temp 98 °F (36.7 °C) (Oral)   Resp 13   Ht 6' 2\" (1.88 m)   Wt 190 lb 5 oz (86.3 kg)   SpO2 94%   BMI 24.43 kg/m²     Intake/Output Summary (Last 24 hours) at 8/2/2022 0843  Last data filed at 8/1/2022 0934  Gross per 24 hour   Intake 10 ml   Output --   Net 10 ml       Medications:   Scheduled Meds:   metoprolol succinate  25 mg Oral Nightly    ranolazine  500 mg Oral BID furosemide  20 mg Oral Daily    isosorbide mononitrate  30 mg Oral Daily    sodium chloride flush  5-40 mL IntraVENous BID    aspirin  81 mg Oral Daily    atorvastatin  40 mg Oral Nightly    enoxaparin  40 mg SubCUTAneous Daily    cyanocobalamin  1,000 mcg Oral Daily    docusate sodium  100 mg Oral BID    donepezil  10 mg Oral Nightly    memantine  10 mg Oral BID    pantoprazole  40 mg Oral QAM AC    trihexyphenidyl  3 mg Oral BID      Infusions:   sodium chloride        PRN Meds:  sodium chloride flush, sodium chloride, ondansetron **OR** ondansetron, acetaminophen **OR** acetaminophen, polyethylene glycol     Physical Exam:  Vitals:    08/02/22 0842   BP: (!) 151/77   Pulse: 59   Resp: 13   Temp: 98 °F (36.7 °C)   SpO2: 94%        General: AAO, NAD  Chest: Nontender  Cardiac: First and Second Heart Sounds are Normal, No Murmurs or Gallops noted  Lungs:Clear to auscultation and percussion. Abdomen: Soft, NT, ND, +BS  Extremities: No clubbing, no edema  Vascular:  Equal 2+ peripheral pulses. Lab Data:  CBC:   Recent Labs     07/30/22  1500   WBC 10.8*   HGB 13.3*   HCT 43.3   MCV 89.8        BMP:   Recent Labs     07/30/22  1500 07/30/22  1508    141   K 4.2 4.0     --    CO2 30 31   PHOS 3.1  --    BUN 17  --    CREATININE 1.0 1.1     LIVER PROFILE: No results for input(s): AST, ALT, LIPASE, BILIDIR, BILITOT, ALKPHOS in the last 72 hours. Invalid input(s): AMYLASE,  ALB  PT/INR:   Recent Labs     07/31/22  1804   PROTIME 12.6   INR 0.98     APTT:   Recent Labs     07/31/22  1804   APTT 30.7     BNP:  No results for input(s): BNP in the last 72 hours.       Assessment:  Patient Active Problem List    Diagnosis Date Noted    Chest pain 07/30/2022    WD-Traumatic open wound of left lower leg 03/07/2022    WD-Non-pressure ulcer of left lower extremity, with fat layer exposed ([facility] 03/07/2022    Cellulitis 02/27/2022    Frequent falls 02/27/2022    Age-related physical debility 02/27/2022 Venous stasis of both lower extremities 02/27/2022    Cellulitis of left leg 02/27/2022    Dementia ([facility]     Osteoarthritis     Small vessel disease, cerebrovascular     CAD (coronary artery disease)     Tobacco abuse     Hyperlipidemia 10/04/2018    Primary hypertension 10/04/2018    Primary parkinsonism ([facility] 10/04/2018    Right inguinal hernia 10/05/2015       Electronically signed by Pramod Viera PA-C on 8/2/2022 at 8:43 AM    Electronically signed by Janette White MD on 8/2/22 at 9:07 AM EDT

## 2022-08-02 NOTE — PLAN OF CARE
Problem: Discharge Planning  Goal: Discharge to home or other facility with appropriate resources  8/2/2022 1252 by Ajit Hunter RN  Outcome: Completed  8/2/2022 0335 by Olga Lidia Garcia RN  Outcome: Progressing  Flowsheets (Taken 8/1/2022 2044)  Discharge to home or other facility with appropriate resources: Identify barriers to discharge with patient and caregiver     Problem: Skin/Tissue Integrity  Goal: Absence of new skin breakdown  Description: 1. Monitor for areas of redness and/or skin breakdown  2. Assess vascular access sites hourly  3. Every 4-6 hours minimum:  Change oxygen saturation probe site  4. Every 4-6 hours:  If on nasal continuous positive airway pressure, respiratory therapy assess nares and determine need for appliance change or resting period.   8/2/2022 1252 by Ajit Hunter RN  Outcome: Completed  8/2/2022 0335 by Olga Lidia Garcia RN  Outcome: Progressing     Problem: Safety - Adult  Goal: Free from fall injury  8/2/2022 1252 by Ajit Hunter RN  Outcome: Completed  8/2/2022 0335 by Olga Lidia Garcia RN  Outcome: Progressing

## 2022-08-02 NOTE — PROGRESS NOTES
Chart reviewed. MRI of brain was completed and reviewed. No evidence of acute abnormality. He has orders for discharge back to ECF. Follow up with neurology as outpatient. Roselyn MEDINA-CNS  Neurology

## 2022-09-26 ENCOUNTER — TELEPHONE (OUTPATIENT)
Dept: INTERNAL MEDICINE CLINIC | Age: 72
End: 2022-09-26

## 2022-09-26 NOTE — TELEPHONE ENCOUNTER
Called to confirm in office AWV for this afternoon. Per patient's wife, Young Aguirre, patient is in a long term care facility in [place] and sees the house doctors there.

## 2024-06-09 ENCOUNTER — APPOINTMENT (OUTPATIENT)
Dept: CT IMAGING | Age: 74
End: 2024-06-09
Payer: MEDICARE

## 2024-06-09 ENCOUNTER — HOSPITAL ENCOUNTER (INPATIENT)
Age: 74
LOS: 3 days | Discharge: OTHER INSTITUTION | End: 2024-06-12
Attending: EMERGENCY MEDICINE | Admitting: INTERNAL MEDICINE
Payer: MEDICARE

## 2024-06-09 ENCOUNTER — APPOINTMENT (OUTPATIENT)
Dept: GENERAL RADIOLOGY | Age: 74
End: 2024-06-09
Payer: MEDICARE

## 2024-06-09 DIAGNOSIS — R09.02 HYPOXEMIA: ICD-10-CM

## 2024-06-09 DIAGNOSIS — E86.1 HYPOTENSION DUE TO HYPOVOLEMIA: ICD-10-CM

## 2024-06-09 DIAGNOSIS — J18.9 ACUTE PNEUMONIA: Primary | ICD-10-CM

## 2024-06-09 LAB
ALBUMIN SERPL-MCNC: 2.1 GM/DL (ref 3.4–5)
ALP BLD-CCNC: 106 IU/L (ref 40–129)
ALT SERPL-CCNC: 7 U/L (ref 10–40)
ANION GAP SERPL CALCULATED.3IONS-SCNC: 9 MMOL/L (ref 7–16)
ANISOCYTOSIS: ABNORMAL
AST SERPL-CCNC: 11 IU/L (ref 15–37)
B PARAP IS1001 DNA NPH QL NAA+NON-PROBE: NOT DETECTED
B PERT.PT PRMT NPH QL NAA+NON-PROBE: NOT DETECTED
BACTERIA: ABNORMAL /HPF
BACTERIA: NORMAL /HPF
BANDED NEUTROPHILS ABSOLUTE COUNT: 2.69 K/CU MM
BANDED NEUTROPHILS RELATIVE PERCENT: 21 % (ref 5–11)
BASE EXCESS MIXED: 2.4 (ref 0–3)
BASE EXCESS: ABNORMAL (ref 0–3)
BILIRUB SERPL-MCNC: 0.5 MG/DL (ref 0–1)
BILIRUBIN, URINE: ABNORMAL MG/DL
BILIRUBIN, URINE: NORMAL
BLOOD, URINE: NEGATIVE
BLOOD, URINE: NEGATIVE
BUN SERPL-MCNC: 28 MG/DL (ref 6–23)
C PNEUM DNA NPH QL NAA+NON-PROBE: NOT DETECTED
CALCIUM SERPL-MCNC: 9.2 MG/DL (ref 8.3–10.6)
CAST TYPE: ABNORMAL /HPF
CAST TYPE: NORMAL /HPF
CHLORIDE BLD-SCNC: 106 MMOL/L (ref 99–110)
CLARITY: CLEAR
CLARITY: CLEAR
CO2 CONTENT: 28.2 MMOL/L (ref 21–32)
CO2: 26 MMOL/L (ref 21–32)
COLOR: NORMAL
COLOR: YELLOW
COMMENT UA: NORMAL
CREAT SERPL-MCNC: 1.1 MG/DL (ref 0.9–1.3)
CRYSTAL TYPE: ABNORMAL /HPF
CRYSTAL TYPE: NORMAL /HPF
DIFFERENTIAL TYPE: ABNORMAL
ELLIPTOCYTES: ABNORMAL
EPITHELIAL CELLS, UA: 0 /HPF
EPITHELIAL CELLS, UA: 3 /HPF
FLUAV H1 2009 PAN RNA NPH NAA+NON-PROBE: NOT DETECTED
FLUAV H1 RNA NPH QL NAA+NON-PROBE: NOT DETECTED
FLUAV H3 RNA NPH QL NAA+NON-PROBE: NOT DETECTED
FLUAV RNA NPH QL NAA+NON-PROBE: NOT DETECTED
FLUBV RNA NPH QL NAA+NON-PROBE: NOT DETECTED
GFR, ESTIMATED: 70 ML/MIN/1.73M2
GLUCOSE SERPL-MCNC: 155 MG/DL (ref 70–99)
GLUCOSE URINE: NEGATIVE MG/DL
GLUCOSE URINE: NEGATIVE MG/DL
HADV DNA NPH QL NAA+NON-PROBE: NOT DETECTED
HCO3 VENOUS: 26.9 MMOL/L (ref 22–29)
HCOV 229E RNA NPH QL NAA+NON-PROBE: NOT DETECTED
HCOV HKU1 RNA NPH QL NAA+NON-PROBE: NOT DETECTED
HCOV NL63 RNA NPH QL NAA+NON-PROBE: NOT DETECTED
HCOV OC43 RNA NPH QL NAA+NON-PROBE: NOT DETECTED
HCT VFR BLD CALC: 32 % (ref 42–52)
HEMOGLOBIN: 9.7 GM/DL (ref 13.5–18)
HMPV RNA NPH QL NAA+NON-PROBE: NOT DETECTED
HPIV1 RNA NPH QL NAA+NON-PROBE: NOT DETECTED
HPIV2 RNA NPH QL NAA+NON-PROBE: NOT DETECTED
HPIV3 RNA NPH QL NAA+NON-PROBE: NOT DETECTED
HPIV4 RNA NPH QL NAA+NON-PROBE: NOT DETECTED
KETONES, URINE: ABNORMAL MG/DL
KETONES, URINE: NORMAL MG/DL
LACTIC ACID, SEPSIS: 1.8 MMOL/L (ref 0.4–2)
LEUKOCYTE ESTERASE, URINE: NEGATIVE
LEUKOCYTE ESTERASE, URINE: NEGATIVE
LIPASE: 5 IU/L (ref 13–60)
LYMPHOCYTES ABSOLUTE: 2.4 K/CU MM
LYMPHOCYTES RELATIVE PERCENT: 19 % (ref 24–44)
M PNEUMO DNA NPH QL NAA+NON-PROBE: NOT DETECTED
MACROCYTES: ABNORMAL
MCH RBC QN AUTO: 26.5 PG (ref 27–31)
MCHC RBC AUTO-ENTMCNC: 30.3 % (ref 32–36)
MCV RBC AUTO: 87.4 FL (ref 78–100)
METAMYELOCYTES ABSOLUTE COUNT: 0.26 K/CU MM
METAMYELOCYTES PERCENT: 2 %
MONOCYTES ABSOLUTE: 0.8 K/CU MM
MONOCYTES RELATIVE PERCENT: 6 % (ref 0–4)
MYELOCYTE PERCENT: 1 %
MYELOCYTES ABSOLUTE COUNT: 0.13 K/CU MM
NEUTROPHILS ABSOLUTE: 6.5 K/CU MM
NEUTROPHILS RELATIVE PERCENT: 51 % (ref 36–66)
NITRITE URINE, QUANTITATIVE: POSITIVE
NITRITE URINE, QUANTITATIVE: POSITIVE
O2 SAT, VEN: 76.7 % (ref 50–70)
PCO2, VEN: 40.7 MMHG (ref 41–51)
PDW BLD-RTO: 18.2 % (ref 11.7–14.9)
PH VENOUS: 7.43 (ref 7.32–7.43)
PH, URINE: 5.5
PH, URINE: 5.5 (ref 5–8)
PLATELET # BLD: 173 K/CU MM (ref 140–440)
PLT MORPHOLOGY: ADEQUATE
PMV BLD AUTO: 8.4 FL (ref 7.5–11.1)
PO2, VEN: 40.3 MMHG (ref 28–48)
POTASSIUM SERPL-SCNC: 4.4 MMOL/L (ref 3.5–5.1)
PRO-BNP: 1533 PG/ML
PROTEIN UA: ABNORMAL MG/DL
PROTEIN UA: NORMAL MG/DL
RBC # BLD: 3.66 M/CU MM (ref 4.6–6.2)
RBC URINE: 0 /HPF
RBC URINE: NEGATIVE /HPF (ref 0–3)
RSV RNA NPH QL NAA+NON-PROBE: NOT DETECTED
RV+EV RNA NPH QL NAA+NON-PROBE: NOT DETECTED
SARS-COV-2 RNA NPH QL NAA+NON-PROBE: NOT DETECTED
SCHISTOCYTES: ABNORMAL
SODIUM BLD-SCNC: 141 MMOL/L (ref 135–145)
SOURCE, BLOOD GAS: ABNORMAL
SPECIFIC GRAVITY UA: >1.03
SPECIFIC GRAVITY UA: >1.03 (ref 1–1.03)
TARGET CELLS: ABNORMAL
TEAR DROP CELLS: ABNORMAL
TOTAL PROTEIN: 5.4 GM/DL (ref 6.4–8.2)
TROPONIN, HIGH SENSITIVITY: 55 NG/L (ref 0–22)
TROPONIN, HIGH SENSITIVITY: 59 NG/L (ref 0–22)
UROBILINOGEN, URINE: 1 MG/DL
UROBILINOGEN, URINE: 1 MG/DL (ref 0.2–1)
WBC # BLD: 12.8 K/CU MM (ref 4–10.5)
WBC UA: 1 /HPF
WBC UA: NEGATIVE /HPF (ref 0–2)

## 2024-06-09 PROCEDURE — 94640 AIRWAY INHALATION TREATMENT: CPT

## 2024-06-09 PROCEDURE — 2580000003 HC RX 258: Performed by: FAMILY MEDICINE

## 2024-06-09 PROCEDURE — 80053 COMPREHEN METABOLIC PANEL: CPT

## 2024-06-09 PROCEDURE — 85027 COMPLETE CBC AUTOMATED: CPT

## 2024-06-09 PROCEDURE — 6360000002 HC RX W HCPCS: Performed by: EMERGENCY MEDICINE

## 2024-06-09 PROCEDURE — 84484 ASSAY OF TROPONIN QUANT: CPT

## 2024-06-09 PROCEDURE — 87507 IADNA-DNA/RNA PROBE TQ 12-25: CPT

## 2024-06-09 PROCEDURE — 71045 X-RAY EXAM CHEST 1 VIEW: CPT

## 2024-06-09 PROCEDURE — 93005 ELECTROCARDIOGRAM TRACING: CPT | Performed by: EMERGENCY MEDICINE

## 2024-06-09 PROCEDURE — 96365 THER/PROPH/DIAG IV INF INIT: CPT

## 2024-06-09 PROCEDURE — 83605 ASSAY OF LACTIC ACID: CPT

## 2024-06-09 PROCEDURE — 87449 NOS EACH ORGANISM AG IA: CPT

## 2024-06-09 PROCEDURE — 82805 BLOOD GASES W/O2 SATURATION: CPT

## 2024-06-09 PROCEDURE — 81003 URINALYSIS AUTO W/O SCOPE: CPT

## 2024-06-09 PROCEDURE — 87040 BLOOD CULTURE FOR BACTERIA: CPT

## 2024-06-09 PROCEDURE — 83690 ASSAY OF LIPASE: CPT

## 2024-06-09 PROCEDURE — 87899 AGENT NOS ASSAY W/OPTIC: CPT

## 2024-06-09 PROCEDURE — 81001 URINALYSIS AUTO W/SCOPE: CPT

## 2024-06-09 PROCEDURE — 6360000002 HC RX W HCPCS: Performed by: FAMILY MEDICINE

## 2024-06-09 PROCEDURE — 87324 CLOSTRIDIUM AG IA: CPT

## 2024-06-09 PROCEDURE — 6370000000 HC RX 637 (ALT 250 FOR IP): Performed by: NURSE PRACTITIONER

## 2024-06-09 PROCEDURE — 1200000000 HC SEMI PRIVATE

## 2024-06-09 PROCEDURE — 84145 PROCALCITONIN (PCT): CPT

## 2024-06-09 PROCEDURE — P9612 CATHETERIZE FOR URINE SPEC: HCPCS

## 2024-06-09 PROCEDURE — 2580000003 HC RX 258: Performed by: EMERGENCY MEDICINE

## 2024-06-09 PROCEDURE — 70450 CT HEAD/BRAIN W/O DYE: CPT

## 2024-06-09 PROCEDURE — 0202U NFCT DS 22 TRGT SARS-COV-2: CPT

## 2024-06-09 PROCEDURE — 99285 EMERGENCY DEPT VISIT HI MDM: CPT

## 2024-06-09 PROCEDURE — 87086 URINE CULTURE/COLONY COUNT: CPT

## 2024-06-09 PROCEDURE — 93005 ELECTROCARDIOGRAM TRACING: CPT | Performed by: NURSE PRACTITIONER

## 2024-06-09 PROCEDURE — 96361 HYDRATE IV INFUSION ADD-ON: CPT

## 2024-06-09 PROCEDURE — 87081 CULTURE SCREEN ONLY: CPT

## 2024-06-09 PROCEDURE — 2580000003 HC RX 258: Performed by: NURSE PRACTITIONER

## 2024-06-09 PROCEDURE — 85007 BL SMEAR W/DIFF WBC COUNT: CPT

## 2024-06-09 PROCEDURE — 87641 MR-STAPH DNA AMP PROBE: CPT

## 2024-06-09 PROCEDURE — 83880 ASSAY OF NATRIURETIC PEPTIDE: CPT

## 2024-06-09 PROCEDURE — 51702 INSERT TEMP BLADDER CATH: CPT

## 2024-06-09 RX ORDER — ACETAMINOPHEN 650 MG/1
650 SUPPOSITORY RECTAL EVERY 6 HOURS PRN
Status: DISCONTINUED | OUTPATIENT
Start: 2024-06-09 | End: 2024-06-12 | Stop reason: HOSPADM

## 2024-06-09 RX ORDER — ACETAMINOPHEN 325 MG/1
650 TABLET ORAL EVERY 6 HOURS PRN
Status: DISCONTINUED | OUTPATIENT
Start: 2024-06-09 | End: 2024-06-12 | Stop reason: HOSPADM

## 2024-06-09 RX ORDER — IPRATROPIUM BROMIDE AND ALBUTEROL SULFATE 2.5; .5 MG/3ML; MG/3ML
1 SOLUTION RESPIRATORY (INHALATION)
Status: DISCONTINUED | OUTPATIENT
Start: 2024-06-09 | End: 2024-06-12 | Stop reason: HOSPADM

## 2024-06-09 RX ORDER — GUAIFENESIN/DEXTROMETHORPHAN 100-10MG/5
5 SYRUP ORAL EVERY 4 HOURS PRN
Status: DISCONTINUED | OUTPATIENT
Start: 2024-06-09 | End: 2024-06-12 | Stop reason: HOSPADM

## 2024-06-09 RX ORDER — FUROSEMIDE 20 MG/1
20 TABLET ORAL DAILY
Status: DISCONTINUED | OUTPATIENT
Start: 2024-06-09 | End: 2024-06-12 | Stop reason: HOSPADM

## 2024-06-09 RX ORDER — SODIUM CHLORIDE 9 MG/ML
INJECTION, SOLUTION INTRAVENOUS CONTINUOUS
Status: DISCONTINUED | OUTPATIENT
Start: 2024-06-09 | End: 2024-06-09

## 2024-06-09 RX ORDER — METOPROLOL SUCCINATE 25 MG/1
25 TABLET, EXTENDED RELEASE ORAL NIGHTLY
Status: DISCONTINUED | OUTPATIENT
Start: 2024-06-09 | End: 2024-06-12

## 2024-06-09 RX ORDER — DONEPEZIL HYDROCHLORIDE 10 MG/1
10 TABLET, FILM COATED ORAL NIGHTLY
Status: DISCONTINUED | OUTPATIENT
Start: 2024-06-09 | End: 2024-06-12 | Stop reason: HOSPADM

## 2024-06-09 RX ORDER — ISOSORBIDE MONONITRATE 30 MG/1
30 TABLET, EXTENDED RELEASE ORAL DAILY
Status: DISCONTINUED | OUTPATIENT
Start: 2024-06-09 | End: 2024-06-12 | Stop reason: HOSPADM

## 2024-06-09 RX ORDER — ACETAMINOPHEN 160 MG
2000 TABLET,DISINTEGRATING ORAL DAILY
Status: ON HOLD | COMMUNITY
End: 2024-06-12 | Stop reason: HOSPADM

## 2024-06-09 RX ORDER — ASCORBIC ACID 500 MG
500 TABLET ORAL DAILY
COMMUNITY

## 2024-06-09 RX ORDER — ONDANSETRON 2 MG/ML
4 INJECTION INTRAMUSCULAR; INTRAVENOUS EVERY 6 HOURS PRN
Status: DISCONTINUED | OUTPATIENT
Start: 2024-06-09 | End: 2024-06-12 | Stop reason: HOSPADM

## 2024-06-09 RX ORDER — RANOLAZINE 500 MG/1
500 TABLET, EXTENDED RELEASE ORAL 2 TIMES DAILY
Status: DISCONTINUED | OUTPATIENT
Start: 2024-06-09 | End: 2024-06-12 | Stop reason: HOSPADM

## 2024-06-09 RX ORDER — SODIUM CHLORIDE 9 MG/ML
250 INJECTION, SOLUTION INTRAVENOUS PRN
Status: DISCONTINUED | OUTPATIENT
Start: 2024-06-09 | End: 2024-06-12 | Stop reason: HOSPADM

## 2024-06-09 RX ORDER — SODIUM CHLORIDE 0.9 % (FLUSH) 0.9 %
5-40 SYRINGE (ML) INJECTION PRN
Status: DISCONTINUED | OUTPATIENT
Start: 2024-06-09 | End: 2024-06-12 | Stop reason: HOSPADM

## 2024-06-09 RX ORDER — ALBUTEROL SULFATE 2.5 MG/3ML
2.5 SOLUTION RESPIRATORY (INHALATION)
Status: DISCONTINUED | OUTPATIENT
Start: 2024-06-09 | End: 2024-06-12 | Stop reason: HOSPADM

## 2024-06-09 RX ORDER — POLYETHYLENE GLYCOL 3350 17 G/17G
17 POWDER, FOR SOLUTION ORAL DAILY PRN
Status: DISCONTINUED | OUTPATIENT
Start: 2024-06-09 | End: 2024-06-12 | Stop reason: HOSPADM

## 2024-06-09 RX ORDER — ATORVASTATIN CALCIUM 40 MG/1
40 TABLET, FILM COATED ORAL NIGHTLY
Status: DISCONTINUED | OUTPATIENT
Start: 2024-06-09 | End: 2024-06-12 | Stop reason: HOSPADM

## 2024-06-09 RX ORDER — ENOXAPARIN SODIUM 100 MG/ML
40 INJECTION SUBCUTANEOUS DAILY
Status: DISCONTINUED | OUTPATIENT
Start: 2024-06-09 | End: 2024-06-12 | Stop reason: HOSPADM

## 2024-06-09 RX ORDER — TRIHEXYPHENIDYL HYDROCHLORIDE 2 MG/1
3 TABLET ORAL 2 TIMES DAILY
Status: DISCONTINUED | OUTPATIENT
Start: 2024-06-09 | End: 2024-06-12 | Stop reason: HOSPADM

## 2024-06-09 RX ORDER — 0.9 % SODIUM CHLORIDE 0.9 %
30 INTRAVENOUS SOLUTION INTRAVENOUS ONCE
Status: COMPLETED | OUTPATIENT
Start: 2024-06-09 | End: 2024-06-09

## 2024-06-09 RX ORDER — ONDANSETRON 4 MG/1
4 TABLET, ORALLY DISINTEGRATING ORAL EVERY 8 HOURS PRN
Status: DISCONTINUED | OUTPATIENT
Start: 2024-06-09 | End: 2024-06-12 | Stop reason: HOSPADM

## 2024-06-09 RX ORDER — SODIUM CHLORIDE 9 MG/ML
1000 INJECTION, SOLUTION INTRAVENOUS CONTINUOUS
Status: DISPENSED | OUTPATIENT
Start: 2024-06-09 | End: 2024-06-09

## 2024-06-09 RX ORDER — MEMANTINE HYDROCHLORIDE 10 MG/1
10 TABLET ORAL 2 TIMES DAILY
Status: DISCONTINUED | OUTPATIENT
Start: 2024-06-09 | End: 2024-06-12 | Stop reason: HOSPADM

## 2024-06-09 RX ORDER — PANTOPRAZOLE SODIUM 40 MG/1
40 TABLET, DELAYED RELEASE ORAL
Status: DISCONTINUED | OUTPATIENT
Start: 2024-06-09 | End: 2024-06-10

## 2024-06-09 RX ORDER — SODIUM CHLORIDE 0.9 % (FLUSH) 0.9 %
5-40 SYRINGE (ML) INJECTION EVERY 12 HOURS SCHEDULED
Status: DISCONTINUED | OUTPATIENT
Start: 2024-06-09 | End: 2024-06-12 | Stop reason: HOSPADM

## 2024-06-09 RX ADMIN — PIPERACILLIN AND TAZOBACTAM 4500 MG: 4; .5 INJECTION, POWDER, FOR SOLUTION INTRAVENOUS at 13:54

## 2024-06-09 RX ADMIN — ENOXAPARIN SODIUM 40 MG: 100 INJECTION SUBCUTANEOUS at 11:47

## 2024-06-09 RX ADMIN — IPRATROPIUM BROMIDE AND ALBUTEROL SULFATE 1 DOSE: 2.5; .5 SOLUTION RESPIRATORY (INHALATION) at 15:13

## 2024-06-09 RX ADMIN — SODIUM CHLORIDE 1000 ML: 9 INJECTION, SOLUTION INTRAVENOUS at 09:09

## 2024-06-09 RX ADMIN — SODIUM CHLORIDE 250 ML: 9 INJECTION, SOLUTION INTRAVENOUS at 13:53

## 2024-06-09 RX ADMIN — SODIUM CHLORIDE 2448 ML: 9 INJECTION, SOLUTION INTRAVENOUS at 05:34

## 2024-06-09 RX ADMIN — IPRATROPIUM BROMIDE AND ALBUTEROL SULFATE 1 DOSE: 2.5; .5 SOLUTION RESPIRATORY (INHALATION) at 11:24

## 2024-06-09 RX ADMIN — PIPERACILLIN AND TAZOBACTAM 4500 MG: 4; .5 INJECTION, POWDER, FOR SOLUTION INTRAVENOUS at 05:31

## 2024-06-09 RX ADMIN — IPRATROPIUM BROMIDE AND ALBUTEROL SULFATE 1 DOSE: 2.5; .5 SOLUTION RESPIRATORY (INHALATION) at 19:20

## 2024-06-09 ASSESSMENT — PAIN - FUNCTIONAL ASSESSMENT: PAIN_FUNCTIONAL_ASSESSMENT: ADULT NONVERBAL PAIN SCALE (NPVS)

## 2024-06-09 NOTE — ED PROVIDER NOTES
The history is provided by the EMS personnel.   Altered Mental Status  The original complaint from the nursing home was that the patient had altered mental status.  The nursing home told EMS that the patient normally is able to have a conversation despite the fact that the patient lives in the memory care component of the nursing home.  However EMS reports that the patient was unresponsive verbally but did have his eyes open.  It is unclear as to what is the patient normal baseline at the nursing home.  Review of the patient's chart shows that when he was discharged from the hospital 2022 the patient was recorded to be alert and oriented x 4 at his discharge.  I currently do not have a new baseline for the patient and the only chief complaint we have to go by is from the nursing home and from what EMS had told us of altered mental status.  Arrival of the patient's wife to the emergency department reveals that he has an extensive history of Parkinson's and dementia and she states that there are some days that he is nonverbal.  His wife also reports that the nursing home told her that he had a \"glassy appearance of his eyes and would not swallow his pills which she has done in the past so they were sending him to the ER\"    Review of Systems   Unable to perform ROS: Mental status change       Family History   Problem Relation Age of Onset    High Blood Pressure Mother     High Blood Pressure Father     Heart Disease Father     Osteoporosis Sister          from allergy from an injection    No Known Problems Sister     No Known Problems Sister     No Known Problems Sister     No Known Problems Sister      Social History     Socioeconomic History    Marital status:      Spouse name: Not on file    Number of children: Not on file    Years of education: Not on file    Highest education level: Not on file   Occupational History    Not on file   Tobacco Use    Smoking status: Every Day     Current  packs/day: 0.50     Average packs/day: 0.5 packs/day for 60.3 years (30.2 ttl pk-yrs)     Types: Cigarettes     Start date: 2/5/1964    Smokeless tobacco: Never   Vaping Use    Vaping Use: Never used   Substance and Sexual Activity    Alcohol use: Not Currently    Drug use: No    Sexual activity: Not Currently   Other Topics Concern    Not on file   Social History Narrative    Not on file     Social Determinants of Health     Financial Resource Strain: Low Risk  (4/21/2021)    Overall Financial Resource Strain (CARDIA)     Difficulty of Paying Living Expenses: Not hard at all   Food Insecurity: No Food Insecurity (4/21/2021)    Hunger Vital Sign     Worried About Running Out of Food in the Last Year: Never true     Ran Out of Food in the Last Year: Never true   Transportation Needs: Not on file   Physical Activity: Not on file   Stress: Not on file   Social Connections: Not on file   Intimate Partner Violence: Not on file   Housing Stability: Not on file     Past Surgical History:   Procedure Laterality Date    ANKLE SURGERY      BACK SURGERY      CARDIAC SURGERY      heart cath    CORONARY ANGIOPLASTY WITH STENT PLACEMENT      HERNIA REPAIR      LUMBAR LAMINECTOMY      TONSILLECTOMY      VASECTOMY       Past Medical History:   Diagnosis Date    CAD (coronary artery disease)     with stent    Chronic low back pain     HX of laminectomy    Coronary arteriosclerosis     stent    Dementia (HCC)     Fracture     R ankle    Hypertension     Hypertensive disorder     Osteoarthritis     Parkinson disease (HCC)     Small vessel disease, cerebrovascular     Tobacco abuse      Allergies   Allergen Reactions    Chantix [Varenicline]     Daypro [Oxaprozin]      Prior to Admission medications    Medication Sig Start Date End Date Taking? Authorizing Provider   isosorbide mononitrate (IMDUR) 30 MG extended release tablet Take 1 tablet by mouth in the morning. 8/3/22   Doris Allen, APRN - CNP   ranolazine (RANEXA) 500 MG extended

## 2024-06-09 NOTE — PROGRESS NOTES
4 Eyes Skin Assessment     NAME:  Lamont Mckeon  YOB: 1950  MEDICAL RECORD NUMBER:  [id number]    The patient is being assessed for  Admission    I agree that at least one RN has performed a thorough Head to Toe Skin Assessment on the patient. ALL assessment sites listed below have been assessed.      Areas assessed by both nurses:    Head, Face, Ears, Shoulders, Back, Chest, Arms, Elbows, Hands, Sacrum. Buttock, Coccyx, Ischium, Legs. Feet and Heels, and Under Medical Devices         Does the Patient have a Wound? Yes wound(s) were present on assessment. LDA wound assessment was Initiated and completed by RN       Waylon Prevention initiated by RN: Yes  Wound Care Orders initiated by RN: Yes    Pressure Injury (Stage 3,4, Unstageable, DTI, NWPT, and Complex wounds) if present, place Wound referral order by RN under : Yes    New Ostomies, if present place, Ostomy referral order under : No     Nurse 1 eSignature: Electronically signed by Jaclyn London on 6/9/24 at 12:43 PM EDT    **SHARE this note so that the co-signing nurse can place an eSignature**    Nurse 2 eSignature: Electronically signed by German Calderon RN on 6/9/24 at 12:45 PM EDT

## 2024-06-09 NOTE — H&P
V2.0  History and Physical      Name:  Lamont Mckeon /Age/Sex: 1950  (74 y.o. male)   MRN & CSN:  [id number] & [id number] Encounter Date/Time: 2024 8:06 AM EDT   Location:   PCP: Yamile Marquez DO       Hospital Day: 1    Assessment and Plan:   Lamont Mckeon is a 74 y.o. male  who presents with Pneumonia of left upper lobe due to infectious organism    Hospital Problems             Last Modified POA    * (Principal) Pneumonia of left upper lobe due to infectious organism 2024 Yes       Plan:  Left upper lung pneumonia continue Zosyn, DuoNebs, blood cultures are pending, order procalcitonin, urinary antigens ordered.  Acute respiratory failure with hypoxia, patient is normally on room air is requiring 2 L nasal cannula.  Will wean as tolerated  Acute cystitis, urine cultures pending continue Zosyn  Diarrhea, stool studies have been sent awaiting results.  Known Dementia, is on Namenda and Aricept lives in locked unit at nursing home.  Did discuss mental status with patient's wife who was at bedside, she states he is  nonverbal and has been for several months.  Chronic A-Fib, not currently on any anticoagulation at the nursing home.  Continue metoprolol.  Parkinson's disease continue with trihexyphenidyl  History of CAD continue aspirin metoprolol and statin.  Elevated troponin, most likely secondary to stress response secondary to hypoxia.  No complaints of chest pain however patient is nonverbal.  First troponin was 59-second troponin 55.  Downward trend      Of note swallow eval completed at bedside patient is able to follow direction to swallow.  Will consult speech therapy.  Patient will be made NPO.        Disposition:   Current Living situation: Locked dementia unit  Expected Disposition: As above  Estimated D/C: 48 to 72 hours    Diet ADULT DIET; Regular; Low Fat/Low Chol/High Fiber/RAYO   DVT Prophylaxis [x] Lovenox, []  Heparin, [] SCDs, [] Ambulation,  [] Eliquis, [] Xarelto,  PRN  guaiFENesin-dextromethorphan, 5 mL, Q4H PRN        Labs      CBC:   Recent Labs     06/09/24  0505   WBC 12.8*   HGB 9.7*        BMP:    Recent Labs     06/09/24  0505      K 4.4      CO2 26   BUN 28*   CREATININE 1.1   GLUCOSE 155*     Hepatic:   Recent Labs     06/09/24  0505   AST 11*   ALT 7*   BILITOT 0.5   ALKPHOS 106     Lipids:   Lab Results   Component Value Date/Time    CHOL 138 07/31/2022 06:04 PM    CHOL 118 02/24/2022 08:52 AM    HDL 37 07/31/2022 06:04 PM    TRIG 96 07/31/2022 06:04 PM       Troponin:   Lab Results   Component Value Date/Time    TROPONINT <0.010 07/31/2022 02:15 AM    TROPONINT <0.010 07/30/2022 11:17 PM    TROPONINT <0.010 07/30/2022 03:00 PM       BNP:   Recent Labs     06/09/24  0505   PROBNP 1,533*     UA:  Lab Results   Component Value Date/Time    NITRU POSITIVE 06/09/2024 05:24 AM    COLORU ORANGE 06/09/2024 05:24 AM    PHUR 5.5 06/09/2024 05:24 AM    WBCUA 1 06/09/2024 05:24 AM    RBCUA 0 06/09/2024 05:24 AM    MUCUS RARE 10/07/2018 06:45 AM    TRICHOMONAS NONE SEEN 10/07/2018 06:45 AM    YEAST FEW 06/26/2016 10:37 PM    BACTERIA FEW 06/09/2024 05:24 AM    CLARITYU CLEAR 06/09/2024 05:24 AM    LEUKOCYTESUR NEGATIVE 06/09/2024 05:24 AM    UROBILINOGEN 1.0 06/09/2024 05:24 AM    BILIRUBINUR MODERATE NUMBER OR AMOUNT OBSERVED 06/09/2024 05:24 AM    BLOODU NEGATIVE 06/09/2024 05:24 AM    GLUCOSEU NEGATIVE 06/09/2024 05:24 AM    KETUA TRACE 06/09/2024 05:24 AM    AMORPHOUS RARE 06/26/2016 10:37 PM         Imaging/Diagnostics Last 24 Hours   CT HEAD WO CONTRAST    Result Date: 6/9/2024  DICTATING PHYSICIAN: Bari Bae M.D. EXAMINATION:   Brain CT dated 6/9/2024. COMPARISON: Brain MRI dated 8/1/2022. HISTORY:   Altered mental status. History of Parkinson's, dementia. PROCEDURE:  Utilizing 2.5 mm collimation, contiguous axial tomographic images were obtained from the skullbase to the cranial vertex. Brain, blood, stroke, bone, and soft tissue windows reviewed

## 2024-06-10 LAB
ALBUMIN SERPL-MCNC: 1.8 GM/DL (ref 3.4–5)
ALP BLD-CCNC: 91 IU/L (ref 40–129)
ALT SERPL-CCNC: <5 U/L (ref 10–40)
ANION GAP SERPL CALCULATED.3IONS-SCNC: 8 MMOL/L (ref 7–16)
ANISOCYTOSIS: ABNORMAL
AST SERPL-CCNC: 10 IU/L (ref 15–37)
ASTROVIRUS PCR: NOT DETECTED
BANDED NEUTROPHILS ABSOLUTE COUNT: 1.66 K/CU MM
BANDED NEUTROPHILS RELATIVE PERCENT: 16 % (ref 5–11)
BILIRUB SERPL-MCNC: 0.5 MG/DL (ref 0–1)
BUN SERPL-MCNC: 31 MG/DL (ref 6–23)
BURR CELLS: ABNORMAL
C CAYETANENSIS DNA SPEC QL NAA+PROBE: NOT DETECTED
C DIFF AG + TOXIN: ABNORMAL
CALCIUM SERPL-MCNC: 8.6 MG/DL (ref 8.3–10.6)
CAMPY SP DNA.DIARRHEA STL QL NAA+PROBE: NOT DETECTED
CHLORIDE BLD-SCNC: 112 MMOL/L (ref 99–110)
CLOSTRIDIUM DIFFICILE, PCR: NORMAL
CO2: 22 MMOL/L (ref 21–32)
CREAT SERPL-MCNC: 1 MG/DL (ref 0.9–1.3)
CRYPTOSP DNA SPEC QL NAA+PROBE: NOT DETECTED
CULTURE: NORMAL
DIFFERENTIAL TYPE: ABNORMAL
E COLI DNA SPEC QL NAA+PROBE: NOT DETECTED
E COLI ENTEROAGGREGATIVE PCR: NOT DETECTED
E COLI ENTEROPATHOGENIC PCR: NOT DETECTED
E COLI ENTEROTOXIGENIC PCR: NOT DETECTED
E COLI O157H7 DNA SPEC QL NAA+PROBE: NOT DETECTED
E HISTOLYT DNA SPEC QL NAA+PROBE: NOT DETECTED
EC STX1+STX2 + H7 FLIC SPEC NAA+PROBE: NOT DETECTED
EKG ATRIAL RATE: 103 BPM
EKG ATRIAL RATE: 68 BPM
EKG ATRIAL RATE: 75 BPM
EKG DIAGNOSIS: NORMAL
EKG P AXIS: 71 DEGREES
EKG P AXIS: 82 DEGREES
EKG P-R INTERVAL: 120 MS
EKG P-R INTERVAL: 142 MS
EKG Q-T INTERVAL: 356 MS
EKG Q-T INTERVAL: 400 MS
EKG Q-T INTERVAL: 432 MS
EKG QRS DURATION: 78 MS
EKG QRS DURATION: 90 MS
EKG QRS DURATION: 90 MS
EKG QTC CALCULATION (BAZETT): 446 MS
EKG QTC CALCULATION (BAZETT): 459 MS
EKG QTC CALCULATION (BAZETT): 466 MS
EKG R AXIS: -13 DEGREES
EKG R AXIS: -19 DEGREES
EKG R AXIS: -5 DEGREES
EKG T AXIS: -3 DEGREES
EKG T AXIS: -4 DEGREES
EKG T AXIS: 8 DEGREES
EKG VENTRICULAR RATE: 103 BPM
EKG VENTRICULAR RATE: 68 BPM
EKG VENTRICULAR RATE: 75 BPM
G LAMBLIA DNA SPEC QL NAA+PROBE: NOT DETECTED
GFR, ESTIMATED: 78 ML/MIN/1.73M2
GLUCOSE SERPL-MCNC: 109 MG/DL (ref 70–99)
HADV DNA SPEC QL NAA+PROBE: NOT DETECTED
HCT VFR BLD CALC: 29.5 % (ref 42–52)
HEMOGLOBIN: 8.7 GM/DL (ref 13.5–18)
L PNEUMO AG UR QL IA: NEGATIVE
LYMPHOCYTES ABSOLUTE: 1.8 K/CU MM
LYMPHOCYTES RELATIVE PERCENT: 17 % (ref 24–44)
Lab: NORMAL
MCH RBC QN AUTO: 26.9 PG (ref 27–31)
MCHC RBC AUTO-ENTMCNC: 29.5 % (ref 32–36)
MCV RBC AUTO: 91 FL (ref 78–100)
METAMYELOCYTES ABSOLUTE COUNT: 0.42 K/CU MM
METAMYELOCYTES PERCENT: 4 %
MICROCYTES: ABNORMAL
MONOCYTES ABSOLUTE: 0.4 K/CU MM
MONOCYTES RELATIVE PERCENT: 4 % (ref 0–4)
MRSA, DNA, NASAL: NEGATIVE
NEUTROPHILS ABSOLUTE: 6.1 K/CU MM
NEUTROPHILS RELATIVE PERCENT: 59 % (ref 36–66)
NOROVIRUS RNA SPEC QL NAA+PROBE: NOT DETECTED
OVALOCYTES: ABNORMAL
P SHIGELLOIDES DNA STL QL NAA+PROBE: NOT DETECTED
PDW BLD-RTO: 18.5 % (ref 11.7–14.9)
PLATELET # BLD: 179 K/CU MM (ref 140–440)
PLT MORPHOLOGY: ADEQUATE
PMV BLD AUTO: 8.8 FL (ref 7.5–11.1)
POTASSIUM SERPL-SCNC: 3.7 MMOL/L (ref 3.5–5.1)
PROCALCITONIN SERPL-MCNC: 0.78 NG/ML
RBC # BLD: 3.24 M/CU MM (ref 4.6–6.2)
RV RNA SPEC QL NAA+PROBE: NOT DETECTED
S PNEUM AG CSF QL: NORMAL
SALMONELLA DNA SPEC QL NAA+PROBE: NOT DETECTED
SAPOVIRUS PCR: NOT DETECTED
SCHISTOCYTES: ABNORMAL
SODIUM BLD-SCNC: 142 MMOL/L (ref 135–145)
SOURCE: ABNORMAL
SPECIMEN DESCRIPTION: NORMAL
SPECIMEN: NORMAL
TOTAL PROTEIN: 4.7 GM/DL (ref 6.4–8.2)
V CHOLERAE DNA SPEC QL NAA+PROBE: NOT DETECTED
VIBRIO DNA SPEC NAA+PROBE: NOT DETECTED
WBC # BLD: 10.4 K/CU MM (ref 4–10.5)
YERSINIA DNA SPEC NAA+PROBE: NOT DETECTED

## 2024-06-10 PROCEDURE — 6370000000 HC RX 637 (ALT 250 FOR IP): Performed by: NURSE PRACTITIONER

## 2024-06-10 PROCEDURE — 94761 N-INVAS EAR/PLS OXIMETRY MLT: CPT

## 2024-06-10 PROCEDURE — 2700000000 HC OXYGEN THERAPY PER DAY

## 2024-06-10 PROCEDURE — 94640 AIRWAY INHALATION TREATMENT: CPT

## 2024-06-10 PROCEDURE — 2580000003 HC RX 258: Performed by: FAMILY MEDICINE

## 2024-06-10 PROCEDURE — 93005 ELECTROCARDIOGRAM TRACING: CPT | Performed by: NURSE PRACTITIONER

## 2024-06-10 PROCEDURE — 1200000000 HC SEMI PRIVATE

## 2024-06-10 PROCEDURE — 80053 COMPREHEN METABOLIC PANEL: CPT

## 2024-06-10 PROCEDURE — 93010 ELECTROCARDIOGRAM REPORT: CPT | Performed by: INTERNAL MEDICINE

## 2024-06-10 PROCEDURE — 92610 EVALUATE SWALLOWING FUNCTION: CPT

## 2024-06-10 PROCEDURE — 97166 OT EVAL MOD COMPLEX 45 MIN: CPT

## 2024-06-10 PROCEDURE — 6360000002 HC RX W HCPCS: Performed by: NURSE PRACTITIONER

## 2024-06-10 PROCEDURE — 51798 US URINE CAPACITY MEASURE: CPT

## 2024-06-10 PROCEDURE — 97162 PT EVAL MOD COMPLEX 30 MIN: CPT

## 2024-06-10 PROCEDURE — 85027 COMPLETE CBC AUTOMATED: CPT

## 2024-06-10 PROCEDURE — 85007 BL SMEAR W/DIFF WBC COUNT: CPT

## 2024-06-10 PROCEDURE — 97530 THERAPEUTIC ACTIVITIES: CPT

## 2024-06-10 PROCEDURE — 6360000002 HC RX W HCPCS: Performed by: FAMILY MEDICINE

## 2024-06-10 RX ORDER — FUROSEMIDE 10 MG/ML
40 INJECTION INTRAMUSCULAR; INTRAVENOUS ONCE
Status: COMPLETED | OUTPATIENT
Start: 2024-06-10 | End: 2024-06-10

## 2024-06-10 RX ORDER — PANTOPRAZOLE SODIUM 40 MG/10ML
40 INJECTION, POWDER, LYOPHILIZED, FOR SOLUTION INTRAVENOUS DAILY
Status: DISCONTINUED | OUTPATIENT
Start: 2024-06-11 | End: 2024-06-10 | Stop reason: ALTCHOICE

## 2024-06-10 RX ORDER — METOPROLOL TARTRATE 1 MG/ML
5 INJECTION, SOLUTION INTRAVENOUS EVERY 6 HOURS
Status: DISCONTINUED | OUTPATIENT
Start: 2024-06-10 | End: 2024-06-11

## 2024-06-10 RX ADMIN — IPRATROPIUM BROMIDE AND ALBUTEROL SULFATE 1 DOSE: 2.5; .5 SOLUTION RESPIRATORY (INHALATION) at 07:08

## 2024-06-10 RX ADMIN — IPRATROPIUM BROMIDE AND ALBUTEROL SULFATE 1 DOSE: 2.5; .5 SOLUTION RESPIRATORY (INHALATION) at 14:56

## 2024-06-10 RX ADMIN — SODIUM CHLORIDE 250 ML: 9 INJECTION, SOLUTION INTRAVENOUS at 22:07

## 2024-06-10 RX ADMIN — PIPERACILLIN AND TAZOBACTAM 4500 MG: 4; .5 INJECTION, POWDER, FOR SOLUTION INTRAVENOUS at 15:27

## 2024-06-10 RX ADMIN — PIPERACILLIN AND TAZOBACTAM 4500 MG: 4; .5 INJECTION, POWDER, FOR SOLUTION INTRAVENOUS at 06:48

## 2024-06-10 RX ADMIN — IPRATROPIUM BROMIDE AND ALBUTEROL SULFATE 1 DOSE: 2.5; .5 SOLUTION RESPIRATORY (INHALATION) at 19:22

## 2024-06-10 RX ADMIN — IPRATROPIUM BROMIDE AND ALBUTEROL SULFATE 1 DOSE: 2.5; .5 SOLUTION RESPIRATORY (INHALATION) at 11:03

## 2024-06-10 RX ADMIN — PIPERACILLIN AND TAZOBACTAM 4500 MG: 4; .5 INJECTION, POWDER, FOR SOLUTION INTRAVENOUS at 22:08

## 2024-06-10 RX ADMIN — FUROSEMIDE 40 MG: 10 INJECTION, SOLUTION INTRAMUSCULAR; INTRAVENOUS at 15:21

## 2024-06-10 RX ADMIN — ENOXAPARIN SODIUM 40 MG: 100 INJECTION SUBCUTANEOUS at 11:19

## 2024-06-10 RX ADMIN — PIPERACILLIN AND TAZOBACTAM 4500 MG: 4; .5 INJECTION, POWDER, FOR SOLUTION INTRAVENOUS at 01:22

## 2024-06-10 NOTE — CARE COORDINATION
06/10/24 0951   Service Assessment   Patient Orientation Unable to Assess   Cognition Dementia / Early Alzheimer's   History Provided By Medical Record   Primary Caregiver Other (Comment)  (LTC resident at [facility])   Support Systems Spouse/Significant Other   Patient's Healthcare Decision Maker is: Legal Next of Kin   PCP Verified by CM Yes   Prior Functional Level Assistance with the following:;Bathing;Dressing;Toileting;Cooking;Housework;Shopping;Mobility   Current Functional Level Assistance with the following:;Housework;Shopping;Mobility;Bathing;Dressing;Toileting;Cooking   Can patient return to prior living arrangement Yes   Ability to make needs known: Poor   Family able to assist with home care needs: Other (comment)  (LTC resident at [facility])   Financial Resources Medicare;Other (Comment)  (Medicare & )   Social/Functional History   Lives With Other (comment)  (LT resident at [facility])   Type of Home Facility  (LTC resident at [facility])   Discharge Planning   Type of Residence Long-Term Care   Living Arrangements Other (Comment)  (LT resident at [facility])   Current Services Prior To Admission Extended Care Facility   Potential Assistance Needed Skilled Nursing Facility   Potential Assistance Purchasing Medications No   Patient expects to be discharged to: Long-term care   Services At/After Discharge   Transition of Care Consult (CM Consult) N/A   Services At/After Discharge Long term care   Mode of Transport at Discharge BLS   Confirm Follow Up Transport Family   Condition of Participation: Discharge Planning   The Plan for Transition of Care is related to the following treatment goals: Plan is for patient to return to [facility]     CM reviewed the patient's chart to initiate discharge planning.  Patient is a long-term care resident at [facility], has medical coverage & PCP, and requires assistance with ADLs.  CM spoke with  Silva at [facility] who confirmed that the patient is a long-term care resident.  Silva advised that the patient does sit in his recliner at the facility and may benefit from returning to their skilled unit for skilled therapy prior to transitioning back into long-term care.  CM updated the whiteboard requesting PT/OT evaluations.  Patient's insurance requires a qualifying 3 night inpatient stay to be eligible for skilled benefits.  CM will follow.

## 2024-06-10 NOTE — DISCHARGE INSTR - COC
Continuity of Care Form    Patient Name: Lamont Mckeon   :  1950  MRN:  [id number]    Admit date:  2024  Discharge date:  2024    Code Status Order: Full Code   Advance Directives:     Admitting Physician:  Harpreet Saldaña MD  PCP: Yamile Marquez DO    Discharging Nurse: Kay Stover RN  Discharging Hospital Unit/Room#: 002/002-01  Discharging Unit Phone Number: [phone]    Emergency Contact:   Extended Emergency Contact Information  Primary Emergency Contact: Becky Mckeon  Address: [address] [place] [address] of [facility]  Home Phone: [phone]  Mobile Phone: [phone]  Relation: Spouse  Secondary Emergency Contact: Lamont Mckeon Jr  Home Phone: [phone]  Relation: Child    Past Surgical History:  Past Surgical History:   Procedure Laterality Date    ANKLE SURGERY      BACK SURGERY      CARDIAC SURGERY      heart cath    CORONARY ANGIOPLASTY WITH STENT PLACEMENT      HERNIA REPAIR      LUMBAR LAMINECTOMY      TONSILLECTOMY      VASECTOMY         Immunization History:   Immunization History   Administered Date(s) Administered    COVID-19, MODERNA BLUE border, Primary or Immunocompromised, (age 12y+), IM, 100 mcg/0.5mL 2021, 2021    COVID-19, MODERNA Booster BLUE border, (age 18y+), IM, 50mcg/0.25mL 12/10/2021    COVID-19, PFIZER Bivalent, DO NOT Dilute, (age 12y+), IM, 30 mcg/0.3 mL 2022    Influenza, Triv, inactivated, subunit, adjuvanted, IM (Fluad 65 yrs and older) 10/09/2019       Active Problems:  Patient Active Problem List   Diagnosis Code    Right inguinal hernia K40.90    Hyperlipidemia E78.5    Primary hypertension I10    Primary parkinsonism (HCC) G20.C    Dementia (HCC) F03.90    Osteoarthritis M19.90    Small vessel disease, cerebrovascular I67.9    CAD (coronary artery disease) I25.10    Tobacco abuse Z72.0    Cellulitis L03.90    Frequent falls R29.6    Age-related physical debility R54    Venous stasis of both lower    Odor None 06/09/24 1952   Number of days: 1       Wound 06/09/24 Tibial Posterior;Right (Active)   Dressing Status New dressing applied 06/09/24 0730   Wound Cleansed Soap and water 06/09/24 0730   Dressing/Treatment Silicone border 06/09/24 1952   Drainage Amount None (dry) 06/09/24 1952   Odor None 06/09/24 1952   Number of days: 1        Elimination:  Continence:   Bowel: No  Bladder: No  Urinary Catheter: None   Colostomy/Ileostomy/Ileal Conduit: No       Date of Last BM: 06/12/2024    Intake/Output Summary (Last 24 hours) at 6/10/2024 0931  Last data filed at 6/10/2024 0651  Gross per 24 hour   Intake 1007.01 ml   Output 550 ml   Net 457.01 ml     I/O last 3 completed shifts:  In: 1007 [I.V.:740.4; IV Piggyback:266.7]  Out: 550 [Urine:550]    Safety Concerns:     Aspiration Risk    Impairments/Disabilities:      Speech, Vision, and non ambulatory    Nutrition Therapy:  Current Nutrition Therapy:   - Oral Diet:  Dysphagia - Pureed    Routes of Feeding: Oral  Liquids: Thin Liquids  Daily Fluid Restriction: no  Last Modified Barium Swallow with Video (Video Swallowing Test): not done    Treatments at the Time of Hospital Discharge:   Respiratory Treatments: NA  Oxygen Therapy:  is not on home oxygen therapy.  Ventilator:    - No ventilator support    Rehab Therapies: na  Weight Bearing Status/Restrictions: No weight bearing restrictions  Other Medical Equipment (for information only, NOT a DME order):  wheelchair and hospital bed  Other Treatments: NA    Patient's personal belongings (please select all that are sent with patient):  None    RN SIGNATURE:  Electronically signed by Natacha Stover RN on 6/12/24 at 10:59 AM EDT    CASE MANAGEMENT/SOCIAL WORK SECTION    Inpatient Status Date: 6/9/24    Readmission Risk Assessment Score:  Readmission Risk              Risk of Unplanned Readmission:  15           Discharging to Facility/ Agency   Name: [facility]   Address: [address]

## 2024-06-10 NOTE — CONSULTS
[facility] Continence Nurse  Consult Note       Lamont Mckeon  AGE: 74 y.o.   GENDER: male  : 1950  TODAY'S DATE:  6/10/2024    Subjective:     Reason for [facility] Evaluation and Assessment: bilateral buttock wound      Lamont Mckeon is a 74 y.o. male referred by:   [] Physician  [x] Nursing  [] Other:     Wound Identification:  Wound Type: pressure  Contributing Factors: chronic pressure, decreased mobility, shear force, and incontinence of stool        PAST MEDICAL HISTORY        Diagnosis Date    CAD (coronary artery disease)     with stent    Chronic low back pain     HX of laminectomy    Coronary arteriosclerosis     stent    Dementia (HCC)     Fracture     R ankle    Hypertension     Hypertensive disorder     Osteoarthritis     Parkinson disease (HCC)     Small vessel disease, cerebrovascular     Tobacco abuse        PAST SURGICAL HISTORY    Past Surgical History:   Procedure Laterality Date    ANKLE SURGERY      BACK SURGERY      CARDIAC SURGERY      heart cath    CORONARY ANGIOPLASTY WITH STENT PLACEMENT      HERNIA REPAIR      LUMBAR LAMINECTOMY      TONSILLECTOMY      VASECTOMY         FAMILY HISTORY    Family History   Problem Relation Age of Onset    High Blood Pressure Mother     High Blood Pressure Father     Heart Disease Father     Osteoporosis Sister          from allergy from an injection    No Known Problems Sister     No Known Problems Sister     No Known Problems Sister     No Known Problems Sister        SOCIAL HISTORY    Social History     Tobacco Use    Smoking status: Every Day     Current packs/day: 0.50     Average packs/day: 0.5 packs/day for 60.3 years (30.2 ttl pk-yrs)     Types: Cigarettes     Start date: 1964    Smokeless tobacco: Never   Vaping Use    Vaping Use: Never used   Substance Use Topics    Alcohol use: Not Currently    Drug use: No       ALLERGIES    Allergies   Allergen Reactions    Chantix [Varenicline]     Daypro [Oxaprozin]

## 2024-06-10 NOTE — PROGRESS NOTES
V2.0    [facility] Progress Note      Name:  Lamont Mckeon /Age/Sex: 1950  (74 y.o. male)   MRN & CSN:  [id number] & [id number] Encounter Date/Time: 6/10/2024 7:31 AM EDT   Location:   PCP: Yamile Marquez DO     Attending:Harpreet Saldaña MD       Hospital Day: 2    Assessment and Recommendations   Lamnot Mckeon is a 74 y.o. male  who presents with Pneumonia of left upper lobe due to infectious organism      Plan:   Left upper lung pneumonia continue Zosyn will continue as CT chest did show pneumonia.  DuoNebs.  Blood cultures show no growth to date, urinary antigens are negative, procalcitonin is unremarkable.  No elevated white count.  Acute respiratory failure with hypoxia, patient is normally on room air is requiring 2 L nasal cannula.  Will wean as tolerated  Acute cystitis, urine cultures no growth to date.    Diarrhea, C. difficile and gastrointestinal profile both negative.    Known Dementia, is on Namenda and Aricept lives in locked unit at nursing home.  Did discuss mental status with patient's wife who was at bedside, she states he is  nonverbal and has been for several months.  Dysphagia, patient has been seen by speech and evaluated, they did recommend keep patient n.p.o., they will reevaluate again tomorrow, most likely patient is going to need NG placed for tube feedings and eventually PEG tube insertion.  Will give patient another 24 hours before inserting NG tube given his dementia and risk of pulling tube out.  All p.o. medications are on hold at this time.  Paroxysmal atrial fibrillation, not currently on any anticoagulation at the nursing home.  Continue metoprolol.  Will start IV metoprolol 5 mg every 6 hours.  Will add hold parameters.  Patient is currently in normal sinus rhythm.  Parkinson's disease holding trihexyphenidyl  History of CAD continue aspirin metoprolol and statin.  Elevated troponin, most likely secondary to stress response secondary to hypoxia.  No  Altered mental status. History of Parkinson's, dementia. PROCEDURE:  Utilizing 2.5 mm collimation, contiguous axial tomographic images were obtained from the skullbase to the cranial vertex. Brain, blood, stroke, bone, and soft tissue windows reviewed soft copy. Radiation dose reduction techniques applied. FINDINGS:  No acute intracranial hemorrhage. No mass, mass-effect, shift of the midline structures, or abnormal extra-axial fluid collections. Diffuse, patchy low attenuation in the periventricular, deep, and subcortical white matter. Findings most likely represent the sequelae of chronic small vessel ischemic change. No CT evidence of acute ischemia, however, MRI is a more sensitive indicator of acute and hyperacute ischemic change. Ventricular system and sulcal spaces moderately prominent, but proportionate. Basal cisterns patent. No findings suggestive of acutely elevated intraventricular pressure. The visualized paranasal sinuses, mastoid air cells, and middle ear cavities are well aerated. The visualized orbital contents are unremarkable.  No intra or extraconal masses in the visualized portions of the orbits.  The visualized portions of the globes are intact bilaterally.  The visualized extraocular muscles and optic nerves are symmetric bilaterally.  No lytic or blastic calvarial lesions.  No calvarial fractures.  The visualized extra-cranial soft tissues of the head and neck are unremarkable.     1. No acute intracranial abnormality. All CT scans performed at this facility use dose modulation, iterative reconstruction, and/or weight-based dosing when appropriate to reduce radiation dose to as low as reasonably achievable. Electronically signed by Bari Bae    XR CHEST PORTABLE    Result Date: 6/9/2024  DICTATING PHYSICIAN: Bari Bae M.D. EXAMINATION: AP chest radiograph dated 6/9/2024. COMPARISON: [facility] chest radiograph dated 7/30/2022. HISTORY:   Chest pain. Dementia. PROCEDURE:  PA and lateral chest

## 2024-06-10 NOTE — PROGRESS NOTES
Comprehensive Nutrition Assessment    Type and Reason for Visit:  Initial    Nutrition Recommendations/Plan:   Initiate oral diet and/or EN as appropriate per SLP recommendations  Please document all weights  Monitor labs, po intakes and POC     Malnutrition Assessment:  Malnutrition Status:  Severe malnutrition (06/10/24 1503)    Context:  Acute Illness     Findings of the 6 clinical characteristics of malnutrition:  Energy Intake:  Unable to assess  Weight Loss:  Unable to assess     Body Fat Loss:  Moderate body fat loss Orbital   Muscle Mass Loss:  Moderate muscle mass loss Temples (temporalis)  Fluid Accumulation:  Unable to assess     Strength:  Not Performed    Nutrition Assessment:    Pt admitted from [facility] for hypoxemia, acute cystitis, diarrhea, dysphagia, pneumonia of IFRAH, hypotension d/t hypovolemia. Hx includes CAD, dementia, HTN, Parkinson's dx. Diet order NPO-per SLP assessment pt currently to remain NPO and possible need to consider alternative nutrition. If unable to safely take po-pt would benefit from enteral feeding to meet nutritional needs. Pt is unable to answer questions d/t dementia. Weights reviewed per chart with slight loss over past 2 years. Full physical assessment unable to be obtained d/t pt sleeping soundly-did note moderate muscle and fat depletion to orbitals and temples. Pt at this time meets criteria for severe malnutrition. Will monitor at high nutrition risk to determine appropriate route of nutrition.    Nutrition Related Findings:    hgb 8.7 L, BUN 31 L. Meds include Lasix, Aricept, Namenda. Wound Type: Multiple, Pressure Injury, Stage II       Current Nutrition Intake & Therapies:    Average Meal Intake: NPO  Average Supplements Intake: NPO  Diet NPO    Anthropometric Measures:  Height: 188 cm (6' 2\")  Ideal Body Weight (IBW): 190 lbs (86 kg)    Admission Body Weight: 81.6 kg (180 lb) (estimated weight)  Current Body Weight: 84.4 kg (186 lb), 97.9 % IBW. Weight

## 2024-06-10 NOTE — PROGRESS NOTES
Patient noted to have urine in his attends and only 50ml in his christian catheter. Hospitalist notified and order obtained to bladder scan the patient and irrigate the christian.

## 2024-06-10 NOTE — PROGRESS NOTES
Bladder scan showed 65ml of urine in the bladder. The catheter was irrigated with 60ml of sterile water. The irrigant went in without resistance but the then came out from around the christian and not through the catheter. The christian was removed and a 16fr standard catheter was attempted to be reinserted without success. Since the patient is urinating freely from his penis at this time the christian was left out. Will bladder scan prior to shift ending to verify bladder emptying.

## 2024-06-10 NOTE — PROGRESS NOTES
SLP ALL NOTES  Facility/Department: [facility]   CLINICAL BEDSIDE SWALLOW EVALUATION    NAME: Lamont Mckeon  : 1950  MRN: [id number]    ADMISSION DATE: 2024  ADMITTING DIAGNOSIS: has Right inguinal hernia; Hyperlipidemia; Primary hypertension; Primary parkinsonism (HCC); Dementia (HCC); Osteoarthritis; Small vessel disease, cerebrovascular; CAD (coronary artery disease); Tobacco abuse; Cellulitis; Frequent falls; Age-related physical debility; Venous stasis of both lower extremities; Cellulitis of left leg; WD-Traumatic open wound of left lower leg; WD-Non-pressure ulcer of left lower extremity, with fat layer exposed (HCC); and Pneumonia of left upper lobe due to infectious organism on their problem list.  ONSET DATE: PTA    Date of Eval: 6/10/2024  Evaluating Therapist: SAMMY Pelayo    Current Diet level:  Current Diet : NPO  Current Liquid Diet : NPO    Impressions and Recommendations:   Pt's PMHx is significant for dementia, HTN, Parkinson disease and is mostly nonverbal. Pt did attempt verbal communication, but was edentulous and never closed his mouth so productions were all vowel approximates. Pt had XR Chest portable on 2024. Findings included: left upper lung pneumonia with small left pleural effusion. Pt had a Head CT on 2024 with no acute intracranial abnormality.   ?   Pt was seen at bedside for a bedside swallow evaluation.?Pt unable to follow directions to perform OME, however minimal alcides and tongue movement was noted during assessment.?Pt's voice was weak. Volitional cough was noted to be weak.?Pt was presented with thin liquids via teaspoon and straw and purees. Pt unable to seal lips around his spoon or straw presentations to extract liquids and when a small about of water went into his mouth pt presented with significantly delayed onset of swallow with suspected aspiration. Pt took small piece of applesauce only using his top lip which was observed to drop into  - straw;Thin - teaspoon;Pureed    Oral Motor Deficits  Labial: Other (comment) (baseline mouth agape)  Dentition: Edentulous  Oral Hygiene: Moist  Oral Hygiene Comments: SLP observed white/yellow secretions on the back of tongue and on pt's hard palate  Lingual: Decreased strength;Flaccid  Consistencies Administered: Thin - straw;Thin - teaspoon;Pureed    Oral Phase Dysfunction  Oral Phase  Oral Phase: Exceptions     Indicators of Pharyngeal Phase Dysfunction   Pharyngeal Phase  Pharyngeal Phase: Exceptions  Pharyngeal Phase   Pharyngeal Phase: Exceptions    Education  Patient Education Response: No evidence of learning        Therapy Time  SLP Individual Minutes  Time In: 0820  Time Out: 0835  Minutes: 15        Johana Mera M.A., CCC-SLP, 6/10/2024 9:25 AM

## 2024-06-10 NOTE — PLAN OF CARE
Problem: Safety - Adult  Goal: Free from fall injury  Outcome: Adequate for Discharge     Problem: Pain  Goal: Verbalizes/displays adequate comfort level or baseline comfort level  Outcome: Adequate for Discharge     Problem: Skin/Tissue Integrity  Goal: Absence of new skin breakdown  Description: 1.  Monitor for areas of redness and/or skin breakdown  2.  Assess vascular access sites hourly  3.  Every 4-6 hours minimum:  Change oxygen saturation probe site  4.  Every 4-6 hours:  If on nasal continuous positive airway pressure, respiratory therapy assess nares and determine need for appliance change or resting period.  Outcome: Adequate for Discharge     Problem: ABCDS Injury Assessment  Goal: Absence of physical injury  Outcome: Adequate for Discharge     Problem: Respiratory - Adult  Goal: Achieves optimal ventilation and oxygenation  Outcome: Adequate for Discharge     Problem: Cardiovascular - Adult  Goal: Maintains optimal cardiac output and hemodynamic stability  Outcome: Adequate for Discharge  Goal: Absence of cardiac dysrhythmias or at baseline  Outcome: Adequate for Discharge     Problem: Skin/Tissue Integrity - Adult  Goal: Incisions, wounds, or drain sites healing without S/S of infection  Outcome: Adequate for Discharge     Problem: Gastrointestinal - Adult  Goal: Maintains or returns to baseline bowel function  Outcome: Progressing  Goal: Maintains adequate nutritional intake  Outcome: Not Progressing     Problem: Genitourinary - Adult  Goal: Absence of urinary retention  Outcome: Adequate for Discharge  Goal: Urinary catheter remains patent  Outcome: Completed     Problem: Infection - Adult  Goal: Absence of infection at discharge  Outcome: Progressing     Problem: Metabolic/Fluid and Electrolytes - Adult  Goal: Electrolytes maintained within normal limits  Outcome: Progressing     Problem: Gastrointestinal - Adult  Goal: Maintains adequate nutritional intake  Outcome: Not Progressing

## 2024-06-10 NOTE — PLAN OF CARE
Problem: Safety - Adult  Goal: Free from fall injury  Outcome: Progressing     Problem: Pain  Goal: Verbalizes/displays adequate comfort level or baseline comfort level  Outcome: Progressing     Problem: Skin/Tissue Integrity  Goal: Absence of new skin breakdown  Description: 1.  Monitor for areas of redness and/or skin breakdown  2.  Assess vascular access sites hourly  3.  Every 4-6 hours minimum:  Change oxygen saturation probe site  4.  Every 4-6 hours:  If on nasal continuous positive airway pressure, respiratory therapy assess nares and determine need for appliance change or resting period.  Outcome: Progressing     Problem: ABCDS Injury Assessment  Goal: Absence of physical injury  Outcome: Progressing     Problem: Respiratory - Adult  Goal: Achieves optimal ventilation and oxygenation  Outcome: Progressing     Problem: Cardiovascular - Adult  Goal: Maintains optimal cardiac output and hemodynamic stability  Outcome: Progressing  Goal: Absence of cardiac dysrhythmias or at baseline  Outcome: Progressing     Problem: Skin/Tissue Integrity - Adult  Goal: Incisions, wounds, or drain sites healing without S/S of infection  Outcome: Progressing     Problem: Gastrointestinal - Adult  Goal: Maintains or returns to baseline bowel function  Outcome: Progressing  Goal: Maintains adequate nutritional intake  Outcome: Progressing     Problem: Genitourinary - Adult  Goal: Absence of urinary retention  Outcome: Progressing  Goal: Urinary catheter remains patent  Outcome: Progressing     Problem: Infection - Adult  Goal: Absence of infection at discharge  Outcome: Progressing     Problem: Metabolic/Fluid and Electrolytes - Adult  Goal: Electrolytes maintained within normal limits  Outcome: Progressing

## 2024-06-10 NOTE — PROGRESS NOTES
Facility/Department: [facility]  Physical Therapy Initial Assessment    Name Lamont ONEIL 1950   MRN [id number]   Date of Service 6/10/2024   Patient Room  002/002-01     Patient Diagnoses The primary encounter diagnosis was Acute pneumonia. Diagnoses of Hypoxemia and Hypotension due to hypovolemia were also pertinent to this visit.   Past Medical History  has a past medical history of CAD (coronary artery disease), Chronic low back pain, Coronary arteriosclerosis, Dementia (HCC), Fracture, Hypertension, Hypertensive disorder, Osteoarthritis, Parkinson disease (HCC), Small vessel disease, cerebrovascular, and Tobacco abuse.   Past Surgical History  has a past surgical history that includes back surgery; Vasectomy; Cardiac surgery; lumbar laminectomy; Coronary angioplasty with stent; hernia repair; Ankle surgery; and Tonsillectomy.       Discharge Recommendations  SNF   Equipment: Continue to assess    Assessment  Conditions Requiring Skilled Therapeutic Intervention: Decreased functional mobility, Decreased strength, Decreased endurance, Decreased ADL status, Decreased high level ADLs/IADLs, Impaired safety awareness, Impaired cognition, Increased pain, and Decreased posture  Assessment of Evaluation: Patient is a 74 y.o. male who presents to [facility] with altered mental status. Dx of pneumonia and acute respiratory failure with hypoxia. Patient has a history of dementia and Parkinson's Disease. Unsure of baseline functional and cognitive levels. Patient would benefit from continued skilled physical therapy services to address decreased strength, endurance, and impaired balance.  Treatment Diagnosis: Muscle weakness (generalized), Decreased endurance, and Difficulty walking  Therapy Prognosis: Fair  Decision Making: Medium Complexity  Requires PT Follow-Up: Yes  Activity Tolerance: Patient limited by fatigue and Patient limited by endurance     Plan  General Plan: 2+  Treatment Initiation:

## 2024-06-10 NOTE — PROGRESS NOTES
Tolerance  Activity Tolerance: Patient Tolerated treatment well  Activity Tolerance Comments: pt did not appear to be attempting to avoid activity with OT eval, but did fatigue out with sitting eob as noted with more need for support        Plan   Occupational Therapy Plan  Times Per Week: 3+  Times Per Day: Once a day  Current Treatment Recommendations: Cognitive reorientation, Balance training, Functional mobility training, Endurance training, Patient/Caregiver education & training, Equipment evaluation, education, & procurement, Co-Treatment, Self-Care / ADL     Restrictions  Restrictions/Precautions  Restrictions/Precautions: Fall Risk, NPO, Isolation    Subjective   General  Chart Reviewed: Yes  Family / Caregiver Present: No  Referring Practitioner: Harpreet Saldaña  Diagnosis: Pneumonia L U lobe due to infectious organism, C-Diff, hx Demential, ARF, on 2 L NC, Parkinsons, a-fib, CAD  Subjective  Subjective: Pt in bed partially turned to R side, pt able to localize OT but not tracking, pt awake, non verbal, able to participate lightly in OT eval.  General Comment  Comments: at times pt appears to be verbalizing a response when given option of 2 simple responses, some yes/no head nods but neck ROM is significantly limited and difficult to determine accuracy of response. Following some commands for trunk control/balance eob and for use of arms to assist with this.     Social/Functional History  Social/Functional History  Lives With:  ([facility])  Type of Home: Facility  Receives Help From:  (Unclear of prior level of function but needed significant assist from staff at facility)  ADL Assistance:  (unclear of full  PLOF, pt was needing sigificant assist for adls and was dep all iadls, up to chair with jean lift most recent)  Feeding:  (NPO)  Toileting: Needs assistance  Homemaking Assistance:  (dep)  Homemaking Responsibilities: No  Ambulation Assistance: Non-ambulatory  Transfer Assistance:  (per  report, most recent jean lift to chair, pt potentially may assist in stand pivot however will need to determine further in therapy and as medical status improves)  Active : No  Occupation: Retired  Leisure & Hobbies: unknown at eval  Additional Comments: Per chart review, patient is a LTC resident at [facility]. He is on the memory unit. Reports of possibly being appropriate for SNF then returning to LTC unit. Per nursing, who called the facility yesterday, patient is a jean lift at baseline. Pt unable to communicate or follow many commands throughout evaluation. No family present       Objective   Temp: 99 °F (37.2 °C)  Pulse: 94  Heart Rate Source: Monitor  Respirations: 15  SpO2: 96 %  O2 Device: Nasal cannula  BP: 104/65  MAP (Calculated): 78  BP Location: Right upper arm  BP Method: Automatic  Patient Position: Semi fowlers          Observation/Palpation  Posture: Fair  Observation: pt in bed when OT arrived, turned to his r side using wedges  Edema: hands /UE, LE/ankles, feet  Safety Devices  Type of Devices: All aron prominences offloaded;Bed alarm in place;Call light within reach;Left in bed;Patient at risk for falls (bed wedges to help position for pressure relief on side lying and pillows to elevate heels)  Restraints  Restraints Initially in Place: No  Bed Mobility Training  Bed Mobility Training: Yes  Overall Level of Assistance: Assist X2;Total assistance  Rolling: Total assistance (using draw sheet to roll and place wedges for side lying , pressure relief)  Balance  Sitting: With support (PT behind pt sittind eob and OT in front)     AROM:  (Pt not folowing commands for arom and limited participating with AAROM attempted, IV, edema, BP cuff and pulse ox also may hinder pt movement)  Strength:  (impaired)  Coordination:  (impaired)  ADL  Feeding: NPO  Grooming: Dependent/Total  Grooming Skilled Clinical Factors: OT attempted hand over hand, to wash face, pt did  not assist to lift arms

## 2024-06-11 LAB
ALBUMIN SERPL-MCNC: 1.9 GM/DL (ref 3.4–5)
ALP BLD-CCNC: 91 IU/L (ref 40–129)
ALT SERPL-CCNC: 8 U/L (ref 10–40)
ANION GAP SERPL CALCULATED.3IONS-SCNC: 10 MMOL/L (ref 7–16)
ANISOCYTOSIS: ABNORMAL
AST SERPL-CCNC: 10 IU/L (ref 15–37)
BANDED NEUTROPHILS ABSOLUTE COUNT: 0.91 K/CU MM
BANDED NEUTROPHILS RELATIVE PERCENT: 11 % (ref 5–11)
BASOPHILS ABSOLUTE: 0.1 K/CU MM
BASOPHILS RELATIVE PERCENT: 1 % (ref 0–1)
BILIRUB SERPL-MCNC: 0.6 MG/DL (ref 0–1)
BUN SERPL-MCNC: 23 MG/DL (ref 6–23)
CALCIUM SERPL-MCNC: 8.3 MG/DL (ref 8.3–10.6)
CHLORIDE BLD-SCNC: 111 MMOL/L (ref 99–110)
CO2: 25 MMOL/L (ref 21–32)
CREAT SERPL-MCNC: 0.8 MG/DL (ref 0.9–1.3)
DIFFERENTIAL TYPE: ABNORMAL
GFR, ESTIMATED: >90 ML/MIN/1.73M2
GLUCOSE SERPL-MCNC: 75 MG/DL (ref 70–99)
HCT VFR BLD CALC: 29.2 % (ref 42–52)
HEMOGLOBIN: 8.7 GM/DL (ref 13.5–18)
LYMPHOCYTES ABSOLUTE: 1.7 K/CU MM
LYMPHOCYTES RELATIVE PERCENT: 20 % (ref 24–44)
MAGNESIUM: 2 MG/DL (ref 1.8–2.4)
MCH RBC QN AUTO: 26.3 PG (ref 27–31)
MCHC RBC AUTO-ENTMCNC: 29.8 % (ref 32–36)
MCV RBC AUTO: 88.2 FL (ref 78–100)
METAMYELOCYTES ABSOLUTE COUNT: 0.08 K/CU MM
METAMYELOCYTES PERCENT: 1 %
MICROCYTES: ABNORMAL
MONOCYTES ABSOLUTE: 0.6 K/CU MM
MONOCYTES RELATIVE PERCENT: 7 % (ref 0–4)
NEUTROPHILS ABSOLUTE: 4.9 K/CU MM
NEUTROPHILS RELATIVE PERCENT: 60 % (ref 36–66)
OVALOCYTES: ABNORMAL
PDW BLD-RTO: 18.3 % (ref 11.7–14.9)
PLATELET # BLD: 163 K/CU MM (ref 140–440)
PLT MORPHOLOGY: ADEQUATE
PMV BLD AUTO: 8.9 FL (ref 7.5–11.1)
POTASSIUM SERPL-SCNC: 2.8 MMOL/L (ref 3.5–5.1)
RBC # BLD: 3.31 M/CU MM (ref 4.6–6.2)
SODIUM BLD-SCNC: 146 MMOL/L (ref 135–145)
TEAR DROP CELLS: ABNORMAL
TOTAL PROTEIN: 4.4 GM/DL (ref 6.4–8.2)
WBC # BLD: 8.3 K/CU MM (ref 4–10.5)

## 2024-06-11 PROCEDURE — 2580000003 HC RX 258: Performed by: FAMILY MEDICINE

## 2024-06-11 PROCEDURE — 2580000003 HC RX 258: Performed by: NURSE PRACTITIONER

## 2024-06-11 PROCEDURE — C9113 INJ PANTOPRAZOLE SODIUM, VIA: HCPCS | Performed by: NURSE PRACTITIONER

## 2024-06-11 PROCEDURE — 2700000000 HC OXYGEN THERAPY PER DAY

## 2024-06-11 PROCEDURE — 2500000003 HC RX 250 WO HCPCS: Performed by: NURSE PRACTITIONER

## 2024-06-11 PROCEDURE — 94640 AIRWAY INHALATION TREATMENT: CPT

## 2024-06-11 PROCEDURE — 6360000002 HC RX W HCPCS: Performed by: NURSE PRACTITIONER

## 2024-06-11 PROCEDURE — 6370000000 HC RX 637 (ALT 250 FOR IP): Performed by: NURSE PRACTITIONER

## 2024-06-11 PROCEDURE — 92526 ORAL FUNCTION THERAPY: CPT

## 2024-06-11 PROCEDURE — 83735 ASSAY OF MAGNESIUM: CPT

## 2024-06-11 PROCEDURE — 6360000002 HC RX W HCPCS: Performed by: FAMILY MEDICINE

## 2024-06-11 PROCEDURE — 94761 N-INVAS EAR/PLS OXIMETRY MLT: CPT

## 2024-06-11 PROCEDURE — 80053 COMPREHEN METABOLIC PANEL: CPT

## 2024-06-11 PROCEDURE — 6370000000 HC RX 637 (ALT 250 FOR IP): Performed by: FAMILY MEDICINE

## 2024-06-11 PROCEDURE — 1200000000 HC SEMI PRIVATE

## 2024-06-11 PROCEDURE — 85025 COMPLETE CBC W/AUTO DIFF WBC: CPT

## 2024-06-11 RX ORDER — POTASSIUM CHLORIDE 20 MEQ/1
40 TABLET, EXTENDED RELEASE ORAL PRN
Status: DISCONTINUED | OUTPATIENT
Start: 2024-06-11 | End: 2024-06-12 | Stop reason: HOSPADM

## 2024-06-11 RX ORDER — METOPROLOL TARTRATE 1 MG/ML
5 INJECTION, SOLUTION INTRAVENOUS EVERY 6 HOURS
Status: DISCONTINUED | OUTPATIENT
Start: 2024-06-11 | End: 2024-06-12

## 2024-06-11 RX ORDER — POTASSIUM CHLORIDE 7.45 MG/ML
10 INJECTION INTRAVENOUS PRN
Status: DISCONTINUED | OUTPATIENT
Start: 2024-06-11 | End: 2024-06-12 | Stop reason: HOSPADM

## 2024-06-11 RX ORDER — POTASSIUM CHLORIDE 7.45 MG/ML
10 INJECTION INTRAVENOUS
Status: COMPLETED | OUTPATIENT
Start: 2024-06-11 | End: 2024-06-11

## 2024-06-11 RX ORDER — MAGNESIUM SULFATE IN WATER 40 MG/ML
2000 INJECTION, SOLUTION INTRAVENOUS PRN
Status: DISCONTINUED | OUTPATIENT
Start: 2024-06-11 | End: 2024-06-12 | Stop reason: HOSPADM

## 2024-06-11 RX ADMIN — IPRATROPIUM BROMIDE AND ALBUTEROL SULFATE 1 DOSE: 2.5; .5 SOLUTION RESPIRATORY (INHALATION) at 07:03

## 2024-06-11 RX ADMIN — METOPROLOL TARTRATE 5 MG: 5 INJECTION INTRAVENOUS at 06:21

## 2024-06-11 RX ADMIN — TRIHEXYPHENIDYL HYDROCHLORIDE 3 MG: 2 TABLET ORAL at 19:34

## 2024-06-11 RX ADMIN — POTASSIUM CHLORIDE 10 MEQ: 7.46 INJECTION, SOLUTION INTRAVENOUS at 14:47

## 2024-06-11 RX ADMIN — PIPERACILLIN AND TAZOBACTAM 4500 MG: 4; .5 INJECTION, POWDER, FOR SOLUTION INTRAVENOUS at 06:15

## 2024-06-11 RX ADMIN — POTASSIUM CHLORIDE 10 MEQ: 7.46 INJECTION, SOLUTION INTRAVENOUS at 18:56

## 2024-06-11 RX ADMIN — MEMANTINE 10 MG: 10 TABLET ORAL at 19:34

## 2024-06-11 RX ADMIN — IPRATROPIUM BROMIDE AND ALBUTEROL SULFATE 1 DOSE: 2.5; .5 SOLUTION RESPIRATORY (INHALATION) at 10:39

## 2024-06-11 RX ADMIN — PANTOPRAZOLE SODIUM 40 MG: 40 INJECTION, POWDER, FOR SOLUTION INTRAVENOUS at 08:00

## 2024-06-11 RX ADMIN — SODIUM CHLORIDE 250 ML: 9 INJECTION, SOLUTION INTRAVENOUS at 08:02

## 2024-06-11 RX ADMIN — POTASSIUM CHLORIDE 10 MEQ: 7.46 INJECTION, SOLUTION INTRAVENOUS at 18:13

## 2024-06-11 RX ADMIN — DONEPEZIL HYDROCHLORIDE 10 MG: 10 TABLET, FILM COATED ORAL at 19:34

## 2024-06-11 RX ADMIN — PIPERACILLIN AND TAZOBACTAM 4500 MG: 4; .5 INJECTION, POWDER, FOR SOLUTION INTRAVENOUS at 14:49

## 2024-06-11 RX ADMIN — PIPERACILLIN AND TAZOBACTAM 4500 MG: 4; .5 INJECTION, POWDER, FOR SOLUTION INTRAVENOUS at 21:08

## 2024-06-11 RX ADMIN — METOPROLOL TARTRATE 5 MG: 5 INJECTION INTRAVENOUS at 01:42

## 2024-06-11 RX ADMIN — ATORVASTATIN CALCIUM 40 MG: 40 TABLET, FILM COATED ORAL at 19:34

## 2024-06-11 RX ADMIN — IPRATROPIUM BROMIDE AND ALBUTEROL SULFATE 1 DOSE: 2.5; .5 SOLUTION RESPIRATORY (INHALATION) at 14:11

## 2024-06-11 RX ADMIN — SODIUM CHLORIDE 250 ML: 9 INJECTION, SOLUTION INTRAVENOUS at 06:14

## 2024-06-11 RX ADMIN — ENOXAPARIN SODIUM 40 MG: 100 INJECTION SUBCUTANEOUS at 07:59

## 2024-06-11 RX ADMIN — IPRATROPIUM BROMIDE AND ALBUTEROL SULFATE 1 DOSE: 2.5; .5 SOLUTION RESPIRATORY (INHALATION) at 19:14

## 2024-06-11 RX ADMIN — POTASSIUM CHLORIDE 10 MEQ: 7.46 INJECTION, SOLUTION INTRAVENOUS at 11:50

## 2024-06-11 RX ADMIN — SODIUM CHLORIDE 25 ML: 9 INJECTION, SOLUTION INTRAVENOUS at 21:07

## 2024-06-11 RX ADMIN — POTASSIUM CHLORIDE 10 MEQ: 7.46 INJECTION, SOLUTION INTRAVENOUS at 08:04

## 2024-06-11 RX ADMIN — POTASSIUM CHLORIDE 10 MEQ: 7.46 INJECTION, SOLUTION INTRAVENOUS at 10:10

## 2024-06-11 RX ADMIN — SODIUM CHLORIDE, PRESERVATIVE FREE 10 ML: 5 INJECTION INTRAVENOUS at 08:03

## 2024-06-11 ASSESSMENT — PAIN SCALES - WONG BAKER: WONGBAKER_NUMERICALRESPONSE: NO HURT

## 2024-06-11 NOTE — PLAN OF CARE
Problem: Safety - Adult  Goal: Free from fall injury  6/10/2024 2331 by Queenie Granados RN  Outcome: Progressing  6/10/2024 1151 by Natacha Stover RN  Outcome: Adequate for Discharge     Problem: Pain  Goal: Verbalizes/displays adequate comfort level or baseline comfort level  6/10/2024 2331 by Queenie Granados RN  Outcome: Progressing  6/10/2024 1151 by Natacha Stover RN  Outcome: Adequate for Discharge     Problem: Skin/Tissue Integrity  Goal: Absence of new skin breakdown  Description: 1.  Monitor for areas of redness and/or skin breakdown  2.  Assess vascular access sites hourly  3.  Every 4-6 hours minimum:  Change oxygen saturation probe site  4.  Every 4-6 hours:  If on nasal continuous positive airway pressure, respiratory therapy assess nares and determine need for appliance change or resting period.  6/10/2024 2331 by Queenie Granados RN  Outcome: Progressing  6/10/2024 1151 by Natacha Stover RN  Outcome: Adequate for Discharge     Problem: ABCDS Injury Assessment  Goal: Absence of physical injury  6/10/2024 2331 by Queenie Granados RN  Outcome: Progressing  6/10/2024 1151 by Natacha Stover RN  Outcome: Adequate for Discharge     Problem: Respiratory - Adult  Goal: Achieves optimal ventilation and oxygenation  6/10/2024 2331 by Queenie Granados RN  Outcome: Progressing  6/10/2024 1151 by Natacha Stover RN  Outcome: Adequate for Discharge     Problem: Cardiovascular - Adult  Goal: Maintains optimal cardiac output and hemodynamic stability  6/10/2024 2331 by Queenie Granados RN  Outcome: Progressing  6/10/2024 1151 by Natacha Stover RN  Outcome: Adequate for Discharge  Goal: Absence of cardiac dysrhythmias or at baseline  6/10/2024 2331 by Queenie Granados RN  Outcome: Progressing  6/10/2024 1151 by Natacha Stover RN  Outcome: Adequate for Discharge     Problem: Skin/Tissue Integrity - Adult  Goal: Incisions, wounds, or drain sites healing without S/S of infection  6/10/2024 2331 by Queenie Granados RN  Outcome:

## 2024-06-11 NOTE — PROGRESS NOTES
54.  Downward trend    Diet Diet NPO   DVT Prophylaxis [x] Lovenox, []  Heparin, [] SCDs, [] Ambulation,  [] Eliquis, [] Xarelto  [] Coumadin   Code Status Full Code   Disposition From: [facility]  Expected Disposition: [facility]  Estimated Date of Discharge: 24 to 48 hours  Patient requires continued admission due to treatment of pneumonia/UTI   Surrogate Decision Maker/ LUIS MIGUEL Rodrigues wife     Personally reviewed Lab Studies and Imaging     Discussed management of the case with Dr. Kay my supervising physician who is in agreement with the above-noted plan of care.      Discussed patient with case management during IDR's    Drugs that require monitoring for toxicity include Lovenox and the method of monitoring was CBC    Subjective:     Chief Complaint:     Patient seen and examined at bedside, he is alert and awake however.  He did tell me he is in the hospital but I am not able to understand anything else he is saying.  Patient's wife does state that he has been nonverbal for several months, but apparently can follow commands.      Review of Systems:      Unable to obtain secondary to baseline mental status.    Objective:     Intake/Output Summary (Last 24 hours) at 6/11/2024 0720  Last data filed at 6/11/2024 0624  Gross per 24 hour   Intake 380.93 ml   Output 3414 ml   Net -3033.07 ml        Vitals:   Vitals:    06/11/24 0141 06/11/24 0618 06/11/24 0703 06/11/24 0719   BP: 110/60 103/64  103/64   Pulse:    82   Resp:    14   Temp:    97.5 °F (36.4 °C)   TempSrc:       SpO2:   99% 99%   Weight:  82.3 kg (181 lb 6.4 oz)     Height:             Physical Exam:      General: Chronically ill appearing male, NAD  Eyes: EOMI  ENT: neck supple, white coating on tongue.  Cardiovascular: Regular rate.  Respiratory: Clear to auscultation  Gastrointestinal: Soft, non tender  Genitourinary: no suprapubic tenderness  Musculoskeletal: b/l LE edema +1  Skin: warm, dry  Neuro: Alert and oriented x2 (person,  soft copy. Radiation dose reduction techniques applied. FINDINGS:  No acute intracranial hemorrhage. No mass, mass-effect, shift of the midline structures, or abnormal extra-axial fluid collections. Diffuse, patchy low attenuation in the periventricular, deep, and subcortical white matter. Findings most likely represent the sequelae of chronic small vessel ischemic change. No CT evidence of acute ischemia, however, MRI is a more sensitive indicator of acute and hyperacute ischemic change. Ventricular system and sulcal spaces moderately prominent, but proportionate. Basal cisterns patent. No findings suggestive of acutely elevated intraventricular pressure. The visualized paranasal sinuses, mastoid air cells, and middle ear cavities are well aerated. The visualized orbital contents are unremarkable.  No intra or extraconal masses in the visualized portions of the orbits.  The visualized portions of the globes are intact bilaterally.  The visualized extraocular muscles and optic nerves are symmetric bilaterally.  No lytic or blastic calvarial lesions.  No calvarial fractures.  The visualized extra-cranial soft tissues of the head and neck are unremarkable.     1. No acute intracranial abnormality. All CT scans performed at this facility use dose modulation, iterative reconstruction, and/or weight-based dosing when appropriate to reduce radiation dose to as low as reasonably achievable. Electronically signed by Bari Bae    XR CHEST PORTABLE    Result Date: 6/9/2024  DICTATING PHYSICIAN: Bari Bae M.D. EXAMINATION: AP chest radiograph dated 6/9/2024. COMPARISON: AP chest radiograph dated 7/30/2022. HISTORY:   Chest pain. Dementia. PROCEDURE:  PA and lateral chest radiographs submitted for evaluation. FINDINGS: Lungs hypoinflated. Nodular airspace opacity overlying the left upper lobe, new from the comparison examination. Findings likely represent pneumonia. Recommend correlation with short-term follow-up chest

## 2024-06-11 NOTE — PROGRESS NOTES
will demonstrate understanding of diet recommendations, compensatory strategies, and POC.       Time in: 1140  Time out: 1155  Total Time: 15 minutes    Electronically signed by: Johana Mera M.A., CCC-SLP, 6/11/2024, 1:47 PM

## 2024-06-11 NOTE — PLAN OF CARE
Problem: Safety - Adult  Goal: Free from fall injury  6/11/2024 1035 by Celsa Carranza RN  Outcome: Progressing  6/11/2024 1035 by Celsa Carranza RN  Outcome: Progressing  6/10/2024 2331 by Queenie Granados RN  Outcome: Progressing     Problem: Pain  Goal: Verbalizes/displays adequate comfort level or baseline comfort level  6/11/2024 1035 by Celsa Carranza RN  Outcome: Progressing  6/11/2024 1035 by Celsa Carranza RN  Outcome: Progressing  6/10/2024 2331 by Queenie Granados RN  Outcome: Progressing     Problem: Skin/Tissue Integrity  Goal: Absence of new skin breakdown  Description: 1.  Monitor for areas of redness and/or skin breakdown  2.  Assess vascular access sites hourly  3.  Every 4-6 hours minimum:  Change oxygen saturation probe site  4.  Every 4-6 hours:  If on nasal continuous positive airway pressure, respiratory therapy assess nares and determine need for appliance change or resting period.  6/11/2024 1035 by Celsa Carranza RN  Outcome: Progressing  6/11/2024 1035 by Celsa Carranza RN  Outcome: Progressing  6/10/2024 2331 by Queenie Granados RN  Outcome: Progressing     Problem: ABCDS Injury Assessment  Goal: Absence of physical injury  6/11/2024 1035 by Celsa Carranza RN  Outcome: Progressing  6/11/2024 1035 by Celsa Carranza RN  Outcome: Progressing  6/10/2024 2331 by Queenie Granados RN  Outcome: Progressing     Problem: Respiratory - Adult  Goal: Achieves optimal ventilation and oxygenation  6/11/2024 1035 by Celsa Carranza RN  Outcome: Progressing  6/11/2024 1035 by Celsa Carranza RN  Outcome: Progressing  6/10/2024 2331 by Queenie Granados RN  Outcome: Progressing     Problem: Cardiovascular - Adult  Goal: Maintains optimal cardiac output and hemodynamic stability  6/11/2024 1035 by Celsa Carranza RN  Outcome: Progressing  6/11/2024 1035 by Carranza, Celsa, RN  Outcome: Progressing  6/10/2024 2331 by Queenie Granados RN  Outcome: Progressing  Goal:

## 2024-06-11 NOTE — ACP (ADVANCE CARE PLANNING)
Met with patient's wife at bedside.  Discussed that patient did pass his swallow eval and at this current time he does not need a feeding tube.  I did have a lengthy discussion with her that as his disease progresses this will be a conversation that will likely come up again.  She did report she feels she would not proceed with a feeding tube if the patient were not alert and not able to partake in meaningful activity.  She reports she only wants a feeding tube if the patient is alert.      We then discussed CODE STATUS.  I did discuss with her in the event the patient suffers respiratory distress or depression and the patient needs to be placed on the ventilator, that he is very weak and would potentially not come off the ventilator.  I did give her the option we could place him on the ventilator and give him a few days and then at that point have discussions to remove him from the ventilator.  We as well discussed the need for chest compressions.  I discussed with her what chest compressions entail should his heart stop beating.  She stated she would not like the patient to have chest compressions, but should he have respiratory compromise she would like him placed on the ventilator at least for a short time.  I did state that this would be a CODE STATUS of DNR CCA, and this would not change any of his current medical treatment and this only changes in the event that his heart stops beating, we will not initiate chest compressions.  She is in understanding and agreement with changing his CODE STATUS.

## 2024-06-12 VITALS
OXYGEN SATURATION: 94 % | WEIGHT: 182.4 LBS | TEMPERATURE: 98.2 F | SYSTOLIC BLOOD PRESSURE: 105 MMHG | HEART RATE: 107 BPM | DIASTOLIC BLOOD PRESSURE: 56 MMHG | RESPIRATION RATE: 18 BRPM | HEIGHT: 74 IN | BODY MASS INDEX: 23.41 KG/M2

## 2024-06-12 LAB
ANION GAP SERPL CALCULATED.3IONS-SCNC: 10 MMOL/L (ref 7–16)
ANISOCYTOSIS: ABNORMAL
BANDED NEUTROPHILS ABSOLUTE COUNT: 0.89 K/CU MM
BANDED NEUTROPHILS RELATIVE PERCENT: 10 % (ref 5–11)
BUN SERPL-MCNC: 26 MG/DL (ref 6–23)
CALCIUM SERPL-MCNC: 8.3 MG/DL (ref 8.3–10.6)
CHLORIDE BLD-SCNC: 110 MMOL/L (ref 99–110)
CO2: 22 MMOL/L (ref 21–32)
CREAT SERPL-MCNC: 0.8 MG/DL (ref 0.9–1.3)
DIFFERENTIAL TYPE: ABNORMAL
GFR, ESTIMATED: >90 ML/MIN/1.73M2
GLUCOSE SERPL-MCNC: 115 MG/DL (ref 70–99)
HCT VFR BLD CALC: 29.7 % (ref 42–52)
HEMOGLOBIN: 8.9 GM/DL (ref 13.5–18)
LYMPHOCYTES ABSOLUTE: 2.3 K/CU MM
LYMPHOCYTES RELATIVE PERCENT: 26 % (ref 24–44)
MAGNESIUM: 2 MG/DL (ref 1.8–2.4)
MCH RBC QN AUTO: 26.6 PG (ref 27–31)
MCHC RBC AUTO-ENTMCNC: 30 % (ref 32–36)
MCV RBC AUTO: 88.7 FL (ref 78–100)
METAMYELOCYTES ABSOLUTE COUNT: 0.09 K/CU MM
METAMYELOCYTES PERCENT: 1 %
MICROCYTES: ABNORMAL
MONOCYTES ABSOLUTE: 0.6 K/CU MM
MONOCYTES RELATIVE PERCENT: 7 % (ref 0–4)
NEUTROPHILS ABSOLUTE: 5 K/CU MM
NEUTROPHILS RELATIVE PERCENT: 56 % (ref 36–66)
OVALOCYTES: ABNORMAL
PDW BLD-RTO: 18.2 % (ref 11.7–14.9)
PHOSPHORUS: 1.7 MG/DL (ref 2.5–4.9)
PLATELET # BLD: 171 K/CU MM (ref 140–440)
PLT MORPHOLOGY: ADEQUATE
PMV BLD AUTO: 8.6 FL (ref 7.5–11.1)
POTASSIUM SERPL-SCNC: 3.6 MMOL/L (ref 3.5–5.1)
RBC # BLD: 3.35 M/CU MM (ref 4.6–6.2)
SODIUM BLD-SCNC: 142 MMOL/L (ref 135–145)
WBC # BLD: 8.9 K/CU MM (ref 4–10.5)

## 2024-06-12 PROCEDURE — 83735 ASSAY OF MAGNESIUM: CPT

## 2024-06-12 PROCEDURE — 80048 BASIC METABOLIC PNL TOTAL CA: CPT

## 2024-06-12 PROCEDURE — 80053 COMPREHEN METABOLIC PANEL: CPT

## 2024-06-12 PROCEDURE — C9113 INJ PANTOPRAZOLE SODIUM, VIA: HCPCS | Performed by: NURSE PRACTITIONER

## 2024-06-12 PROCEDURE — 94761 N-INVAS EAR/PLS OXIMETRY MLT: CPT

## 2024-06-12 PROCEDURE — 6370000000 HC RX 637 (ALT 250 FOR IP): Performed by: FAMILY MEDICINE

## 2024-06-12 PROCEDURE — 6360000002 HC RX W HCPCS: Performed by: FAMILY MEDICINE

## 2024-06-12 PROCEDURE — 2580000003 HC RX 258: Performed by: FAMILY MEDICINE

## 2024-06-12 PROCEDURE — 6360000002 HC RX W HCPCS: Performed by: NURSE PRACTITIONER

## 2024-06-12 PROCEDURE — 6370000000 HC RX 637 (ALT 250 FOR IP): Performed by: NURSE PRACTITIONER

## 2024-06-12 PROCEDURE — 94640 AIRWAY INHALATION TREATMENT: CPT

## 2024-06-12 PROCEDURE — 84100 ASSAY OF PHOSPHORUS: CPT

## 2024-06-12 PROCEDURE — 2580000003 HC RX 258: Performed by: NURSE PRACTITIONER

## 2024-06-12 PROCEDURE — 85025 COMPLETE CBC W/AUTO DIFF WBC: CPT

## 2024-06-12 RX ORDER — GUAIFENESIN/DEXTROMETHORPHAN 100-10MG/5
5 SYRUP ORAL EVERY 4 HOURS PRN
Qty: 120 ML | Refills: 0 | Status: SHIPPED | OUTPATIENT
Start: 2024-06-12 | End: 2024-06-22

## 2024-06-12 RX ORDER — AMOXICILLIN AND CLAVULANATE POTASSIUM 875; 125 MG/1; MG/1
1 TABLET, FILM COATED ORAL EVERY 12 HOURS SCHEDULED
Status: DISCONTINUED | OUTPATIENT
Start: 2024-06-12 | End: 2024-06-12 | Stop reason: HOSPADM

## 2024-06-12 RX ORDER — AMOXICILLIN AND CLAVULANATE POTASSIUM 875; 125 MG/1; MG/1
1 TABLET, FILM COATED ORAL EVERY 12 HOURS SCHEDULED
Qty: 12 TABLET | Refills: 0 | Status: SHIPPED | OUTPATIENT
Start: 2024-06-12 | End: 2024-06-18

## 2024-06-12 RX ADMIN — PANTOPRAZOLE SODIUM 40 MG: 40 INJECTION, POWDER, FOR SOLUTION INTRAVENOUS at 08:48

## 2024-06-12 RX ADMIN — SODIUM CHLORIDE 25 ML: 9 INJECTION, SOLUTION INTRAVENOUS at 05:25

## 2024-06-12 RX ADMIN — MEMANTINE 10 MG: 10 TABLET ORAL at 08:48

## 2024-06-12 RX ADMIN — IPRATROPIUM BROMIDE AND ALBUTEROL SULFATE 1 DOSE: 2.5; .5 SOLUTION RESPIRATORY (INHALATION) at 07:35

## 2024-06-12 RX ADMIN — IPRATROPIUM BROMIDE AND ALBUTEROL SULFATE 1 DOSE: 2.5; .5 SOLUTION RESPIRATORY (INHALATION) at 11:49

## 2024-06-12 RX ADMIN — TRIHEXYPHENIDYL HYDROCHLORIDE 3 MG: 2 TABLET ORAL at 08:48

## 2024-06-12 RX ADMIN — PIPERACILLIN AND TAZOBACTAM 4500 MG: 4; .5 INJECTION, POWDER, FOR SOLUTION INTRAVENOUS at 05:26

## 2024-06-12 RX ADMIN — AMOXICILLIN AND CLAVULANATE POTASSIUM 1 TABLET: 875; 125 TABLET, COATED ORAL at 11:26

## 2024-06-12 RX ADMIN — ENOXAPARIN SODIUM 40 MG: 100 INJECTION SUBCUTANEOUS at 10:28

## 2024-06-12 RX ADMIN — FUROSEMIDE 20 MG: 20 TABLET ORAL at 08:48

## 2024-06-12 ASSESSMENT — PAIN SCALES - WONG BAKER: WONGBAKER_NUMERICALRESPONSE: NO HURT

## 2024-06-12 NOTE — CARE COORDINATION
NURSING: The patient will be discharged to [facility] today.  Please complete the BEV form, call report to [phone], and notify the patient's family.  Medical transportation has been arranged at 12 PM through [facility].

## 2024-06-12 NOTE — DISCHARGE SUMMARY
V2.0  Discharge Summary    Name:  Lamont Mckeon /Age/Sex: 1950 (74 y.o. male)   Admit Date: 2024  Discharge Date: 24    MRN & CSN:  [id number] & [id number] Encounter Date and Time 24 10:51 AM EDT    Attending:  Roger Kay MD Discharging Provider: Candace Hatfield, APRN - CNP       Hospital Course:     Brief HPI: Lamont Mckeon is a 74 y.o. male who presented with pneumonia.    Brief Problem Based Course:     Left upper lung pneumonia received Zosyn x4 days; will transition to Augmentin for 6 days. Blood cultures show no growth to date, urinary antigens are negative, procalcitonin is unremarkable.  No elevated white count.    Acute respiratory failure with hypoxia, initially required 2L NC; has been weaned to RA.  Hypokalemia; resolved. sK+ 3.6  Acute cystitis, urine cultures no growth to date.    Diarrhea, C. difficile and gastrointestinal profile both negative.    Dementia, Namenda and Aricept; lives nursing home.  Did discuss mental status with patient's wife who was at bedside, progressive decline over past several months.  Would be appropriate for Hospice referral.  Dysphagia, patient has been seen by speech and evaluated, recommend Puree and thin liquids. Liquids via straw only, small bites/sips, external pacing, strict aspiration precautions, meds in puree.   Paroxysmal atrial fibrillation, not currently on any anticoagulation at the nursing home. Will transition Metoprolol to non-ER so can be crushed. Patient is currently in normal sinus rhythm.  Parkinson's disease holding trihexyphenidyl  History of CAD continue aspirin metoprolol and statin.  Elevated troponin, most likely secondary to stress response secondary to hypoxia.  First troponin was 59-second troponin 55.  Downward trend      The patient expressed appropriate understanding of, and agreement with the discharge recommendations, medications, and plan.     Consults this admission:  None    Discharge Diagnosis:  interval resolution. Electronically signed by Bari Bae      CBC:   Recent Labs     06/10/24  0500 06/11/24  0515 06/12/24  0600   WBC 10.4 8.3 8.9   HGB 8.7* 8.7* 8.9*    163 171     BMP:    Recent Labs     06/10/24  0500 06/11/24  0515 06/12/24  0530    146* 142   K 3.7 2.8* 3.6   * 111* 110   CO2 22 25 22   BUN 31* 23 26*   CREATININE 1.0 0.8* 0.8*   GLUCOSE 109* 75 115*     Hepatic:   Recent Labs     06/10/24  0500 06/11/24  0515   AST 10* 10*   ALT <5* 8*   BILITOT 0.5 0.6   ALKPHOS 91 91     Lipids:   Lab Results   Component Value Date/Time    CHOL 138 07/31/2022 06:04 PM    CHOL 118 02/24/2022 08:52 AM    HDL 37 07/31/2022 06:04 PM    TRIG 96 07/31/2022 06:04 PM     Hemoglobin A1C: No results found for: \"LABA1C\"  TSH: No results found for: \"TSH\"  Troponin:   Lab Results   Component Value Date/Time    TROPONINT <0.010 07/31/2022 02:15 AM    TROPONINT <0.010 07/30/2022 11:17 PM    TROPONINT <0.010 07/30/2022 03:00 PM     Lactic Acid: No results for input(s): \"LACTA\" in the last 72 hours.  BNP: No results for input(s): \"PROBNP\" in the last 72 hours.  UA:  Lab Results   Component Value Date/Time    NITRU POSITIVE 06/09/2024 08:30 AM    COLORU YELLOW 06/09/2024 08:30 AM    PHUR 5.5 06/09/2024 08:30 AM    WBCUA NEGATIVE 06/09/2024 08:30 AM    RBCUA NEGATIVE 06/09/2024 08:30 AM    MUCUS RARE 10/07/2018 06:45 AM    TRICHOMONAS NONE SEEN 10/07/2018 06:45 AM    YEAST FEW 06/26/2016 10:37 PM    BACTERIA MODERATE 06/09/2024 08:30 AM    CLARITYU CLEAR 06/09/2024 08:30 AM    LEUKOCYTESUR NEGATIVE 06/09/2024 08:30 AM    UROBILINOGEN 1.0 06/09/2024 08:30 AM    BILIRUBINUR SMALL NUMBER OR AMOUNT OBSERVED 06/09/2024 08:30 AM    BLOODU NEGATIVE 06/09/2024 08:30 AM    GLUCOSEU NEGATIVE 06/09/2024 08:30 AM    KETUA TRACE 06/09/2024 08:30 AM    AMORPHOUS RARE 06/26/2016 10:37 PM     Urine Cultures: No results found for: \"LABURIN\"  Blood Cultures: No results found for: \"BC\"  No results found for:

## 2024-06-12 NOTE — PROGRESS NOTES
Report called to Julia Cash RN, all questions answered, verbalized understanding, informed of transfer time of 1200.

## 2024-06-12 NOTE — CARE COORDINATION
Medical transportation to [facility] has been arranged at 12 PM through [facility].  CM will follow.

## 2024-06-12 NOTE — PLAN OF CARE
Problem: Safety - Adult  Goal: Free from fall injury  6/11/2024 2119 by Kate Hernandez RN  Outcome: Progressing  6/11/2024 1035 by Celsa Carranza RN  Outcome: Progressing  6/11/2024 1035 by Celsa Carranza RN  Outcome: Progressing     Problem: Pain  Goal: Verbalizes/displays adequate comfort level or baseline comfort level  6/11/2024 2119 by Kate Hernandez RN  Outcome: Progressing  6/11/2024 1035 by Celsa Carranza RN  Outcome: Progressing  6/11/2024 1035 by Celsa Carranza RN  Outcome: Progressing     Problem: Skin/Tissue Integrity  Goal: Absence of new skin breakdown  Description: 1.  Monitor for areas of redness and/or skin breakdown  2.  Assess vascular access sites hourly  3.  Every 4-6 hours minimum:  Change oxygen saturation probe site  4.  Every 4-6 hours:  If on nasal continuous positive airway pressure, respiratory therapy assess nares and determine need for appliance change or resting period.  6/11/2024 2119 by Kate Hernandez RN  Outcome: Progressing  6/11/2024 1035 by Celsa Carranza RN  Outcome: Progressing  6/11/2024 1035 by Celsa Carranza RN  Outcome: Progressing     Problem: ABCDS Injury Assessment  Goal: Absence of physical injury  6/11/2024 2119 by Kate Hernandez RN  Outcome: Progressing  6/11/2024 1035 by Celsa Carranza RN  Outcome: Progressing  6/11/2024 1035 by Celsa Carranza RN  Outcome: Progressing     Problem: Respiratory - Adult  Goal: Achieves optimal ventilation and oxygenation  6/11/2024 2119 by Kate Hernandez RN  Outcome: Progressing  6/11/2024 1035 by Celsa Carranza RN  Outcome: Progressing  6/11/2024 1035 by Celsa Carranza RN  Outcome: Progressing     Problem: Cardiovascular - Adult  Goal: Maintains optimal cardiac output and hemodynamic stability  6/11/2024 2119 by Kate Hernandez RN  Outcome: Progressing  6/11/2024 1035 by Celsa Carranza, RN  Outcome: Progressing  6/11/2024 1035 by Celsa Carranza, SRINI  Outcome:  Progressing  Goal: Absence of cardiac dysrhythmias or at baseline  6/11/2024 2119 by Kate Hernandez RN  Outcome: Progressing  6/11/2024 1035 by Celsa Carranza RN  Outcome: Progressing  6/11/2024 1035 by Celsa Carranza RN  Outcome: Progressing     Problem: Skin/Tissue Integrity - Adult  Goal: Incisions, wounds, or drain sites healing without S/S of infection  6/11/2024 2119 by Kate Hernandez RN  Outcome: Progressing  6/11/2024 1035 by Celsa Carranza RN  Outcome: Progressing  6/11/2024 1035 by Celsa Carranza RN  Outcome: Progressing     Problem: Gastrointestinal - Adult  Goal: Maintains or returns to baseline bowel function  6/11/2024 2119 by Kate Hernandez RN  Outcome: Progressing  6/11/2024 1035 by Celsa Carranza RN  Outcome: Progressing  6/11/2024 1035 by Celsa Carranza RN  Outcome: Progressing  Goal: Maintains adequate nutritional intake  6/11/2024 2119 by Kate Hernandez RN  Outcome: Progressing  6/11/2024 1035 by Celsa Carranza RN  Outcome: Progressing  6/11/2024 1035 by Celsa Carranza RN  Outcome: Progressing     Problem: Genitourinary - Adult  Goal: Absence of urinary retention  6/11/2024 2119 by Kate Hernandez RN  Outcome: Progressing  6/11/2024 1035 by Celsa Carranza RN  Outcome: Progressing  6/11/2024 1035 by Celsa Carranza RN  Outcome: Progressing     Problem: Nutrition Deficit:  Goal: Optimize nutritional status  6/11/2024 2119 by Kate Hernandez RN  Outcome: Progressing  6/11/2024 1035 by Celsa Carranza RN  Outcome: Progressing  6/11/2024 1035 by Celsa Carranza RN  Outcome: Progressing     Problem: Metabolic/Fluid and Electrolytes - Adult  Goal: Electrolytes maintained within normal limits  6/11/2024 2119 by Kate Hernandez RN  Outcome: Progressing  6/11/2024 1035 by Celsa Carranza RN  Outcome: Progressing  6/11/2024 1035 by Celsa Carranza, RN  Outcome: Progressing

## 2024-06-12 NOTE — PROGRESS NOTES
Phone call to wife, Becky - informed of discharge to [facility] today, she was very grateful and appreciative.

## 2024-06-12 NOTE — PLAN OF CARE
Problem: Safety - Adult  Goal: Free from fall injury  6/12/2024 1038 by Natacha Stover RN  Outcome: Completed  6/11/2024 2119 by Kate Hernandez RN  Outcome: Progressing     Problem: Pain  Goal: Verbalizes/displays adequate comfort level or baseline comfort level  6/12/2024 1038 by Natacha Stover RN  Outcome: Completed  6/11/2024 2119 by Kate Hernandez RN  Outcome: Progressing     Problem: Skin/Tissue Integrity  Goal: Absence of new skin breakdown  Description: 1.  Monitor for areas of redness and/or skin breakdown  2.  Assess vascular access sites hourly  3.  Every 4-6 hours minimum:  Change oxygen saturation probe site  4.  Every 4-6 hours:  If on nasal continuous positive airway pressure, respiratory therapy assess nares and determine need for appliance change or resting period.  6/12/2024 1038 by Natacha Stover RN  Outcome: Completed  6/11/2024 2119 by Kate Hernandez RN  Outcome: Progressing     Problem: ABCDS Injury Assessment  Goal: Absence of physical injury  6/12/2024 1038 by Natacha Stover RN  Outcome: Completed  6/11/2024 2119 by Kate Hernandez RN  Outcome: Progressing     Problem: Respiratory - Adult  Goal: Achieves optimal ventilation and oxygenation  6/12/2024 1038 by Natacha Stover RN  Outcome: Completed  6/11/2024 2119 by Kate Hernandez RN  Outcome: Progressing     Problem: Cardiovascular - Adult  Goal: Maintains optimal cardiac output and hemodynamic stability  6/12/2024 1038 by Natacha Stover RN  Outcome: Completed  6/11/2024 2119 by Kate Hernandez RN  Outcome: Progressing  Goal: Absence of cardiac dysrhythmias or at baseline  6/12/2024 1038 by Natacha Stover RN  Outcome: Completed  6/11/2024 2119 by Kate Hernandez RN  Outcome: Progressing     Problem: Skin/Tissue Integrity - Adult  Goal: Incisions, wounds, or drain sites healing without S/S of infection  6/12/2024 1038 by Natacha Stover RN  Outcome: Completed  6/11/2024 2119 by Kate Hernandez RN  Outcome: Progressing     Problem:

## 2024-06-14 LAB
CULTURE: NORMAL
CULTURE: NORMAL
Lab: NORMAL
Lab: NORMAL
SPECIMEN: NORMAL
SPECIMEN: NORMAL